# Patient Record
Sex: MALE | Race: WHITE | Employment: OTHER | URBAN - METROPOLITAN AREA
[De-identification: names, ages, dates, MRNs, and addresses within clinical notes are randomized per-mention and may not be internally consistent; named-entity substitution may affect disease eponyms.]

---

## 2017-01-23 ENCOUNTER — APPOINTMENT (OUTPATIENT)
Dept: ULTRASOUND IMAGING | Age: 67
End: 2017-01-23
Attending: EMERGENCY MEDICINE
Payer: COMMERCIAL

## 2017-01-23 ENCOUNTER — HOSPITAL ENCOUNTER (EMERGENCY)
Age: 67
Discharge: HOME | End: 2017-01-24
Attending: EMERGENCY MEDICINE
Payer: COMMERCIAL

## 2017-01-23 DIAGNOSIS — L03.116 CELLULITIS OF LEFT LOWER EXTREMITY: ICD-10-CM

## 2017-01-23 DIAGNOSIS — M10.9 ACUTE GOUT OF LEFT ANKLE, UNSPECIFIED CAUSE: Primary | ICD-10-CM

## 2017-01-23 LAB
ALBUMIN SERPL BCP-MCNC: 3.8 G/DL (ref 3.2–4.6)
ALBUMIN/GLOB SERPL: 1 {RATIO} (ref 1.2–3.5)
ALP SERPL-CCNC: 69 U/L (ref 50–136)
ALT SERPL-CCNC: 24 U/L (ref 12–65)
ANION GAP BLD CALC-SCNC: 6 MMOL/L (ref 7–16)
AST SERPL W P-5'-P-CCNC: 32 U/L (ref 15–37)
BASOPHILS # BLD AUTO: 0 K/UL (ref 0–0.2)
BASOPHILS # BLD: 0 % (ref 0–2)
BILIRUB SERPL-MCNC: 0.5 MG/DL (ref 0.2–1.1)
BUN SERPL-MCNC: 11 MG/DL (ref 8–23)
CALCIUM SERPL-MCNC: 8.7 MG/DL (ref 8.3–10.4)
CHLORIDE SERPL-SCNC: 103 MMOL/L (ref 98–107)
CO2 SERPL-SCNC: 30 MMOL/L (ref 21–32)
CREAT SERPL-MCNC: 1.05 MG/DL (ref 0.8–1.5)
DIFFERENTIAL METHOD BLD: ABNORMAL
EOSINOPHIL # BLD: 0.3 K/UL (ref 0–0.8)
EOSINOPHIL NFR BLD: 4 % (ref 0.5–7.8)
ERYTHROCYTE [DISTWIDTH] IN BLOOD BY AUTOMATED COUNT: 13.2 % (ref 11.9–14.6)
GLOBULIN SER CALC-MCNC: 3.7 G/DL (ref 2.3–3.5)
GLUCOSE SERPL-MCNC: 116 MG/DL (ref 65–100)
HCT VFR BLD AUTO: 48.3 % (ref 41.1–50.3)
HGB BLD-MCNC: 16.8 G/DL (ref 13.6–17.2)
IMM GRANULOCYTES # BLD: 0 K/UL (ref 0–0.5)
IMM GRANULOCYTES NFR BLD AUTO: 0.1 % (ref 0–5)
LYMPHOCYTES # BLD AUTO: 31 % (ref 13–44)
LYMPHOCYTES # BLD: 2.3 K/UL (ref 0.5–4.6)
MCH RBC QN AUTO: 30.3 PG (ref 26.1–32.9)
MCHC RBC AUTO-ENTMCNC: 34.8 G/DL (ref 31.4–35)
MCV RBC AUTO: 87.2 FL (ref 79.6–97.8)
MONOCYTES # BLD: 0.3 K/UL (ref 0.1–1.3)
MONOCYTES NFR BLD AUTO: 5 % (ref 4–12)
NEUTS SEG # BLD: 4.3 K/UL (ref 1.7–8.2)
NEUTS SEG NFR BLD AUTO: 60 % (ref 43–78)
PLATELET # BLD AUTO: 235 K/UL (ref 150–450)
PMV BLD AUTO: 10.1 FL (ref 10.8–14.1)
POTASSIUM SERPL-SCNC: 3.9 MMOL/L (ref 3.5–5.1)
PROT SERPL-MCNC: 7.5 G/DL (ref 6.3–8.2)
RBC # BLD AUTO: 5.54 M/UL (ref 4.23–5.67)
SODIUM SERPL-SCNC: 139 MMOL/L (ref 136–145)
TROPONIN I BLD-MCNC: 0 NG/ML (ref 0–0.08)
WBC # BLD AUTO: 7.2 K/UL (ref 4.3–11.1)

## 2017-01-23 PROCEDURE — 93971 EXTREMITY STUDY: CPT

## 2017-01-23 PROCEDURE — 85025 COMPLETE CBC W/AUTO DIFF WBC: CPT | Performed by: EMERGENCY MEDICINE

## 2017-01-23 PROCEDURE — 99283 EMERGENCY DEPT VISIT LOW MDM: CPT | Performed by: EMERGENCY MEDICINE

## 2017-01-23 PROCEDURE — 80053 COMPREHEN METABOLIC PANEL: CPT | Performed by: EMERGENCY MEDICINE

## 2017-01-23 PROCEDURE — 84484 ASSAY OF TROPONIN QUANT: CPT

## 2017-01-23 RX ORDER — SULFAMETHOXAZOLE AND TRIMETHOPRIM 800; 160 MG/1; MG/1
1 TABLET ORAL 2 TIMES DAILY
Qty: 20 TAB | Refills: 0 | Status: SHIPPED | OUTPATIENT
Start: 2017-01-23 | End: 2017-02-02

## 2017-01-24 VITALS
HEART RATE: 68 BPM | TEMPERATURE: 98.7 F | RESPIRATION RATE: 18 BRPM | SYSTOLIC BLOOD PRESSURE: 164 MMHG | HEIGHT: 70 IN | OXYGEN SATURATION: 95 % | BODY MASS INDEX: 34.93 KG/M2 | DIASTOLIC BLOOD PRESSURE: 85 MMHG | WEIGHT: 244 LBS

## 2017-01-24 NOTE — ED TRIAGE NOTES
Patient to ed with c/o left ankle/leg swelling--reports hx of gout--patient reports he has been taking his colchicine and indocine x1 week without relief--patient has been on keflex r/t skin surgery recently--patient with redness to left ankle and calf

## 2017-01-24 NOTE — ED NOTES
Pt discharged, instructions and rx given to pt. Pt v\u to instructions. Pt ambulatory on discharge in no acute distress.

## 2017-01-24 NOTE — ED PROVIDER NOTES
HPI Comments: Patient just finishing a round of Keflex  Subsequent to a surgical excision of some skin cancer to the left side of his neck. States his gout flares in his left ankle several days ago when he started taking some of his indomethacin as well as colchicine with moderate improvement in the pain today, but noted increased swelling to left lower extremity today. Concerned he may have blood clot. Patient is a 74 y.o. male presenting with lower extremity edema. The history is provided by the patient and the spouse. Leg Swelling    This is a new problem. The current episode started 12 to 24 hours ago. The problem occurs constantly. The problem has not changed since onset. The pain is present in the left ankle. The quality of the pain is described as aching. The pain is at a severity of 4/10. Associated symptoms include stiffness. Pertinent negatives include no numbness, full range of motion, no tingling, no itching, no back pain and no neck pain. The symptoms are aggravated by palpation and standing. He has tried rest for the symptoms. The treatment provided mild relief. There has been no history of extremity trauma. Family history is significant for gout.         Past Medical History:   Diagnosis Date    Anterior myocardial infarction [facility]) 10/2003    Arrhythmia      paroxysmal a fib    Arteriosclerotic coronary artery disease 11/2005    Arthritis      fingers    Cancer [facility])      prostate    Coronary atherosclerosis of native coronary vessel 10/2001    Family history of premature CAD      Father hx cabg/CHF  Brother CABG x 2    Gout      right toe but no problem now    Hypercholesterolemia     Hypertension        Past Surgical History:   Procedure Laterality Date    Hx colonoscopy      Pr cardiac surg procedure unlist       stent    Pr cardiac surg procedure unlist  March 9,2016     quidrupal bypass     Hx coronary artery bypass graft  3/9/2016     x4 - Dr. Emilia Jacques Hx orthopaedic 2 back surg    Hx prostatectomy      Hx heent       tonsillectomy    Colonoscopy N/A 12/21/2016     COLONOSCOPY  BMI 34 performed by Jayshree Lima MD at [facility] ENDOSCOPY         Family History:   Problem Relation Age of Onset    Heart Disease Father     Diabetes Brother     Heart Disease Brother     Cancer Sister     Colon Cancer Neg Hx        Social History     Social History    Marital status:      Spouse name: N/A    Number of children: N/A    Years of education: N/A     Occupational History    Not on file. Social History Main Topics    Smoking status: Never Smoker    Smokeless tobacco: Never Used    Alcohol use Yes      Comment: beer socially-seldom    Drug use: No    Sexual activity: Not on file     Other Topics Concern    Not on file     Social History Narrative         ALLERGIES: Latex; Codeine; and Tape [adhesive]    Review of Systems   Constitutional: Negative for chills and fever. Musculoskeletal: Positive for arthralgias and stiffness. Negative for back pain and neck pain. Skin: Positive for color change. Negative for itching, rash and wound. Neurological: Negative for tingling and numbness. All other systems reviewed and are negative. Vitals:    01/23/17 2156   BP: 168/86   Pulse: 70   Resp: 18   Temp: 99.1 °F (37.3 °C)   SpO2: 94%   Weight: 110.7 kg (244 lb)   Height: 5' 10\" (1.778 m)            Physical Exam   Constitutional: He is oriented to person, place, and time. He appears well-developed and well-nourished. No distress. HENT:   Head: Normocephalic and atraumatic. Right Ear: Tympanic membrane and external ear normal.   Left Ear: Tympanic membrane and external ear normal.   Mouth/Throat: Oropharynx is clear and moist.   Eyes: Conjunctivae and EOM are normal. Pupils are equal, round, and reactive to light. Neck: Normal range of motion. Neck supple. No tracheal deviation present.    Cardiovascular: Normal rate, regular rhythm, normal heart sounds and intact distal pulses. Exam reveals no gallop and no friction rub. No murmur heard. Pulmonary/Chest: Effort normal and breath sounds normal. No respiratory distress. He has no wheezes. Abdominal: Soft. Bowel sounds are normal. He exhibits no distension and no mass. There is no hepatosplenomegaly. There is no tenderness. There is no rebound and no guarding. Musculoskeletal: Normal range of motion. He exhibits edema (2+ left lower extremity with mild erythema and warmth). Lymphadenopathy:     He has no cervical adenopathy. Neurological: He is alert and oriented to person, place, and time. He displays normal reflexes. No cranial nerve deficit. Skin: Skin is warm and dry. No rash noted. He is not diaphoretic. No erythema. Psychiatric: He has a normal mood and affect. Nursing note and vitals reviewed. MDM  Number of Diagnoses or Management Options  Acute gout of left ankle, unspecified cause: new and requires workup  Cellulitis of left lower extremity: new and requires workup     Amount and/or Complexity of Data Reviewed  Clinical lab tests: ordered and reviewed  Tests in the radiology section of CPT®: ordered and reviewed  Obtain history from someone other than the patient: yes  Review and summarize past medical records: yes    Risk of Complications, Morbidity, and/or Mortality  Presenting problems: high  Diagnostic procedures: moderate  Management options: high    Patient Progress  Patient progress: stable    ED Course       Procedures    The patient was observed in the ED. Although I suspect the erythema warmth and swelling are all due to his gout,  I will start him on Bactrim versus possible secondary cellulitis.     Results Reviewed:      Recent Results (from the past 24 hour(s))   CBC WITH AUTOMATED DIFF    Collection Time: 01/23/17  8:15 PM   Result Value Ref Range    WBC 7.2 4.3 - 11.1 K/uL    RBC 5.54 4.23 - 5.67 M/uL    HGB 16.8 13.6 - 17.2 g/dL    HCT 48.3 41.1 - 50.3 %    MCV 87.2 79.6 - 97.8 FL    MCH 30.3 26.1 - 32.9 PG    MCHC 34.8 31.4 - 35.0 g/dL    RDW 13.2 11.9 - 14.6 %    PLATELET 006 417 - 495 K/uL    MPV 10.1 (L) 10.8 - 14.1 FL    DF AUTOMATED      NEUTROPHILS 60 43 - 78 %    LYMPHOCYTES 31 13 - 44 %    MONOCYTES 5 4.0 - 12.0 %    EOSINOPHILS 4 0.5 - 7.8 %    BASOPHILS 0 0.0 - 2.0 %    IMMATURE GRANULOCYTES 0.1 0.0 - 5.0 %    ABS. NEUTROPHILS 4.3 1.7 - 8.2 K/UL    ABS. LYMPHOCYTES 2.3 0.5 - 4.6 K/UL    ABS. MONOCYTES 0.3 0.1 - 1.3 K/UL    ABS. EOSINOPHILS 0.3 0.0 - 0.8 K/UL    ABS. BASOPHILS 0.0 0.0 - 0.2 K/UL    ABS. IMM. GRANS. 0.0 0.0 - 0.5 K/UL   METABOLIC PANEL, COMPREHENSIVE    Collection Time: 01/23/17  8:15 PM   Result Value Ref Range    Sodium 139 136 - 145 mmol/L    Potassium 3.9 3.5 - 5.1 mmol/L    Chloride 103 98 - 107 mmol/L    CO2 30 21 - 32 mmol/L    Anion gap 6 (L) 7 - 16 mmol/L    Glucose 116 (H) 65 - 100 mg/dL    BUN 11 8 - 23 MG/DL    Creatinine 1.05 0.8 - 1.5 MG/DL    GFR est AA >60 >60 ml/min/1.73m2    GFR est non-AA >60 >60 ml/min/1.73m2    Calcium 8.7 8.3 - 10.4 MG/DL    Bilirubin, total 0.5 0.2 - 1.1 MG/DL    ALT 24 12 - 65 U/L    AST 32 15 - 37 U/L    Alk. phosphatase 69 50 - 136 U/L    Protein, total 7.5 6.3 - 8.2 g/dL    Albumin 3.8 3.2 - 4.6 g/dL    Globulin 3.7 (H) 2.3 - 3.5 g/dL    A-G Ratio 1.0 (L) 1.2 - 3.5     POC TROPONIN-I    Collection Time: 01/23/17 10:13 PM   Result Value Ref Range    Troponin-I (POC) 0 0.0 - 0.08 ng/ml     DUPLEX LOWER EXT VENOUS LEFT   Final Result   IMPRESSION: Negative for sonographic evidence of deep venous thrombosis left   lower extremity. I discussed the results of all labs, procedures, radiographs, and treatments with the patient and available family. Treatment plan is agreed upon and the patient is ready for discharge. All voiced understanding of the discharge plan and medication instructions or changes as appropriate. Questions about treatment in the ED were answered.   All were encouraged to return should symptoms worsen or new problems develop.

## 2017-02-27 ENCOUNTER — HOSPITAL ENCOUNTER (EMERGENCY)
Age: 67
Discharge: HOME | End: 2017-02-27
Attending: EMERGENCY MEDICINE
Payer: COMMERCIAL

## 2017-02-27 ENCOUNTER — APPOINTMENT (OUTPATIENT)
Dept: GENERAL RADIOLOGY | Age: 67
End: 2017-02-27
Payer: COMMERCIAL

## 2017-02-27 VITALS
BODY MASS INDEX: 35.79 KG/M2 | OXYGEN SATURATION: 95 % | SYSTOLIC BLOOD PRESSURE: 129 MMHG | HEIGHT: 70 IN | DIASTOLIC BLOOD PRESSURE: 70 MMHG | WEIGHT: 250 LBS | HEART RATE: 68 BPM | RESPIRATION RATE: 18 BRPM | TEMPERATURE: 98.9 F

## 2017-02-27 DIAGNOSIS — M54.50 ACUTE RIGHT-SIDED LOW BACK PAIN WITHOUT SCIATICA: Primary | ICD-10-CM

## 2017-02-27 PROCEDURE — 96372 THER/PROPH/DIAG INJ SC/IM: CPT | Performed by: PHYSICIAN ASSISTANT

## 2017-02-27 PROCEDURE — 74011250636 HC RX REV CODE- 250/636

## 2017-02-27 PROCEDURE — 99284 EMERGENCY DEPT VISIT MOD MDM: CPT | Performed by: PHYSICIAN ASSISTANT

## 2017-02-27 PROCEDURE — 72100 X-RAY EXAM L-S SPINE 2/3 VWS: CPT

## 2017-02-27 PROCEDURE — 81003 URINALYSIS AUTO W/O SCOPE: CPT | Performed by: PHYSICIAN ASSISTANT

## 2017-02-27 PROCEDURE — 74011250636 HC RX REV CODE- 250/636: Performed by: PHYSICIAN ASSISTANT

## 2017-02-27 RX ORDER — METHOCARBAMOL 750 MG/1
750 TABLET, FILM COATED ORAL 4 TIMES DAILY
Qty: 40 TAB | Refills: 0 | Status: SHIPPED | OUTPATIENT
Start: 2017-02-27 | End: 2017-03-09

## 2017-02-27 RX ORDER — DIAZEPAM 10 MG/2ML
5 INJECTION INTRAMUSCULAR
Status: COMPLETED | OUTPATIENT
Start: 2017-02-27 | End: 2017-02-27

## 2017-02-27 RX ORDER — TRAMADOL HYDROCHLORIDE 50 MG/1
50 TABLET ORAL
Qty: 20 TAB | Refills: 0 | Status: SHIPPED | OUTPATIENT
Start: 2017-02-27 | End: 2018-03-28

## 2017-02-27 RX ADMIN — DIAZEPAM 5 MG: 5 INJECTION, SOLUTION INTRAMUSCULAR; INTRAVENOUS at 15:01

## 2017-02-27 NOTE — DISCHARGE INSTRUCTIONS
Back Pain: Care Instructions  Your Care Instructions    Back pain has many possible causes. It is often related to problems with muscles and ligaments of the back. It may also be related to problems with the nerves, discs, or bones of the back. Moving, lifting, standing, sitting, or sleeping in an awkward way can strain the back. Sometimes you don't notice the injury until later. Arthritis is another common cause of back pain. Although it may hurt a lot, back pain usually improves on its own within several weeks. Most people recover in 12 weeks or less. Using good home treatment and being careful not to stress your back can help you feel better sooner. Follow-up care is a key part of your treatment and safety. Be sure to make and go to all appointments, and call your doctor if you are having problems. Its also a good idea to know your test results and keep a list of the medicines you take. How can you care for yourself at home? · Sit or lie in positions that are most comfortable and reduce your pain. Try one of these positions when you lie down:  ¨ Lie on your back with your knees bent and supported by large pillows. ¨ Lie on the floor with your legs on the seat of a sofa or chair. Jenna Renetta on your side with your knees and hips bent and a pillow between your legs. ¨ Lie on your stomach if it does not make pain worse. · Do not sit up in bed, and avoid soft couches and twisted positions. Bed rest can help relieve pain at first, but it delays healing. Avoid bed rest after the first day of back pain. · Change positions every 30 minutes. If you must sit for long periods of time, take breaks from sitting. Get up and walk around, or lie in a comfortable position. · Try using a heating pad on a low or medium setting for 15 to 20 minutes every 2 or 3 hours. Try a warm shower in place of one session with the heating pad. · You can also try an ice pack for 10 to 15 minutes every 2 to 3 hours.  Put a thin cloth between the ice pack and your skin. · Take pain medicines exactly as directed. ¨ If the doctor gave you a prescription medicine for pain, take it as prescribed. ¨ If you are not taking a prescription pain medicine, ask your doctor if you can take an over-the-counter medicine. · Take short walks several times a day. You can start with 5 to 10 minutes, 3 or 4 times a day, and work up to longer walks. Walk on level surfaces and avoid hills and stairs until your back is better. · Return to work and other activities as soon as you can. Continued rest without activity is usually not good for your back. · To prevent future back pain, do exercises to stretch and strengthen your back and stomach. Learn how to use good posture, safe lifting techniques, and proper body mechanics. When should you call for help? Call your doctor now or seek immediate medical care if:  · You have new or worsening numbness in your legs. · You have new or worsening weakness in your legs. (This could make it hard to stand up.)  · You lose control of your bladder or bowels. Watch closely for changes in your health, and be sure to contact your doctor if:  · Your pain gets worse. · You are not getting better after 2 weeks. Where can you learn more? Go to http://prasad-kelly.info/. Enter S971 in the search box to learn more about \"Back Pain: Care Instructions. \"  Current as of: May 23, 2016  Content Version: 11.1  © 0930-5603 [email or web address]. Care instructions adapted under license by [employer] (which disclaims liability or warranty for this information). If you have questions about a medical condition or this instruction, always ask your healthcare professional. [address] any warranty or liability for your use of this information. Back Stretches: Exercises  Your Care Instructions  Here are some examples of exercises for stretching your back.  Start each exercise slowly. Ease off the exercise if you start to have pain. Your doctor or physical therapist will tell you when you can start these exercises and which ones will work best for you. How to do the exercises  Overhead stretch    1. Stand comfortably with your feet shoulder-width apart. 2. Looking straight ahead, raise both arms over your head and reach toward the ceiling. Do not allow your head to tilt back. 3. Hold for 15 to 30 seconds, then lower your arms to your sides. 4. Repeat 2 to 4 times. Side stretch    1. Stand comfortably with your feet shoulder-width apart. 2. Raise one arm over your head, and then lean to the other side. 3. Slide your hand down your leg as you let the weight of your arm gently stretch your side muscles. Hold for 15 to 30 seconds. 4. Repeat 2 to 4 times on each side. Press-up    1. Lie on your stomach, supporting your body with your forearms. 2. Press your elbows down into the floor to raise your upper back. As you do this, relax your stomach muscles and allow your back to arch without using your back muscles. As your press up, do not let your hips or pelvis come off the floor. 3. Hold for 15 to 30 seconds, then relax. 4. Repeat 2 to 4 times. Relax and rest    1. Lie on your back with a rolled towel under your neck and a pillow under your knees. Extend your arms comfortably to your sides. 2. Relax and breathe normally. 3. Remain in this position for about 10 minutes. 4. If you can, do this 2 or 3 times each day. Follow-up care is a key part of your treatment and safety. Be sure to make and go to all appointments, and call your doctor if you are having problems. It's also a good idea to know your test results and keep a list of the medicines you take. Where can you learn more? Go to http://prasad-kelly.info/. Enter E197 in the search box to learn more about \"Back Stretches: Exercises. \"  Current as of: May 23, 2016  Content Version: 11.1  © 8932-1859 [employer]. Care instructions adapted under license by [employer] (which disclaims liability or warranty for this information). If you have questions about a medical condition or this instruction, always ask your healthcare professional. [address] any warranty or liability for your use of this information.

## 2017-02-27 NOTE — ED TRIAGE NOTES
Reports lower back pain. States has had back surgery before. Patient states has been going on a while but worsened on Friday.

## 2017-02-27 NOTE — ED PROVIDER NOTES
Patient is a 74 y.o. male presenting with back pain. The history is provided by the patient. Back Pain    This is a new problem. The current episode started more than 2 days ago. The problem has not changed since onset. The problem occurs constantly. Patient reports not work related injury. The pain is associated with no known injury. The pain is present in the lower back. The quality of the pain is described as aching. The pain does not radiate. The pain is at a severity of 10/10. The pain is moderate. The symptoms are aggravated by certain positions. The pain is the same all the time. Pertinent negatives include no leg pain, no paresthesias, no paresis, no tingling and no weakness. He has tried NSAIDs and bed rest for the symptoms. The treatment provided no relief. Risk factors include a sedentary lifestyle. The patient's surgical history includes laminectomy.        Past Medical History:   Diagnosis Date    Anterior myocardial infarction [facility]) 10/2003    Arrhythmia     paroxysmal a fib    Arteriosclerotic coronary artery disease 11/2005    Arthritis     fingers    Cancer [facility])     prostate    Coronary atherosclerosis of native coronary vessel 10/2001    Family history of premature CAD     Father hx cabg/CHF  Brother CABG x 2    Gout     right toe but no problem now    Hypercholesterolemia     Hypertension        Past Surgical History:   Procedure Laterality Date    CARDIAC SURG PROCEDURE UNLIST      stent    CARDIAC SURG PROCEDURE UNLIST  March 9,2016    quidrupal bypass     COLONOSCOPY N/A 12/21/2016    COLONOSCOPY  BMI 34 performed by Raj Albright MD at [facility] ENDOSCOPY    HX COLONOSCOPY      HX CORONARY ARTERY BYPASS GRAFT  3/9/2016    x4 - Dr. Chantal Wheeler    HX HEENT      tonsillectomy    HX ORTHOPAEDIC      2 back surg    HX PROSTATECTOMY           Family History:   Problem Relation Age of Onset    Heart Disease Father     Diabetes Brother     Heart Disease Brother     Cancer Sister    Nolia Darling Colon Cancer Neg Hx        Social History     Social History    Marital status:      Spouse name: N/A    Number of children: N/A    Years of education: N/A     Occupational History    Not on file. Social History Main Topics    Smoking status: Never Smoker    Smokeless tobacco: Never Used    Alcohol use Yes      Comment: beer socially-seldom    Drug use: No    Sexual activity: Not on file     Other Topics Concern    Not on file     Social History Narrative         ALLERGIES: Latex; Codeine; and Tape [adhesive]    Review of Systems   Musculoskeletal: Positive for back pain. Neurological: Negative for tingling, weakness and paresthesias. All other systems reviewed and are negative. Vitals:    02/27/17 1356   BP: 156/79   Pulse: 77   Resp: 16   Temp: 98.2 °F (36.8 °C)   SpO2: 95%   Weight: 113.4 kg (250 lb)   Height: 5' 10\" (1.778 m)            Physical Exam   Constitutional: He is oriented to person, place, and time. He appears well-developed and well-nourished. No distress. HENT:   Head: Normocephalic and atraumatic. Eyes: Conjunctivae and EOM are normal. Pupils are equal, round, and reactive to light. Neck: Normal range of motion. Neck supple. Cardiovascular: Normal rate and regular rhythm. Pulmonary/Chest: Effort normal and breath sounds normal. No respiratory distress. He has no wheezes. Abdominal: Soft. Bowel sounds are normal.   Musculoskeletal: He exhibits tenderness. He exhibits no edema. Back:    Rt lower back pain to palpation no bony pain, limited motion due to pain, no skin changes, no pain into buttocks or legs, no numbness or tingling into legs, no bowel or bladder symptoms    Neurological: He is alert and oriented to person, place, and time. Skin: Skin is warm. Nursing note and vitals reviewed.        MDM  Number of Diagnoses or Management Options  Diagnosis management comments: Urine dip -, l spine x rays with djd, pt given 5 mg valium im, starting to feel some better, will give rx for ultram and robaxin, stressed moist heat and motion, see pmd for recheck        Amount and/or Complexity of Data Reviewed  Clinical lab tests: ordered and reviewed  Tests in the radiology section of CPT®: ordered and reviewed  Review and summarize past medical records: yes    Risk of Complications, Morbidity, and/or Mortality  Presenting problems: moderate  Diagnostic procedures: low  Management options: low    Patient Progress  Patient progress: improved    ED Course       Procedures

## 2017-03-14 PROBLEM — I10 HYPERTENSION: Status: ACTIVE | Noted: 2017-03-14

## 2017-07-11 ENCOUNTER — HOSPITAL ENCOUNTER (OUTPATIENT)
Dept: GENERAL RADIOLOGY | Age: 67
Discharge: HOME | End: 2017-07-11
Payer: COMMERCIAL

## 2017-07-11 DIAGNOSIS — R05.8 RECURRENT COUGH: ICD-10-CM

## 2017-07-11 PROCEDURE — 71020 XR CHEST PA LAT: CPT

## 2018-09-11 ENCOUNTER — HOSPITAL ENCOUNTER (OUTPATIENT)
Dept: LAB | Age: 68
Discharge: HOME | End: 2018-09-11
Payer: COMMERCIAL

## 2018-09-11 DIAGNOSIS — E78.5 DYSLIPIDEMIA: Chronic | ICD-10-CM

## 2018-09-11 LAB
ALBUMIN SERPL-MCNC: 3.7 G/DL (ref 3.2–4.6)
ALBUMIN/GLOB SERPL: 1 {RATIO} (ref 1.2–3.5)
ALP SERPL-CCNC: 57 U/L (ref 50–136)
ALT SERPL-CCNC: 26 U/L (ref 12–65)
AST SERPL-CCNC: 25 U/L (ref 15–37)
BILIRUB DIRECT SERPL-MCNC: <0.1 MG/DL
BILIRUB SERPL-MCNC: 0.6 MG/DL (ref 0.2–1.1)
CHOLEST SERPL-MCNC: 113 MG/DL
GLOBULIN SER CALC-MCNC: 3.7 G/DL (ref 2.3–3.5)
HDLC SERPL-MCNC: 36 MG/DL (ref 40–60)
HDLC SERPL: 3.1 {RATIO}
LDLC SERPL CALC-MCNC: 47 MG/DL
LIPID PROFILE,FLP: ABNORMAL
PROT SERPL-MCNC: 7.4 G/DL (ref 6.3–8.2)
TRIGL SERPL-MCNC: 150 MG/DL (ref 35–150)
VLDLC SERPL CALC-MCNC: 30 MG/DL (ref 6–23)

## 2018-09-11 PROCEDURE — 36415 COLL VENOUS BLD VENIPUNCTURE: CPT

## 2018-09-11 PROCEDURE — 80076 HEPATIC FUNCTION PANEL: CPT

## 2018-09-11 PROCEDURE — 80061 LIPID PANEL: CPT

## 2019-04-01 PROBLEM — E66.01 SEVERE OBESITY (HCC): Status: ACTIVE | Noted: 2019-04-01

## 2019-08-21 ENCOUNTER — HOSPITAL ENCOUNTER (OUTPATIENT)
Dept: LAB | Age: 69
Discharge: HOME | End: 2019-08-21
Payer: COMMERCIAL

## 2019-08-21 DIAGNOSIS — E78.5 DYSLIPIDEMIA: Chronic | ICD-10-CM

## 2019-08-21 LAB
ALBUMIN SERPL-MCNC: 3.7 G/DL (ref 3.2–4.6)
ALBUMIN/GLOB SERPL: 1 {RATIO} (ref 1.2–3.5)
ALP SERPL-CCNC: 59 U/L (ref 50–136)
ALT SERPL-CCNC: 26 U/L (ref 12–65)
AST SERPL-CCNC: 21 U/L (ref 15–37)
BILIRUB DIRECT SERPL-MCNC: 0.1 MG/DL
BILIRUB SERPL-MCNC: 0.8 MG/DL (ref 0.2–1.1)
CHOLEST SERPL-MCNC: 118 MG/DL
GLOBULIN SER CALC-MCNC: 3.6 G/DL (ref 2.3–3.5)
HDLC SERPL-MCNC: 40 MG/DL (ref 40–60)
HDLC SERPL: 3 {RATIO}
LDLC SERPL CALC-MCNC: 54.2 MG/DL
LIPID PROFILE,FLP: ABNORMAL
PROT SERPL-MCNC: 7.3 G/DL (ref 6.3–8.2)
TRIGL SERPL-MCNC: 119 MG/DL (ref 35–150)
VLDLC SERPL CALC-MCNC: 23.8 MG/DL (ref 6–23)

## 2019-08-21 PROCEDURE — 80061 LIPID PANEL: CPT

## 2019-08-21 PROCEDURE — 80076 HEPATIC FUNCTION PANEL: CPT

## 2019-08-21 PROCEDURE — 36415 COLL VENOUS BLD VENIPUNCTURE: CPT

## 2020-08-24 ENCOUNTER — HOSPITAL ENCOUNTER (OUTPATIENT)
Dept: LAB | Age: 70
Discharge: HOME | End: 2020-08-24
Payer: COMMERCIAL

## 2020-08-24 DIAGNOSIS — I25.700 CORONARY ARTERY DISEASE INVOLVING CORONARY BYPASS GRAFT OF NATIVE HEART WITH UNSTABLE ANGINA PECTORIS (HCC): ICD-10-CM

## 2020-08-24 DIAGNOSIS — E78.5 DYSLIPIDEMIA: ICD-10-CM

## 2020-08-24 LAB
CHOLEST SERPL-MCNC: 157 MG/DL
HDLC SERPL-MCNC: 38 MG/DL (ref 40–60)
HDLC SERPL: 4.1 {RATIO}
LDLC SERPL CALC-MCNC: 87.8 MG/DL
LIPID PROFILE,FLP: ABNORMAL
TRIGL SERPL-MCNC: 156 MG/DL (ref 35–150)
VLDLC SERPL CALC-MCNC: 31.2 MG/DL (ref 6–23)

## 2020-08-24 PROCEDURE — 80061 LIPID PANEL: CPT

## 2020-08-24 PROCEDURE — 36415 COLL VENOUS BLD VENIPUNCTURE: CPT

## 2021-04-02 ENCOUNTER — HOSPITAL ENCOUNTER (OUTPATIENT)
Dept: LAB | Age: 71
Discharge: HOME | End: 2021-04-02
Payer: COMMERCIAL

## 2021-04-02 DIAGNOSIS — E78.5 DYSLIPIDEMIA: Chronic | ICD-10-CM

## 2021-04-02 LAB
ALBUMIN SERPL-MCNC: 3.7 G/DL (ref 3.2–4.6)
ALBUMIN/GLOB SERPL: 1 {RATIO} (ref 1.2–3.5)
ALP SERPL-CCNC: 70 U/L (ref 50–136)
ALT SERPL-CCNC: 24 U/L (ref 12–65)
AST SERPL-CCNC: 22 U/L (ref 15–37)
BILIRUB DIRECT SERPL-MCNC: 0.2 MG/DL
BILIRUB SERPL-MCNC: 0.9 MG/DL (ref 0.2–1.1)
CHOLEST SERPL-MCNC: 96 MG/DL
GLOBULIN SER CALC-MCNC: 3.8 G/DL (ref 2.3–3.5)
HDLC SERPL-MCNC: 39 MG/DL (ref 40–60)
HDLC SERPL: 2.5 {RATIO}
LDLC SERPL CALC-MCNC: 33.4 MG/DL
LIPID PROFILE,FLP: ABNORMAL
PROT SERPL-MCNC: 7.5 G/DL (ref 6.3–8.2)
TRIGL SERPL-MCNC: 118 MG/DL (ref 35–150)
VLDLC SERPL CALC-MCNC: 23.6 MG/DL (ref 6–23)

## 2021-04-02 PROCEDURE — 80076 HEPATIC FUNCTION PANEL: CPT

## 2021-04-02 PROCEDURE — 80061 LIPID PANEL: CPT

## 2021-04-02 PROCEDURE — 36415 COLL VENOUS BLD VENIPUNCTURE: CPT

## 2021-10-25 ENCOUNTER — HOSPITAL ENCOUNTER (OUTPATIENT)
Dept: LAB | Age: 71
Discharge: HOME | End: 2021-10-25

## 2021-10-25 PROCEDURE — 88305 TISSUE EXAM BY PATHOLOGIST: CPT

## 2021-12-04 ENCOUNTER — HOSPITAL ENCOUNTER (INPATIENT)
Age: 71
LOS: 13 days | Discharge: HOME HEALTH | DRG: 871 | End: 2021-12-17
Attending: EMERGENCY MEDICINE | Admitting: FAMILY MEDICINE
Payer: COMMERCIAL

## 2021-12-04 ENCOUNTER — APPOINTMENT (OUTPATIENT)
Dept: GENERAL RADIOLOGY | Age: 71
DRG: 871 | End: 2021-12-04
Attending: EMERGENCY MEDICINE
Payer: COMMERCIAL

## 2021-12-04 DIAGNOSIS — J12.82 PNEUMONIA DUE TO COVID-19 VIRUS: Primary | ICD-10-CM

## 2021-12-04 DIAGNOSIS — U07.1 PNEUMONIA DUE TO COVID-19 VIRUS: Primary | ICD-10-CM

## 2021-12-04 DIAGNOSIS — I48.91 ATRIAL FIBRILLATION WITH RVR (HCC): ICD-10-CM

## 2021-12-04 DIAGNOSIS — I25.10 CORONARY ARTERY DISEASE INVOLVING NATIVE CORONARY ARTERY OF NATIVE HEART WITHOUT ANGINA PECTORIS: ICD-10-CM

## 2021-12-04 DIAGNOSIS — I48.91 ATRIAL FIBRILLATION WITH RAPID VENTRICULAR RESPONSE (HCC): ICD-10-CM

## 2021-12-04 DIAGNOSIS — J96.01 ACUTE HYPOXEMIC RESPIRATORY FAILURE (HCC): ICD-10-CM

## 2021-12-04 DIAGNOSIS — I10 PRIMARY HYPERTENSION: ICD-10-CM

## 2021-12-04 DIAGNOSIS — R09.02 HYPOXIA: ICD-10-CM

## 2021-12-04 LAB
ALBUMIN SERPL-MCNC: 3.3 G/DL (ref 3.2–4.6)
ALBUMIN/GLOB SERPL: 0.7 {RATIO} (ref 1.2–3.5)
ALP SERPL-CCNC: 60 U/L (ref 50–136)
ALT SERPL-CCNC: 34 U/L (ref 12–65)
ANION GAP SERPL CALC-SCNC: 11 MMOL/L (ref 7–16)
AST SERPL-CCNC: 44 U/L (ref 15–37)
ATRIAL RATE: 178 BPM
BASOPHILS # BLD: 0 K/UL (ref 0–0.2)
BASOPHILS NFR BLD: 0 % (ref 0–2)
BILIRUB SERPL-MCNC: 1.7 MG/DL (ref 0.2–1.1)
BUN SERPL-MCNC: 19 MG/DL (ref 8–23)
CALCIUM SERPL-MCNC: 9 MG/DL (ref 8.3–10.4)
CALCULATED R AXIS, ECG10: 112 DEGREES
CALCULATED T AXIS, ECG11: 41 DEGREES
CHLORIDE SERPL-SCNC: 88 MMOL/L (ref 98–107)
CO2 SERPL-SCNC: 27 MMOL/L (ref 21–32)
CREAT SERPL-MCNC: 1.19 MG/DL (ref 0.8–1.5)
CRP SERPL-MCNC: 16.5 MG/DL (ref 0–0.9)
CRP SERPL-MCNC: 17.7 MG/DL (ref 0–0.9)
D DIMER PPP FEU-MCNC: 0.47 UG/ML(FEU)
DIAGNOSIS, 93000: NORMAL
DIFFERENTIAL METHOD BLD: ABNORMAL
EOSINOPHIL # BLD: 0.1 K/UL (ref 0–0.8)
EOSINOPHIL NFR BLD: 1 % (ref 0.5–7.8)
ERYTHROCYTE [DISTWIDTH] IN BLOOD BY AUTOMATED COUNT: 12.8 % (ref 11.9–14.6)
FERRITIN SERPL-MCNC: 410 NG/ML (ref 8–388)
GLOBULIN SER CALC-MCNC: 4.8 G/DL (ref 2.3–3.5)
GLUCOSE SERPL-MCNC: 133 MG/DL (ref 65–100)
HCT VFR BLD AUTO: 48.6 % (ref 41.1–50.3)
HGB BLD-MCNC: 16.9 G/DL (ref 13.6–17.2)
IMM GRANULOCYTES # BLD AUTO: 0.1 K/UL (ref 0–0.5)
IMM GRANULOCYTES NFR BLD AUTO: 1 % (ref 0–5)
LACTATE SERPL-SCNC: 1.5 MMOL/L (ref 0.4–2)
LYMPHOCYTES # BLD: 1 K/UL (ref 0.5–4.6)
LYMPHOCYTES NFR BLD: 8 % (ref 13–44)
MAGNESIUM SERPL-MCNC: 1.8 MG/DL (ref 1.8–2.4)
MCH RBC QN AUTO: 29.7 PG (ref 26.1–32.9)
MCHC RBC AUTO-ENTMCNC: 34.8 G/DL (ref 31.4–35)
MCV RBC AUTO: 85.4 FL (ref 79.6–97.8)
MONOCYTES # BLD: 1 K/UL (ref 0.1–1.3)
MONOCYTES NFR BLD: 8 % (ref 4–12)
NEUTS SEG # BLD: 10.3 K/UL (ref 1.7–8.2)
NEUTS SEG NFR BLD: 83 % (ref 43–78)
NRBC # BLD: 0 K/UL (ref 0–0.2)
PLATELET # BLD AUTO: 272 K/UL (ref 150–450)
PMV BLD AUTO: 9.9 FL (ref 9.4–12.3)
POTASSIUM SERPL-SCNC: 3.9 MMOL/L (ref 3.5–5.1)
PROCALCITONIN SERPL-MCNC: 0.29 NG/ML (ref 0–0.49)
PROCALCITONIN SERPL-MCNC: 0.31 NG/ML (ref 0–0.49)
PROT SERPL-MCNC: 8.1 G/DL (ref 6.3–8.2)
Q-T INTERVAL, ECG07: 286 MS
QRS DURATION, ECG06: 78 MS
QTC CALCULATION (BEZET), ECG08: 449 MS
RBC # BLD AUTO: 5.69 M/UL (ref 4.23–5.6)
SODIUM SERPL-SCNC: 126 MMOL/L (ref 136–145)
VENTRICULAR RATE, ECG03: 148 BPM
WBC # BLD AUTO: 12.4 K/UL (ref 4.3–11.1)

## 2021-12-04 PROCEDURE — 74011250637 HC RX REV CODE- 250/637: Performed by: INTERNAL MEDICINE

## 2021-12-04 PROCEDURE — 82728 ASSAY OF FERRITIN: CPT

## 2021-12-04 PROCEDURE — 74011000250 HC RX REV CODE- 250: Performed by: EMERGENCY MEDICINE

## 2021-12-04 PROCEDURE — 71045 X-RAY EXAM CHEST 1 VIEW: CPT

## 2021-12-04 PROCEDURE — 94760 N-INVAS EAR/PLS OXIMETRY 1: CPT

## 2021-12-04 PROCEDURE — 99223 1ST HOSP IP/OBS HIGH 75: CPT | Performed by: INTERNAL MEDICINE

## 2021-12-04 PROCEDURE — 74011250637 HC RX REV CODE- 250/637: Performed by: EMERGENCY MEDICINE

## 2021-12-04 PROCEDURE — 74011000250 HC RX REV CODE- 250: Performed by: FAMILY MEDICINE

## 2021-12-04 PROCEDURE — 74011250636 HC RX REV CODE- 250/636: Performed by: FAMILY MEDICINE

## 2021-12-04 PROCEDURE — 93005 ELECTROCARDIOGRAM TRACING: CPT | Performed by: EMERGENCY MEDICINE

## 2021-12-04 PROCEDURE — 65660000000 HC RM CCU STEPDOWN

## 2021-12-04 PROCEDURE — 83735 ASSAY OF MAGNESIUM: CPT

## 2021-12-04 PROCEDURE — 99285 EMERGENCY DEPT VISIT HI MDM: CPT

## 2021-12-04 PROCEDURE — 74011250636 HC RX REV CODE- 250/636: Performed by: EMERGENCY MEDICINE

## 2021-12-04 PROCEDURE — 83605 ASSAY OF LACTIC ACID: CPT

## 2021-12-04 PROCEDURE — 87040 BLOOD CULTURE FOR BACTERIA: CPT

## 2021-12-04 PROCEDURE — 80053 COMPREHEN METABOLIC PANEL: CPT

## 2021-12-04 PROCEDURE — 85379 FIBRIN DEGRADATION QUANT: CPT

## 2021-12-04 PROCEDURE — 86140 C-REACTIVE PROTEIN: CPT

## 2021-12-04 PROCEDURE — 77010033678 HC OXYGEN DAILY

## 2021-12-04 PROCEDURE — 96375 TX/PRO/DX INJ NEW DRUG ADDON: CPT

## 2021-12-04 PROCEDURE — 84145 PROCALCITONIN (PCT): CPT

## 2021-12-04 PROCEDURE — 94762 N-INVAS EAR/PLS OXIMTRY CONT: CPT

## 2021-12-04 PROCEDURE — 85025 COMPLETE CBC W/AUTO DIFF WBC: CPT

## 2021-12-04 PROCEDURE — XW0DXM6 INTRODUCTION OF BARICITINIB INTO MOUTH AND PHARYNX, EXTERNAL APPROACH, NEW TECHNOLOGY GROUP 6: ICD-10-PCS | Performed by: FAMILY MEDICINE

## 2021-12-04 PROCEDURE — 96374 THER/PROPH/DIAG INJ IV PUSH: CPT

## 2021-12-04 PROCEDURE — 74011250637 HC RX REV CODE- 250/637: Performed by: FAMILY MEDICINE

## 2021-12-04 PROCEDURE — 36415 COLL VENOUS BLD VENIPUNCTURE: CPT

## 2021-12-04 RX ORDER — ACETAMINOPHEN 650 MG/1
650 SUPPOSITORY RECTAL
Status: DISCONTINUED | OUTPATIENT
Start: 2021-12-04 | End: 2021-12-17 | Stop reason: HOSPADM

## 2021-12-04 RX ORDER — ONDANSETRON 2 MG/ML
4 INJECTION INTRAMUSCULAR; INTRAVENOUS
Status: DISCONTINUED | OUTPATIENT
Start: 2021-12-04 | End: 2021-12-17 | Stop reason: HOSPADM

## 2021-12-04 RX ORDER — ACETAMINOPHEN 500 MG
1000 TABLET ORAL
Status: COMPLETED | OUTPATIENT
Start: 2021-12-04 | End: 2021-12-04

## 2021-12-04 RX ORDER — GUAIFENESIN/DEXTROMETHORPHAN 100-10MG/5
5 SYRUP ORAL
Status: DISCONTINUED | OUTPATIENT
Start: 2021-12-04 | End: 2021-12-17 | Stop reason: HOSPADM

## 2021-12-04 RX ORDER — DEXAMETHASONE SODIUM PHOSPHATE 100 MG/10ML
6 INJECTION INTRAMUSCULAR; INTRAVENOUS EVERY 24 HOURS
Status: DISCONTINUED | OUTPATIENT
Start: 2021-12-05 | End: 2021-12-06

## 2021-12-04 RX ORDER — SODIUM CHLORIDE, SODIUM LACTATE, POTASSIUM CHLORIDE, CALCIUM CHLORIDE 600; 310; 30; 20 MG/100ML; MG/100ML; MG/100ML; MG/100ML
75 INJECTION, SOLUTION INTRAVENOUS CONTINUOUS
Status: DISCONTINUED | OUTPATIENT
Start: 2021-12-04 | End: 2021-12-06

## 2021-12-04 RX ORDER — ASPIRIN 81 MG/1
81 TABLET ORAL DAILY
Status: DISCONTINUED | OUTPATIENT
Start: 2021-12-05 | End: 2021-12-17 | Stop reason: HOSPADM

## 2021-12-04 RX ORDER — ENOXAPARIN SODIUM 100 MG/ML
30 INJECTION SUBCUTANEOUS EVERY 12 HOURS
Status: DISCONTINUED | OUTPATIENT
Start: 2021-12-04 | End: 2021-12-04

## 2021-12-04 RX ORDER — ACETAMINOPHEN 325 MG/1
650 TABLET ORAL
Status: DISCONTINUED | OUTPATIENT
Start: 2021-12-04 | End: 2021-12-17 | Stop reason: HOSPADM

## 2021-12-04 RX ORDER — SODIUM CHLORIDE 0.9 % (FLUSH) 0.9 %
5-10 SYRINGE (ML) INJECTION EVERY 8 HOURS
Status: DISCONTINUED | OUTPATIENT
Start: 2021-12-04 | End: 2021-12-09

## 2021-12-04 RX ORDER — DILTIAZEM HYDROCHLORIDE 5 MG/ML
10 INJECTION INTRAVENOUS ONCE
Status: COMPLETED | OUTPATIENT
Start: 2021-12-04 | End: 2021-12-04

## 2021-12-04 RX ORDER — HYDROCHLOROTHIAZIDE 25 MG/1
12.5 TABLET ORAL DAILY
Status: DISCONTINUED | OUTPATIENT
Start: 2021-12-05 | End: 2021-12-17 | Stop reason: HOSPADM

## 2021-12-04 RX ORDER — SODIUM CHLORIDE 0.9 % (FLUSH) 0.9 %
5-10 SYRINGE (ML) INJECTION AS NEEDED
Status: DISCONTINUED | OUTPATIENT
Start: 2021-12-04 | End: 2021-12-17 | Stop reason: HOSPADM

## 2021-12-04 RX ORDER — FLUTICASONE PROPIONATE 50 MCG
2 SPRAY, SUSPENSION (ML) NASAL DAILY
Status: DISCONTINUED | OUTPATIENT
Start: 2021-12-05 | End: 2021-12-17 | Stop reason: HOSPADM

## 2021-12-04 RX ORDER — SODIUM CHLORIDE 0.9 % (FLUSH) 0.9 %
5-40 SYRINGE (ML) INJECTION EVERY 8 HOURS
Status: DISCONTINUED | OUTPATIENT
Start: 2021-12-04 | End: 2021-12-17 | Stop reason: HOSPADM

## 2021-12-04 RX ORDER — METOPROLOL SUCCINATE 100 MG/1
100 TABLET, EXTENDED RELEASE ORAL DAILY
Status: DISCONTINUED | OUTPATIENT
Start: 2021-12-05 | End: 2021-12-04

## 2021-12-04 RX ORDER — ONDANSETRON 4 MG/1
4 TABLET, ORALLY DISINTEGRATING ORAL
Status: DISCONTINUED | OUTPATIENT
Start: 2021-12-04 | End: 2021-12-17 | Stop reason: HOSPADM

## 2021-12-04 RX ORDER — GUAIFENESIN 600 MG/1
600 TABLET, EXTENDED RELEASE ORAL EVERY 12 HOURS
Status: DISCONTINUED | OUTPATIENT
Start: 2021-12-04 | End: 2021-12-17 | Stop reason: HOSPADM

## 2021-12-04 RX ORDER — POLYETHYLENE GLYCOL 3350 17 G/17G
17 POWDER, FOR SOLUTION ORAL DAILY PRN
Status: DISCONTINUED | OUTPATIENT
Start: 2021-12-04 | End: 2021-12-17 | Stop reason: HOSPADM

## 2021-12-04 RX ORDER — DILTIAZEM HYDROCHLORIDE 30 MG/1
30 TABLET, FILM COATED ORAL
Status: DISCONTINUED | OUTPATIENT
Start: 2021-12-04 | End: 2021-12-04

## 2021-12-04 RX ORDER — ENOXAPARIN SODIUM 100 MG/ML
1 INJECTION SUBCUTANEOUS EVERY 12 HOURS
Status: DISCONTINUED | OUTPATIENT
Start: 2021-12-05 | End: 2021-12-08

## 2021-12-04 RX ORDER — METOPROLOL TARTRATE 5 MG/5ML
5 INJECTION INTRAVENOUS ONCE
Status: COMPLETED | OUTPATIENT
Start: 2021-12-04 | End: 2021-12-04

## 2021-12-04 RX ORDER — SODIUM CHLORIDE, SODIUM LACTATE, POTASSIUM CHLORIDE, CALCIUM CHLORIDE 600; 310; 30; 20 MG/100ML; MG/100ML; MG/100ML; MG/100ML
1000 INJECTION, SOLUTION INTRAVENOUS CONTINUOUS
Status: DISCONTINUED | OUTPATIENT
Start: 2021-12-04 | End: 2021-12-04

## 2021-12-04 RX ORDER — DEXAMETHASONE SODIUM PHOSPHATE 100 MG/10ML
6 INJECTION INTRAMUSCULAR; INTRAVENOUS
Status: COMPLETED | OUTPATIENT
Start: 2021-12-04 | End: 2021-12-04

## 2021-12-04 RX ORDER — SODIUM CHLORIDE 0.9 % (FLUSH) 0.9 %
5-40 SYRINGE (ML) INJECTION AS NEEDED
Status: DISCONTINUED | OUTPATIENT
Start: 2021-12-04 | End: 2021-12-17 | Stop reason: HOSPADM

## 2021-12-04 RX ORDER — METOPROLOL SUCCINATE 100 MG/1
100 TABLET, EXTENDED RELEASE ORAL EVERY 12 HOURS
Status: DISCONTINUED | OUTPATIENT
Start: 2021-12-04 | End: 2021-12-17 | Stop reason: HOSPADM

## 2021-12-04 RX ADMIN — ENOXAPARIN SODIUM 30 MG: 100 INJECTION SUBCUTANEOUS at 14:52

## 2021-12-04 RX ADMIN — SODIUM CHLORIDE, SODIUM LACTATE, POTASSIUM CHLORIDE, AND CALCIUM CHLORIDE 75 ML/HR: 600; 310; 30; 20 INJECTION, SOLUTION INTRAVENOUS at 11:32

## 2021-12-04 RX ADMIN — GUAIFENESIN 600 MG: 600 TABLET ORAL at 21:45

## 2021-12-04 RX ADMIN — DILTIAZEM HYDROCHLORIDE 30 MG: 30 TABLET, FILM COATED ORAL at 11:32

## 2021-12-04 RX ADMIN — ACETAMINOPHEN 1000 MG: 500 TABLET, FILM COATED ORAL at 09:01

## 2021-12-04 RX ADMIN — DILTIAZEM HYDROCHLORIDE 10 MG: 5 INJECTION INTRAVENOUS at 10:10

## 2021-12-04 RX ADMIN — Medication 10 ML: at 21:47

## 2021-12-04 RX ADMIN — METOPROLOL SUCCINATE 100 MG: 100 TABLET, EXTENDED RELEASE ORAL at 21:45

## 2021-12-04 RX ADMIN — DILTIAZEM HYDROCHLORIDE 30 MG: 30 TABLET, FILM COATED ORAL at 16:32

## 2021-12-04 RX ADMIN — DEXAMETHASONE SODIUM PHOSPHATE 6 MG: 10 INJECTION INTRAMUSCULAR; INTRAVENOUS at 10:02

## 2021-12-04 RX ADMIN — BARICITINIB 4 MG: 2 TABLET, FILM COATED ORAL at 14:52

## 2021-12-04 RX ADMIN — METOPROLOL TARTRATE 5 MG: 5 INJECTION INTRAVENOUS at 11:33

## 2021-12-04 RX ADMIN — GUAIFENESIN 600 MG: 600 TABLET ORAL at 14:52

## 2021-12-04 RX ADMIN — SODIUM CHLORIDE, SODIUM LACTATE, POTASSIUM CHLORIDE, AND CALCIUM CHLORIDE 1000 ML: 600; 310; 30; 20 INJECTION, SOLUTION INTRAVENOUS at 10:15

## 2021-12-04 RX ADMIN — Medication 10 ML: at 14:52

## 2021-12-04 RX ADMIN — Medication 5 ML: at 11:34

## 2021-12-04 NOTE — PROGRESS NOTES
MSN, CM:  Spoke with patient this afternoon about discharge planning. Patient lives with his wife \"Jesse\" and his sister Ulises Chiang" in own house with 5 steps for entrance. Patient has three children \"Radha, Anselmo Markham, and Enrique" which live locally. Patient is independent with all ADL's and requires no equipment for ambulation. Patient denies any home oxygen, HH or rehab in the past.  Patient confirms PCP is Dr. Harriet Hines and patient drives himself to all appointments. PT consulted for evaluation and recommendations. Case Management will continue to follow for any discharge needs. Care Management Interventions  PCP Verified by CM: Yes (Dr. Harriet Hines )  Mode of Transport at Discharge:  Other (see comment) (family to transport)  Health Maintenance Reviewed: Yes  Physical Therapy Consult: Yes  Support Systems: Spouse/Significant Other, Child(reddy), Other Family Member(s)  Confirm Follow Up Transport: Self  Freedom of Choice List was Provided with Basic Dialogue that Supports the Patient's Individualized Plan of Care/Goals, Treatment Preferences and Shares the Quality Data Associated with the Providers?: Yes  Discharge Location  Discharge Placement: Home

## 2021-12-04 NOTE — ED NOTES
TRANSFER - OUT REPORT:    Verbal report given to Elsa Luna (name) on Maricao Oats  being transferred to 803(unit) for routine progression of care       Report consisted of patients Situation, Background, Assessment and   Recommendations(SBAR). Information from the following report(s) SBAR, ED Summary, [facility] and Recent Results was reviewed with the receiving nurse. Lines:   Peripheral IV 12/04/21 Right Antecubital (Active)        Opportunity for questions and clarification was provided.       Patient transported with:   O2 @ 3 liters  Tech

## 2021-12-04 NOTE — PROGRESS NOTES
Pt admitted from ED he is alert and oriented rr are even dyspnea noted on exertion. O2 in place 3L NC lung sounds are coarse. Pt denies pain. Skin intact no issues noted dual skin assessment done with Ericka Mejias RN. Safety measures in place will continue to monitor.

## 2021-12-04 NOTE — H&P
[facility] Hospitalist Note     Admit Date:  2021  8:45 AM   Name:  Simon Daniels   Age:  73 y.o.  :  1950   MRN:  [id number]   PCP:  Elayne Epps MD  Treatment Team: Attending Provider: Danny Peña MD; Primary Nurse: Raymond Meyer RN    HPI/Subjective:   53-year-old man with past medical history significant for hypertension, CAD, paroxysmal A. fib, prostate cancer presented to ED with 1 week history of cough and congestion, associated with fevers, body aches, nausea and shortness of breath. Also reports decreased oral intake for few days. Denies any underlying lung disease. He was diagnosed with Covid on 12/3 at urgent care. Wife and sister also tested positive for Covid. Patient has not received Covid vaccination. In the ED patient was febrile up to 101.3, and A. fib with RVR, desatting to 88% on room air, requiring up to 3 L nasal cannula. Labs noted for WBC 12.4, hemoglobin 16.9, platelet 386, sodium 126 creatinine 1. 19. Chest x-ray consistent with viral pneumonitis. Hospitalist consulted for admission for further management.     Assessment and Plan:     Sepsis secondary to COVID-19 pneumonia:  Acute hypoxic respiratory failure secondary to COVID-19 pneumonia:  Meeting SIRS criteria on admission with possible source of infection  Desatting to 88% on room air, requiring up to 3 L nasal cannula  Continue supplemental oxygen as needed, wean as tolerated  Check procalcitonin, if elevated will consider to start patient on antibiotic to cover community-acquired pneumonia  Check D-dimer, if elevated will order CTA to rule out PE  Check inflammatory markers  Will order UA and blood culture to r/o any other source of infection  Start patient on Lovenox for DVT/PE prophylaxis  Start patient on Decadron 6 mg IV daily for 10 days  Start patient on baricitinib 4 mg twice daily for 14 days  Mucinex twice daily  Incentive spirometry  Encourage prone positioning    A. fib with RVR:  Likely triggered by acute illness and COVID-19 infection  Patient with history of paroxysmal A. fib, not on any anticoagulation  Heart rate in 140s on admission, has received Cardizem 10 mg IV once in the ED with improvement in heart rate to 120s  Give Lopressor 5 mg IV once  Start patient on Cardizem 30 mg 4 times daily  Resume Toprol-XL home dose  Lovenox for DVT/PE prophylaxis  Echocardiogram  Replete electrolyte as needed to keep potassium > 4 and magnesium >2  Cardiology consult, appreciate recommendation    Hyponatremia:  Likely secondary to dehydration, patient with decreased oral intake  Gentle hydration with NS, need to be careful for fluid overload state due to Covid infection  Monitor BMP    Hypertension/CAD/hyperlipidemia:  Resume home meds metoprolol, hydrochlorothiazide and aspirin  Patient at home on Repatha    History of prostate cancer:  Noted    Discharge planning: Admit to remote telemetry    DVT ppx ordered  Code status:  Full  Estimated LOS:  Greater than 2 midnights  Risk:  high    Hospital Problems as of 12/4/2021 Date Reviewed: 8/27/2021          Codes Class Noted - Resolved POA    Atrial fibrillation with RVR ([facility] ICD-10-CM: I48.91  ICD-9-CM: 427.31  12/4/2021 - Present Unknown        Acute hypoxemic respiratory failure ([facility] ICD-10-CM: J96.01  ICD-9-CM: 518.81  12/4/2021 - Present Unknown        * (Principal) COVID-19 ICD-10-CM: U07.1  ICD-9-CM: 079.89  12/4/2021 - Present Unknown        Hypertension ICD-10-CM: I10  ICD-9-CM: 401.9  3/14/2017 - Present Yes        Paroxysmal atrial fibrillation (HCC) ICD-10-CM: I48.0  ICD-9-CM: 427.31  3/12/2016 - Present Yes        CAD (coronary artery disease) ICD-10-CM: I25.10  ICD-9-CM: 414.00  3/12/2016 - Present Yes        Hypoxia ICD-10-CM: R09.02  ICD-9-CM: 799.02  3/11/2016 - Present Yes        S/P CABG x 4 ICD-10-CM: Z95.1  ICD-9-CM: V45.81  3/9/2016 - Present Yes                10 systems reviewed and negative except as noted in HPI.   Past Medical History:   Diagnosis Date    Anterior myocardial infarction [facility]) 10/2003    Arrhythmia     paroxysmal a fib    Arteriosclerotic coronary artery disease 2005    Arthritis     fingers    Cancer ([address]     prostate    Coronary atherosclerosis of native coronary vessel 10/2001    Family history of premature CAD     Father hx cabg/CHF  Brother CABG x 2    Gout     right toe but no problem now    Hypercholesterolemia     Hypertension       Past Surgical History:   Procedure Laterality Date    COLONOSCOPY N/A 2016    COLONOSCOPY  BMI 34 performed by Ron Billingsley [facility] at [facility] ENDOSCOPY    HX COLONOSCOPY      HX CORONARY ARTERY BYPASS GRAFT  3/9/2016    x4 - Dr. Wills Pair    HX HEENT      tonsillectomy    HX ORTHOPAEDIC      2 back surg    HX PROSTATECTOMY      [place] CARDIAC SURG PROCEDURE UNLIST      stent    [place] CARDIAC SURG PROCEDURE UNLIST      quidrupal bypass       Allergies   Allergen Reactions    Latex Rash    Codeine Nausea and Vomiting    Tape [Adhesive] Rash      Social History     Tobacco Use    Smoking status: Never Smoker    Smokeless tobacco: Never Used   Substance Use Topics    Alcohol use: Yes     Comment: beer socially-seldom      Family History   Problem Relation Age of Onset    Heart Disease Father     Diabetes Brother     Heart Disease Brother     Cancer Sister     Colon Cancer Neg Hx       Family history reviewed and noncontributory. Immunization History   Administered Date(s) Administered    TB Skin Test (PPD) Intradermal 2016    Tdap 10/21/2014     PTA Medications:  Prior to Admission Medications   Prescriptions Last Dose Informant Patient Reported? Taking?   aspirin delayed-release 81 mg tablet   Yes No   Sig: Take  by mouth every morning. evolocumab (REPATHA) syringe   No No   Si mL by SubCUTAneous route every fourteen (14) days.    fluticasone (FLONASE) 50 mcg/actuation nasal spray   No No   Sig: One spray in each nostril twice daily hydroCHLOROthiazide (HYDRODIURIL) 25 mg tablet   No No   Si/2 tablet by mouth daily   loratadine (CLARITIN) 10 mg tablet   Yes No   Sig: Take 10 mg by mouth every morning. metoprolol succinate (TOPROL-XL) 100 mg tablet   No No   Sig: Take 1 Tablet by mouth daily. nitroglycerin (NITROSTAT) 0.4 mg SL tablet   No No   Si Tablet by SubLINGual route every five (5) minutes as needed for Chest Pain. Up to 3 doses. trimethoprim-sulfamethoxazole (Bactrim DS) 160-800 mg per tablet   No No   Sig: Take 1 Tablet by mouth two (2) times a day. Facility-Administered Medications: None       Objective:     Patient Vitals for the past 24 hrs:   Temp Pulse Resp BP SpO2   21 1106 98.6 °F (37 °C)       21 1100  (!) 109 21 135/69 95 %   21 1044  (!) 117 23 130/77 94 %   21 1030  (!) 116 25 128/72 93 %   21 1014  (!) 127 24 118/68 94 %   21 0945    (!) 126/99 94 %   21 0944     94 %   21 0937 (!) 101.3 °F (38.5 °C) (!) 143 18 (!) 130/91 90 %   21 0935     (!) 88 %   21 0904     93 %   21 0845 (!) 100.6 °F (38.1 °C) (!) 137 20 (!) 151/76 (!) 88 %     Oxygen Therapy  O2 Sat (%): 95 % (21 1100)  Pulse via Oximetry: 106 beats per minute (21 1100)  O2 Device: Nasal cannula (21 1030)  O2 Flow Rate (L/min): 3 l/min (21 1030)    Estimated body mass index is 33 kg/m² as calculated from the following:    Height as of this encounter: 5' 10\" (1.778 m). Weight as of this encounter: 104.3 kg (230 lb). No intake or output data in the 24 hours ending 21 1121    *Note that automatically entered I/Os may not be accurate; dependent on patient compliance with collection and accurate  by assistants. Physical Exam:  General:    Alert. No acute distress  Eyes:   Normal sclerae. Extraocular movements intact. HENT:  Normocephalic, atraumatic.   Moist mucous membranes  CV:   Tachycardia, irregularly irregular. No m/r/g. Lungs:  Coarse breath sounds  Abdomen: Soft, nontender, nondistended. Extremities: Warm and dry. No cyanosis or edema. Neurologic: CN II-XII grossly intact. Sensation intact. Skin:     No rashes or jaundice. Normal coloration  Psych:  Normal mood and affect. I reviewed the labs, imaging, EKGs, telemetry, and other studies done this admission. Data Reviewed:   Recent Results (from the past 24 hour(s))   CBC WITH AUTOMATED DIFF    Collection Time: 12/04/21  8:50 AM   Result Value Ref Range    WBC 12.4 (H) 4.3 - 11.1 K/uL    RBC 5.69 (H) 4.23 - 5.6 M/uL    HGB 16.9 13.6 - 17.2 g/dL    HCT 48.6 41.1 - 50.3 %    MCV 85.4 79.6 - 97.8 FL    MCH 29.7 26.1 - 32.9 PG    MCHC 34.8 31.4 - 35.0 g/dL    RDW 12.8 11.9 - 14.6 %    PLATELET 978 348 - 547 K/uL    MPV 9.9 9.4 - 12.3 FL    ABSOLUTE NRBC 0.00 0.0 - 0.2 K/uL    DF AUTOMATED      NEUTROPHILS 83 (H) 43 - 78 %    LYMPHOCYTES 8 (L) 13 - 44 %    MONOCYTES 8 4.0 - 12.0 %    EOSINOPHILS 1 0.5 - 7.8 %    BASOPHILS 0 0.0 - 2.0 %    IMMATURE GRANULOCYTES 1 0.0 - 5.0 %    ABS. NEUTROPHILS 10.3 (H) 1.7 - 8.2 K/UL    ABS. LYMPHOCYTES 1.0 0.5 - 4.6 K/UL    ABS. MONOCYTES 1.0 0.1 - 1.3 K/UL    ABS. EOSINOPHILS 0.1 0.0 - 0.8 K/UL    ABS. BASOPHILS 0.0 0.0 - 0.2 K/UL    ABS. IMM. GRANS. 0.1 0.0 - 0.5 K/UL   METABOLIC PANEL, COMPREHENSIVE    Collection Time: 12/04/21  8:50 AM   Result Value Ref Range    Sodium 126 (L) 136 - 145 mmol/L    Potassium 3.9 3.5 - 5.1 mmol/L    Chloride 88 (L) 98 - 107 mmol/L    CO2 27 21 - 32 mmol/L    Anion gap 11 7 - 16 mmol/L    Glucose 133 (H) 65 - 100 mg/dL    BUN 19 8 - 23 MG/DL    Creatinine 1.19 0.8 - 1.5 MG/DL    GFR est AA >60 >60 ml/min/1.73m2    GFR est non-AA >60 >60 ml/min/1.73m2    Calcium 9.0 8.3 - 10.4 MG/DL    Bilirubin, total 1.7 (H) 0.2 - 1.1 MG/DL    ALT (SGPT) 34 12 - 65 U/L    AST (SGOT) 44 (H) 15 - 37 U/L    Alk.  phosphatase 60 50 - 136 U/L    Protein, total 8.1 6.3 - 8.2 g/dL    Albumin 3.3 3.2 - 4.6 g/dL    Globulin 4.8 (H) 2.3 - 3.5 g/dL    A-G Ratio 0.7 (L) 1.2 - 3.5     MAGNESIUM    Collection Time: 12/04/21  8:50 AM   Result Value Ref Range    Magnesium 1.8 1.8 - 2.4 mg/dL   EKG, 12 LEAD, INITIAL    Collection Time: 12/04/21  8:51 AM   Result Value Ref Range    Ventricular Rate 148 BPM    Atrial Rate 178 BPM    QRS Duration 78 ms    Q-T Interval 286 ms    QTC Calculation (Bezet) 449 ms    Calculated R Axis 112 degrees    Calculated T Axis 41 degrees    Diagnosis       Atrial fibrillation with rapid ventricular response  Right axis deviation  Nonspecific ST abnormality  Abnormal ECG  When compared with ECG of 10-MAR-2016 07:43,  Atrial fibrillation has replaced Sinus rhythm  Vent. rate has increased BY  81 BPM  QRS axis Shifted right  ST no longer elevated in Inferior leads  ST now depressed in Anterolateral leads  Confirmed by Keith Barker MD (), MODESTA MCCURDY ([id number]) on 12/4/2021 10:51:18 AM         All Micro Results     Procedure Component Value Units Date/Time    CULTURE, BLOOD [[id number]]     Order Status: Sent Specimen: Blood     CULTURE, BLOOD [[id number]]     Order Status: Sent Specimen: Blood           Current Facility-Administered Medications   Medication Dose Route Frequency    sodium chloride (NS) flush 5-10 mL  5-10 mL IntraVENous Q8H    sodium chloride (NS) flush 5-10 mL  5-10 mL IntraVENous PRN    metoprolol (LOPRESSOR) injection 5 mg  5 mg IntraVENous ONCE    dilTIAZem IR (CARDIZEM) tablet 30 mg  30 mg Oral TIDAC    lactated Ringers infusion  75 mL/hr IntraVENous CONTINUOUS    baricitinib (OLUMIANT) tablet 4 mg  4 mg Oral DAILY     Current Outpatient Medications   Medication Sig    nitroglycerin (NITROSTAT) 0.4 mg SL tablet 1 Tablet by SubLINGual route every five (5) minutes as needed for Chest Pain. Up to 3 doses.  evolocumab (REPATHA) syringe 1 mL by SubCUTAneous route every fourteen (14) days.     hydroCHLOROthiazide (HYDRODIURIL) 25 mg tablet 1/2 tablet by mouth daily    metoprolol succinate (TOPROL-XL) 100 mg tablet Take 1 Tablet by mouth daily.  trimethoprim-sulfamethoxazole (Bactrim DS) 160-800 mg per tablet Take 1 Tablet by mouth two (2) times a day.  loratadine (CLARITIN) 10 mg tablet Take 10 mg by mouth every morning.  fluticasone (FLONASE) 50 mcg/actuation nasal spray One spray in each nostril twice daily    aspirin delayed-release 81 mg tablet Take  by mouth every morning. Other Studies:  No results found for this visit on 12/04/21. XR CHEST PORT    Result Date: 12/4/2021  Chest X-ray INDICATION: Shortness of breath. Viral pneumonitis. A portable AP view of the chest was obtained. FINDINGS: New, subtle interstitial changes lateral right mid lung possibly reflecting viral pneumonitis. Left lung remains clear. No pneumothorax or pleural effusions. The heart size is normal.  Sternotomy changes again noted. Subtle interstitial changes lateral right midlung possibly reflecting viral pneumonitis       [unfilled]       Part of this note was written by using a voice dictation software and the note has been proof read but may still contain some grammatical/other typographical errors.     Signed:  Augustus Mansfield MD

## 2021-12-04 NOTE — PROGRESS NOTES
Reviewed notes for concerns      Per notes:    Yazidi    LIVES IN [facility]    [place] - [place]    MEWS [address]          Will continue to assess how to best serve this family

## 2021-12-04 NOTE — CONSULTS
[facility] Cardiology Consult           Date of  Admission: 12/4/2021  8:45 AM     Admission type:Emergency    Consulting MD: Ace Martino MD    Primary Cardiologist: Dr Megan Delong  Primary Care Physician: Vimal Castillo MD      Miguel Angel Wagoner is a 73 y.o. male admitted for COVID-19 [U07.1]; Acute hypoxemic respiratory failure ([facility] [J96.01]; Atrial fibrillation with RVR ([facility] [I48.91]. Patient complains of creasing shortness of breath cough and weakness. Patient is diagnosed with COVID-19 pneumonia with hypoxic respiratory failure. He is noted to have atrial fibrillation with rapid ventricular response. Patient has since converted back to sinus rhythm. Heart rate is stable. Patient is being treated symptomatically for COVID-19. Patient with history of hypertension dyslipidemia and coronary artery disease with coronary bypass grafting 03/2016 with left internal mammary artery the LAD, saphenous vein graft to the ramus intermedius,/vein graft to the posterior descending, sinus vein graft to the right posterior lateral branch. Patient currently denies any angina. Cardiac risk factors: smoking/ tobacco exposure, family history, dyslipidemia, male gender, hypertension.     Patient Active Problem List   Diagnosis Code    Sleep apnea G47.30    Obesity, unspecified E66.9    Dyslipidemia E78.5    NSTEMI (non-ST elevated myocardial infarction) ([facility] I21.4    S/P CABG x 4 Z95.1    Hypoxia R09.02    Paroxysmal atrial fibrillation (HCC) I48.0    CAD (coronary artery disease) I25.10    Coronary artery disease involving coronary bypass graft of native heart with unstable angina pectoris ([facility] I25.700    Hypertension I10    Severe obesity (HCC) E66.01    Atrial fibrillation with RVR (HCC) I48.91    Acute hypoxemic respiratory failure ([facility] J96.01    COVID-19 U07.1       Past Medical History:   Diagnosis Date    Anterior myocardial infarction ([facility] 10/2003    Arrhythmia     paroxysmal a fib    Arteriosclerotic coronary artery disease 11/2005    Arthritis     fingers    Cancer [facility])     prostate    Coronary atherosclerosis of native coronary vessel 10/2001    Family history of premature CAD     Father hx cabg/CHF  Brother CABG x 2    Gout     right toe but no problem now    Hypercholesterolemia     Hypertension      Allergies   Allergen Reactions    Latex Rash    Codeine Nausea and Vomiting    Tape [Adhesive] Rash      Family History   Problem Relation Age of Onset    Heart Disease Father     Diabetes Brother     Heart Disease Brother     Cancer Sister     Colon Cancer Neg Hx          Review of Symptoms:  Review of Systems   Constitutional: Positive for chills, fever and malaise/fatigue. Eyes: Negative for visual disturbance. Cardiovascular: Positive for palpitations. Negative for chest pain, dyspnea on exertion and syncope. Respiratory: Positive for cough and shortness of breath. Skin: Negative for color change and rash. Musculoskeletal: Negative for joint pain and muscle cramps. Gastrointestinal: Negative for abdominal pain, diarrhea and nausea. Genitourinary: Negative for dysuria. Neurological: Negative for dizziness and headaches. Psychiatric/Behavioral: Negative for depression. Physical Exam  Vitals:    12/04/21 1200 12/04/21 1533 12/04/21 1600 12/04/21 1830   BP: 122/64 135/66     Pulse: 83 76 76 78   Resp: 22 22     Temp: 98.7 °F (37.1 °C) 98.4 °F (36.9 °C)     SpO2: 95% 95%     Weight:       Height:           Physical Exam  Constitutional:       Appearance: He is well-developed. HENT:      Head: Normocephalic and atraumatic. Cardiovascular:      Rate and Rhythm: Normal rate and regular rhythm. Heart sounds: Normal heart sounds. Pulmonary:      Breath sounds: Decreased breath sounds present. No wheezing or rales. Abdominal:      General: Bowel sounds are normal.      Palpations: Abdomen is soft.    Musculoskeletal:         General: Normal range of motion. Skin:     General: Skin is warm and dry. Neurological:      Mental Status: He is alert and oriented to person, place, and time. Cardiographics    Telemetry: normal sinus rhythm  ECG: atrial fibrillation, rate 120  Echocardiogram: Not done. Last echocardiogram 2016 with normal left trigger systolic function no significant valvular heart disease    Labs:   Recent Labs     12/04/21  0850   *   K 3.9   MG 1.8   BUN 19   CREA 1.19   *   WBC 12.4*   HGB 16.9   HCT 48.6           Assessment/Plan:     Assessment:      Principal Problem:    COVID-19 (12/4/2021) -patient with acute COVID-19 pneumonia. He has hypoxic respiratory failure. This is managed per primary team.    Active Problems:    S/P CABG x 4 (3/9/2016) -patient with history of coronary disease and prior cardio bypass grafting. Patient denies angina at this time. Hypoxia (3/11/2016) -secondary to COVID-19 pneumonia. Paroxysmal atrial fibrillation ([facility] (3/12/2016) -patient with transient atrial fibrillation with rapid ventricular response. He is converted back to sinus rhythm. Increase metoprolol succinate to 100 mg twice daily. Discontinue diltiazem. Increase Lovenox to treatment dose 1 mg/kg every 12 hours. The patient is stable demonstrating no evidence of clinical decline can transition patient to oral anticoagulation such as Eliquis 5 mg twice daily prior to discharge. CAD (coronary artery disease) (3/12/2016) -no active angina. Hypertension (3/14/2017) -blood pressure currently well controlled. Increase metoprolol succinate to 100 mg twice daily. Atrial fibrillation with RVR ([address] (12/4/2021) -converted to sinus rhythm. Please see above. Acute hypoxemic respiratory failure ([address] (12/4/2021) -please see above. Patient currently stable with no active cardiovascular issues. We will be on standby please call with questions.             Thank you very much for this referral. We appreciate the opportunity to participate in this patient's care. We will follow along with above stated plan.     MD Indio Hester MD

## 2021-12-04 NOTE — ROUTINE PROCESS
TRANSFER - IN REPORT:    Verbal report received from Lia Cantrell, [facility] (name) on Zana Barthel  being received from ED (unit) for routine progression of care      Report consisted of patients Situation, Background, Assessment and   Recommendations(SBAR). Information from the following report(s) SBAR, Kardex and ED Summary was reviewed with the receiving nurse. Opportunity for questions and clarification was provided. Assessment completed upon patients arrival to unit and care assumed. SBAR received and given to primary receiving RN, April. Patient not on 8th @ transfer-in time.

## 2021-12-04 NOTE — ED TRIAGE NOTES
Patient ambulatory to triage with mask in place. Patient reports he tested positive for COVID. Sx started around Thanksgiving. Pt reports n/v, cough and fever. Pt has not been vaccinated.

## 2021-12-04 NOTE — ED PROVIDER NOTES
42-year-old male with history of paroxysmal atrial fibrillation, coronary artery disease, prostate cancer, gout, hypertension, high cholesterol presents with cough and congestion for the past week. He was diagnosed with Covid yesterday at [facility] urgent care. He reports fevers, body aches, shortness of breath, and vomiting. Wife and sister also tested positive for Covid. Denies history of underlying lung disease.       Positive For Covid-19  Associated symptoms: chills, congestion, cough, myalgias, nausea and vomiting    Associated symptoms: no chest pain, no confusion, no diarrhea, no headaches and no rash         Past Medical History:   Diagnosis Date    Anterior myocardial infarction [facility]) 10/2003    Arrhythmia     paroxysmal a fib    Arteriosclerotic coronary artery disease 11/2005    Arthritis     fingers    Cancer ([facility] [address]     prostate    Coronary atherosclerosis of native coronary vessel 10/2001    Family history of premature CAD     Father hx cabg/CHF  Brother CABG x 2    Gout     right toe but no problem now    Hypercholesterolemia     Hypertension        Past Surgical History:   Procedure Laterality Date    COLONOSCOPY N/A 12/21/2016    COLONOSCOPY  BMI 34 performed by Galdino Alaniz MD at [facility]    HX COLONOSCOPY      HX CORONARY ARTERY BYPASS GRAFT  3/9/2016    x4 - Dr. Edith Olmedo    HX HEENT      tonsillectomy    HX ORTHOPAEDIC      2 back surg    HX PROSTATECTOMY      TX CARDIAC SURG PROCEDURE UNLIST      stent    TX CARDIAC SURG PROCEDURE UNLIST  March 9,2016    quidrupal bypass          Family History:   Problem Relation Age of Onset    Heart Disease Father     Diabetes Brother     Heart Disease Brother     Cancer Sister     Colon Cancer Neg Hx        Social History     Socioeconomic History    Marital status:      Spouse name: Not on file    Number of children: Not on file    Years of education: Not on file    Highest education level: Not on file   Occupational History    Not on file   Tobacco Use    Smoking status: Never Smoker    Smokeless tobacco: Never Used   Substance and Sexual Activity    Alcohol use: Yes     Comment: beer socially-seldom    Drug use: No    Sexual activity: Not on file   Other Topics Concern    Not on file   Social History Narrative    Not on file     Social Determinants of Health     Financial Resource Strain:     Difficulty of Paying Living Expenses: Not on file   Food Insecurity:     Worried About Running Out of Food in the Last Year: Not on file    Emilio of Food in the Last Year: Not on file   Transportation Needs:     Lack of Transportation (Medical): Not on file    Lack of Transportation (Non-Medical): Not on file   Physical Activity:     Days of Exercise per Week: Not on file    Minutes of Exercise per Session: Not on file   Stress:     Feeling of Stress : Not on file   Social Connections:     Frequency of Communication with Friends and Family: Not on file    Frequency of Social Gatherings with Friends and Family: Not on file    Attends Yazdanism Services: Not on file    Active Member of [address] [place] [address] or Organizations: Not on file    Attends Club or Organization Meetings: Not on file    Marital Status: Not on file   Intimate Partner Violence:     Fear of Current or Ex-Partner: Not on file    Emotionally Abused: Not on file    Physically Abused: Not on file    Sexually Abused: Not on file   Housing Stability:     Unable to Pay for Housing in the Last Year: Not on file    Number of [place] in the Last Year: Not on file    Unstable Housing in the Last Year: Not on file         ALLERGIES: Latex, Codeine, and Tape [adhesive]    Review of Systems   Constitutional: Positive for appetite change, chills, fatigue and fever. HENT: Positive for congestion. Negative for hearing loss. Eyes: Negative for visual disturbance. Respiratory: Positive for cough and shortness of breath. Cardiovascular: Negative for chest pain. Gastrointestinal: Positive for nausea and vomiting. Negative for abdominal pain and diarrhea. Musculoskeletal: Positive for arthralgias and myalgias. Skin: Negative for rash. Neurological: Negative for headaches. Psychiatric/Behavioral: Negative for confusion. All other systems reviewed and are negative. Vitals:    12/04/21 0935 12/04/21 0937 12/04/21 0944 12/04/21 0945   BP:  (!) 130/91  (!) 126/99   Pulse:  (!) 143     Resp:  18     Temp:  (!) 101.3 °F (38.5 °C)     SpO2: (!) 88% 90% 94% 94%   Weight:       Height:                Physical Exam  Vitals and nursing note reviewed. Constitutional:       Appearance: Normal appearance. HENT:      Head: Normocephalic and atraumatic. Nose: Nose normal.      Mouth/Throat:      Mouth: Mucous membranes are moist.   Eyes:      Pupils: Pupils are equal, round, and reactive to light. Cardiovascular:      Rate and Rhythm: Tachycardia present. Rhythm irregular. Pulmonary:      Effort: Pulmonary effort is normal.      Breath sounds: No stridor. Abdominal:      General: Abdomen is flat. Musculoskeletal:         General: No deformity. Normal range of motion. Cervical back: Normal range of motion and neck supple. Skin:     General: Skin is warm and dry. Neurological:      General: No focal deficit present. Mental Status: He is alert. Mental status is at baseline. Psychiatric:         Mood and Affect: Mood normal.         Behavior: Behavior normal.          MDM  Number of Diagnoses or Management Options  Atrial fibrillation with rapid ventricular response ([address]  Hypoxia  Pneumonia due to COVID-19 virus  Diagnosis management comments: Parts of this document were created using dragon voice recognition software. The chart has been reviewed but errors may still be present. I wore appropriate PPE throughout this patient's ED visit. Sylvia Aleman MD, 9:59 AM    Will admit for hypoxia. Given decadron. Febrile with afib in RVR.  Will give fluids and cardizem. Amount and/or Complexity of Data Reviewed  Clinical lab tests: reviewed and ordered (Results for orders placed or performed during the hospital encounter of 12/04/21  -CBC WITH AUTOMATED DIFF:        Result                      Value             Ref Range           WBC                         12.4 (H)          4.3 - 11.1 K*       RBC                         5.69 (H)          4.23 - 5.6 M*       HGB                         16.9              13.6 - 17.2 *       HCT                         48.6              41.1 - 50.3 %       MCV                         85.4              79.6 - 97.8 *       MCH                         29.7              26.1 - 32.9 *       MCHC                        34.8              31.4 - 35.0 *       RDW                         12.8              11.9 - 14.6 %       PLATELET                    272               150 - 450 K/*       MPV                         9.9               9.4 - 12.3 FL       ABSOLUTE NRBC               0.00              0.0 - 0.2 K/*       DF                          AUTOMATED                             NEUTROPHILS                 83 (H)            43 - 78 %           LYMPHOCYTES                 8 (L)             13 - 44 %           MONOCYTES                   8                 4.0 - 12.0 %        EOSINOPHILS                 1                 0.5 - 7.8 %         BASOPHILS                   0                 0.0 - 2.0 %         IMMATURE GRANULOCYTES       1                 0.0 - 5.0 %         ABS. NEUTROPHILS            10.3 (H)          1.7 - 8.2 K/*       ABS. LYMPHOCYTES            1.0               0.5 - 4.6 K/*       ABS. MONOCYTES              1.0               0.1 - 1.3 K/*       ABS. EOSINOPHILS            0.1               0.0 - 0.8 K/*       ABS. BASOPHILS              0.0               0.0 - 0.2 K/*       ABS. IMM.  GRANS.            0.1               0.0 - 0.5 K/*  -METABOLIC PANEL, COMPREHENSIVE:        Result                      Value Ref Range           Sodium                      126 (L)           136 - 145 mm*       Potassium                   3.9               3.5 - 5.1 mm*       Chloride                    88 (L)            98 - 107 mmo*       CO2                         27                21 - 32 mmol*       Anion gap                   11                7 - 16 mmol/L       Glucose                     133 (H)           65 - 100 mg/*       BUN                         19                8 - 23 MG/DL        Creatinine                  1.19              0.8 - 1.5 MG*       GFR est AA                  >60               >60 ml/min/1*       GFR est non-AA              >60               >60 ml/min/1*       Calcium                     9.0               8.3 - 10.4 M*       Bilirubin, total            1.7 (H)           0.2 - 1.1 MG*       ALT (SGPT)                  34                12 - 65 U/L         AST (SGOT)                  44 (H)            15 - 37 U/L         Alk.  phosphatase            60                50 - 136 U/L        Protein, total              8.1               6.3 - 8.2 g/*       Albumin                     3.3               3.2 - 4.6 g/*       Globulin                    4.8 (H)           2.3 - 3.5 g/*       A-G Ratio                   0.7 (L)           1.2 - 3.5      -MAGNESIUM:        Result                      Value             Ref Range           Magnesium                   1.8               1.8 - 2.4 mg*  -EKG, 12 LEAD, INITIAL:        Result                      Value             Ref Range           Ventricular Rate            148               BPM                 Atrial Rate                 178               BPM                 QRS Duration                78                ms                  Q-T Interval                286               ms                  QTC Calculation (Bezet)     449               ms                  Calculated R Axis           112               degrees             Calculated T Axis           41 degrees             Diagnosis                                                     Atrial fibrillation with rapid ventricular response   Right axis deviation   Nonspecific ST abnormality   Abnormal ECG   When compared with ECG of 10-MAR-2016 07:43,   Atrial fibrillation has replaced Sinus rhythm   Vent. rate has increased BY  81 BPM   QRS axis Shifted right   ST no longer elevated in Inferior leads   ST now depressed in Anterolateral leads     )  Tests in the radiology section of CPT®: ordered and reviewed (XR CHEST PORT    Result Date: 12/4/2021  Chest X-ray INDICATION: Shortness of breath. Viral pneumonitis. A portable AP view of the chest was obtained. FINDINGS: New, subtle interstitial changes lateral right mid lung possibly reflecting viral pneumonitis. Left lung remains clear. No pneumothorax or pleural effusions. The heart size is normal.  Sternotomy changes again noted.       Subtle interstitial changes lateral right midlung possibly reflecting viral pneumonitis     )  Tests in the medicine section of CPT®: ordered and reviewed           EKG    Date/Time: 12/4/2021 10:00 AM  Performed by: Robert Leo MD  Authorized by: Robert Leo MD     ECG reviewed by ED Physician in the absence of a cardiologist: yes    Interpretation:     Interpretation: abnormal    Rate:     ECG rate:  148    ECG rate assessment: tachycardic    Rhythm:     Rhythm: atrial fibrillation    Ectopy:     Ectopy: none    Conduction:     Conduction: normal    ST segments:     ST segments:  Non-specific  T waves:     T waves: non-specific    Critical Care  Performed by: Robert Leo MD  Authorized by: Robert Leo MD     Critical care provider statement:     Critical care time (minutes):  30    Critical care time was exclusive of:  Separately billable procedures and treating other patients    Critical care was necessary to treat or prevent imminent or life-threatening deterioration of the following conditions: Cardiac failure    Critical care was time spent personally by me on the following activities:  Development of treatment plan with patient or surrogate, evaluation of patient's response to treatment, discussions with consultants, examination of patient, ordering and performing treatments and interventions, ordering and review of laboratory studies, ordering and review of radiographic studies, pulse oximetry, re-evaluation of patient's condition and review of old charts    I assumed direction of critical care for this patient from another provider in my specialty: yes    EKG    Date/Time: 12/4/2021 10:27 AM  Performed by: Arjun Sewell MD  Authorized by: Arjun Sewell MD     ECG reviewed by ED Physician in the absence of a cardiologist: yes    Interpretation:     Interpretation: abnormal    Rate:     ECG rate:  103    ECG rate assessment: tachycardic    Rhythm:     Rhythm: atrial fibrillation    Ectopy:     Ectopy: none    QRS:     QRS axis:  Right  Conduction:     Conduction: normal    ST segments:     ST segments:  Non-specific  T waves:     T waves: normal

## 2021-12-04 NOTE — ACP (ADVANCE CARE PLANNING)
Advance Care Planning     General Advance Care Planning (ACP) Conversation      Date of Conversation: 12/4/2021  Conducted with: Patient with Decision Making Capacity    Healthcare Decision Maker:     Primary Decision Maker: Jesse Cota - Spouse - [phone]    Secondary Decision Maker: Andres Franz - Sister  Click here to complete [address] including selection of the [address] Relationship (ie \"Primary\")      Today we documented Decision Maker(s) consistent with Legal Next of Kin hierarchy.     Content/Action Overview:   Has NO ACP documents/care preferences - requested patient complete ACP documents  Reviewed DNR/DNI and patient elects Full Code (Attempt Resuscitation)  Topics discussed: ventilation preferences and resuscitation preferences       Length of Voluntary ACP Conversation in minutes:  <16 minutes (Non-Billable)    Waynetta Crigler, RN

## 2021-12-04 NOTE — ED NOTES
Report received from [facility]. This RN assumes pt care at this time.

## 2021-12-05 LAB
ALBUMIN SERPL-MCNC: 2.9 G/DL (ref 3.2–4.6)
ALBUMIN/GLOB SERPL: 0.7 {RATIO} (ref 1.2–3.5)
ALP SERPL-CCNC: 53 U/L (ref 50–136)
ALT SERPL-CCNC: 38 U/L (ref 12–65)
ANION GAP SERPL CALC-SCNC: 10 MMOL/L (ref 7–16)
APPEARANCE UR: ABNORMAL
AST SERPL-CCNC: 46 U/L (ref 15–37)
BACTERIA URNS QL MICRO: ABNORMAL /HPF
BASOPHILS # BLD: 0 K/UL (ref 0–0.2)
BASOPHILS NFR BLD: 0 % (ref 0–2)
BILIRUB SERPL-MCNC: 1.3 MG/DL (ref 0.2–1.1)
BILIRUB UR QL: NEGATIVE
BUN SERPL-MCNC: 24 MG/DL (ref 8–23)
CALCIUM SERPL-MCNC: 8.8 MG/DL (ref 8.3–10.4)
CASTS URNS QL MICRO: ABNORMAL /LPF
CHLORIDE SERPL-SCNC: 91 MMOL/L (ref 98–107)
CO2 SERPL-SCNC: 28 MMOL/L (ref 21–32)
COLOR UR: ABNORMAL
CREAT SERPL-MCNC: 1.11 MG/DL (ref 0.8–1.5)
CRP SERPL-MCNC: 15.5 MG/DL (ref 0–0.9)
D DIMER PPP FEU-MCNC: 0.74 UG/ML(FEU)
DIFFERENTIAL METHOD BLD: ABNORMAL
EOSINOPHIL # BLD: 0 K/UL (ref 0–0.8)
EOSINOPHIL NFR BLD: 0 % (ref 0.5–7.8)
ERYTHROCYTE [DISTWIDTH] IN BLOOD BY AUTOMATED COUNT: 12.8 % (ref 11.9–14.6)
GLOBULIN SER CALC-MCNC: 4.4 G/DL (ref 2.3–3.5)
GLUCOSE SERPL-MCNC: 115 MG/DL (ref 65–100)
GLUCOSE UR STRIP.AUTO-MCNC: NEGATIVE MG/DL
HCT VFR BLD AUTO: 46.5 % (ref 41.1–50.3)
HGB BLD-MCNC: 16.3 G/DL (ref 13.6–17.2)
HGB UR QL STRIP: ABNORMAL
IMM GRANULOCYTES # BLD AUTO: 0.1 K/UL (ref 0–0.5)
IMM GRANULOCYTES NFR BLD AUTO: 1 % (ref 0–5)
INR PPP: 1.4
KETONES UR QL STRIP.AUTO: 15 MG/DL
LEUKOCYTE ESTERASE UR QL STRIP.AUTO: NEGATIVE
LYMPHOCYTES # BLD: 0.7 K/UL (ref 0.5–4.6)
LYMPHOCYTES NFR BLD: 6 % (ref 13–44)
MCH RBC QN AUTO: 29.9 PG (ref 26.1–32.9)
MCHC RBC AUTO-ENTMCNC: 35.1 G/DL (ref 31.4–35)
MCV RBC AUTO: 85.2 FL (ref 79.6–97.8)
MONOCYTES # BLD: 0.8 K/UL (ref 0.1–1.3)
MONOCYTES NFR BLD: 6 % (ref 4–12)
NEUTS SEG # BLD: 11.9 K/UL (ref 1.7–8.2)
NEUTS SEG NFR BLD: 88 % (ref 43–78)
NITRITE UR QL STRIP.AUTO: NEGATIVE
NRBC # BLD: 0 K/UL (ref 0–0.2)
OTHER OBSERVATIONS,UCOM: ABNORMAL
PH UR STRIP: 5.5 [PH] (ref 5–9)
PLATELET # BLD AUTO: 297 K/UL (ref 150–450)
PMV BLD AUTO: 10 FL (ref 9.4–12.3)
POTASSIUM SERPL-SCNC: 3.8 MMOL/L (ref 3.5–5.1)
PROT SERPL-MCNC: 7.3 G/DL (ref 6.3–8.2)
PROT UR STRIP-MCNC: 100 MG/DL
PROTHROMBIN TIME: 17.7 SEC (ref 12.6–14.5)
RBC # BLD AUTO: 5.46 M/UL (ref 4.23–5.6)
RBC #/AREA URNS HPF: ABNORMAL /HPF
SODIUM SERPL-SCNC: 129 MMOL/L (ref 136–145)
SP GR UR REFRACTOMETRY: 1.03 (ref 1–1.02)
UROBILINOGEN UR QL STRIP.AUTO: 1 EU/DL (ref 0.2–1)
WBC # BLD AUTO: 13.6 K/UL (ref 4.3–11.1)
WBC URNS QL MICRO: ABNORMAL /HPF

## 2021-12-05 PROCEDURE — 74011250636 HC RX REV CODE- 250/636: Performed by: INTERNAL MEDICINE

## 2021-12-05 PROCEDURE — 87086 URINE CULTURE/COLONY COUNT: CPT

## 2021-12-05 PROCEDURE — 74011250637 HC RX REV CODE- 250/637: Performed by: FAMILY MEDICINE

## 2021-12-05 PROCEDURE — 51798 US URINE CAPACITY MEASURE: CPT

## 2021-12-05 PROCEDURE — 74011250636 HC RX REV CODE- 250/636: Performed by: FAMILY MEDICINE

## 2021-12-05 PROCEDURE — 85610 PROTHROMBIN TIME: CPT

## 2021-12-05 PROCEDURE — 85379 FIBRIN DEGRADATION QUANT: CPT

## 2021-12-05 PROCEDURE — 74011250637 HC RX REV CODE- 250/637: Performed by: INTERNAL MEDICINE

## 2021-12-05 PROCEDURE — 36415 COLL VENOUS BLD VENIPUNCTURE: CPT

## 2021-12-05 PROCEDURE — 86140 C-REACTIVE PROTEIN: CPT

## 2021-12-05 PROCEDURE — 94760 N-INVAS EAR/PLS OXIMETRY 1: CPT

## 2021-12-05 PROCEDURE — 77010033678 HC OXYGEN DAILY

## 2021-12-05 PROCEDURE — 65660000000 HC RM CCU STEPDOWN

## 2021-12-05 PROCEDURE — BT40ZZZ ULTRASONOGRAPHY OF BLADDER: ICD-10-PCS | Performed by: FAMILY MEDICINE

## 2021-12-05 PROCEDURE — 80053 COMPREHEN METABOLIC PANEL: CPT

## 2021-12-05 PROCEDURE — 74011000258 HC RX REV CODE- 258: Performed by: FAMILY MEDICINE

## 2021-12-05 PROCEDURE — 85025 COMPLETE CBC W/AUTO DIFF WBC: CPT

## 2021-12-05 PROCEDURE — 81001 URINALYSIS AUTO W/SCOPE: CPT

## 2021-12-05 RX ORDER — ALBUTEROL SULFATE 90 UG/1
2 AEROSOL, METERED RESPIRATORY (INHALATION)
Status: DISCONTINUED | OUTPATIENT
Start: 2021-12-05 | End: 2021-12-17 | Stop reason: HOSPADM

## 2021-12-05 RX ORDER — ATROPA BELLADONNA AND OPIUM 16.2; 6 MG/1; MG/1
1 SUPPOSITORY RECTAL ONCE
Status: DISPENSED | OUTPATIENT
Start: 2021-12-05 | End: 2021-12-05

## 2021-12-05 RX ADMIN — METOPROLOL SUCCINATE 100 MG: 100 TABLET, EXTENDED RELEASE ORAL at 08:10

## 2021-12-05 RX ADMIN — METOPROLOL SUCCINATE 100 MG: 100 TABLET, EXTENDED RELEASE ORAL at 21:30

## 2021-12-05 RX ADMIN — GUAIFENESIN 600 MG: 600 TABLET ORAL at 21:30

## 2021-12-05 RX ADMIN — DEXAMETHASONE SODIUM PHOSPHATE 6 MG: 10 INJECTION INTRAMUSCULAR; INTRAVENOUS at 09:11

## 2021-12-05 RX ADMIN — BARICITINIB 4 MG: 2 TABLET, FILM COATED ORAL at 08:10

## 2021-12-05 RX ADMIN — ASPIRIN 81 MG: 81 TABLET ORAL at 08:10

## 2021-12-05 RX ADMIN — GUAIFENESIN 600 MG: 600 TABLET ORAL at 08:10

## 2021-12-05 RX ADMIN — ACETAMINOPHEN 650 MG: 325 TABLET ORAL at 19:49

## 2021-12-05 RX ADMIN — Medication 10 ML: at 05:24

## 2021-12-05 RX ADMIN — ENOXAPARIN SODIUM 100 MG: 100 INJECTION SUBCUTANEOUS at 02:25

## 2021-12-05 RX ADMIN — Medication 10 ML: at 05:23

## 2021-12-05 RX ADMIN — SODIUM CHLORIDE, SODIUM LACTATE, POTASSIUM CHLORIDE, AND CALCIUM CHLORIDE 75 ML/HR: 600; 310; 30; 20 INJECTION, SOLUTION INTRAVENOUS at 04:45

## 2021-12-05 RX ADMIN — Medication 10 ML: at 22:05

## 2021-12-05 RX ADMIN — Medication 10 ML: at 14:00

## 2021-12-05 RX ADMIN — ENOXAPARIN SODIUM 100 MG: 100 INJECTION SUBCUTANEOUS at 16:08

## 2021-12-05 RX ADMIN — CEFTRIAXONE 1 G: 1 INJECTION, POWDER, FOR SOLUTION INTRAMUSCULAR; INTRAVENOUS at 08:10

## 2021-12-05 RX ADMIN — ONDANSETRON 4 MG: 4 TABLET, ORALLY DISINTEGRATING ORAL at 04:44

## 2021-12-05 RX ADMIN — HYDROCHLOROTHIAZIDE 12.5 MG: 25 TABLET ORAL at 08:10

## 2021-12-05 RX ADMIN — FLUTICASONE PROPIONATE 2 SPRAY: 50 SPRAY, METERED NASAL at 08:10

## 2021-12-05 NOTE — PROGRESS NOTES
Patient alert and oriented x 3. No s/s of acute distress at this time. Patient denies any pain at this time. Report receieved from off going nurse. White board updated. Safety measures in place. Will continue to monitor.

## 2021-12-05 NOTE — PROGRESS NOTES
[facility] Hospitalist Note     Admit Date:  2021  8:45 AM   Name:  Ruba Wright   Age:  73 y.o.  :  1950   MRN:  [id number]   PCP:  Sharif Schmitz MD  Treatment Team: Attending Provider: Denver Kim MD; Primary Nurse: Aurelio Mosqueda, ROM; Staff Nurse: Lauryn Field RN    HPI/Subjective:   62-year-old man with past medical history significant for hypertension, CAD, paroxysmal A. fib, prostate cancer admitted with sepsis and acute hypoxic respiratory failure secondary to COVID-19 infection. Also found to have A. fib with RVR. : Patient is seen at the bedside. Reports he is feeling better. Denies chest pain, palpitation, nausea, vomiting. Shortness of breath improving. Currently on 3 L nasal cannula. Reports he is unsteady on his feet. Wife is also admitted with Covid infection on same floor. Assessment and Plan:     Sepsis secondary to COVID-19 pneumonia:  Acute hypoxic respiratory failure secondary to COVID-19 pneumonia:  Meeting SIRS criteria on admission with possible source of infection  Desatting to 88% on room air, requiring up to 3 L nasal cannula  Continue supplemental oxygen as needed, wean as tolerated  Procalcitonin negative, hold off on antibiotics  Monitor inflammatory markers  On Lovenox 1 mg/kg x bid  Decadron 6 mg IV daily for 10 days  Baricitinib 4 mg twice daily for 14 days  Mucinex twice daily  Incentive spirometry  Encourage prone positioning  : On 3 L nasal cannula, continue wean as tolerated. Continue Decadron and baricitinib.   Continue supportive care    A. fib with RVR:  Likely triggered by acute illness and COVID-19 infection  Patient with history of paroxysmal A. fib, not on any anticoagulation  Heart rate in 140s on admission, has received Cardizem 10 mg IV once in the ED   Increased metoprolol succinate to 100 mg twice daily   Cardizem discontinued by cardiology  Lovenox to treatment dose, plan to transition to Eliquis on discharge  Replete electrolyte as needed to keep potassium > 4 and magnesium >2  Cardiology consult, appreciate recommendation  12/5: In sinus rhythm. Heart rate controlled. Continue metoprolol succinate and Lovenox. Pending echocardiogram.  Appreciate cardiology input. UTI:  12/5: UA with bacteriuria. Start patient on ceftriaxone for 3 days. Urine culture ordered, follow-up results    Hyponatremia:  Likely secondary to dehydration, patient with decreased oral intake  12/5: Sodium 129, improving. Continue gentle hydration. Need to be careful for fluid overload state due to Covid infection    Hypertension/CAD/hyperlipidemia:  Resume home meds metoprolol, hydrochlorothiazide and aspirin  Patient at home on Repatha    History of prostate cancer:  Noted    Discharge planning: Anticipate discharge in 2 to 3 days, depend upon hospital course. PT/OT    DVT ppx ordered  Code status:  Full  Estimated LOS:  Greater than 2 midnights  Risk:  high    Hospital Problems as of 12/5/2021 Date Reviewed: 8/27/2021          Codes Class Noted - Resolved POA    Atrial fibrillation with RVR ([facility] [address] ICD-10-CM: I48.91  ICD-9-CM: 427.31  12/4/2021 - Present Unknown        Acute hypoxemic respiratory failure (HCC) ICD-10-CM: J96.01  ICD-9-CM: 518.81  12/4/2021 - Present Unknown        * (Principal) COVID-19 ICD-10-CM: U07.1  ICD-9-CM: 079.89  12/4/2021 - Present Unknown        Hypertension ICD-10-CM: I10  ICD-9-CM: 401.9  3/14/2017 - Present Yes        Paroxysmal atrial fibrillation (HCC) ICD-10-CM: I48.0  ICD-9-CM: 427.31  3/12/2016 - Present Yes        CAD (coronary artery disease) ICD-10-CM: I25.10  ICD-9-CM: 414.00  3/12/2016 - Present Yes        Hypoxia ICD-10-CM: R09.02  ICD-9-CM: 799.02  3/11/2016 - Present Yes        S/P CABG x 4 ICD-10-CM: Z95.1  ICD-9-CM: V45.81  3/9/2016 - Present Yes                10 systems reviewed and negative except as noted in HPI.   Past Medical History:   Diagnosis Date    Anterior myocardial infarction [facility] 10/2003    Arrhythmia     paroxysmal a fib    Arteriosclerotic coronary artery disease 2005    Arthritis     fingers    Cancer [facility])     prostate    Coronary atherosclerosis of native coronary vessel 10/2001    Family history of premature CAD     Father hx cabg/CHF  Brother CABG x 2    Gout     right toe but no problem now    Hypercholesterolemia     Hypertension       Past Surgical History:   Procedure Laterality Date    COLONOSCOPY N/A 2016    COLONOSCOPY  BMI 34 performed by Melinda Lerma MD at [facility] ENDOSCOPY    HX COLONOSCOPY      HX CORONARY ARTERY BYPASS GRAFT  3/9/2016    x4 - Dr. Joellen Saini    HX HEENT      tonsillectomy    HX ORTHOPAEDIC      2 back surg    HX PROSTATECTOMY      IA CARDIAC SURG PROCEDURE UNLIST      stent    IA CARDIAC SURG PROCEDURE UNLIST      quidrupal bypass       Allergies   Allergen Reactions    Latex Rash    Codeine Nausea and Vomiting    Tape [Adhesive] Rash      Social History     Tobacco Use    Smoking status: Never Smoker    Smokeless tobacco: Never Used   Substance Use Topics    Alcohol use: Yes     Comment: beer socially-seldom      Family History   Problem Relation Age of Onset    Heart Disease Father     Diabetes Brother     Heart Disease Brother     Cancer Sister     Colon Cancer Neg Hx       Family history reviewed and noncontributory. Immunization History   Administered Date(s) Administered    TB Skin Test (PPD) Intradermal 2016    Tdap 10/21/2014     PTA Medications:  Prior to Admission Medications   Prescriptions Last Dose Informant Patient Reported? Taking?   aspirin delayed-release 81 mg tablet   Yes No   Sig: Take  by mouth every morning. evolocumab (REPATHA) syringe   No No   Si mL by SubCUTAneous route every fourteen (14) days.    fluticasone (FLONASE) 50 mcg/actuation nasal spray   No No   Sig: One spray in each nostril twice daily   hydroCHLOROthiazide (HYDRODIURIL) 25 mg tablet   No No   Si/2 tablet by mouth daily   loratadine (CLARITIN) 10 mg tablet   Yes No   Sig: Take 10 mg by mouth every morning. metoprolol succinate (TOPROL-XL) 100 mg tablet   No No   Sig: Take 1 Tablet by mouth daily. nitroglycerin (NITROSTAT) 0.4 mg SL tablet   No No   Si Tablet by SubLINGual route every five (5) minutes as needed for Chest Pain. Up to 3 doses. trimethoprim-sulfamethoxazole (Bactrim DS) 160-800 mg per tablet   No No   Sig: Take 1 Tablet by mouth two (2) times a day. Facility-Administered Medications: None       Objective:     Patient Vitals for the past 24 hrs:   Temp Pulse Resp BP SpO2   21 0811 99.9 °F (37.7 °C) 91 20 (!) 153/71 91 %   21 0301 100.4 °F (38 °C) 92 20 (!) 154/66 92 %   21 2308 98.2 °F (36.8 °C) 74 19 120/67 93 %   21 1933 98.5 °F (36.9 °C) 75 20 122/67 95 %   21 1830  78      21 1600  76      21 1533 98.4 °F (36.9 °C) 76 22 135/66 95 %   21 1200 98.7 °F (37.1 °C) 83 22 122/64 95 %   21 1132  (!) 111  122/70    21 1122  (!) 116 26 122/70 92 %   21 1115  (!) 104 24 127/61 92 %   21 1106 98.6 °F (37 °C)       21 1100  (!) 109 21 135/69 95 %   21 1044  (!) 117 23 130/77 94 %   21 1030  (!) 116 25 128/72 93 %     Oxygen Therapy  O2 Sat (%): 91 % (21 0811)  Pulse via Oximetry: 113 beats per minute (21 1122)  O2 Device: Nasal cannula (21 035)  O2 Flow Rate (L/min): 3 l/min (21 035)    Estimated body mass index is 33.72 kg/m² as calculated from the following:    Height as of this encounter: 5' 10\" (1.778 m). Weight as of this encounter: 106.6 kg (235 lb 0.2 oz).       Intake/Output Summary (Last 24 hours) at 2021 1016  Last data filed at 2021 0550  Gross per 24 hour   Intake 400 ml   Output 100 ml   Net 300 ml       *Note that automatically entered I/Os may not be accurate; dependent on patient compliance with collection and accurate  by assistants. Physical Exam:  General:    Alert. No acute distress  Eyes:   Normal sclerae. Extraocular movements intact. HENT:  Normocephalic, atraumatic. Moist mucous membranes  CV:   Tachycardia, irregularly irregular. No m/r/g. Lungs:  Coarse breath sounds  Abdomen: Soft, nontender, nondistended. Extremities: Warm and dry. No cyanosis or edema. Neurologic: CN II-XII grossly intact. Sensation intact. Skin:     No rashes or jaundice. Normal coloration  Psych:  Normal mood and affect. I reviewed the labs, imaging, EKGs, telemetry, and other studies done this admission.   Data Reviewed:   Recent Results (from the past 24 hour(s))   CULTURE, BLOOD    Collection Time: 12/04/21  6:56 PM    Specimen: Blood   Result Value Ref Range    Special Requests: LEFT  Antecubital        Culture result: NO GROWTH AFTER 12 HOURS     CULTURE, BLOOD    Collection Time: 12/04/21  6:56 PM    Specimen: Blood   Result Value Ref Range    Special Requests: RIGHT  HAND        Culture result: NO GROWTH AFTER 12 HOURS     LACTIC ACID    Collection Time: 12/04/21  6:56 PM   Result Value Ref Range    Lactic acid 1.5 0.4 - 2.0 MMOL/L   PROCALCITONIN    Collection Time: 12/04/21  6:56 PM   Result Value Ref Range    Procalcitonin 0.31 0.00 - 0.49 ng/mL   C REACTIVE PROTEIN, QT    Collection Time: 12/04/21  6:56 PM   Result Value Ref Range    C-Reactive protein 17.7 (H) 0.0 - 0.9 mg/dL   FERRITIN    Collection Time: 12/04/21  6:56 PM   Result Value Ref Range    Ferritin 410 (H) 8 - 388 NG/ML   D DIMER    Collection Time: 12/04/21  6:56 PM   Result Value Ref Range    D DIMER 0.47 <0.56 ug/ml(FEU)   CBC WITH AUTOMATED DIFF    Collection Time: 12/05/21  4:07 AM   Result Value Ref Range    WBC 13.6 (H) 4.3 - 11.1 K/uL    RBC 5.46 4.23 - 5.6 M/uL    HGB 16.3 13.6 - 17.2 g/dL    HCT 46.5 41.1 - 50.3 %    MCV 85.2 79.6 - 97.8 FL    MCH 29.9 26.1 - 32.9 PG    MCHC 35.1 (H) 31.4 - 35.0 g/dL    RDW 12.8 11.9 - 14.6 % PLATELET 624 020 - 729 K/uL    MPV 10.0 9.4 - 12.3 FL    ABSOLUTE NRBC 0.00 0.0 - 0.2 K/uL    DF AUTOMATED      NEUTROPHILS 88 (H) 43 - 78 %    LYMPHOCYTES 6 (L) 13 - 44 %    MONOCYTES 6 4.0 - 12.0 %    EOSINOPHILS 0 (L) 0.5 - 7.8 %    BASOPHILS 0 0.0 - 2.0 %    IMMATURE GRANULOCYTES 1 0.0 - 5.0 %    ABS. NEUTROPHILS 11.9 (H) 1.7 - 8.2 K/UL    ABS. LYMPHOCYTES 0.7 0.5 - 4.6 K/UL    ABS. MONOCYTES 0.8 0.1 - 1.3 K/UL    ABS. EOSINOPHILS 0.0 0.0 - 0.8 K/UL    ABS. BASOPHILS 0.0 0.0 - 0.2 K/UL    ABS. IMM. GRANS. 0.1 0.0 - 0.5 K/UL   METABOLIC PANEL, COMPREHENSIVE    Collection Time: 12/05/21  4:07 AM   Result Value Ref Range    Sodium 129 (L) 136 - 145 mmol/L    Potassium 3.8 3.5 - 5.1 mmol/L    Chloride 91 (L) 98 - 107 mmol/L    CO2 28 21 - 32 mmol/L    Anion gap 10 7 - 16 mmol/L    Glucose 115 (H) 65 - 100 mg/dL    BUN 24 (H) 8 - 23 MG/DL    Creatinine 1.11 0.8 - 1.5 MG/DL    GFR est AA >60 >60 ml/min/1.73m2    GFR est non-AA >60 >60 ml/min/1.73m2    Calcium 8.8 8.3 - 10.4 MG/DL    Bilirubin, total 1.3 (H) 0.2 - 1.1 MG/DL    ALT (SGPT) 38 12 - 65 U/L    AST (SGOT) 46 (H) 15 - 37 U/L    Alk.  phosphatase 53 50 - 136 U/L    Protein, total 7.3 6.3 - 8.2 g/dL    Albumin 2.9 (L) 3.2 - 4.6 g/dL    Globulin 4.4 (H) 2.3 - 3.5 g/dL    A-G Ratio 0.7 (L) 1.2 - 3.5     PROTHROMBIN TIME + INR    Collection Time: 12/05/21  4:07 AM   Result Value Ref Range    Prothrombin time 17.7 (H) 12.6 - 14.5 sec    INR 1.4     C REACTIVE PROTEIN, QT    Collection Time: 12/05/21  4:07 AM   Result Value Ref Range    C-Reactive protein 15.5 (H) 0.0 - 0.9 mg/dL   D DIMER    Collection Time: 12/05/21  4:07 AM   Result Value Ref Range    D DIMER 0.74 (H) <0.56 ug/ml(FEU)   URINALYSIS W/ RFLX MICROSCOPIC    Collection Time: 12/05/21  5:50 AM   Result Value Ref Range    Color BASHIR      Appearance CLOUDY      Specific gravity 1.026 (H) 1.001 - 1.023      pH (UA) 5.5 5.0 - 9.0      Protein 100 (A) NEG mg/dL    Glucose Negative mg/dL    Ketone 15 (A) NEG mg/dL    Bilirubin Negative NEG      Blood MODERATE (A) NEG      Urobilinogen 1.0 0.2 - 1.0 EU/dL    Nitrites Negative NEG      Leukocyte Esterase Negative NEG      WBC 3-5 0 /hpf    RBC 0-3 0 /hpf    Bacteria 3+ (H) 0 /hpf    Casts 3-5 0 /lpf    Other observations RESULTS VERIFIED MANUALLY         All Micro Results     Procedure Component Value Units Date/Time    CULTURE, BLOOD [[id number]] Collected: 12/04/21 1856    Order Status: Completed Specimen: Blood Updated: 12/05/21 0718     Special Requests: --        LEFT  Antecubital       Culture result: NO GROWTH AFTER 12 HOURS       CULTURE, BLOOD [[id number]] Collected: 12/04/21 1856    Order Status: Completed Specimen: Blood Updated: 12/05/21 0718     Special Requests: --        RIGHT  HAND       Culture result: NO GROWTH AFTER 12 HOURS       CULTURE, URINE [[id number]]     Order Status: Sent Specimen: Urine from Clean catch           Current Facility-Administered Medications   Medication Dose Route Frequency    opium-belladonna (B&O 16-A SUPPRETTE) 16.2-60 mg suppository 1 Suppository  1 Suppository Rectal ONCE    cefTRIAXone (ROCEPHIN) 1 g in 0.9% sodium chloride (MBP/ADV) 50 mL MBP  1 g IntraVENous Q24H    sodium chloride (NS) flush 5-10 mL  5-10 mL IntraVENous Q8H    sodium chloride (NS) flush 5-10 mL  5-10 mL IntraVENous PRN    aspirin delayed-release tablet 81 mg  81 mg Oral DAILY    fluticasone propionate (FLONASE) 50 mcg/actuation nasal spray 2 Spray  2 Spray Both Nostrils DAILY    hydroCHLOROthiazide (HYDRODIURIL) tablet 12.5 mg  12.5 mg Oral DAILY    sodium chloride (NS) flush 5-40 mL  5-40 mL IntraVENous Q8H    sodium chloride (NS) flush 5-40 mL  5-40 mL IntraVENous PRN    acetaminophen (TYLENOL) tablet 650 mg  650 mg Oral Q6H PRN    Or    acetaminophen (TYLENOL) suppository 650 mg  650 mg Rectal Q6H PRN    polyethylene glycol (MIRALAX) packet 17 g  17 g Oral DAILY PRN    ondansetron (ZOFRAN ODT) tablet 4 mg  4 mg Oral Q8H PRN    Or    ondansetron (ZOFRAN) injection 4 mg  4 mg IntraVENous Q6H PRN    guaiFENesin-dextromethorphan (ROBITUSSIN DM) 100-10 mg/5 mL syrup 5 mL  5 mL Oral Q4H PRN    dexamethasone (DECADRON) 10 mg/mL injection 6 mg  6 mg IntraVENous Q24H    lactated Ringers infusion  75 mL/hr IntraVENous CONTINUOUS    baricitinib (OLUMIANT) tablet 4 mg  4 mg Oral DAILY    guaiFENesin ER (MUCINEX) tablet 600 mg  600 mg Oral Q12H    metoprolol succinate (TOPROL-XL) tablet 100 mg  100 mg Oral Q12H    enoxaparin (LOVENOX) injection 100 mg  1 mg/kg SubCUTAneous Q12H       Other Studies:  No results found for this visit on 12/04/21. No results found. [unfilled]       Part of this note was written by using a voice dictation software and the note has been proof read but may still contain some grammatical/other typographical errors.     Signed:  Zo Jimenez MD

## 2021-12-05 NOTE — PROGRESS NOTES
SAROJ collected this AM.     Patient lying in bed, resting. No s/s of acute distress. Safety measures in place. Will continue to monitor and report off to oncoming nurse.      O2 at 3LPM via NC.

## 2021-12-05 NOTE — PROGRESS NOTES
Patient resting in bed, reports he is warming up some. RN adjusted thermostat in room at patient request.    Patient denies need for additional blanket at this time. Patient denies any pain or other needs at this time. Safety measures in place. Will continue to monitor.

## 2021-12-05 NOTE — PROGRESS NOTES
Patient requested workman catheter insertion due to weakness and dizziness when getting OOB. Order obtained. Unable to insert workman catheter. Patient reports history of prostatectomy with scar tissue. Dr Geeta Mansfield Notified.

## 2021-12-06 ENCOUNTER — APPOINTMENT (OUTPATIENT)
Dept: CT IMAGING | Age: 71
DRG: 871 | End: 2021-12-06
Attending: FAMILY MEDICINE
Payer: COMMERCIAL

## 2021-12-06 LAB
ANION GAP SERPL CALC-SCNC: 11 MMOL/L (ref 7–16)
ANION GAP SERPL CALC-SCNC: 9 MMOL/L (ref 7–16)
BASOPHILS # BLD: 0 K/UL (ref 0–0.2)
BASOPHILS # BLD: 0 K/UL (ref 0–0.2)
BASOPHILS NFR BLD: 0 % (ref 0–2)
BASOPHILS NFR BLD: 0 % (ref 0–2)
BUN SERPL-MCNC: 25 MG/DL (ref 8–23)
BUN SERPL-MCNC: 26 MG/DL (ref 8–23)
CALCIUM SERPL-MCNC: 8.2 MG/DL (ref 8.3–10.4)
CALCIUM SERPL-MCNC: 8.5 MG/DL (ref 8.3–10.4)
CHLORIDE SERPL-SCNC: 90 MMOL/L (ref 98–107)
CHLORIDE SERPL-SCNC: 92 MMOL/L (ref 98–107)
CO2 SERPL-SCNC: 27 MMOL/L (ref 21–32)
CO2 SERPL-SCNC: 27 MMOL/L (ref 21–32)
CREAT SERPL-MCNC: 1.02 MG/DL (ref 0.8–1.5)
CREAT SERPL-MCNC: 1.18 MG/DL (ref 0.8–1.5)
CRP SERPL-MCNC: 14.2 MG/DL (ref 0–0.9)
D DIMER PPP FEU-MCNC: 0.81 UG/ML(FEU)
DIFFERENTIAL METHOD BLD: ABNORMAL
DIFFERENTIAL METHOD BLD: ABNORMAL
EOSINOPHIL # BLD: 0 K/UL (ref 0–0.8)
EOSINOPHIL # BLD: 0 K/UL (ref 0–0.8)
EOSINOPHIL NFR BLD: 0 % (ref 0.5–7.8)
EOSINOPHIL NFR BLD: 0 % (ref 0.5–7.8)
ERYTHROCYTE [DISTWIDTH] IN BLOOD BY AUTOMATED COUNT: 12.8 % (ref 11.9–14.6)
ERYTHROCYTE [DISTWIDTH] IN BLOOD BY AUTOMATED COUNT: 13.2 % (ref 11.9–14.6)
GLUCOSE SERPL-MCNC: 105 MG/DL (ref 65–100)
GLUCOSE SERPL-MCNC: 117 MG/DL (ref 65–100)
HCT VFR BLD AUTO: 49.6 % (ref 41.1–50.3)
HCT VFR BLD AUTO: 51.5 % (ref 41.1–50.3)
HGB BLD-MCNC: 16.9 G/DL (ref 13.6–17.2)
HGB BLD-MCNC: 17.5 G/DL (ref 13.6–17.2)
IMM GRANULOCYTES # BLD AUTO: 0 K/UL (ref 0–0.5)
IMM GRANULOCYTES # BLD AUTO: 0.1 K/UL (ref 0–0.5)
IMM GRANULOCYTES NFR BLD AUTO: 1 % (ref 0–5)
IMM GRANULOCYTES NFR BLD AUTO: 1 % (ref 0–5)
LACTATE SERPL-SCNC: 1.6 MMOL/L (ref 0.4–2)
LYMPHOCYTES # BLD: 0.6 K/UL (ref 0.5–4.6)
LYMPHOCYTES # BLD: 1 K/UL (ref 0.5–4.6)
LYMPHOCYTES NFR BLD: 7 % (ref 13–44)
LYMPHOCYTES NFR BLD: 8 % (ref 13–44)
MCH RBC QN AUTO: 28.7 PG (ref 26.1–32.9)
MCH RBC QN AUTO: 28.8 PG (ref 26.1–32.9)
MCHC RBC AUTO-ENTMCNC: 34 G/DL (ref 31.4–35)
MCHC RBC AUTO-ENTMCNC: 34.1 G/DL (ref 31.4–35)
MCV RBC AUTO: 84.5 FL (ref 79.6–97.8)
MCV RBC AUTO: 84.6 FL (ref 79.6–97.8)
MONOCYTES # BLD: 0.2 K/UL (ref 0.1–1.3)
MONOCYTES # BLD: 0.7 K/UL (ref 0.1–1.3)
MONOCYTES NFR BLD: 3 % (ref 4–12)
MONOCYTES NFR BLD: 6 % (ref 4–12)
NEUTS SEG # BLD: 10.2 K/UL (ref 1.7–8.2)
NEUTS SEG # BLD: 7.5 K/UL (ref 1.7–8.2)
NEUTS SEG NFR BLD: 85 % (ref 43–78)
NEUTS SEG NFR BLD: 90 % (ref 43–78)
NRBC # BLD: 0 K/UL (ref 0–0.2)
NRBC # BLD: 0 K/UL (ref 0–0.2)
PLATELET # BLD AUTO: 268 K/UL (ref 150–450)
PLATELET # BLD AUTO: 327 K/UL (ref 150–450)
PMV BLD AUTO: 10.1 FL (ref 9.4–12.3)
PMV BLD AUTO: 9.8 FL (ref 9.4–12.3)
POTASSIUM SERPL-SCNC: 4.1 MMOL/L (ref 3.5–5.1)
POTASSIUM SERPL-SCNC: 4.3 MMOL/L (ref 3.5–5.1)
PROCALCITONIN SERPL-MCNC: 1.62 NG/ML (ref 0–0.49)
RBC # BLD AUTO: 5.87 M/UL (ref 4.23–5.6)
RBC # BLD AUTO: 6.09 M/UL (ref 4.23–5.6)
SODIUM SERPL-SCNC: 126 MMOL/L (ref 136–145)
SODIUM SERPL-SCNC: 130 MMOL/L (ref 138–145)
WBC # BLD AUTO: 12 K/UL (ref 4.3–11.1)
WBC # BLD AUTO: 8.4 K/UL (ref 4.3–11.1)

## 2021-12-06 PROCEDURE — 80048 BASIC METABOLIC PNL TOTAL CA: CPT

## 2021-12-06 PROCEDURE — 71260 CT THORAX DX C+: CPT

## 2021-12-06 PROCEDURE — 74011250637 HC RX REV CODE- 250/637: Performed by: INTERNAL MEDICINE

## 2021-12-06 PROCEDURE — 74011250636 HC RX REV CODE- 250/636: Performed by: FAMILY MEDICINE

## 2021-12-06 PROCEDURE — 74011250637 HC RX REV CODE- 250/637: Performed by: FAMILY MEDICINE

## 2021-12-06 PROCEDURE — 74011250636 HC RX REV CODE- 250/636: Performed by: INTERNAL MEDICINE

## 2021-12-06 PROCEDURE — 74011000636 HC RX REV CODE- 636: Performed by: FAMILY MEDICINE

## 2021-12-06 PROCEDURE — 85025 COMPLETE CBC W/AUTO DIFF WBC: CPT

## 2021-12-06 PROCEDURE — 74011000258 HC RX REV CODE- 258: Performed by: FAMILY MEDICINE

## 2021-12-06 PROCEDURE — 77010033711 HC HIGH FLOW OXYGEN

## 2021-12-06 PROCEDURE — 94640 AIRWAY INHALATION TREATMENT: CPT

## 2021-12-06 PROCEDURE — 65660000000 HC RM CCU STEPDOWN

## 2021-12-06 PROCEDURE — 83605 ASSAY OF LACTIC ACID: CPT

## 2021-12-06 PROCEDURE — 36415 COLL VENOUS BLD VENIPUNCTURE: CPT

## 2021-12-06 PROCEDURE — 85379 FIBRIN DEGRADATION QUANT: CPT

## 2021-12-06 PROCEDURE — 94760 N-INVAS EAR/PLS OXIMETRY 1: CPT

## 2021-12-06 PROCEDURE — 94664 DEMO&/EVAL PT USE INHALER: CPT

## 2021-12-06 PROCEDURE — 86140 C-REACTIVE PROTEIN: CPT

## 2021-12-06 PROCEDURE — 84145 PROCALCITONIN (PCT): CPT

## 2021-12-06 RX ORDER — SODIUM CHLORIDE 9 MG/ML
75 INJECTION, SOLUTION INTRAVENOUS CONTINUOUS
Status: DISCONTINUED | OUTPATIENT
Start: 2021-12-06 | End: 2021-12-08

## 2021-12-06 RX ORDER — SODIUM CHLORIDE 0.9 % (FLUSH) 0.9 %
10 SYRINGE (ML) INJECTION
Status: ACTIVE | OUTPATIENT
Start: 2021-12-06 | End: 2021-12-06

## 2021-12-06 RX ORDER — DEXAMETHASONE 4 MG/1
6 TABLET ORAL DAILY
Status: DISCONTINUED | OUTPATIENT
Start: 2021-12-07 | End: 2021-12-10

## 2021-12-06 RX ADMIN — Medication 10 ML: at 05:08

## 2021-12-06 RX ADMIN — Medication 10 ML: at 14:00

## 2021-12-06 RX ADMIN — Medication 10 ML: at 23:28

## 2021-12-06 RX ADMIN — DEXAMETHASONE SODIUM PHOSPHATE 6 MG: 10 INJECTION INTRAMUSCULAR; INTRAVENOUS at 09:12

## 2021-12-06 RX ADMIN — ASPIRIN 81 MG: 81 TABLET ORAL at 09:11

## 2021-12-06 RX ADMIN — GUAIFENESIN 600 MG: 600 TABLET ORAL at 09:11

## 2021-12-06 RX ADMIN — FLUTICASONE PROPIONATE 2 SPRAY: 50 SPRAY, METERED NASAL at 09:00

## 2021-12-06 RX ADMIN — METOPROLOL SUCCINATE 100 MG: 100 TABLET, EXTENDED RELEASE ORAL at 09:00

## 2021-12-06 RX ADMIN — CEFTRIAXONE 1 G: 1 INJECTION, POWDER, FOR SOLUTION INTRAMUSCULAR; INTRAVENOUS at 07:28

## 2021-12-06 RX ADMIN — ACETAMINOPHEN 650 MG: 325 TABLET ORAL at 07:45

## 2021-12-06 RX ADMIN — ENOXAPARIN SODIUM 100 MG: 100 INJECTION SUBCUTANEOUS at 02:48

## 2021-12-06 RX ADMIN — HYDROCHLOROTHIAZIDE 12.5 MG: 25 TABLET ORAL at 09:11

## 2021-12-06 RX ADMIN — ALBUTEROL SULFATE 2 PUFF: 90 AEROSOL, METERED RESPIRATORY (INHALATION) at 07:54

## 2021-12-06 RX ADMIN — BARICITINIB 4 MG: 2 TABLET, FILM COATED ORAL at 09:11

## 2021-12-06 RX ADMIN — SODIUM CHLORIDE 75 ML/HR: 900 INJECTION, SOLUTION INTRAVENOUS at 16:53

## 2021-12-06 RX ADMIN — METOPROLOL SUCCINATE 100 MG: 100 TABLET, EXTENDED RELEASE ORAL at 21:50

## 2021-12-06 RX ADMIN — ENOXAPARIN SODIUM 100 MG: 100 INJECTION SUBCUTANEOUS at 14:22

## 2021-12-06 RX ADMIN — IOPAMIDOL 100 ML: 755 INJECTION, SOLUTION INTRAVENOUS at 08:45

## 2021-12-06 RX ADMIN — SODIUM CHLORIDE 100 ML: 900 INJECTION, SOLUTION INTRAVENOUS at 08:45

## 2021-12-06 RX ADMIN — GUAIFENESIN 600 MG: 600 TABLET ORAL at 21:50

## 2021-12-06 NOTE — PROGRESS NOTES
[facility] Hospitalist Note     Admit Date:  2021  8:45 AM   Name:  Cristhian Parekh   Age:  73 y.o.  :  1950   MRN:  [id number]   PCP:  Justin Hopper MD  Treatment Team: Attending Provider: Mya Mukherjee MD; Staff Nurse: Marita Tran RN; Utilization Review: Diane Scott RN; Physical Therapist: Sandra Enciso PT; Care Manager: Sari Mcdermott RN    HPI/Subjective:   68-year-old man with past medical history significant for hypertension, CAD, paroxysmal A. fib, prostate cancer admitted with sepsis and acute hypoxic respiratory failure secondary to COVID-19 infection. Also found to have A. fib with RVR. : Patient is seen at the bedside. Complaining of feeling weak. Also having spikes of fever. Endorses sweating. Denies chest pain, nausea, vomiting or abdominal pain. Oxygen requirement increased to 9 to 10 L nasal cannula. Assessment and Plan:     Sepsis secondary to COVID-19 pneumonia:  Acute hypoxic respiratory failure secondary to COVID-19 pneumonia:  Meeting SIRS criteria on admission with possible source of infection  Desatting to 88% on room air, requiring up to 3 L nasal cannula  Continue supplemental oxygen as needed, wean as tolerated  Procalcitonin negative, hold off on antibiotics  Monitor inflammatory markers  On Lovenox 1 mg/kg x bid  Decadron 6 mg IV daily for 10 days  Baricitinib 4 mg twice daily for 14 days  Mucinex twice daily  Incentive spirometry  Encourage prone positioning  : Oxygen requirement has been increased to 9 to 10 L nasal cannula. T-max 101.4. Elevated D-dimer. CTA ordered, reviewed and negative for PE. Continue Decadron and baricitinib.   Continue supportive care    A. fib with RVR:  Likely triggered by acute illness and COVID-19 infection  Patient with history of paroxysmal A. fib, not on any anticoagulation  Heart rate in 140s on admission, has received Cardizem 10 mg IV once in the ED   Increased metoprolol succinate to 100 mg twice daily   Cardizem discontinued by cardiology  Lovenox to treatment dose, plan to transition to Eliquis on discharge  Replete electrolyte as needed to keep potassium > 4 and magnesium >2  Cardiology consult, appreciate recommendation  12/6: Patient again A. fib, heart rate in 130s. Also febrile at that time. Given morning dose of metoprolol with improvement in heart rate. Continue metoprolol succinate and Lovenox. Pending echocardiogram.  Appreciate cardiology input. UTI:  12/6: Continue ceftriaxone for 2/3 days. Urine culture negative till date    Hyponatremia:  Likely secondary to dehydration, patient with decreased oral intake  12/6: Sodium 126, worsening. Hold hydrochlorothiazide. Continue gentle hydration. Need to be careful for fluid overload state due to Covid infection    Hypertension/CAD/hyperlipidemia:  Resume home meds metoprolol, and aspirin  Patient at home on Repatha  12/6: Hydrochlorothiazide held due to hyponatremia    History of prostate cancer:  Noted    Discharge planning: To be determined, high oxygen demand, depend upon hospital course.   PT/OT    DVT ppx ordered  Code status:  Full  Estimated LOS:  Greater than 2 midnights  Risk:  high    Hospital Problems as of 12/6/2021 Date Reviewed: 8/27/2021          Codes Class Noted - Resolved POA    Atrial fibrillation with RVR ([facility] ICD-10-CM: I48.91  ICD-9-CM: 427.31  12/4/2021 - Present Unknown        Acute hypoxemic respiratory failure ([facility] ICD-10-CM: J96.01  ICD-9-CM: 518.81  12/4/2021 - Present Unknown        * (Principal) COVID-19 ICD-10-CM: U07.1  ICD-9-CM: 079.89  12/4/2021 - Present Unknown        Hypertension ICD-10-CM: I10  ICD-9-CM: 401.9  3/14/2017 - Present Yes        Paroxysmal atrial fibrillation (HCC) ICD-10-CM: I48.0  ICD-9-CM: 427.31  3/12/2016 - Present Yes        CAD (coronary artery disease) ICD-10-CM: I25.10  ICD-9-CM: 414.00  3/12/2016 - Present Yes        Hypoxia ICD-10-CM: R09.02  ICD-9-CM: 799.02  3/11/2016 - Present Yes        S/P CABG x 4 ICD-10-CM: Z95.1  ICD-9-CM: V45.81  3/9/2016 - Present Yes                10 systems reviewed and negative except as noted in HPI. Past Medical History:   Diagnosis Date    Anterior myocardial infarction [facility]) 10/2003    Arrhythmia     paroxysmal a fib    Arteriosclerotic coronary artery disease 11/2005    Arthritis     fingers    Cancer ([address]     prostate    Coronary atherosclerosis of native coronary vessel 10/2001    Family history of premature CAD     Father hx cabg/CHF  Brother CABG x 2    Gout     right toe but no problem now    Hypercholesterolemia     Hypertension       Past Surgical History:   Procedure Laterality Date    COLONOSCOPY N/A 12/21/2016    COLONOSCOPY  BMI 34 performed by Ron Billingsley MD at [facility] ENDOSCOPY    HX COLONOSCOPY      HX CORONARY ARTERY BYPASS GRAFT  3/9/2016    x4 - Dr. Elma Isabel    HX HEENT      tonsillectomy    HX ORTHOPAEDIC      2 back surg    HX PROSTATECTOMY      SC CARDIAC SURG PROCEDURE UNLIST      stent    SC CARDIAC SURG PROCEDURE UNLIST  March 9,2016    quidrupal bypass       Allergies   Allergen Reactions    Latex Rash    Codeine Nausea and Vomiting    Tape [Adhesive] Rash      Social History     Tobacco Use    Smoking status: Never Smoker    Smokeless tobacco: Never Used   Substance Use Topics    Alcohol use: Yes     Comment: beer socially-seldom      Family History   Problem Relation Age of Onset    Heart Disease Father     Diabetes Brother     Heart Disease Brother     Cancer Sister     Colon Cancer Neg Hx       Family history reviewed and noncontributory. Immunization History   Administered Date(s) Administered    TB Skin Test (PPD) Intradermal 03/09/2016    Tdap 10/21/2014     PTA Medications:  Prior to Admission Medications   Prescriptions Last Dose Informant Patient Reported? Taking?   aspirin delayed-release 81 mg tablet   Yes No   Sig: Take  by mouth every morning.    evolocumab (REPATHA) syringe No No   Si mL by SubCUTAneous route every fourteen (14) days. fluticasone (FLONASE) 50 mcg/actuation nasal spray   No No   Sig: One spray in each nostril twice daily   hydroCHLOROthiazide (HYDRODIURIL) 25 mg tablet   No No   Si/2 tablet by mouth daily   loratadine (CLARITIN) 10 mg tablet   Yes No   Sig: Take 10 mg by mouth every morning. metoprolol succinate (TOPROL-XL) 100 mg tablet   No No   Sig: Take 1 Tablet by mouth daily. nitroglycerin (NITROSTAT) 0.4 mg SL tablet   No No   Si Tablet by SubLINGual route every five (5) minutes as needed for Chest Pain. Up to 3 doses. trimethoprim-sulfamethoxazole (Bactrim DS) 160-800 mg per tablet   No No   Sig: Take 1 Tablet by mouth two (2) times a day. Facility-Administered Medications: None       Objective:     Patient Vitals for the past 24 hrs:   Temp Pulse Resp BP SpO2   21 1200  90      21 1140 98.5 °F (36.9 °C) (!) 103 17 110/74 95 %   21 1008     (!) 6 %   21 0800  (!) 133      21 0745  (!) 133      21 0721 (!) 101.4 °F (38.6 °C) (!) 116 22 (!) 166/82 93 %   21 0448  (!) 114      21 0423  (!) 114 24  (!) 84 %   21 0420     93 %   21 0419     (!) 85 %   21 0020 99.2 °F (37.3 °C) 92 20 (!) 140/68 92 %   21 1958     91 %   21 195     (!) 88 %   21 193 (!) 100.6 °F (38.1 °C) 89 21 (!) 157/75 90 %   21 1532 98.4 °F (36.9 °C) 85 20 (!) 153/68 96 %     Oxygen Therapy  O2 Sat (%): 95 % (21 1140)  Pulse via Oximetry: 124 beats per minute (21)  O2 Device: Hi flow nasal cannula (21)  Skin Assessment: Clean, dry, & intact (21)  Skin Protection for O2 Device: No (21)  O2 Flow Rate (L/min): 6 l/min (21)    Estimated body mass index is 33.72 kg/m² as calculated from the following:    Height as of this encounter: 5' 10\" (1.778 m).     Weight as of this encounter: 106.6 kg (235 lb 0.2 oz). Intake/Output Summary (Last 24 hours) at 12/6/2021 1351  Last data filed at 12/6/2021 1338  Gross per 24 hour   Intake 118 ml   Output    Net 118 ml       *Note that automatically entered I/Os may not be accurate; dependent on patient compliance with collection and accurate  by assistants. Physical Exam:  General:    Alert. No acute distress  Eyes:   Normal sclerae. Extraocular movements intact. HENT:  Normocephalic, atraumatic. Moist mucous membranes  CV:   Tachycardia, irregularly irregular. No m/r/g. Lungs:  Coarse breath sounds  Abdomen: Soft, nontender, nondistended. Extremities: Warm and dry. No cyanosis or edema. Neurologic: CN II-XII grossly intact. Sensation intact. Skin:     No rashes or jaundice. Normal coloration  Psych:  Normal mood and affect. I reviewed the labs, imaging, EKGs, telemetry, and other studies done this admission. Data Reviewed:   Recent Results (from the past 24 hour(s))   C REACTIVE PROTEIN, QT    Collection Time: 12/06/21  4:28 AM   Result Value Ref Range    C-Reactive protein 14.2 (H) 0.0 - 0.9 mg/dL   D DIMER    Collection Time: 12/06/21  4:28 AM   Result Value Ref Range    D DIMER 0.81 (H) <0.56 ug/ml(FEU)   CBC WITH AUTOMATED DIFF    Collection Time: 12/06/21  4:28 AM   Result Value Ref Range    WBC 12.0 (H) 4.3 - 11.1 K/uL    RBC 5.87 (H) 4.23 - 5.6 M/uL    HGB 16.9 13.6 - 17.2 g/dL    HCT 49.6 41.1 - 50.3 %    MCV 84.5 79.6 - 97.8 FL    MCH 28.8 26.1 - 32.9 PG    MCHC 34.1 31.4 - 35.0 g/dL    RDW 12.8 11.9 - 14.6 %    PLATELET 091 631 - 639 K/uL    MPV 10.1 9.4 - 12.3 FL    ABSOLUTE NRBC 0.00 0.0 - 0.2 K/uL    DF AUTOMATED      NEUTROPHILS 85 (H) 43 - 78 %    LYMPHOCYTES 8 (L) 13 - 44 %    MONOCYTES 6 4.0 - 12.0 %    EOSINOPHILS 0 (L) 0.5 - 7.8 %    BASOPHILS 0 0.0 - 2.0 %    IMMATURE GRANULOCYTES 1 0.0 - 5.0 %    ABS. NEUTROPHILS 10.2 (H) 1.7 - 8.2 K/UL    ABS. LYMPHOCYTES 1.0 0.5 - 4.6 K/UL    ABS.  MONOCYTES 0.7 0.1 - 1.3 K/UL    ABS. EOSINOPHILS 0.0 0.0 - 0.8 K/UL    ABS. BASOPHILS 0.0 0.0 - 0.2 K/UL    ABS. IMM.  GRANS. 0.1 0.0 - 0.5 K/UL   METABOLIC PANEL, BASIC    Collection Time: 12/06/21  4:28 AM   Result Value Ref Range    Sodium 126 (L) 136 - 145 mmol/L    Potassium 4.1 3.5 - 5.1 mmol/L    Chloride 90 (L) 98 - 107 mmol/L    CO2 27 21 - 32 mmol/L    Anion gap 9 7 - 16 mmol/L    Glucose 105 (H) 65 - 100 mg/dL    BUN 26 (H) 8 - 23 MG/DL    Creatinine 1.18 0.8 - 1.5 MG/DL    GFR est AA >60 >60 ml/min/1.73m2    GFR est non-AA >60 >60 ml/min/1.73m2    Calcium 8.5 8.3 - 10.4 MG/DL       All Micro Results     Procedure Component Value Units Date/Time    CULTURE, BLOOD [[id number]] Collected: 12/04/21 1856    Order Status: Completed Specimen: Blood Updated: 12/06/21 0653     Special Requests: --        RIGHT  HAND       Culture result: NO GROWTH 2 DAYS       CULTURE, BLOOD [[id number]] Collected: 12/04/21 1856    Order Status: Completed Specimen: Blood Updated: 12/06/21 0653     Special Requests: --        LEFT  Antecubital       Culture result: NO GROWTH 2 DAYS       CULTURE, URINE [[id number]] Collected: 12/05/21 0549    Order Status: Completed Specimen: Urine from Clean catch Updated: 12/06/21 0651     Special Requests: NO SPECIAL REQUESTS        Culture result: NO GROWTH 1 DAY             Current Facility-Administered Medications   Medication Dose Route Frequency    saline peripheral flush soln 10 mL  10 mL InterCATHeter RAD ONCE    [START ON 12/7/2021] dexAMETHasone (DECADRON) tablet 6 mg  6 mg Oral DAILY    cefTRIAXone (ROCEPHIN) 1 g in 0.9% sodium chloride (MBP/ADV) 50 mL MBP  1 g IntraVENous Q24H    albuterol (PROVENTIL HFA, VENTOLIN HFA, PROAIR HFA) inhaler 2 Puff  2 Puff Inhalation Q4H PRN    sodium chloride (NS) flush 5-10 mL  5-10 mL IntraVENous Q8H    sodium chloride (NS) flush 5-10 mL  5-10 mL IntraVENous PRN    aspirin delayed-release tablet 81 mg  81 mg Oral DAILY    fluticasone propionate (FLONASE) 50 mcg/actuation nasal spray 2 Spray  2 Spray Both Nostrils DAILY    hydroCHLOROthiazide (HYDRODIURIL) tablet 12.5 mg  12.5 mg Oral DAILY    sodium chloride (NS) flush 5-40 mL  5-40 mL IntraVENous Q8H    sodium chloride (NS) flush 5-40 mL  5-40 mL IntraVENous PRN    acetaminophen (TYLENOL) tablet 650 mg  650 mg Oral Q6H PRN    Or    acetaminophen (TYLENOL) suppository 650 mg  650 mg Rectal Q6H PRN    polyethylene glycol (MIRALAX) packet 17 g  17 g Oral DAILY PRN    ondansetron (ZOFRAN ODT) tablet 4 mg  4 mg Oral Q8H PRN    Or    ondansetron (ZOFRAN) injection 4 mg  4 mg IntraVENous Q6H PRN    guaiFENesin-dextromethorphan (ROBITUSSIN DM) 100-10 mg/5 mL syrup 5 mL  5 mL Oral Q4H PRN    lactated Ringers infusion  75 mL/hr IntraVENous CONTINUOUS    baricitinib (OLUMIANT) tablet 4 mg  4 mg Oral DAILY    guaiFENesin ER (MUCINEX) tablet 600 mg  600 mg Oral Q12H    metoprolol succinate (TOPROL-XL) tablet 100 mg  100 mg Oral Q12H    enoxaparin (LOVENOX) injection 100 mg  1 mg/kg SubCUTAneous Q12H       Other Studies:  No results found for this visit on 12/04/21. CT CHEST PULMONARY EMBOLISM    Result Date: 12/6/2021  CT OF THE CHEST WITH INTRAVENOUS CONTRAST. INDICATION: COVID pneumonia, worsening, shortness of breath. . COMPARISON: None. TECHNIQUE:   2.5 mm axial scans from above the aortic arch to the lung bases with 1 cc nonionic intravenous contrast without acute complication. Intravenous contrast was given to evaluate for pulmonary embolism. Radiation dose reduction techniques were used for this study. Our CT scanners use one or more of the following:  Automated exposure control, adjustment of the mA and or kV according to patient size, iterative reconstruction. FINDINGS:  PULMONARY ARTERY OPACIFICATION: Adequate  NO intraluminal filling defects within the pulmonary arterial tree to suggest pulmonary embolism. LUNGS: Multiple peripheral groundglass opacities with more consolidation bases.  AIRWAYS: Trachea and proximal bronchi grossly patent. PLEURA: Small bilateral pleural effusions. LYMPH NODES: Mildly enlarged prevascular paratracheal subcarinal and hilar lymph nodes. HEART: Normal size. CORONARIES: Extensive calcifications. AORTA is normal caliber with a uniform lumen without evidence of dissection. UPPER ABDOMEN: Normal size adrenal glands. SKELETAL/CHEST WALL: Sternal wires. Thoracic DJD. Darlynn Magic Negative for pulmonary embolism. Bilateral pneumonia. [unfilled]       Part of this note was written by using a voice dictation software and the note has been proof read but may still contain some grammatical/other typographical errors.     Signed:  Jonah Matos MD

## 2021-12-06 NOTE — PROGRESS NOTES
Report received from off going nurse. Patient alert and oriented. No s/s of acute distress. Safety measures in place including bed alarm and educating patient to not get OOB without assistance from staff. Patient agreeable. Will continue to monitor.

## 2021-12-06 NOTE — PROGRESS NOTES
MSN, CM:  Patient is currently on 9L NC today. Patient did have a fall yesterday. PT is already ordered for this patient. Case Management will continue to follow.

## 2021-12-06 NOTE — PROGRESS NOTES
Assumed care of pt at 0700. Pt stated he doesn't feel well in general. Pt stated he has generalized pain, weakness, and fatigue. Vitals all WNL. MD notified of concern over pt appearance (sweating profusely) and complaints.  Per MD will continue to monitor

## 2021-12-06 NOTE — PROGRESS NOTES
PT note: PT evaluation received and chart reviewed. Pt not feeling well currently, on 10 L HF, sweating. Will hold evaluation today and attempt tomorrow as time and schedule allow. Thank you.    Michael Redding, PT  12/6/2021

## 2021-12-06 NOTE — PROGRESS NOTES
Patient lying bed. No s/s of acute distress at this time. Tely Monitor on. 9LPM HFNC. Safety measures in place. Will continue to monitor and report off to oncoming nurse.

## 2021-12-06 NOTE — PROGRESS NOTES
Patient desated to 84% after BRP with HR elevated to 131 per monitor room. Dr Faustina Reina notified. Patient increased to 9LPM via High flow nasal cannula by respiratory.

## 2021-12-07 LAB
BACTERIA SPEC CULT: NORMAL
SERVICE CMNT-IMP: NORMAL

## 2021-12-07 PROCEDURE — 74011250637 HC RX REV CODE- 250/637: Performed by: FAMILY MEDICINE

## 2021-12-07 PROCEDURE — 74011000258 HC RX REV CODE- 258: Performed by: FAMILY MEDICINE

## 2021-12-07 PROCEDURE — 74011250637 HC RX REV CODE- 250/637: Performed by: INTERNAL MEDICINE

## 2021-12-07 PROCEDURE — 65660000000 HC RM CCU STEPDOWN

## 2021-12-07 PROCEDURE — 74011250636 HC RX REV CODE- 250/636: Performed by: INTERNAL MEDICINE

## 2021-12-07 PROCEDURE — 97530 THERAPEUTIC ACTIVITIES: CPT

## 2021-12-07 PROCEDURE — 97161 PT EVAL LOW COMPLEX 20 MIN: CPT

## 2021-12-07 PROCEDURE — 74011250636 HC RX REV CODE- 250/636: Performed by: FAMILY MEDICINE

## 2021-12-07 PROCEDURE — 97165 OT EVAL LOW COMPLEX 30 MIN: CPT

## 2021-12-07 PROCEDURE — 97535 SELF CARE MNGMENT TRAINING: CPT

## 2021-12-07 RX ORDER — DOXYCYCLINE 100 MG/1
100 CAPSULE ORAL EVERY 12 HOURS
Status: DISCONTINUED | OUTPATIENT
Start: 2021-12-07 | End: 2021-12-12

## 2021-12-07 RX ADMIN — Medication 10 ML: at 05:26

## 2021-12-07 RX ADMIN — FLUTICASONE PROPIONATE 2 SPRAY: 50 SPRAY, METERED NASAL at 08:21

## 2021-12-07 RX ADMIN — DOXYCYCLINE HYCLATE 100 MG: 100 CAPSULE ORAL at 22:11

## 2021-12-07 RX ADMIN — SODIUM CHLORIDE 75 ML/HR: 900 INJECTION, SOLUTION INTRAVENOUS at 05:13

## 2021-12-07 RX ADMIN — Medication 10 ML: at 14:17

## 2021-12-07 RX ADMIN — BARICITINIB 4 MG: 2 TABLET, FILM COATED ORAL at 08:21

## 2021-12-07 RX ADMIN — CEFTRIAXONE 1 G: 1 INJECTION, POWDER, FOR SOLUTION INTRAMUSCULAR; INTRAVENOUS at 08:21

## 2021-12-07 RX ADMIN — ENOXAPARIN SODIUM 100 MG: 100 INJECTION SUBCUTANEOUS at 14:16

## 2021-12-07 RX ADMIN — SODIUM CHLORIDE 75 ML/HR: 900 INJECTION, SOLUTION INTRAVENOUS at 18:14

## 2021-12-07 RX ADMIN — METOPROLOL SUCCINATE 100 MG: 100 TABLET, EXTENDED RELEASE ORAL at 22:11

## 2021-12-07 RX ADMIN — METOPROLOL SUCCINATE 100 MG: 100 TABLET, EXTENDED RELEASE ORAL at 08:21

## 2021-12-07 RX ADMIN — ENOXAPARIN SODIUM 100 MG: 100 INJECTION SUBCUTANEOUS at 02:53

## 2021-12-07 RX ADMIN — GUAIFENESIN 600 MG: 600 TABLET ORAL at 08:22

## 2021-12-07 RX ADMIN — DOXYCYCLINE HYCLATE 100 MG: 100 CAPSULE ORAL at 08:21

## 2021-12-07 RX ADMIN — Medication 10 ML: at 22:00

## 2021-12-07 RX ADMIN — GUAIFENESIN 600 MG: 600 TABLET ORAL at 22:11

## 2021-12-07 RX ADMIN — ASPIRIN 81 MG: 81 TABLET ORAL at 08:21

## 2021-12-07 RX ADMIN — DEXAMETHASONE 6 MG: 4 TABLET ORAL at 08:21

## 2021-12-07 NOTE — PROGRESS NOTES
ACUTE PHYSICAL THERAPY GOALS:  (Developed with and agreed upon by patient and/or caregiver. )  LTG:  (1.)Mr. Baires Friday will move from supine to sit and sit to supine , scoot up and down and roll side to side in bed with INDEPENDENT within 7 treatment day(s). (2.)Mr. Baires Friday will transfer from bed to chair and chair to bed with INDEPENDENT using the least restrictive device within 7 treatment day(s). (3.)Mr. Baires Friday will ambulate with INDEPENDENT for 250+ feet with the least restrictive device within 7 treatment day(s) while maintaining normal vital signs. (4.)Mr. Baires Friday will tolerate 23+ minutes of therapeutic activity within 7 treatment days for increased activity tolerance and strength.   ________________________________________________________________________________________________       PHYSICAL THERAPY ASSESSMENT: Initial Assessment and AM PT Treatment Day # 1      Patty Pierson is a 73 y.o. male   PRIMARY DIAGNOSIS: COVID-19  COVID-19 [U07.1]  Acute hypoxemic respiratory failure (HCC) [J96.01]  Atrial fibrillation with RVR ([address] [I48.91]       Reason for Referral:    ICD-10: Treatment Diagnosis: Generalized Muscle Weakness (M62.81)  Difficulty in walking, Not elsewhere classified (R26.2)  INPATIENT: Payor: [facility] [employer] / Plan: [place] [facility] [employer] / Product Type: PPO /     ASSESSMENT:     REHAB RECOMMENDATIONS:   Recommendation to date pending progress:  [address] [place] [address]  Equipment:    To Be Determined     PRIOR LEVEL OF FUNCTION:  (Prior to Hospitalization) INITIAL/CURRENT LEVEL OF FUNCTION:  (Most Recently Demonstrated)   Bed Mobility:   Independent  Sit to Stand:   Independent  Transfers:   Independent  Gait/Mobility:   Independent Bed Mobility:   Contact Guard Assistance  Sit to Stand:   Minimal Assistance  Transfers:   Minimal Assistance  Gait/Mobility:   Minimal Assistance     ASSESSMENT:  Mr. Baires Friday presents to hospital with ARF due to COVID 19 virus.  Pt currently on 14L HF, stats 91% at rest. Pt reports feeling a little better today than yesterday. Pt generally weak, frequent rest breaks with minimal activity. Pt with CGA to MIN A for activity. Pt with additional time sitting EOB due to O2 stats 87%, cues for pursed lip breathing activity. Pt performed sit to stand and ambulated with HHA short distance bed to chair, O2 dropped to 84%, recovered quickly once seated with cues to 90%. PT to follow for acute care needs to address general weakness, transfers, ambulation, balance, and overall activity tolerance. Pt may benefit from HHPT at discharge pending progress with activity. SUBJECTIVE:   Mr. Darren Sykes states, \"I am doing a little better today. \"    SOCIAL HISTORY/LIVING ENVIRONMENT: independent at baseline, lives spouse, drives, no falls, RA  Home Environment: Private residence  One/Two Story Residence: One story  Living Alone: No  Support Systems: Spouse/Significant Other  OBJECTIVE:     PAIN: VITAL SIGNS: LINES/DRAINS:   Pre Treatment: Pain Screen  Pain Scale 1: Numeric (0 - 10)  Pain Intensity 1: 0  Post Treatment: 0 Vital Signs  O2 Sat (%): 91 %  O2 Device: Hi flow nasal cannula  O2 Flow Rate (L/min): 14 l/min Continuous Pulse Oximetry, IV and Purewick  O2 Device: Hi flow nasal cannula     GROSS EVALUATION:  B LE Within Functional Limits Abnormal/ Functional Abnormal/ Non-Functional (see comments) Not Tested Comments:   AROM [x] [] [] []    PROM [x] [] [] []    Strength [x] [] [] []    Balance [] [x] [] []    Posture [x] [] [] []    Sensation [x] [] [] []    Coordination [x] [] [] []    Tone [x] [] [] []    Edema [] [x] [] []    Activity Tolerance [] [x] [] []     [] [] [] []      COGNITION/  PERCEPTION: Intact Impaired   (see comments) Comments:   Orientation [x] []    Vision [x] []    Hearing [x] []    Command Following [x] []    Safety Awareness [x] []     [] []      MOBILITY: I Mod I S SBA CGA Min Mod Max Total  NT x2 Comments:   Bed Mobility    Rolling [] [] [] [] [x] [] [] [] [] [] []    Supine to Sit [] [] [] [] [] [x] [] [] [] [] []    Scooting [] [] [] [] [x] [] [] [] [] [] []    Sit to Supine [] [] [] [] [] [] [] [] [] [x] [] chair   Transfers    Sit to Stand [] [] [] [] [] [x] [] [] [] [] []    Bed to Chair [] [] [] [] [] [x] [] [] [] [] []    Stand to Sit [] [] [] [] [] [x] [] [] [] [] []    I=Independent, Mod I=Modified Independent, S=Supervision, SBA=Standby Assistance, CGA=Contact Guard Assistance,   Min=Minimal Assistance, Mod=Moderate Assistance, Max=Maximal Assistance, Total=Total Assistance, NT=Not Tested  GAIT: I Mod I S SBA CGA Min Mod Max Total  NT x2 Comments:   Level of Assistance [] [] [] [] [] [x] [] [] [] [] []    Distance 10    DME HHA    Gait Quality Shuffled, slow    Weightbearing Status N/A     I=Independent, Mod I=Modified Independent, S=Supervision, SBA=Standby Assistance, CGA=Contact Guard Assistance,   Min=Minimal Assistance, Mod=Moderate Assistance, Max=Maximal Assistance, Total=Total Assistance, NT=Not Tested    [facility] Form       How much difficulty does the patient currently have. .. Unable A Lot A Little None   1. Turning over in bed (including adjusting bedclothes, sheets and blankets)? [] 1   [] 2   [x] 3   [] 4   2. Sitting down on and standing up from a chair with arms ( e.g., wheelchair, bedside commode, etc.)   [] 1   [] 2   [x] 3   [] 4   3. Moving from lying on back to sitting on the side of the bed? [] 1   [] 2   [x] 3   [] 4   How much help from another person does the patient currently need. .. Total A Lot A Little None   4. Moving to and from a bed to a chair (including a wheelchair)? [] 1   [] 2   [x] 3   [] 4   5. Need to walk in hospital room? [] 1   [] 2   [x] 3   [] 4   6. Climbing 3-5 steps with a railing? [] 1   [x] 2   [] 3   [] 4   © 2007, Trustees of [facility] [employer], under license to [employer].  All rights reserved     Score:  Initial: 17 Most Recent: X (Date: -- )    Interpretation of Tool:  Represents activities that are increasingly more difficult (i.e. Bed mobility, Transfers, Gait). PLAN:   FREQUENCY/DURATION: PT Plan of Care: 3 times/week for duration of hospital stay or until stated goals are met, whichever comes first.    PROBLEM LIST:   (Skilled intervention is medically necessary to address:)  1. Decreased ADL/Functional Activities  2. Decreased Activity Tolerance  3. Decreased Balance  4. Decreased Coordination  5. Decreased Gait Ability  6. Decreased Strength  7. Decreased Transfer Abilities   INTERVENTIONS PLANNED:   (Benefits and precautions of physical therapy have been discussed with the patient.)  1. Therapeutic Activity  2. Therapeutic Exercise/HEP  3. Neuromuscular Re-education  4. Gait Training  5. Manual Therapy  6. Education     TREATMENT:     EVALUATION: Low Complexity : (Untimed Charge)    TREATMENT:   ($$ Therapeutic Activity: 23-37 mins    )  Therapeutic Activity (23 Minutes): Therapeutic activity included Supine to Sit, Scooting, Transfer Training, Ambulation on level ground, Sitting balance , Standing balance and activity pacing, pursed lip breathing to improve functional Mobility, Strength, Activity tolerance and balance.    Co-Treatment PT/OT necessary due to patient's decreased overall endurance/tolerance levels, as well as need for high level skilled assistance to complete functional transfers/mobility and functional tasks    TREATMENT GRID:  N/A    AFTER TREATMENT POSITION/PRECAUTIONS:  Chair, Needs within reach and RN notified    INTERDISCIPLINARY COLLABORATION:  RN/PCT, PT/PTA and OT/BASHIR    TOTAL TREATMENT DURATION:  PT Patient Time In/Time Out  Time In: 0936  Time Out: 5500 REYNA Aleman PT

## 2021-12-07 NOTE — PROGRESS NOTES
ACUTE OT GOALS:  (Developed with and agreed upon by patient and/or caregiver.)  1) Patient will complete total body bathing and dressing with SET UP and adaptive equipment as needed. 2) Patient will complete toileting with SUPERVISION. 3) Patient will complete functional transfers with SUPERVISION and adaptive equipment as needed. 4) Patient will tolerate at least 25 minutes of OT activity with 1-2 rest breaks while maintaining O2 sats >90%. 5) Patient will verbalize at least 3 energy conservation technique to utilize during ADL/IADL. Timeframe: 7 visits     OCCUPATIONAL THERAPY ASSESSMENT: Initial Assessment and Daily Note OT Treatment Day # 1    Bushra Geiger is a 73 y.o. male   PRIMARY DIAGNOSIS: COVID-19  COVID-19 [U07.1]  Acute hypoxemic respiratory failure (HCC) [J96.01]  Atrial fibrillation with RVR ([facility] [I48.91]       Reason for Referral:    ICD-10: Treatment Diagnosis: Generalized Muscle Weakness (M62.81)  Difficulty in walking, Not elsewhere classified (R26.2)  INPATIENT: Payor: [facility] / Plan: [place] [facility] / Product Type: PPO /   ASSESSMENT:     REHAB RECOMMENDATIONS:   Recommendation to date pending progress:  [address] [place] [address]  Equipment:    To Be Determined     PRIOR LEVEL OF FUNCTION:  (Prior to Hospitalization)  INITIAL/CURRENT LEVEL OF FUNCTION:  (Based on today's evaluation)   Bathing:   Independent  Dressing:   Independent  Feeding/Grooming:   Independent  Toileting:   Independent  Functional Mobility:   Independent Bathing:   Moderate Assistance  Dressing:   Minimal Assistance  Feeding/Grooming:   Set Up  Toileting:   Minimal Assistance  Functional Mobility:   Minimal Assistance     ASSESSMENT:  Mr. Carmenza Salamanca is a 74 y/o male presents with acute respiratory failure due to COVID 19. Today pt presents with decreased activity tolerance and balance impacting ADLs.  Pt is currently on 14L HF NC and sats 93% at rest. Pt completed grooming, LE dressing and functional transfers in prep for ADLs in grid below. Pt min A HHA for functional transfers due to feeling weak and off balance from his baseline. Pt educated on pursed lip breathing and energy conservation techniques in prep for ADLs. Pt O2 sats ranged 84-93% throughout. Pt is currently functioning below baseline and would benefit from skilled OT services to address OT goals and plan of care. SUBJECTIVE:   Mr. Michael Guy states, \"I'm just tired. \"    SOCIAL HISTORY/LIVING ENVIRONMENT: lives with wife and sister, one level home, no DME in the home, drives  Home Environment: Private residence  One/Two Story Residence: One story  Living Alone: No  Support Systems: Spouse/Significant Other    OBJECTIVE:     PAIN: VITAL SIGNS: LINES/DRAINS:   Pre Treatment: Pain Screen  Pain Scale 1: Numeric (0 - 10)  Pain Intensity 1: 0  Post Treatment: 0 Vital Signs  O2 Sat (%): 91 %  O2 Device: Hi flow nasal cannula  O2 Flow Rate (L/min): 14 l/min Continuous Pulse Oximetry, IV and Male External Catheter  O2 Device: Hi flow nasal cannula     GROSS EVALUATION:  BUE Within Functional Limits Abnormal/ Functional Abnormal/ Non-Functional (see comments) Not Tested Comments:   AROM [x] [] [] []    PROM [] [] [] [x]    Strength [] [x] [] []    Balance [] [x] [] [] Sitting: good  Standing: fair +   Posture [] [x] [] []    Sensation [x] [] [] []    Coordination [] [x] [] []    Tone [x] [] [] []    Edema [x] [] [] []    Activity Tolerance [] [x] [] [] 14L HF NC    [] [] [] []      COGNITION/  PERCEPTION: Intact Impaired   (see comments) Comments:   Orientation [x] []    Vision [x] []    Hearing [x] []    Judgment/ Insight [] [x] Decreased insight into deficits   Attention [x] []    Memory [x] []    Command Following [x] []    Emotional Regulation [x] []     [] []      ACTIVITIES OF DAILY LIVING: I Mod I S SBA CGA Min Mod Max Total NT Comments   BASIC ADLs:              Bathing/ Showering [] [] [] [] [] [] [] [] [] [x]    Toileting [] [] [] [] [] [] [] [] [] [x]    Dressing [] [] [] [] [x] [] [] [] [] [] Donning socks EOB   Feeding [] [] [] [] [] [] [] [] [] [x]    Grooming [] [] [x] [] [] [] [] [] [] [] Combing hair and brushing teeth in chair   Personal Device Care [] [] [] [] [] [] [] [] [] [x]    Functional Mobility [] [] [] [] [] [x] [] [] [] [] HHA   I=Independent, Mod I=Modified Independent, S=Supervision, SBA=Standby Assistance, CGA=Contact Guard Assistance,   Min=Minimal Assistance, Mod=Moderate Assistance, Max=Maximal Assistance, Total=Total Assistance, NT=Not Tested    MOBILITY: I Mod I S SBA CGA Min Mod Max Total  NT x2 Comments:   Supine to sit [] [] [] [] [] [x] [] [] [] [] []    Sit to supine [] [] [] [] [] [] [] [] [] [x] []    Sit to stand [] [] [] [] [] [x] [] [] [] [] [] HHA   Bed to chair [] [] [] [] [] [x] [] [] [] [] [] HHA   I=Independent, Mod I=Modified Independent, S=Supervision, SBA=Standby Assistance, CGA=Contact Guard Assistance,   Min=Minimal Assistance, Mod=Moderate Assistance, Max=Maximal Assistance, Total=Total Assistance, NT=Not Tested    [address]\"" [address] [address] AM-[facility]. [address]   Daily Activity Inpatient Short Form        How much help from another person does the patient currently need. .. Total A Lot A Little None   1. Putting on and taking off regular lower body clothing? [] 1   [] 2   [x] 3   [] 4   2. Bathing (including washing, rinsing, drying)? [] 1   [x] 2   [] 3   [] 4   3. Toileting, which includes using toilet, bedpan or urinal?   [] 1   [] 2   [x] 3   [] 4   4. Putting on and taking off regular upper body clothing? [] 1   [] 2   [x] 3   [] 4   5. Taking care of personal grooming such as brushing teeth? [] 1   [] 2   [x] 3   [] 4   6. Eating meals? [] 1   [] 2   [] 3   [x] 4   © 2007, Trustees of [address] [place] [id number], under license to [employer].  All rights reserved     Score:  Initial: 18 Most Recent: X (Date: -- )   Interpretation of Tool:  Represents activities that are increasingly more difficult (i.e. Bed mobility, Transfers, Gait). PLAN:   FREQUENCY/DURATION: OT Plan of Care: 3 times/week for duration of hospital stay or until stated goals are met, whichever comes first.    PROBLEM LIST:   (Skilled intervention is medically necessary to address:)  1. Decreased ADL/Functional Activities  2. Decreased Activity Tolerance  3. Decreased Balance  4. Decreased Coordination  5. Decreased Gait Ability  6. Decreased Strength  7. Decreased Transfer Abilities   INTERVENTIONS PLANNED:   (Benefits and precautions of occupational therapy have been discussed with the patient.)  1. Self Care Training  2. Therapeutic Activity  3. Therapeutic Exercise/HEP  4. Neuromuscular Re-education  5. Education     TREATMENT:     EVALUATION: Low Complexity : (Untimed Charge)    TREATMENT:   ($$ Self Care/Home Management: 8-22 mins    )  Co-Treatment PT/OT necessary due to patient's decreased overall endurance/tolerance levels, as well as need for high level skilled assistance to complete functional transfers/mobility and functional tasks  Self Care (15 Minutes): Self care including Grooming, Energy Conservation Training and functional transfers in prep for ADLs to increase independence and decrease level of assistance required.     TREATMENT GRID:  N/A    AFTER TREATMENT POSITION/PRECAUTIONS:  Chair, Needs within reach and RN notified    INTERDISCIPLINARY COLLABORATION:  RN/PCT, PT/PTA and OT/BASHIR    TOTAL TREATMENT DURATION:  OT Patient Time In/Time Out  Time In: 1559  Time Out: [facility],

## 2021-12-07 NOTE — PROGRESS NOTES
Advance Care Planning   Advance Care Planning Inpatient Note  [address] [facility] Department    Today's Date: 12/7/2021  Unit: SFD 8 INTERMEDIATE CARE UNIT    Received request from Health Care provider. Upon review of chart and communication with care team, patient's decision making abilities are not in question. Patient and Spouse was/were present in the room during visit. Goals of ACP Conversation:  Discuss Advance Care planning documents    Health Care Decision Makers:      Primary Decision Maker: Jesse Cota - Spouse - [phone]    Secondary Decision Maker: Roger Michelle - Sister      Summary:  Completed New Documents    Advance Care Planning Documents (Patient Wishes) on file:  Healthcare Power of /Advance Directive appointment of Health care agent     Assessment:    After receiving consult, reviewed patient's chart. Pt's wife was present, as she has also been hospitalized for COVID 19. She was being discharged home but was visiting with pt prior to leaving the hospital.   Confirmed with RN that pt has decision making capabilities. Dressed in appropriate PPE,  facilitated for the completion of form with assistance of wife.      Interventions:  Provided education on documents for clarity and greater understanding  Assisted in the completion of documents according to patient's wishes at this time    Care Preferences Communicated:  See pt's HCPOA    Outcomes/Plan:  New Advance Directive completed  Returned original document(s) to patient, as well as copies for distribution to appointed agents  Copy of Advance Directive given to staff to scan into medical record    Dylan Wilkins, [address] on 12/7/2021 at 4:25 PM

## 2021-12-07 NOTE — PROGRESS NOTES
SERGEYAR received from [facility]. Pt resting quietly in bed. No signs of distress noted or needs stated. Respirations even and unlabored on 13L HF NC. Will continue to monitor.

## 2021-12-07 NOTE — PROGRESS NOTES
Patient remains in stable condition with respirations even/unlabored. No acute distress noted. No needs noted or voiced at this time. Safety measures in place. Patient on oxygen to HF NC. NRB in reach. Call light remains within reach. SBAR to [place], RN.

## 2021-12-07 NOTE — PROGRESS NOTES
Patient was found trying to get OOB for BRP. Patient unable to stand. Was assisted with urinal use by RN on side of bed. Patient voided approximately 15ml of urine. Patient then returned to bed.

## 2021-12-07 NOTE — PROGRESS NOTES
SYLVIA from [place], [facility]. Patient in stable condition with resps even/unlabored. NAD noted. Patient on 13 L HF NC, oxinet in use. IVF infusing. Telemetry noted. Safety measures noted. Will continue to monitor per policy.

## 2021-12-07 NOTE — PROGRESS NOTES
Patient running Sinus Rhythm on cardiac monitor. Tech states they converted from A Fib about 10 minutes ago. Order for telemetry renewed for another 48 hours.

## 2021-12-07 NOTE — PROGRESS NOTES
Patient alert and oriented but weak. Assisted to bathroom for urination by RN. Patient c/o being cold with tremors at this time. SPO2 monitor switched to forehead, as bilateral fingers cool to touch and with tremors not reading accurately. Temp 97.6 orally. Patient given two extra blankets and covered up. Will continue to monitor. Continuous pulse ox monitoring in place.

## 2021-12-07 NOTE — PROGRESS NOTES
[facility] Hospitalist Note     Admit Date:  2021  8:45 AM   Name:  Zana Barthel   Age:  73 y.o.  :  1950   MRN:  [id number]   PCP:  Nik Saldaña MD  Treatment Team: Attending Provider: Abel Ferraro MD; Utilization Review: Lc Nicole, ROM; Care Manager: Jhoana Dickinson, RN; Physical Therapist: Joaquim Altman, PT; Occupational Therapist: Shailesh Williamson OT; Primary Nurse: Harriet Steven    HPI/Subjective:   80-year-old man with past medical history significant for hypertension, CAD, paroxysmal A. fib, prostate cancer admitted with sepsis and acute hypoxic respiratory failure secondary to COVID-19 infection. Started on baricitinib and Decadron. Also found to have A. fib with RVR. Cardiology consulted and patient started on metoprolol twice daily. Lovenox to treatment dose, plan transition to Eliquis on discharge. : Patient is seen at the bedside. Afebrile for 24 hours. Oxygen demand slowly worsening. Currently on 13 L high flow nasal cannula. Denies chest pain, palpitation, nausea, vomiting or abdominal pain. Reports feeling little better today. Wife is being discharged today who is also admitted with COVID-19 infection. She will meet patient before leaving the hospital.    Assessment and Plan:     Sepsis secondary to COVID-19 pneumonia:  Acute hypoxic respiratory failure secondary to COVID-19 pneumonia:  Meeting SIRS criteria on admission with possible source of infection  Desatting to 88% on room air, requiring up to 3 L nasal cannula  Continue supplemental oxygen as needed, wean as tolerated  CTA negative for PE  Procalcitonin negative, hold off on antibiotics  Monitor inflammatory markers  On Lovenox 1 mg/kg x bid  Decadron 6 mg IV daily for 10 days  Baricitinib 4 mg twice daily for 14 days  Mucinex twice daily  Incentive spirometry  Encourage prone positioning  : Oxygen demand slowly worsening, currently on 13 L high flow nasal cannula.   Continue Decadron and baricitinib. Continue supportive care. Monitor inflammatory markers. Elevated procalcitonin today. I will start patient on doxycycline and continue ceftriaxone for CAP coverage. A. fib with RVR:  Likely triggered by acute illness and COVID-19 infection  Patient with history of paroxysmal A. fib, not on any anticoagulation  Heart rate in 140s on admission, has received Cardizem 10 mg IV once in the ED   Increased metoprolol succinate to 100 mg twice daily   Cardizem discontinued by cardiology  Lovenox to treatment dose, plan to transition to Eliquis on discharge  Replete electrolyte as needed to keep potassium > 4 and magnesium >2  Cardiology consult, appreciate recommendation  12/7: Converted back to sinus rhythm today. Continue metoprolol succinate and Lovenox. Pending echocardiogram.  Appreciate cardiology input. UTI:  12/7: Continue ceftriaxone for 3/3 days. Urine culture negative till date    Hyponatremia:  Likely secondary to dehydration, patient with decreased oral intake  Hydrochlorothiazide held  12/7: Sodium 130 today. Continue gentle hydration. Need to be careful for fluid overload state due to Covid infection    Hypertension/CAD/hyperlipidemia:  Resume home meds metoprolol, and aspirin  Patient at home on Repatha  Hydrochlorothiazide held due to hyponatremia    History of prostate cancer:  Noted    Discharge planning: To be determined, high oxygen demand.   PT/OT    DVT ppx ordered  Code status:  Full  Estimated LOS:  Greater than 2 midnights  Risk:  high    Hospital Problems as of 12/7/2021 Date Reviewed: 8/27/2021          Codes Class Noted - Resolved POA    Atrial fibrillation with RVR ([facility] ICD-10-CM: I48.91  ICD-9-CM: 427.31  12/4/2021 - Present Unknown        Acute hypoxemic respiratory failure ([facility] ICD-10-CM: J96.01  ICD-9-CM: 518.81  12/4/2021 - Present Unknown        * (Principal) COVID-19 ICD-10-CM: U07.1  ICD-9-CM: 079.89  12/4/2021 - Present Unknown Hypertension ICD-10-CM: I10  ICD-9-CM: 401.9  3/14/2017 - Present Yes        Paroxysmal atrial fibrillation (HCC) ICD-10-CM: I48.0  ICD-9-CM: 427.31  3/12/2016 - Present Yes        CAD (coronary artery disease) ICD-10-CM: I25.10  ICD-9-CM: 414.00  3/12/2016 - Present Yes        Hypoxia ICD-10-CM: R09.02  ICD-9-CM: 799.02  3/11/2016 - Present Yes        S/P CABG x 4 ICD-10-CM: Z95.1  ICD-9-CM: V45.81  3/9/2016 - Present Yes                10 systems reviewed and negative except as noted in HPI. Past Medical History:   Diagnosis Date    Anterior myocardial infarction [facility]) 10/2003    Arrhythmia     paroxysmal a fib    Arteriosclerotic coronary artery disease 11/2005    Arthritis     fingers    Cancer ([address]     prostate    Coronary atherosclerosis of native coronary vessel 10/2001    Family history of premature CAD     Father hx cabg/CHF  Brother CABG x 2    Gout     right toe but no problem now    Hypercholesterolemia     Hypertension       Past Surgical History:   Procedure Laterality Date    COLONOSCOPY N/A 12/21/2016    COLONOSCOPY  BMI 34 performed by Remy [facility] at [facility] ENDOSCOPY    HX COLONOSCOPY      HX CORONARY ARTERY BYPASS GRAFT  3/9/2016    x4 - Dr. Marina Points    HX HEENT      tonsillectomy    HX ORTHOPAEDIC      2 back surg    HX PROSTATECTOMY      MA CARDIAC SURG PROCEDURE UNLIST      stent    MA CARDIAC SURG PROCEDURE UNLIST  March 9,2016    quidrupal bypass       Allergies   Allergen Reactions    Latex Rash    Codeine Nausea and Vomiting    Tape [Adhesive] Rash      Social History     Tobacco Use    Smoking status: Never Smoker    Smokeless tobacco: Never Used   Substance Use Topics    Alcohol use: Yes     Comment: beer socially-seldom      Family History   Problem Relation Age of Onset    Heart Disease Father     Diabetes Brother     Heart Disease Brother     Cancer Sister     Colon Cancer Neg Hx       Family history reviewed and noncontributory.   Immunization History   Administered Date(s) Administered    TB Skin Test (PPD) Intradermal 2016    Tdap 10/21/2014     PTA Medications:  Prior to Admission Medications   Prescriptions Last Dose Informant Patient Reported? Taking?   aspirin delayed-release 81 mg tablet   Yes No   Sig: Take  by mouth every morning. evolocumab (REPATHA) syringe   No No   Si mL by SubCUTAneous route every fourteen (14) days. fluticasone (FLONASE) 50 mcg/actuation nasal spray   No No   Sig: One spray in each nostril twice daily   hydroCHLOROthiazide (HYDRODIURIL) 25 mg tablet   No No   Si/2 tablet by mouth daily   loratadine (CLARITIN) 10 mg tablet   Yes No   Sig: Take 10 mg by mouth every morning. metoprolol succinate (TOPROL-XL) 100 mg tablet   No No   Sig: Take 1 Tablet by mouth daily. nitroglycerin (NITROSTAT) 0.4 mg SL tablet   No No   Si Tablet by SubLINGual route every five (5) minutes as needed for Chest Pain. Up to 3 doses. trimethoprim-sulfamethoxazole (Bactrim DS) 160-800 mg per tablet   No No   Sig: Take 1 Tablet by mouth two (2) times a day.       Facility-Administered Medications: None       Objective:     Patient Vitals for the past 24 hrs:   Temp Pulse Resp BP SpO2   21 1200  74      21 1122 97.6 °F (36.4 °C) 83 19 125/73 91 %   21 0939  83      21 0937     91 %   21 0936     91 %   21 0805     94 %   21 0800  (!) 102      21 0757 97.6 °F (36.4 °C) (!) 102 18 108/75 90 %   21 0548     94 %   21 0258 98.3 °F (36.8 °C) (!) 117 21 123/73 93 %   21 0113     91 %   21 2325 98.8 °F (37.1 °C) (!) 120 23 (!) 157/77 91 %   21 2126     (!) 89 %   21 2108 98.4 °F (36.9 °C)       21     93 %   21 1942 97.9 °F (36.6 °C) (!) 102 21 137/81 96 %   21 1600  (!) 104      21 1517 97.5 °F (36.4 °C) 96 19 123/75 96 %     Oxygen Therapy  O2 Sat (%): 91 % (21 1122)  Pulse via Oximetry: 96 beats per minute (12/07/21 0805)  O2 Device: Hi flow nasal cannula (12/07/21 5991)  Skin Assessment: Clean, dry, & intact (12/07/21 0805)  Skin Protection for O2 Device: No (12/06/21 0730)  O2 Flow Rate (L/min): 14 l/min (12/07/21 0937)    Estimated body mass index is 33.72 kg/m² as calculated from the following:    Height as of this encounter: 5' 10\" (1.778 m). Weight as of this encounter: 106.6 kg (235 lb 0.2 oz). Intake/Output Summary (Last 24 hours) at 12/7/2021 1425  Last data filed at 12/7/2021 8844  Gross per 24 hour   Intake 1528 ml   Output 225 ml   Net 1303 ml       *Note that automatically entered I/Os may not be accurate; dependent on patient compliance with collection and accurate  by assistants. Physical Exam:  General:    Alert. Eyes:   Normal sclerae. Extraocular movements intact. HENT:  Normocephalic, atraumatic. Moist mucous membranes  CV:   Tachycardia, irregularly irregular. No m/r/g. Lungs:  Coarse breath sounds  Abdomen: Soft, nontender, nondistended. Extremities: Warm and dry. No cyanosis or edema. Neurologic: CN II-XII grossly intact. Sensation intact. Skin:     No rashes or jaundice. Normal coloration  Psych:  Normal mood and affect. I reviewed the labs, imaging, EKGs, telemetry, and other studies done this admission.   Data Reviewed:   Recent Results (from the past 24 hour(s))   LACTIC ACID    Collection Time: 12/06/21  3:23 PM   Result Value Ref Range    Lactic acid 1.6 0.4 - 2.0 MMOL/L   CBC WITH AUTOMATED DIFF    Collection Time: 12/06/21  3:23 PM   Result Value Ref Range    WBC 8.4 4.3 - 11.1 K/uL    RBC 6.09 (H) 4.23 - 5.6 M/uL    HGB 17.5 (H) 13.6 - 17.2 g/dL    HCT 51.5 (H) 41.1 - 50.3 %    MCV 84.6 79.6 - 97.8 FL    MCH 28.7 26.1 - 32.9 PG    MCHC 34.0 31.4 - 35.0 g/dL    RDW 13.2 11.9 - 14.6 %    PLATELET 586 306 - 267 K/uL    MPV 9.8 9.4 - 12.3 FL    ABSOLUTE NRBC 0.00 0.0 - 0.2 K/uL    DF AUTOMATED NEUTROPHILS 90 (H) 43 - 78 %    LYMPHOCYTES 7 (L) 13 - 44 %    MONOCYTES 3 (L) 4.0 - 12.0 %    EOSINOPHILS 0 (L) 0.5 - 7.8 %    BASOPHILS 0 0.0 - 2.0 %    IMMATURE GRANULOCYTES 1 0.0 - 5.0 %    ABS. NEUTROPHILS 7.5 1.7 - 8.2 K/UL    ABS. LYMPHOCYTES 0.6 0.5 - 4.6 K/UL    ABS. MONOCYTES 0.2 0.1 - 1.3 K/UL    ABS. EOSINOPHILS 0.0 0.0 - 0.8 K/UL    ABS. BASOPHILS 0.0 0.0 - 0.2 K/UL    ABS. IMM.  GRANS. 0.0 0.0 - 0.5 K/UL   METABOLIC PANEL, BASIC    Collection Time: 12/06/21  3:23 PM   Result Value Ref Range    Sodium 130 (L) 138 - 145 mmol/L    Potassium 4.3 3.5 - 5.1 mmol/L    Chloride 92 (L) 98 - 107 mmol/L    CO2 27 21 - 32 mmol/L    Anion gap 11 7 - 16 mmol/L    Glucose 117 (H) 65 - 100 mg/dL    BUN 25 (H) 8 - 23 MG/DL    Creatinine 1.02 0.8 - 1.5 MG/DL    GFR est AA >60 >60 ml/min/1.73m2    GFR est non-AA >60 >60 ml/min/1.73m2    Calcium 8.2 (L) 8.3 - 10.4 MG/DL   PROCALCITONIN    Collection Time: 12/06/21  3:23 PM   Result Value Ref Range    Procalcitonin 1.62 (H) 0.00 - 0.49 ng/mL       All Micro Results     Procedure Component Value Units Date/Time    CULTURE, BLOOD [[id number]] Collected: 12/04/21 1856    Order Status: Completed Specimen: Blood Updated: 12/07/21 0751     Special Requests: --        LEFT  Antecubital       Culture result: NO GROWTH 3 DAYS       CULTURE, BLOOD [[id number]] Collected: 12/04/21 1856    Order Status: Completed Specimen: Blood Updated: 12/07/21 0751     Special Requests: --        RIGHT  HAND       Culture result: NO GROWTH 3 DAYS       CULTURE, URINE [[id number]] Collected: 12/05/21 0549    Order Status: Completed Specimen: Urine from Clean catch Updated: 12/07/21 0736     Special Requests: NO SPECIAL REQUESTS        Culture result: NO GROWTH 2 DAYS       INFLUENZA A & B AG (RAPID TEST) [[id number]]     Order Status: Canceled Specimen: Nasopharyngeal           Current Facility-Administered Medications   Medication Dose Route Frequency    doxycycline (VIBRAMYCIN) capsule 100 mg  100 mg Oral Q12H    dexAMETHasone (DECADRON) tablet 6 mg  6 mg Oral DAILY    0.9% sodium chloride infusion  75 mL/hr IntraVENous CONTINUOUS    cefTRIAXone (ROCEPHIN) 1 g in 0.9% sodium chloride (MBP/ADV) 50 mL MBP  1 g IntraVENous Q24H    albuterol (PROVENTIL HFA, VENTOLIN HFA, PROAIR HFA) inhaler 2 Puff  2 Puff Inhalation Q4H PRN    sodium chloride (NS) flush 5-10 mL  5-10 mL IntraVENous Q8H    sodium chloride (NS) flush 5-10 mL  5-10 mL IntraVENous PRN    aspirin delayed-release tablet 81 mg  81 mg Oral DAILY    fluticasone propionate (FLONASE) 50 mcg/actuation nasal spray 2 Spray  2 Spray Both Nostrils DAILY    [Held by provider] hydroCHLOROthiazide (HYDRODIURIL) tablet 12.5 mg  12.5 mg Oral DAILY    sodium chloride (NS) flush 5-40 mL  5-40 mL IntraVENous Q8H    sodium chloride (NS) flush 5-40 mL  5-40 mL IntraVENous PRN    acetaminophen (TYLENOL) tablet 650 mg  650 mg Oral Q6H PRN    Or    acetaminophen (TYLENOL) suppository 650 mg  650 mg Rectal Q6H PRN    polyethylene glycol (MIRALAX) packet 17 g  17 g Oral DAILY PRN    ondansetron (ZOFRAN ODT) tablet 4 mg  4 mg Oral Q8H PRN    Or    ondansetron (ZOFRAN) injection 4 mg  4 mg IntraVENous Q6H PRN    guaiFENesin-dextromethorphan (ROBITUSSIN DM) 100-10 mg/5 mL syrup 5 mL  5 mL Oral Q4H PRN    baricitinib (OLUMIANT) tablet 4 mg  4 mg Oral DAILY    guaiFENesin ER (MUCINEX) tablet 600 mg  600 mg Oral Q12H    metoprolol succinate (TOPROL-XL) tablet 100 mg  100 mg Oral Q12H    enoxaparin (LOVENOX) injection 100 mg  1 mg/kg SubCUTAneous Q12H       Other Studies:  No results found for this visit on 12/04/21. No results found. [unfilled]       Part of this note was written by using a voice dictation software and the note has been proof read but may still contain some grammatical/other typographical errors.     Signed:  Ya Herzog MD

## 2021-12-07 NOTE — ACP (ADVANCE CARE PLANNING)
Advance Care Planning   Advance Care Planning Inpatient Note  [place] [facility] Care Department    Today's Date: 12/7/2021  Unit: [facility] 8 INTERMEDIATE CARE UNIT    Received request from Health Care provider. Upon review of chart and communication with care team, patient's decision making abilities are not in question. Patient and Spouse was/were present in the room during visit. Goals of ACP Conversation:  Discuss Advance Care planning documents    Health Care Decision Makers:      Primary Decision Maker: Jesse Cota - Spouse - [phone]    Secondary Decision Maker: Claritza Leach - Sister      Summary:  Completed New Documents    Advance Care Planning Documents (Patient Wishes) on file:  Healthcare Power of /Advance Directive appointment of Health care agent     Assessment:    After receiving consult, reviewed patient's chart. Pt's wife was present, as she has also been hospitalized for COVID 19. She was being discharged home but was visiting with pt prior to leaving the hospital.   Confirmed with RN that pt has decision making capabilities. Dressed in appropriate PPE,  facilitated for the completion of form with assistance of wife.      Interventions:  Provided education on documents for clarity and greater understanding  Assisted in the completion of documents according to patient's wishes at this time    Care Preferences Communicated:  See pt's HCPOA    Outcomes/Plan:  New Advance Directive completed  Returned original document(s) to patient, as well as copies for distribution to appointed agents  Copy of Advance Directive given to staff to scan into medical record    [facility] on 12/7/2021 at 4:25 PM

## 2021-12-08 PROBLEM — A41.9 SEPSIS (HCC): Status: ACTIVE | Noted: 2021-12-08

## 2021-12-08 PROBLEM — A41.9 SEPSIS (HCC): Status: RESOLVED | Noted: 2021-12-08 | Resolved: 2021-12-08

## 2021-12-08 LAB
ANION GAP SERPL CALC-SCNC: 9 MMOL/L (ref 7–16)
BASOPHILS # BLD: 0 K/UL (ref 0–0.2)
BASOPHILS NFR BLD: 0 % (ref 0–2)
BUN SERPL-MCNC: 27 MG/DL (ref 8–23)
CALCIUM SERPL-MCNC: 8.2 MG/DL (ref 8.3–10.4)
CHLORIDE SERPL-SCNC: 97 MMOL/L (ref 98–107)
CO2 SERPL-SCNC: 28 MMOL/L (ref 21–32)
CREAT SERPL-MCNC: 0.92 MG/DL (ref 0.8–1.5)
DIFFERENTIAL METHOD BLD: ABNORMAL
EOSINOPHIL # BLD: 0 K/UL (ref 0–0.8)
EOSINOPHIL NFR BLD: 0 % (ref 0.5–7.8)
ERYTHROCYTE [DISTWIDTH] IN BLOOD BY AUTOMATED COUNT: 12.8 % (ref 11.9–14.6)
GLUCOSE SERPL-MCNC: 98 MG/DL (ref 65–100)
HCT VFR BLD AUTO: 48.1 % (ref 41.1–50.3)
HGB BLD-MCNC: 16 G/DL (ref 13.6–17.2)
IMM GRANULOCYTES # BLD AUTO: 0.1 K/UL (ref 0–0.5)
IMM GRANULOCYTES NFR BLD AUTO: 1 % (ref 0–5)
LYMPHOCYTES # BLD: 0.5 K/UL (ref 0.5–4.6)
LYMPHOCYTES NFR BLD: 6 % (ref 13–44)
MCH RBC QN AUTO: 28.4 PG (ref 26.1–32.9)
MCHC RBC AUTO-ENTMCNC: 33.3 G/DL (ref 31.4–35)
MCV RBC AUTO: 85.3 FL (ref 79.6–97.8)
MONOCYTES # BLD: 0.6 K/UL (ref 0.1–1.3)
MONOCYTES NFR BLD: 6 % (ref 4–12)
NEUTS SEG # BLD: 8.2 K/UL (ref 1.7–8.2)
NEUTS SEG NFR BLD: 87 % (ref 43–78)
NRBC # BLD: 0 K/UL (ref 0–0.2)
PLATELET # BLD AUTO: 279 K/UL (ref 150–450)
PMV BLD AUTO: 10.3 FL (ref 9.4–12.3)
POTASSIUM SERPL-SCNC: 3.8 MMOL/L (ref 3.5–5.1)
RBC # BLD AUTO: 5.64 M/UL (ref 4.23–5.6)
SODIUM SERPL-SCNC: 134 MMOL/L (ref 136–145)
WBC # BLD AUTO: 9.4 K/UL (ref 4.3–11.1)

## 2021-12-08 PROCEDURE — 74011250636 HC RX REV CODE- 250/636: Performed by: INTERNAL MEDICINE

## 2021-12-08 PROCEDURE — 74011250636 HC RX REV CODE- 250/636: Performed by: FAMILY MEDICINE

## 2021-12-08 PROCEDURE — 77010033711 HC HIGH FLOW OXYGEN

## 2021-12-08 PROCEDURE — 94760 N-INVAS EAR/PLS OXIMETRY 1: CPT

## 2021-12-08 PROCEDURE — 74011250637 HC RX REV CODE- 250/637: Performed by: FAMILY MEDICINE

## 2021-12-08 PROCEDURE — 36415 COLL VENOUS BLD VENIPUNCTURE: CPT

## 2021-12-08 PROCEDURE — 80048 BASIC METABOLIC PNL TOTAL CA: CPT

## 2021-12-08 PROCEDURE — 74011250637 HC RX REV CODE- 250/637: Performed by: INTERNAL MEDICINE

## 2021-12-08 PROCEDURE — 65660000000 HC RM CCU STEPDOWN

## 2021-12-08 PROCEDURE — 85025 COMPLETE CBC W/AUTO DIFF WBC: CPT

## 2021-12-08 PROCEDURE — 97535 SELF CARE MNGMENT TRAINING: CPT

## 2021-12-08 PROCEDURE — 74011000258 HC RX REV CODE- 258: Performed by: FAMILY MEDICINE

## 2021-12-08 PROCEDURE — 97530 THERAPEUTIC ACTIVITIES: CPT

## 2021-12-08 PROCEDURE — 94660 CPAP INITIATION&MGMT: CPT

## 2021-12-08 RX ADMIN — DEXAMETHASONE 6 MG: 4 TABLET ORAL at 09:56

## 2021-12-08 RX ADMIN — METOPROLOL SUCCINATE 100 MG: 100 TABLET, EXTENDED RELEASE ORAL at 21:33

## 2021-12-08 RX ADMIN — GUAIFENESIN 600 MG: 600 TABLET ORAL at 09:56

## 2021-12-08 RX ADMIN — APIXABAN 5 MG: 5 TABLET, FILM COATED ORAL at 17:36

## 2021-12-08 RX ADMIN — DOXYCYCLINE HYCLATE 100 MG: 100 CAPSULE ORAL at 21:32

## 2021-12-08 RX ADMIN — BARICITINIB 4 MG: 2 TABLET, FILM COATED ORAL at 09:56

## 2021-12-08 RX ADMIN — SODIUM CHLORIDE 75 ML/HR: 900 INJECTION, SOLUTION INTRAVENOUS at 06:04

## 2021-12-08 RX ADMIN — Medication 10 ML: at 21:33

## 2021-12-08 RX ADMIN — DOXYCYCLINE HYCLATE 100 MG: 100 CAPSULE ORAL at 09:56

## 2021-12-08 RX ADMIN — CEFTRIAXONE 1 G: 1 INJECTION, POWDER, FOR SOLUTION INTRAMUSCULAR; INTRAVENOUS at 09:56

## 2021-12-08 RX ADMIN — Medication 10 ML: at 06:00

## 2021-12-08 RX ADMIN — Medication 10 ML: at 13:47

## 2021-12-08 RX ADMIN — METOPROLOL SUCCINATE 100 MG: 100 TABLET, EXTENDED RELEASE ORAL at 09:56

## 2021-12-08 RX ADMIN — GUAIFENESIN 600 MG: 600 TABLET ORAL at 21:33

## 2021-12-08 RX ADMIN — ASPIRIN 81 MG: 81 TABLET ORAL at 09:56

## 2021-12-08 RX ADMIN — FLUTICASONE PROPIONATE 2 SPRAY: 50 SPRAY, METERED NASAL at 09:56

## 2021-12-08 RX ADMIN — ENOXAPARIN SODIUM 100 MG: 100 INJECTION SUBCUTANEOUS at 02:37

## 2021-12-08 NOTE — PROGRESS NOTES
Report received from off going nurse. Patient lying supine in bed, alert and oriented. Requested a blanket for bed. HFNC and NRB in use with SPO2 94% at this time. No s/s of acute distress. Safety measures in place.

## 2021-12-08 NOTE — PROGRESS NOTES
Hospitalist Progress Note   Admit Date:  2021  8:45 AM   Name:  Bushra Geiger   Age:  73 y.o. Sex:  male  :  1950   MRN:  [id number]   Room:  [facility]/    Presenting Complaint: Positive For Covid-19    Reason(s) for Admission: COVID-19 [U07.1]  Acute hypoxemic respiratory failure ([facility] [address] [J96.01]  Atrial fibrillation with RVR [facility] Course & Interval History:   Mr. Carmenza Salamanca is a 72 y/o WM with a h/o HTN, CAD, AFib and prostate cancer who was admitted on  with acute hypoxemic respiratory failure 2/2 COVID-19. Started on dexamethasone, baricitinib and O2. CT chest neg for PE. Subjective (21): Feels a little better than yesterday, not as fatigued. On 15L HF, bedside sat low 90s at rest, he did desat at one point in the 70s but pleth was bad and he quickly returned to low 90s. No chest pain, N/V/D. Appetite is marginal. ROS neg otherwise. Assessment & Plan:   # Acute hypoxemic respiratory failure and sepsis 2/2 COVID-19   - Fever + tachycardia + leukocytosis + COVID pos. Sepsis resolved. - Remains on 13-15L NC O2 high flow. Wean as able. Con't dexamethasone, baricitinib and abx. # AFib RVR   - Resolved. Stop Lovenox and change to Eliquis. Metoprolol. # HypoNa   - Resolved. # HTN/CAD/HLD   - Home meds    # H/o prostate cancer   - Outpt f/u as indicated    Dispo/Discharge Planning: Home when able pending improvement in O2 needs.   Diet:  ADULT DIET Regular; dislikes sweet tea  DVT PPx: Eliquis  Code status: Full Code    Hospital Problems as of 2021 Date Reviewed: 2021          Codes Class Noted - Resolved POA    Atrial fibrillation with RVR (HCC) ICD-10-CM: I48.91  ICD-9-CM: 427.31  2021 - Present Yes        * (Principal) Acute hypoxemic respiratory failure ([facility] [address] ICD-10-CM: J96.01  ICD-9-CM: 518.81  2021 - Present Yes        COVID-19 ICD-10-CM: U07.1  ICD-9-CM: 079.89  2021 - Present Yes        Hypertension ICD-10-CM: I10  ICD-9-CM: 401.9  3/14/2017 - Present Yes        Paroxysmal atrial fibrillation (HCC) ICD-10-CM: I48.0  ICD-9-CM: 427.31  3/12/2016 - Present Yes        CAD (coronary artery disease) ICD-10-CM: I25.10  ICD-9-CM: 414.00  3/12/2016 - Present Yes        Hypoxia ICD-10-CM: R09.02  ICD-9-CM: 799.02  3/11/2016 - Present Yes        S/P CABG x 4 ICD-10-CM: Z95.1  ICD-9-CM: V45.81  3/9/2016 - Present Yes        RESOLVED: Sepsis ([facility] [address] ICD-10-CM: A41.9  ICD-9-CM: 038.9, 995.91  12/8/2021 - 12/8/2021 Yes              Objective:     Patient Vitals for the past 24 hrs:   Temp Pulse Resp BP SpO2   12/08/21 1435  63  134/77 97 %   12/08/21 1105     91 %   12/08/21 1005 97.6 °F (36.4 °C) 71 20 128/70 95 %   12/08/21 0526     96 %   12/08/21 0318 98.5 °F (36.9 °C) 67 22 (!) 148/79 95 %   12/08/21 0023 99 °F (37.2 °C) (!) 59 21 (!) 147/70 95 %   12/07/21 1937 98.9 °F (37.2 °C) 72 21 (!) 144/71 93 %   12/07/21 1600  63      12/07/21 1526 97.4 °F (36.3 °C) 69 20 124/68 94 %     Oxygen Therapy  O2 Sat (%): 97 % (12/08/21 1435)  Pulse via Oximetry: 110 beats per minute (12/08/21 0526)  O2 Device: Hi flow nasal cannula; Non-rebreather mask (12/08/21 1105)  Skin Assessment: Clean, dry, & intact (12/07/21 0805)  Skin Protection for O2 Device: No (12/06/21 0730)  O2 Flow Rate (L/min): 14 l/min (12/07/21 1949)  FIO2 (%): 60 % (12/08/21 0526)    Estimated body mass index is 33.72 kg/m² as calculated from the following:    Height as of this encounter: 5' 10\" (1.778 m). Weight as of this encounter: 106.6 kg (235 lb 0.2 oz). Intake/Output Summary (Last 24 hours) at 12/8/2021 1438  Last data filed at 12/8/2021 0612  Gross per 24 hour   Intake 3463 ml   Output 550 ml   Net 2913 ml         Physical Exam:   Blood pressure 134/77, pulse 63, temperature 97.6 °F (36.4 °C), resp. rate 20, height 5' 10\" (1.778 m), weight 106.6 kg (235 lb 0.2 oz), SpO2 97 %. General:    Well nourished. No overt distress. Obese.  Head:  Normocephalic, atraumatic  Eyes:  Sclerae appear normal.  Pupils equally round. ENT:  Nares appear normal, no drainage. Moist oral mucosa  Neck:  No restricted ROM. Trachea midline   CV:   RRR. No m/r/g. No jugular venous distension. Lungs:   Mild b/l rales, no wheezes or rhonchi. 15L HF, bedside sats low 90s at rest.   Abdomen: Bowel sounds present. Soft, nontender, nondistended. Extremities: No cyanosis or clubbing. No edema  Skin:     No rashes and normal coloration. Warm and dry. Neuro:  CN II-XII grossly intact. Sensation intact. A&Ox3  Psych:  Normal mood and affect. I have reviewed ordered lab tests and independently visualized imaging below:    Recent Labs:  Recent Results (from the past 48 hour(s))   LACTIC ACID    Collection Time: 12/06/21  3:23 PM   Result Value Ref Range    Lactic acid 1.6 0.4 - 2.0 MMOL/L   CBC WITH AUTOMATED DIFF    Collection Time: 12/06/21  3:23 PM   Result Value Ref Range    WBC 8.4 4.3 - 11.1 K/uL    RBC 6.09 (H) 4.23 - 5.6 M/uL    HGB 17.5 (H) 13.6 - 17.2 g/dL    HCT 51.5 (H) 41.1 - 50.3 %    MCV 84.6 79.6 - 97.8 FL    MCH 28.7 26.1 - 32.9 PG    MCHC 34.0 31.4 - 35.0 g/dL    RDW 13.2 11.9 - 14.6 %    PLATELET 706 104 - 170 K/uL    MPV 9.8 9.4 - 12.3 FL    ABSOLUTE NRBC 0.00 0.0 - 0.2 K/uL    DF AUTOMATED      NEUTROPHILS 90 (H) 43 - 78 %    LYMPHOCYTES 7 (L) 13 - 44 %    MONOCYTES 3 (L) 4.0 - 12.0 %    EOSINOPHILS 0 (L) 0.5 - 7.8 %    BASOPHILS 0 0.0 - 2.0 %    IMMATURE GRANULOCYTES 1 0.0 - 5.0 %    ABS. NEUTROPHILS 7.5 1.7 - 8.2 K/UL    ABS. LYMPHOCYTES 0.6 0.5 - 4.6 K/UL    ABS. MONOCYTES 0.2 0.1 - 1.3 K/UL    ABS. EOSINOPHILS 0.0 0.0 - 0.8 K/UL    ABS. BASOPHILS 0.0 0.0 - 0.2 K/UL    ABS. IMM.  GRANS. 0.0 0.0 - 0.5 K/UL   METABOLIC PANEL, BASIC    Collection Time: 12/06/21  3:23 PM   Result Value Ref Range    Sodium 130 (L) 138 - 145 mmol/L    Potassium 4.3 3.5 - 5.1 mmol/L    Chloride 92 (L) 98 - 107 mmol/L    CO2 27 21 - 32 mmol/L Anion gap 11 7 - 16 mmol/L    Glucose 117 (H) 65 - 100 mg/dL    BUN 25 (H) 8 - 23 MG/DL    Creatinine 1.02 0.8 - 1.5 MG/DL    GFR est AA >60 >60 ml/min/1.73m2    GFR est non-AA >60 >60 ml/min/1.73m2    Calcium 8.2 (L) 8.3 - 10.4 MG/DL   PROCALCITONIN    Collection Time: 12/06/21  3:23 PM   Result Value Ref Range    Procalcitonin 1.62 (H) 0.00 - 0.49 ng/mL   CBC WITH AUTOMATED DIFF    Collection Time: 12/08/21  7:27 AM   Result Value Ref Range    WBC 9.4 4.3 - 11.1 K/uL    RBC 5.64 (H) 4.23 - 5.6 M/uL    HGB 16.0 13.6 - 17.2 g/dL    HCT 48.1 41.1 - 50.3 %    MCV 85.3 79.6 - 97.8 FL    MCH 28.4 26.1 - 32.9 PG    MCHC 33.3 31.4 - 35.0 g/dL    RDW 12.8 11.9 - 14.6 %    PLATELET 202 395 - 460 K/uL    MPV 10.3 9.4 - 12.3 FL    ABSOLUTE NRBC 0.00 0.0 - 0.2 K/uL    DF AUTOMATED      NEUTROPHILS 87 (H) 43 - 78 %    LYMPHOCYTES 6 (L) 13 - 44 %    MONOCYTES 6 4.0 - 12.0 %    EOSINOPHILS 0 (L) 0.5 - 7.8 %    BASOPHILS 0 0.0 - 2.0 %    IMMATURE GRANULOCYTES 1 0.0 - 5.0 %    ABS. NEUTROPHILS 8.2 1.7 - 8.2 K/UL    ABS. LYMPHOCYTES 0.5 0.5 - 4.6 K/UL    ABS. MONOCYTES 0.6 0.1 - 1.3 K/UL    ABS. EOSINOPHILS 0.0 0.0 - 0.8 K/UL    ABS. BASOPHILS 0.0 0.0 - 0.2 K/UL    ABS. IMM.  GRANS. 0.1 0.0 - 0.5 K/UL   METABOLIC PANEL, BASIC    Collection Time: 12/08/21  7:27 AM   Result Value Ref Range    Sodium 134 (L) 136 - 145 mmol/L    Potassium 3.8 3.5 - 5.1 mmol/L    Chloride 97 (L) 98 - 107 mmol/L    CO2 28 21 - 32 mmol/L    Anion gap 9 7 - 16 mmol/L    Glucose 98 65 - 100 mg/dL    BUN 27 (H) 8 - 23 MG/DL    Creatinine 0.92 0.8 - 1.5 MG/DL    GFR est AA >60 >60 ml/min/1.73m2    GFR est non-AA >60 >60 ml/min/1.73m2    Calcium 8.2 (L) 8.3 - 10.4 MG/DL       All Micro Results     Procedure Component Value Units Date/Time    CULTURE, BLOOD [[id number]] Collected: 12/04/21 1856    Order Status: Completed Specimen: Blood Updated: 12/08/21 0740     Special Requests: --        RIGHT  HAND       Culture result: NO GROWTH 4 DAYS       CULTURE, BLOOD [[id number]] Collected: 12/04/21 1856    Order Status: Completed Specimen: Blood Updated: 12/08/21 0717     Special Requests: --        LEFT  Antecubital       Culture result: NO GROWTH 4 DAYS       CULTURE, URINE [[id number]] Collected: 12/05/21 0549    Order Status: Completed Specimen: Urine from Clean catch Updated: 12/07/21 0736     Special Requests: NO SPECIAL REQUESTS        Culture result: NO GROWTH 2 DAYS       INFLUENZA A & B AG (RAPID TEST) [[id number]]     Order Status: Canceled Specimen: Nasopharyngeal           Other Studies:  No results found.     Current Meds:  Current Facility-Administered Medications   Medication Dose Route Frequency    doxycycline (VIBRAMYCIN) capsule 100 mg  100 mg Oral Q12H    dexAMETHasone (DECADRON) tablet 6 mg  6 mg Oral DAILY    0.9% sodium chloride infusion  75 mL/hr IntraVENous CONTINUOUS    cefTRIAXone (ROCEPHIN) 1 g in 0.9% sodium chloride (MBP/ADV) 50 mL MBP  1 g IntraVENous Q24H    albuterol (PROVENTIL HFA, VENTOLIN HFA, PROAIR HFA) inhaler 2 Puff  2 Puff Inhalation Q4H PRN    sodium chloride (NS) flush 5-10 mL  5-10 mL IntraVENous Q8H    sodium chloride (NS) flush 5-10 mL  5-10 mL IntraVENous PRN    aspirin delayed-release tablet 81 mg  81 mg Oral DAILY    fluticasone propionate (FLONASE) 50 mcg/actuation nasal spray 2 Spray  2 Spray Both Nostrils DAILY    [Held by provider] hydroCHLOROthiazide (HYDRODIURIL) tablet 12.5 mg  12.5 mg Oral DAILY    sodium chloride (NS) flush 5-40 mL  5-40 mL IntraVENous Q8H    sodium chloride (NS) flush 5-40 mL  5-40 mL IntraVENous PRN    acetaminophen (TYLENOL) tablet 650 mg  650 mg Oral Q6H PRN    Or    acetaminophen (TYLENOL) suppository 650 mg  650 mg Rectal Q6H PRN    polyethylene glycol (MIRALAX) packet 17 g  17 g Oral DAILY PRN    ondansetron (ZOFRAN ODT) tablet 4 mg  4 mg Oral Q8H PRN    Or    ondansetron (ZOFRAN) injection 4 mg  4 mg IntraVENous Q6H PRN    guaiFENesin-dextromethorphan (ROBITUSSIN DM) 100-10 mg/5 mL syrup 5 mL  5 mL Oral Q4H PRN    baricitinib (OLUMIANT) tablet 4 mg  4 mg Oral DAILY    guaiFENesin ER (MUCINEX) tablet 600 mg  600 mg Oral Q12H    metoprolol succinate (TOPROL-XL) tablet 100 mg  100 mg Oral Q12H    enoxaparin (LOVENOX) injection 100 mg  1 mg/kg SubCUTAneous Q12H       Signed:  Dorie Suarez MD    Part of this note may have been written by using a voice dictation software. The note has been proof read but may still contain some grammatical/other typographical errors.

## 2021-12-08 NOTE — PROGRESS NOTES
Assumed care for patient and talked with patient. Patient in bed resting and needing urinal.  This nurse gave urinal to patient. No pain or distress noted.    Call light in reach

## 2021-12-08 NOTE — PROGRESS NOTES
Patient is mouth breathing and has non-re-breather on with 14 liters 02 via HFNC and 02 sats decreasing without non-re-breather. This nurse notified hospitalist that patient needs C-PAP while sleeping for sleep apnea. CPAP on and patient 02 saturation is 94% at this time and patient resting in bed. Call light in reach.

## 2021-12-08 NOTE — ADVANCED PRACTICE NURSE
Patient resting and no distress noted. Call light in reach and will prepare bedside report for oncoming nurse.

## 2021-12-08 NOTE — PROGRESS NOTES
ACUTE OT GOALS:  (Developed with and agreed upon by patient and/or caregiver.)  1) Patient will complete total body bathing and dressing with SET UP and adaptive equipment as needed. 2) Patient will complete toileting with SUPERVISION. 3) Patient will complete functional transfers with SUPERVISION and adaptive equipment as needed. 4) Patient will tolerate at least 25 minutes of OT activity with 1-2 rest breaks while maintaining O2 sats >90%. 5) Patient will verbalize at least 3 energy conservation technique to utilize during ADL/IADL. Timeframe: 7 visits       OCCUPATIONAL THERAPY: Daily Note and PM OT Treatment Day # 2    Ruba Wright is a 73 y.o. male   PRIMARY DIAGNOSIS: Acute hypoxemic respiratory failure ([facility] [address]  COVID-19 [U07.1]  Acute hypoxemic respiratory failure (HCC) [J96.01]  Atrial fibrillation with RVR ([facility] [address] [I48.91]       Payor: [employer] / Plan[place] / Product Type: PPO /   ASSESSMENT:     REHAB RECOMMENDATIONS: CURRENT LEVEL OF FUNCTION:  (Most Recently Demonstrated)   Recommendation to date pending progress:  [address] [place] [address] Therapy  Equipment:    To Be Determined Bathing:   Not tested  Dressing:   Not tested  Feeding/Grooming:   Standby Assistance with intermittent CGA to brush teeth in standing. Toileting:   Not tested  Functional Mobility:   CGA without use of DME. ASSESSMENT:  Mr. CHAUDHARI [facility] continues to present with deficits in overall strength, balance, and activity tolerance for performance of ADLs and functional mobility, but does present with minimal improvement towards acute care OT goals. Received on 15L O2 with O2 sats in high 90s at rest. Requires Supervision to transfer to EOB. Seated EOB, pt maintains O2 sats > than 90%. Requires CGA to complete sit <> stand and CGA to walk from bed <> sink. Requires SBA with intermittent CGA to brush teeth at sink due to intermittent decreased dynamic standing balance.  Requires CGA to walk from sink <> chair for rest break with O2 sats observed to be 88% post-activity. Following rest break, requires CGA to complete sit <> stand and CGA to walk from chair <> end of bed and return with pt observed to have one small loss of balance. Once again, requires seated rest break. Following rest break, pt requires CGA to complete sit <> stand and CGA to walk from chair <> end of bed and return. Once again, pt with minimal desaturation to 88% and small loss of balance. Pt able to improve O2 sats to 94% with seated rest break and increased time. Pt would benefit from continued skilled OT to increase independence and safety for performance of ADLs and functional mobility. SUBJECTIVE:   Mr. Zana Spann states, \"I was working on my hot jack before this. \"    SOCIAL HISTORY/LIVING ENVIRONMENT:  Lives with wife and sister, one level home, no DME in the home, drives. Home Environment: Private residence  One/Two Story Residence: One story  Living Alone: No  Support Systems: Spouse/Significant Other    OBJECTIVE:     PAIN: VITAL SIGNS: LINES/DRAINS:   Pre Treatment: Pain Screen  Pain Scale 1: Numeric (0 - 10)  Pain Intensity 1: 0  Post Treatment: Unchanged   Continuous Pulse Oximetry and IV  O2 Device: Hi flow nasal cannula     ACTIVITIES OF DAILY LIVING: I Mod I S SBA CGA Min Mod Max Total NT Comments   BASIC ADLs:              Bathing/ Showering [] [] [] [] [] [] [] [] [] [x]    Toileting [] [] [] [] [] [] [] [] [] [x]    Dressing [] [] [] [] [] [] [] [] [] [x]    Feeding [] [] [] [] [] [] [] [] [] [x]    Grooming [] [] [] [x] [x] [] [] [] [] [] Intermittent CGA due to decreased dynamic standing balance. Personal Device Care [] [] [] [] [] [] [] [] [] [x]    Functional Mobility [] [] [] [] [x] [] [] [] [] [] No use of DME.     I=Independent, Mod I=Modified Independent, S=Supervision, SBA=Standby Assistance, CGA=Contact Guard Assistance,   Min=Minimal Assistance, Mod=Moderate Assistance, Max=Maximal Assistance, Total=Total Assistance, NT=Not Tested    MOBILITY: I Mod I S SBA CGA Min Mod Max Total  NT x2 Comments:   Supine to sit [] [] [x] [] [] [] [] [] [] [] []    Sit to supine [] [] [] [] [] [] [] [] [] [x] []    Sit to stand [] [] [] [] [x] [] [] [] [] [] []    Bed to chair [] [] [] [] [x] [] [] [] [] [] [] No use of DME. I=Independent, Mod I=Modified Independent, S=Supervision, SBA=Standby Assistance, CGA=Contact Guard Assistance,   Min=Minimal Assistance, Mod=Moderate Assistance, Max=Maximal Assistance, Total=Total Assistance, NT=Not Tested    BALANCE: Good Fair+ Fair Fair- Poor NT Comments   Sitting Static [x] [] [] [] [] []    Sitting Dynamic [] [x] [] [] [] []              Standing Static [] [] [x] [] [] []    Standing Dynamic [] [] [x] [x] [] [] 2 small losses of balance during session. PLAN:   FREQUENCY/DURATION: OT Plan of Care: 3 times/week for duration of hospital stay or until stated goals are met, whichever comes first.    TREATMENT:   TREATMENT:   ($$ Self Care/Home Management: 8-22 mins$$ Therapeutic Activity: 8-22 mins   )  Therapeutic Activity (22 Minutes): Therapeutic activity included Supine to Sit, Scooting, Transfer Training, Ambulation on level ground, Sitting balance  and Standing balance to improve functional Mobility, Strength and Activity tolerance. Self Care (11 Minutes): Self care including Grooming and Energy Conservation Training to increase independence, decrease level of assistance required and increase activity tolerance. .    TREATMENT GRID:  N/A    AFTER TREATMENT POSITION/PRECAUTIONS:  Chair, Needs within reach, RN notified and table tray anterior to pt.     INTERDISCIPLINARY COLLABORATION:  RN/PCT and OT/BASHIR    TOTAL TREATMENT DURATION:  OT Patient Time In/Time Out  Time In: 1431  Time Out: 1504    DERIC Byrd/RICKIE

## 2021-12-08 NOTE — PROGRESS NOTES
Assumed care of patient at this time and received report from [facility] and will continue to assess patient. Patient 02 on at 14 liters via HFNC with non-re-breather on and RR even/unlabored. Telemetry on and working with NSR at this time. Call light in reach and will continue to assess.

## 2021-12-08 NOTE — PROGRESS NOTES
Patient resting in bed. Alert and oriented. No s/s of acute distress at this time. Safety measures in place. Will continue to monitor and report off to oncoming nurse.

## 2021-12-09 LAB
BACTERIA SPEC CULT: NORMAL
BACTERIA SPEC CULT: NORMAL
SERVICE CMNT-IMP: NORMAL
SERVICE CMNT-IMP: NORMAL

## 2021-12-09 PROCEDURE — 97535 SELF CARE MNGMENT TRAINING: CPT

## 2021-12-09 PROCEDURE — 76937 US GUIDE VASCULAR ACCESS: CPT

## 2021-12-09 PROCEDURE — 74011250637 HC RX REV CODE- 250/637: Performed by: INTERNAL MEDICINE

## 2021-12-09 PROCEDURE — 65660000000 HC RM CCU STEPDOWN

## 2021-12-09 PROCEDURE — 74011250637 HC RX REV CODE- 250/637: Performed by: FAMILY MEDICINE

## 2021-12-09 PROCEDURE — 74011250636 HC RX REV CODE- 250/636: Performed by: FAMILY MEDICINE

## 2021-12-09 PROCEDURE — 94660 CPAP INITIATION&MGMT: CPT

## 2021-12-09 PROCEDURE — 77010033711 HC HIGH FLOW OXYGEN

## 2021-12-09 PROCEDURE — 74011000258 HC RX REV CODE- 258: Performed by: FAMILY MEDICINE

## 2021-12-09 RX ADMIN — Medication 10 ML: at 05:15

## 2021-12-09 RX ADMIN — METOPROLOL SUCCINATE 100 MG: 100 TABLET, EXTENDED RELEASE ORAL at 08:47

## 2021-12-09 RX ADMIN — BARICITINIB 4 MG: 2 TABLET, FILM COATED ORAL at 08:47

## 2021-12-09 RX ADMIN — DEXAMETHASONE 6 MG: 4 TABLET ORAL at 08:46

## 2021-12-09 RX ADMIN — ASPIRIN 81 MG: 81 TABLET ORAL at 08:46

## 2021-12-09 RX ADMIN — DOXYCYCLINE HYCLATE 100 MG: 100 CAPSULE ORAL at 08:46

## 2021-12-09 RX ADMIN — DOXYCYCLINE HYCLATE 100 MG: 100 CAPSULE ORAL at 21:33

## 2021-12-09 RX ADMIN — CEFTRIAXONE 1 G: 1 INJECTION, POWDER, FOR SOLUTION INTRAMUSCULAR; INTRAVENOUS at 14:34

## 2021-12-09 RX ADMIN — METOPROLOL SUCCINATE 100 MG: 100 TABLET, EXTENDED RELEASE ORAL at 21:33

## 2021-12-09 RX ADMIN — Medication 10 ML: at 14:34

## 2021-12-09 RX ADMIN — FLUTICASONE PROPIONATE 2 SPRAY: 50 SPRAY, METERED NASAL at 08:48

## 2021-12-09 RX ADMIN — APIXABAN 5 MG: 5 TABLET, FILM COATED ORAL at 08:47

## 2021-12-09 RX ADMIN — GUAIFENESIN 600 MG: 600 TABLET ORAL at 08:47

## 2021-12-09 RX ADMIN — APIXABAN 5 MG: 5 TABLET, FILM COATED ORAL at 17:27

## 2021-12-09 RX ADMIN — Medication 10 ML: at 21:33

## 2021-12-09 RX ADMIN — GUAIFENESIN 600 MG: 600 TABLET ORAL at 21:33

## 2021-12-09 NOTE — PROGRESS NOTES
Hospitalist Progress Note   Admit Date:  2021  8:45 AM   Name:  Clarence Henry   Age:  73 y.o. Sex:  male  :  1950   MRN:  [id number]   Room:  [facility]/    Presenting Complaint: Positive For Covid-19    Reason(s) for Admission: COVID-19 [U07.1]  Acute hypoxemic respiratory failure ([facility] [J96.01]  Atrial fibrillation with RVR [facility] Course & Interval History:   Mr. Jose Estrada is a 74 y/o WM with a h/o HTN, CAD, AFib and prostate cancer who was admitted on  with acute hypoxemic respiratory failure 2/2 COVID-19. Started on dexamethasone, baricitinib and O2. CT chest neg for PE. Subjective (21): Afebrile overnight. Now has NRB at 15L over reg NC also at [address]. He says he feels about the same, no better no worse. Afebrile overnight. Still some SOB at rest but no chest pain, N/V/D. Spoke to wife over the phone in patient's room and gave her an update. Assessment & Plan:   # Acute hypoxemic respiratory failure and sepsis 2/2 COVID-19              - Fever + tachycardia + leukocytosis + COVID pos. Sepsis resolved. - Now on 15L NRB over NC. Sats ranged high 80s to high 90s, may need to transition to Airvo. Con't abx, dex, baricitinib.     # AFib RVR              - Likely instigated by respiratory issues. Resolved. Eliquis, PO metoprolol.     # HypoNa              - Resolved.     # HTN/CAD/HLD              - Home meds     # H/o prostate cancer              - Outpt f/u as indicated    Dispo/Discharge Planning: Pending, increased O2 needs, high risk of further decompensation. D/w patient and wife today.    Diet:  ADULT DIET Regular; dislikes sweet tea  DVT PPx: Eliquis  Code status: Full Code    Hospital Problems as of 2021 Date Reviewed: 2021          Codes Class Noted - Resolved POA    Atrial fibrillation with RVR ([facility] [address] ICD-10-CM: I48.91  ICD-9-CM: 427.31  2021 - Present Yes        * (Principal) Acute hypoxemic respiratory failure ([facility] [address] ICD-10-CM: J96.01  ICD-9-CM: 518.81  12/4/2021 - Present Yes        COVID-19 ICD-10-CM: U07.1  ICD-9-CM: 079.89  12/4/2021 - Present Yes        Hypertension ICD-10-CM: I10  ICD-9-CM: 401.9  3/14/2017 - Present Yes        Paroxysmal atrial fibrillation (HCC) ICD-10-CM: I48.0  ICD-9-CM: 427.31  3/12/2016 - Present Yes        CAD (coronary artery disease) ICD-10-CM: I25.10  ICD-9-CM: 414.00  3/12/2016 - Present Yes        Hypoxia ICD-10-CM: R09.02  ICD-9-CM: 799.02  3/11/2016 - Present Yes        S/P CABG x 4 ICD-10-CM: Z95.1  ICD-9-CM: V45.81  3/9/2016 - Present Yes        RESOLVED: Sepsis ([address] ICD-10-CM: A41.9  ICD-9-CM: 038.9, 995.91  12/8/2021 - 12/8/2021 Yes              Objective:     Patient Vitals for the past 24 hrs:   Temp Pulse Resp BP SpO2   12/09/21 1124     90 %   12/09/21 1054 97.4 °F (36.3 °C) 64 21 133/64 (!) 89 %   12/09/21 0749     98 %   12/09/21 0744     100 %   12/09/21 0736  63 21 111/60 100 %   12/09/21 0512     93 %   12/09/21 0442     93 %   12/09/21 0436     (!) 85 %   12/09/21 0400  66      12/09/21 0338 97.5 °F (36.4 °C) 60 18 (!) 149/80 90 %   12/09/21 0000  (!) 56      12/08/21 2301 97.6 °F (36.4 °C) 60 19 (!) 145/77 94 %   12/08/21 2013     94 %   12/08/21 2000  64      12/08/21 1956 97.9 °F (36.6 °C) 65 20 130/75 95 %   12/08/21 1708     93 %   12/08/21 1652     91 %   12/08/21 1651     (!) 88 %   12/08/21 1508     92 %   12/08/21 1435 98.9 °F (37.2 °C) 63 20 134/77 97 %     Oxygen Therapy  O2 Sat (%): 90 % (12/09/21 1124)  Pulse via Oximetry: 65 beats per minute (12/09/21 0749)  O2 Device: Hi flow nasal cannula;  Non-rebreather mask (12/09/21 1124)  Skin Assessment: Clean, dry, & intact (12/08/21 1508)  Skin Protection for O2 Device: No (12/06/21 0730)  O2 Flow Rate (L/min): 15 l/min (12/09/21 1124)  FIO2 (%): 100 % (12/09/21 0518)    Estimated body mass index is 32.74 kg/m² as calculated from the following:    Height as of this encounter: 5' 10\" (1.778 m). Weight as of this encounter: 103.5 kg (228 lb 2.8 oz). Intake/Output Summary (Last 24 hours) at 12/9/2021 1155  Last data filed at 12/9/2021 0952  Gross per 24 hour   Intake 400 ml   Output 885 ml   Net -485 ml         Physical Exam:   Blood pressure 133/64, pulse 64, temperature 97.4 °F (36.3 °C), resp. rate 21, height 5' 10\" (1.778 m), weight 103.5 kg (228 lb 2.8 oz), SpO2 90 %. General:    Well nourished. No overt distress. Obese. Head:  Normocephalic, atraumatic. Eyes:  Sclerae appear normal.  Pupils equally round. ENT:  Nares appear normal, no drainage. Moist oral mucosa  Neck:  No restricted ROM. Trachea midline   CV:   RRR. No m/r/g. No jugular venous distension. Lungs:   BB rales, NC at 15L with NRB at 15L, bedside sats ranged high 80s to high 90s at rest in bed. Abdomen: Bowel sounds present. Soft, nontender, nondistended. Extremities: No cyanosis or clubbing. No edema  Skin:     No rashes and normal coloration. Warm and dry. Neuro:  CN II-XII grossly intact. Sensation intact. A&Ox3  Psych:  Normal mood and affect. I have reviewed ordered lab tests and independently visualized imaging below:    Recent Labs:  Recent Results (from the past 48 hour(s))   CBC WITH AUTOMATED DIFF    Collection Time: 12/08/21  7:27 AM   Result Value Ref Range    WBC 9.4 4.3 - 11.1 K/uL    RBC 5.64 (H) 4.23 - 5.6 M/uL    HGB 16.0 13.6 - 17.2 g/dL    HCT 48.1 41.1 - 50.3 %    MCV 85.3 79.6 - 97.8 FL    MCH 28.4 26.1 - 32.9 PG    MCHC 33.3 31.4 - 35.0 g/dL    RDW 12.8 11.9 - 14.6 %    PLATELET 272 270 - 961 K/uL    MPV 10.3 9.4 - 12.3 FL    ABSOLUTE NRBC 0.00 0.0 - 0.2 K/uL    DF AUTOMATED      NEUTROPHILS 87 (H) 43 - 78 %    LYMPHOCYTES 6 (L) 13 - 44 %    MONOCYTES 6 4.0 - 12.0 %    EOSINOPHILS 0 (L) 0.5 - 7.8 %    BASOPHILS 0 0.0 - 2.0 %    IMMATURE GRANULOCYTES 1 0.0 - 5.0 %    ABS. NEUTROPHILS 8.2 1.7 - 8.2 K/UL    ABS. LYMPHOCYTES 0.5 0.5 - 4.6 K/UL    ABS. MONOCYTES 0.6 0.1 - 1.3 K/UL    ABS. EOSINOPHILS 0.0 0.0 - 0.8 K/UL    ABS. BASOPHILS 0.0 0.0 - 0.2 K/UL    ABS. IMM. GRANS. 0.1 0.0 - 0.5 K/UL   METABOLIC PANEL, BASIC    Collection Time: 12/08/21  7:27 AM   Result Value Ref Range    Sodium 134 (L) 136 - 145 mmol/L    Potassium 3.8 3.5 - 5.1 mmol/L    Chloride 97 (L) 98 - 107 mmol/L    CO2 28 21 - 32 mmol/L    Anion gap 9 7 - 16 mmol/L    Glucose 98 65 - 100 mg/dL    BUN 27 (H) 8 - 23 MG/DL    Creatinine 0.92 0.8 - 1.5 MG/DL    GFR est AA >60 >60 ml/min/1.73m2    GFR est non-AA >60 >60 ml/min/1.73m2    Calcium 8.2 (L) 8.3 - 10.4 MG/DL       All Micro Results     Procedure Component Value Units Date/Time    CULTURE, BLOOD [[id number]] Collected: 12/04/21 1856    Order Status: Completed Specimen: Blood Updated: 12/09/21 0720     Special Requests: --        RIGHT  HAND       Culture result: NO GROWTH 5 DAYS       CULTURE, BLOOD [[id number]] Collected: 12/04/21 1856    Order Status: Completed Specimen: Blood Updated: 12/09/21 0720     Special Requests: --        LEFT  Antecubital       Culture result: NO GROWTH 5 DAYS       CULTURE, URINE [[id number]] Collected: 12/05/21 0549    Order Status: Completed Specimen: Urine from Clean catch Updated: 12/07/21 0736     Special Requests: NO SPECIAL REQUESTS        Culture result: NO GROWTH 2 DAYS       INFLUENZA A & B AG (RAPID TEST) [[id number]]     Order Status: Canceled Specimen: Nasopharyngeal           Other Studies:  No results found.     Current Meds:  Current Facility-Administered Medications   Medication Dose Route Frequency    apixaban (ELIQUIS) tablet 5 mg  5 mg Oral BID    doxycycline (VIBRAMYCIN) capsule 100 mg  100 mg Oral Q12H    dexAMETHasone (DECADRON) tablet 6 mg  6 mg Oral DAILY    cefTRIAXone (ROCEPHIN) 1 g in 0.9% sodium chloride (MBP/ADV) 50 mL MBP  1 g IntraVENous Q24H    albuterol (PROVENTIL HFA, VENTOLIN HFA, PROAIR HFA) inhaler 2 Puff  2 Puff Inhalation Q4H PRN    sodium chloride (NS) flush 5-10 mL 5-10 mL IntraVENous Q8H    sodium chloride (NS) flush 5-10 mL  5-10 mL IntraVENous PRN    aspirin delayed-release tablet 81 mg  81 mg Oral DAILY    fluticasone propionate (FLONASE) 50 mcg/actuation nasal spray 2 Spray  2 Spray Both Nostrils DAILY    [Held by provider] hydroCHLOROthiazide (HYDRODIURIL) tablet 12.5 mg  12.5 mg Oral DAILY    sodium chloride (NS) flush 5-40 mL  5-40 mL IntraVENous Q8H    sodium chloride (NS) flush 5-40 mL  5-40 mL IntraVENous PRN    acetaminophen (TYLENOL) tablet 650 mg  650 mg Oral Q6H PRN    Or    acetaminophen (TYLENOL) suppository 650 mg  650 mg Rectal Q6H PRN    polyethylene glycol (MIRALAX) packet 17 g  17 g Oral DAILY PRN    ondansetron (ZOFRAN ODT) tablet 4 mg  4 mg Oral Q8H PRN    Or    ondansetron (ZOFRAN) injection 4 mg  4 mg IntraVENous Q6H PRN    guaiFENesin-dextromethorphan (ROBITUSSIN DM) 100-10 mg/5 mL syrup 5 mL  5 mL Oral Q4H PRN    baricitinib (OLUMIANT) tablet 4 mg  4 mg Oral DAILY    guaiFENesin ER (MUCINEX) tablet 600 mg  600 mg Oral Q12H    metoprolol succinate (TOPROL-XL) tablet 100 mg  100 mg Oral Q12H       Signed:  Gibran Joiner MD    Part of this note may have been written by using a voice dictation software. The note has been proof read but may still contain some grammatical/other typographical errors.

## 2021-12-09 NOTE — PROGRESS NOTES
Bedside report received from night nurse Marycruz. Assessment done as noted  Respiration even and unlabored 20/min; denies pain or nausea at present. Afebrile this morning. Non-productive coughing at interval. Remain on remote tele with Sinus Rhythm confirmed per monitor tech. O2 intact with 15 liters via non rebreather mask. Remains on Droplet plus isolation for positive COVID-19 screening. Ordered PPE in place and in use. Encouraged to call with needs.

## 2021-12-09 NOTE — PROGRESS NOTES
Pt placed on CPAP to help with hypoxia - pt is in no distress at this time      12/09/21 0512   Oxygen Therapy   O2 Sat (%) 93 %   O2 Device CPAP mask   FIO2 (%) 100 %   Respiratory   Respiratory (WDL) X   Respiratory Pattern Tachypneic   Chest/Tracheal Assessment Chest expansion, symmetrical   Breath Sounds Bilateral Coarse   CPAP/BIPAP   CPAP/BIPAP Start/Stop On   Device Mode CPAP   $$ CPAP Daily Yes   Mask Type and Size Full face; Large   Skin Condition intact   PIP Observed 13 cm H20   EPAP (cm H2O) 12 cm H2O   Vt Spont (ml) 425 ml   Ve Observed (l/min) 9.3 l/min   Total RR (Spontaneous) 23 breaths per minute   Leak (Estimated) 63 L/min   Pt's Home Machine No   Biomedical Check Performed Yes   Settings Verified Yes   Alarm Settings   High Pressure 30   Low Pressure 5   Apnea 20   Low Ve 3   High Rate 50   Low Rate 8   Pulmonary Toilet   Pulmonary Toilet H. O.B elevated; Cough and deep breath

## 2021-12-09 NOTE — PROGRESS NOTES
IV Access    Called for assistance with IV access. 20g, 1.75 inch catheter placed with ultrasound guidance x 1 attempt to patient's right forearm. Blood return noted and PIV flushed without difficulty. Pt henrietta well. [address] [facility]  Shante Evans, RN, PCCN, VAT

## 2021-12-09 NOTE — PROGRESS NOTES
PM assessment done. No changes noted. Respiration even and unlabored 20/min at rest. New IV RA #20ga placed per PICC team. ABX resumed. Up in a chair. No s/s of pain noted at present. Encouraged to call with needs.

## 2021-12-09 NOTE — PROGRESS NOTES
CM chart review. Patient currently requiring 15L HFNC. [facility] CM will follow to assist with discharge planning when oxygen needs decrease.

## 2021-12-09 NOTE — PROGRESS NOTES
ACUTE OT GOALS:  (Developed with and agreed upon by patient and/or caregiver.)  1) Patient will complete total body bathing and dressing with SET UP and adaptive equipment as needed. 2) Patient will complete toileting with SUPERVISION. 3) Patient will complete functional transfers with SUPERVISION and adaptive equipment as needed. 4) Patient will tolerate at least 25 minutes of OT activity with 1-2 rest breaks while maintaining O2 sats >90%. 5) Patient will verbalize at least 3 energy conservation technique to utilize during ADL/IADL. Timeframe: 7 visits       OCCUPATIONAL THERAPY: Daily Note and PM OT Treatment Day # 3    Bushra Geiger is a 73 y.o. male   PRIMARY DIAGNOSIS: Acute hypoxemic respiratory failure ([facility] [address]  COVID-19 [U07.1]  Acute hypoxemic respiratory failure (HCC) [J96.01]  [facility] [I48.91]       Payor: [employer] / Plan: [place] / Product Type: PPO /   ASSESSMENT:     REHAB RECOMMENDATIONS: CURRENT LEVEL OF FUNCTION:  (Most Recently Demonstrated)   Recommendation to date pending progress:  [address] [place] [address] Therapy  Equipment:    To Be Determined Bathing:   Minimal Assistance  Dressing:   Minimal Assistance  Feeding/Groomin [address] for monitoring of O2 sats to brush teeth and wash face in seated. Toileting:   Minimal Assistance  Functional Mobility:   CGA without use of DME. ASSESSMENT:  Mr. Carmenza Salamanca continues to present with deficits in overall strength, balance, and activity tolerance for performance of ADLs and functional mobility, but does present with good progress towards acute care OT goals this treatment session. Received on 15L O2 with NRB donned on top and O2 sats in high 90s at rest. Requires Supervision to transfer to EOB. Seated EOB, pt maintains O2 sats in mid 90s. Requires CGA to complete sit <> stand and CGA to transfer from bed <> hard back chair.  Pt O2 sats in high 90s following transfer to chair. Trialed doffing NRB and pt able to maintain O2 sats in high 80s to low 90s with performance of full bathing ADL. RT and CNA notified. Pt requires SBA for monitoring of O2 sats to wash face and brush teeth in seated. Requires SBA for monitoring of O2 sats to complete UB bathing and dressing, as well as bathe BLE in seated. Requires CGA to complete sit <> stand and CGA to initiate pull up management in standing. Requires CGA to complete genital bathing and Min A to complete perineal wiping. Returned to seated and requires Min A to doff pull up remainder of way and begin clean pull up management in seated and CGA to complete in standing. Pt educated on pacing and rest breaks with performance of functional ADLs. Pt with O2 sats 91% on 15L O2 at end of treatment session. Pt would benefit from continued skilled OT to increase independence and safety for performance of ADLs and functional mobility. SUBJECTIVE:   Mr. Cisco Link states, \"You are one of three people who has been kind to me while I've been here. \"    SOCIAL HISTORY/LIVING ENVIRONMENT:  Lives with wife and sister, one level home, no DME in the home, drives. Home Environment: Private residence  One/Two Story Residence: One story  Living Alone: No  Support Systems: Spouse/Significant Other    OBJECTIVE:     PAIN: VITAL SIGNS: LINES/DRAINS:   Pre Treatment: Pain Screen  Pain Scale 1: Numeric (0 - 10)  Pain Intensity 1: 0  Post Treatment: Unchanged Vital Signs  O2 Sat (%): 91 %  O2 Device: Hi flow nasal cannula  O2 Flow Rate (L/min): 15 l/min Continuous Pulse Oximetry  O2 Device: Hi flow nasal cannula     ACTIVITIES OF DAILY LIVING: I Mod I S SBA CGA Min Mod Max Total NT Comments   BASIC ADLs:              Bathing/ Showering [] [] [] [] [] [x] [] [] [] [] Perineal bathing. Toileting [] [] [] [] [] [x] [] [] [] [] Perineal wiping and pull up management. Dressing [] [] [] [] [] [x] [] [] [] [] Min A LB dressing.     Feeding [] [] [] [] [] [] [] [] [] [x]    Grooming [] [] [] [x] [] [] [] [] [] [] For monitoring of O2 sats to brush teeth and wash face in seated. Personal Device Care [] [] [] [] [] [] [] [] [] [x]    Functional Mobility [] [] [] [] [x] [] [] [] [] [] No use of DME. I=Independent, Mod I=Modified Independent, S=Supervision, SBA=Standby Assistance, CGA=Contact Guard Assistance,   Min=Minimal Assistance, Mod=Moderate Assistance, Max=Maximal Assistance, Total=Total Assistance, NT=Not Tested    MOBILITY: I Mod I S SBA CGA Min Mod Max Total  NT x2 Comments:   Supine to sit [] [] [x] [] [] [] [] [] [] [] []    Sit to supine [] [] [] [] [] [] [] [] [] [x] []    Sit to stand [] [] [] [] [x] [] [] [] [] [] []    Bed to chair [] [] [] [] [x] [] [] [] [] [] [] No use of DME. I=Independent, Mod I=Modified Independent, S=Supervision, SBA=Standby Assistance, CGA=Contact Guard Assistance,   Min=Minimal Assistance, Mod=Moderate Assistance, Max=Maximal Assistance, Total=Total Assistance, NT=Not Tested    BALANCE: Good Fair+ Fair Fair- Poor NT Comments   Sitting Static [x] [] [] [] [] []    Sitting Dynamic [] [x] [] [] [] []              Standing Static [] [x] [] [] [] []    Standing Dynamic [] [] [x] [] [] [] CGA      PLAN:   FREQUENCY/DURATION: OT Plan of Care: 3 times/week for duration of hospital stay or until stated goals are met, whichever comes first.    TREATMENT:   TREATMENT:   ($$ Self Care/Home Management: 23-37 mins    )  Self Care (35 Minutes): Self care including Upper Body Bathing, Lower Body Bathing, Toileting, Upper Body Dressing, Lower Body Dressing, Grooming and Energy Conservation Training to increase independence, decrease level of assistance required and increase activity tolerance. .    TREATMENT GRID:  N/A    AFTER TREATMENT POSITION/PRECAUTIONS:  Chair, Needs within reach, RN notified and table tray anterior to pt.     INTERDISCIPLINARY COLLABORATION:  RN/PCT and OT/BASHIR    TOTAL TREATMENT DURATION:  OT Patient Time In/Time Out  Time In: 1415  Time Out: Fara Bae, OTR/L

## 2021-12-10 PROCEDURE — 74011250637 HC RX REV CODE- 250/637: Performed by: INTERNAL MEDICINE

## 2021-12-10 PROCEDURE — 74011250636 HC RX REV CODE- 250/636: Performed by: FAMILY MEDICINE

## 2021-12-10 PROCEDURE — 65660000000 HC RM CCU STEPDOWN

## 2021-12-10 PROCEDURE — 74011250637 HC RX REV CODE- 250/637: Performed by: EMERGENCY MEDICINE

## 2021-12-10 PROCEDURE — 74011250637 HC RX REV CODE- 250/637: Performed by: FAMILY MEDICINE

## 2021-12-10 PROCEDURE — 97110 THERAPEUTIC EXERCISES: CPT

## 2021-12-10 PROCEDURE — 74011000258 HC RX REV CODE- 258: Performed by: FAMILY MEDICINE

## 2021-12-10 PROCEDURE — 74011250636 HC RX REV CODE- 250/636: Performed by: INTERNAL MEDICINE

## 2021-12-10 RX ORDER — DEXAMETHASONE SODIUM PHOSPHATE 100 MG/10ML
10 INJECTION INTRAMUSCULAR; INTRAVENOUS EVERY 12 HOURS
Status: COMPLETED | OUTPATIENT
Start: 2021-12-10 | End: 2021-12-14

## 2021-12-10 RX ORDER — PANTOPRAZOLE SODIUM 40 MG/1
40 TABLET, DELAYED RELEASE ORAL
Status: DISCONTINUED | OUTPATIENT
Start: 2021-12-11 | End: 2021-12-17 | Stop reason: HOSPADM

## 2021-12-10 RX ADMIN — DEXAMETHASONE SODIUM PHOSPHATE 10 MG: 10 INJECTION INTRAMUSCULAR; INTRAVENOUS at 08:55

## 2021-12-10 RX ADMIN — BARICITINIB 4 MG: 2 TABLET, FILM COATED ORAL at 08:40

## 2021-12-10 RX ADMIN — Medication 10 ML: at 06:18

## 2021-12-10 RX ADMIN — METOPROLOL SUCCINATE 100 MG: 100 TABLET, EXTENDED RELEASE ORAL at 21:21

## 2021-12-10 RX ADMIN — FLUTICASONE PROPIONATE 2 SPRAY: 50 SPRAY, METERED NASAL at 09:00

## 2021-12-10 RX ADMIN — Medication 10 ML: at 21:21

## 2021-12-10 RX ADMIN — Medication 10 ML: at 13:48

## 2021-12-10 RX ADMIN — CEFTRIAXONE 1 G: 1 INJECTION, POWDER, FOR SOLUTION INTRAMUSCULAR; INTRAVENOUS at 08:40

## 2021-12-10 RX ADMIN — APIXABAN 5 MG: 5 TABLET, FILM COATED ORAL at 08:40

## 2021-12-10 RX ADMIN — METOPROLOL SUCCINATE 100 MG: 100 TABLET, EXTENDED RELEASE ORAL at 08:40

## 2021-12-10 RX ADMIN — DOXYCYCLINE HYCLATE 100 MG: 100 CAPSULE ORAL at 08:40

## 2021-12-10 RX ADMIN — ASPIRIN 81 MG: 81 TABLET ORAL at 08:40

## 2021-12-10 RX ADMIN — GUAIFENESIN 600 MG: 600 TABLET ORAL at 21:21

## 2021-12-10 RX ADMIN — GUAIFENESIN 600 MG: 600 TABLET ORAL at 08:40

## 2021-12-10 RX ADMIN — SALINE NASAL SPRAY 2 SPRAY: 1.5 SOLUTION NASAL at 21:21

## 2021-12-10 RX ADMIN — DEXAMETHASONE SODIUM PHOSPHATE 10 MG: 10 INJECTION INTRAMUSCULAR; INTRAVENOUS at 21:21

## 2021-12-10 RX ADMIN — DOXYCYCLINE HYCLATE 100 MG: 100 CAPSULE ORAL at 21:21

## 2021-12-10 RX ADMIN — APIXABAN 5 MG: 5 TABLET, FILM COATED ORAL at 17:01

## 2021-12-10 NOTE — PROGRESS NOTES
spo2 86% on 15L HFNC. Pt placed on HHFNC 50L 80% spo2 93%. Pt henrietta well. No distress or cyanosis noted. CPAP at [facility].

## 2021-12-10 NOTE — PROGRESS NOTES
Hospitalist Progress Note   Admit Date:  2021  8:45 AM   Name:  Akua Rico   Age:  73 y.o. Sex:  male  :  1950   MRN:  [id number]   Room:  [facility]/    Presenting Complaint: Positive For Covid-19    Reason(s) for Admission: COVID-19 [U07.1]  Acute hypoxemic respiratory failure ([address] [J96.01]  Atrial fibrillation with RVR [facility]) [facility] D/P S Course & Interval History:   Mr. Alhaji Farrell is a 74 y/o WM with a h/o HTN, CAD, AFib and prostate cancer who was admitted on  with acute hypoxemic respiratory failure 2/2 COVID-19. Started on dexamethasone, baricitinib and O2. CT chest neg for PE. O2 needs increased and started on Airvo , dexamethasone increased on 12/10. Subjective (12/10/21):  Up to chair, feels good. On Airvo 50L/90%, good bedside sats. He is feeling a little discouraged at his prolonged hospitalization, says yesterday he felt like he wanted to give up but knows he needs to keep fighting. Appetite is decent, no chest pain, N/V/D. Assessment & Plan:   # Acute hypoxemic respiratory failure and sepsis 2/2 COVID-19              - Fever + tachycardia + leukocytosis + COVID pos. Sepsis resolved.               - Changed to [address] , increased dexamethasone today. Remains on empiric abx and baricitinib.      # AFib RVR              - Likely instigated by respiratory issues. Resolved. Eliquis, PO metoprolol.     # HypoNa              - Resolved.     # HTN/CAD/HLD              - Home meds     # H/o prostate cancer              - Outpt f/u as indicated    Dispo/Discharge Planning: Prolonged hospitalization due to continued need for high flow oxygen.   Diet:  ADULT DIET Regular; dislikes sweet tea  ADULT ORAL NUTRITION SUPPLEMENT Breakfast, Lunch, Dinner; Standard High Calorie/High Protein  DVT PPx: Eliquis  Code status: Full Code    Hospital Problems as of 12/10/2021 Date Reviewed: 2021          Codes Class Noted - Resolved POA    Atrial fibrillation with RVR ([facility] [address] ICD-10-CM: I48.91  ICD-9-CM: 427.31  12/4/2021 - Present Yes        * (Principal) Acute hypoxemic respiratory failure (HCC) ICD-10-CM: J96.01  ICD-9-CM: 518.81  12/4/2021 - Present Yes        COVID-19 ICD-10-CM: U07.1  ICD-9-CM: 079.89  12/4/2021 - Present Yes        Hypertension ICD-10-CM: I10  ICD-9-CM: 401.9  3/14/2017 - Present Yes        Paroxysmal atrial fibrillation (HCC) ICD-10-CM: I48.0  ICD-9-CM: 427.31  3/12/2016 - Present Yes        CAD (coronary artery disease) ICD-10-CM: I25.10  ICD-9-CM: 414.00  3/12/2016 - Present Yes        Hypoxia ICD-10-CM: R09.02  ICD-9-CM: 799.02  3/11/2016 - Present Yes        S/P CABG x 4 ICD-10-CM: Z95.1  ICD-9-CM: V45.81  3/9/2016 - Present Yes        RESOLVED: Sepsis ([facility] [address] ICD-10-CM: A41.9  ICD-9-CM: 038.9, 995.91  12/8/2021 - 12/8/2021 Yes              Objective:     Patient Vitals for the past 24 hrs:   Temp Pulse Resp BP SpO2   12/10/21 1132 97.7 °F (36.5 °C) 64 18 115/61 95 %   12/10/21 1101     94 %   12/10/21 0937     95 %   12/10/21 0932     96 %   12/10/21 0804 (!) 96.6 °F (35.9 °C) 63 20 (!) 141/69 90 %   12/10/21 0359  (!) 56      12/10/21 0334 97.6 °F (36.4 °C) 60 20 128/62 94 %   12/10/21 0142  (!) 48      12/09/21 2329     93 %   12/09/21 2315 97.4 °F (36.3 °C) 60 19 138/84 90 %   12/09/21 2310  61      12/09/21 2002     96 %   12/09/21 1936     93 %   12/09/21 1924 98 °F (36.7 °C) 68 20 136/73 95 %   12/09/21 1516 97.6 °F (36.4 °C) 65 21 120/67 95 %   12/09/21 1415     91 %     Oxygen Therapy  O2 Sat (%): 95 % (12/10/21 1132)  Pulse via Oximetry: 87 beats per minute (12/10/21 1101)  O2 Device: Heated;  Hi flow nasal cannula (12/10/21 1132)  Skin Assessment: Clean, dry, & intact (12/09/21 4388)  Skin Protection for O2 Device: No (12/06/21 4477)  O2 Flow Rate (L/min): 50 l/min (12/10/21 1132)  O2 Temperature: 87.8 °F (31 °C) (12/10/21 1101)  FIO2 (%): 80 % (12/10/21 1132)    Estimated body mass index is 32.74 kg/m² as calculated from the following:    Height as of this encounter: 5' 10\" (1.778 m). Weight as of this encounter: 103.5 kg (228 lb 2.8 oz). Intake/Output Summary (Last 24 hours) at 12/10/2021 1238  Last data filed at 12/10/2021 1139  Gross per 24 hour   Intake 520 ml   Output 1050 ml   Net -530 ml         Physical Exam:   Blood pressure 115/61, pulse 64, temperature 97.7 °F (36.5 °C), resp. rate 18, height 5' 10\" (1.778 m), weight 103.5 kg (228 lb 2.8 oz), SpO2 95 %. General:    Well nourished. Conversational dyspnea. Head:  Normocephalic, atraumatic. Eyes:  Sclerae appear normal.  Pupils equally round. ENT:  Nares appear normal, no drainage. Moist oral mucosa  Neck:  No restricted ROM. Trachea midline   CV:   RRR. No m/r/g. No jugular venous distension. Lungs:   Bilateral rales, Airvo 50L/90% with bedside sats mid 90s at rest in the chair. Conversational dyspnea. Abdomen: Bowel sounds present. Soft, nontender, nondistended. Extremities: No cyanosis or clubbing. No edema  Skin:     No rashes and normal coloration. Warm and dry. Neuro:  CN II-XII grossly intact. Sensation intact. A&Ox3  Psych:  Normal mood and affect. I have reviewed ordered lab tests and independently visualized imaging below:    Recent Labs:  No results found for this or any previous visit (from the past 48 hour(s)).     All Micro Results     Procedure Component Value Units Date/Time    CULTURE, BLOOD [[id number]] Collected: 12/04/21 1856    Order Status: Completed Specimen: Blood Updated: 12/09/21 0720     Special Requests: --        RIGHT  HAND       Culture result: NO GROWTH 5 DAYS       CULTURE, BLOOD [[id number]] Collected: 12/04/21 1856    Order Status: Completed Specimen: Blood Updated: 12/09/21 0720     Special Requests: --        LEFT  Antecubital       Culture result: NO GROWTH 5 DAYS       CULTURE, URINE [[id number]] Collected: 12/05/21 0549    Order Status: Completed Specimen: Urine from Clean catch Updated: 12/07/21 0736     Special Requests: NO SPECIAL REQUESTS        Culture result: NO GROWTH 2 DAYS       INFLUENZA A & B AG (RAPID TEST) [[id number]]     Order Status: Canceled Specimen: Nasopharyngeal           Other Studies:  No results found. Current Meds:  Current Facility-Administered Medications   Medication Dose Route Frequency    dexamethasone (DECADRON) 10 mg/mL injection 10 mg  10 mg IntraVENous Q12H    apixaban (ELIQUIS) tablet 5 mg  5 mg Oral BID    doxycycline (VIBRAMYCIN) capsule 100 mg  100 mg Oral Q12H    albuterol (PROVENTIL HFA, VENTOLIN HFA, PROAIR HFA) inhaler 2 Puff  2 Puff Inhalation Q4H PRN    sodium chloride (NS) flush 5-10 mL  5-10 mL IntraVENous PRN    aspirin delayed-release tablet 81 mg  81 mg Oral DAILY    fluticasone propionate (FLONASE) 50 mcg/actuation nasal spray 2 Spray  2 Spray Both Nostrils DAILY    [Held by provider] hydroCHLOROthiazide (HYDRODIURIL) tablet 12.5 mg  12.5 mg Oral DAILY    sodium chloride (NS) flush 5-40 mL  5-40 mL IntraVENous Q8H    sodium chloride (NS) flush 5-40 mL  5-40 mL IntraVENous PRN    acetaminophen (TYLENOL) tablet 650 mg  650 mg Oral Q6H PRN    Or    acetaminophen (TYLENOL) suppository 650 mg  650 mg Rectal Q6H PRN    polyethylene glycol (MIRALAX) packet 17 g  17 g Oral DAILY PRN    ondansetron (ZOFRAN ODT) tablet 4 mg  4 mg Oral Q8H PRN    Or    ondansetron (ZOFRAN) injection 4 mg  4 mg IntraVENous Q6H PRN    guaiFENesin-dextromethorphan (ROBITUSSIN DM) 100-10 mg/5 mL syrup 5 mL  5 mL Oral Q4H PRN    baricitinib (OLUMIANT) tablet 4 mg  4 mg Oral DAILY    guaiFENesin ER (MUCINEX) tablet 600 mg  600 mg Oral Q12H    metoprolol succinate (TOPROL-XL) tablet 100 mg  100 mg Oral Q12H       Signed:  Stanley Bolden MD    Part of this note may have been written by using a voice dictation software. The note has been proof read but may still contain some grammatical/other typographical errors.

## 2021-12-10 NOTE — PROGRESS NOTES
ACUTE PHYSICAL THERAPY GOALS:  (Developed with and agreed upon by patient and/or caregiver.)  (1.)Mr. Tabitha Parks will move from supine to sit and sit to supine , scoot up and down and roll side to side in bed with INDEPENDENT within 7 treatment day(s). (2.)Mr. Tabitha Parks will transfer from bed to chair and chair to bed with INDEPENDENT using the least restrictive device within 7 treatment day(s). (3.)Mr. Tabitha Parks will ambulate with INDEPENDENT for 250+ feet with the least restrictive device within 7 treatment day(s) while maintaining normal vital signs. (4.)Mr. Tabitha Parks will tolerate 23+ minutes of therapeutic activity within 7 treatment days for increased activity tolerance and strength. PHYSICAL THERAPY: Daily Note and AM Treatment Day # 2    Moreno Campos is a 73 y.o. male   PRIMARY DIAGNOSIS: Acute hypoxemic respiratory failure ([facility] [address]  COVID-19 [U07.1]  Acute hypoxemic respiratory failure (HCC) [J96.01]  [facility] ([facility] [I48.91]         ASSESSMENT:     REHAB RECOMMENDATIONS: CURRENT LEVEL OF FUNCTION:  (Most Recently Demonstrated)   Recommendation to date pending progress:  [address] [place] [address]  Equipment:    To Be Determined Bed Mobility:   Not tested up in chair  Sit to Stand:   Standby Assistance  Transfers:   Contact Guard Assistance  Gait/Mobility:   Contact Guard Assistance     ASSESSMENT:  Mr. Tabitha Parks presents up in chair, on arivo 50/80%, stats 95% at rest. Pt without complaints at this time. Pt scooted to EOB, good static sitting at edge of chair while brushing hair, PT to assist due to length and tangles. Pt then CGA for transfers and ambulation short distance in room. Pt performed standing there ex holding grab bar for support, at chair for seated rest breaks. Pt tolerated activity well, several seated breaks throughout, O2 stats 88-92% during activity, recovers quickly with rest. Pt overall doing well, limited activity due to airvo and O2 stats.  Pt making slow progress, increased O2 needs at this time. PT to cont to follow for acute care needs. SUBJECTIVE:   Mr. Tabitha Parks states, \"I am feeling a little better today. \"    SOCIAL HISTORY/ LIVING ENVIRONMENT: see eval  Home Environment: Private residence  One/Two Story Residence: One story  Living Alone: No  Support Systems: Spouse/Significant Other  OBJECTIVE:     PAIN: VITAL SIGNS: LINES/DRAINS:   Pre Treatment: Pain Screen  Pain Scale 1: Numeric (0 - 10)  Pain Intensity 1: 0  Post Treatment: 0 Vital Signs  O2 Sat (%): 95 %  O2 Device: Heated;  Hi flow nasal cannula  O2 Flow Rate (L/min): 50 l/min  FIO2 (%): 80 % Continuous Pulse Oximetry and IV  O2 Device: Heated, Hi flow nasal cannula     MOBILITY: I Mod I S SBA CGA Min Mod Max Total  NT x2 Comments:   Bed Mobility    Rolling [] [] [] [] [] [] [] [] [] [x] [] In chair   Supine to Sit [] [] [] [] [] [] [] [] [] [x] []    Scooting [] [] [] [] [] [] [] [] [] [x] []    Sit to Supine [] [] [] [] [] [] [] [] [] [x] []    Transfers    Sit to Stand [] [] [] [x] [x] [] [] [] [] [] []    Bed to Chair [] [] [] [] [] [] [] [] [] [x] []    Stand to Sit [] [] [] [x] [] [] [] [] [] [] []    I=Independent, Mod I=Modified Independent, S=Supervision, SBA=Standby Assistance, CGA=Contact Guard Assistance,   Min=Minimal Assistance, Mod=Moderate Assistance, Max=Maximal Assistance, Total=Total Assistance, NT=Not Tested    BALANCE: Good Fair+ Fair Fair- Poor NT Comments   Sitting Static [x] [] [] [] [] []    Sitting Dynamic [x] [] [] [] [] []              Standing Static [x] [x] [] [] [] []    Standing Dynamic [] [x] [] [] [] []      GAIT: I Mod I S SBA CGA Min Mod Max Total  NT x2 Comments:   Level of Assistance [] [] [] [] [x] [] [] [] [] [] [] Limited by airvo   Distance 3    DME None    Gait Quality slow    Weightbearing  Status N/A     I=Independent, Mod I=Modified Independent, S=Supervision, SBA=Standby Assistance, CGA=Contact Guard Assistance,   Min=Minimal Assistance, Mod=Moderate Assistance, Max=Maximal Assistance, Total=Total Assistance, NT=Not Tested    PLAN:   FREQUENCY/DURATION: PT Plan of Care: 3 times/week for duration of hospital stay or until stated goals are met, whichever comes first.  TREATMENT:     TREATMENT:   ($$ Therapeutic Exercises: 23-37 mins    )  Therapeutic Exercise (30 Minutes): Therapeutic exercises noted below to improve functional activity tolerance, strength and mobility. All exercises 2 sets x 10 reps each, seated breaks at needed, additional time required. Unsteady and holding grab bar for support, CGA.      TREATMENT GRID:   Date:  12/10/21 Date:   Date:     Activity/Exercise Parameters Parameters Parameters   Standing toe raises X 10 B      Standing marching  X 10 B     Standing hip abd/add X 10 B      Standing hip ext X 10 B     Standing squats X 10 (5 then rest break)                     AFTER TREATMENT POSITION/PRECAUTIONS:  Chair, Needs within reach and RN notified    INTERDISCIPLINARY COLLABORATION:  RN/PCT and PT/PTA    TOTAL TREATMENT DURATION:  PT Patient Time In/Time Out  Time In: 0937  Time Out: [address] PT

## 2021-12-10 NOTE — PROGRESS NOTES
Pt in bed resting on Airvo 50/80%, in no apparent distress, call light in reach, SBAR given to Koby le RN

## 2021-12-11 PROCEDURE — 74011250637 HC RX REV CODE- 250/637: Performed by: INTERNAL MEDICINE

## 2021-12-11 PROCEDURE — 77010033711 HC HIGH FLOW OXYGEN

## 2021-12-11 PROCEDURE — 74011250636 HC RX REV CODE- 250/636: Performed by: INTERNAL MEDICINE

## 2021-12-11 PROCEDURE — 65660000000 HC RM CCU STEPDOWN

## 2021-12-11 PROCEDURE — 74011250637 HC RX REV CODE- 250/637: Performed by: FAMILY MEDICINE

## 2021-12-11 PROCEDURE — 94762 N-INVAS EAR/PLS OXIMTRY CONT: CPT

## 2021-12-11 RX ORDER — BENZONATATE 100 MG/1
100 CAPSULE ORAL
Status: DISCONTINUED | OUTPATIENT
Start: 2021-12-11 | End: 2021-12-17 | Stop reason: HOSPADM

## 2021-12-11 RX ADMIN — METOPROLOL SUCCINATE 100 MG: 100 TABLET, EXTENDED RELEASE ORAL at 21:20

## 2021-12-11 RX ADMIN — DOXYCYCLINE HYCLATE 100 MG: 100 CAPSULE ORAL at 21:20

## 2021-12-11 RX ADMIN — BARICITINIB 4 MG: 2 TABLET, FILM COATED ORAL at 08:13

## 2021-12-11 RX ADMIN — ASPIRIN 81 MG: 81 TABLET ORAL at 08:13

## 2021-12-11 RX ADMIN — Medication 10 ML: at 06:27

## 2021-12-11 RX ADMIN — APIXABAN 5 MG: 5 TABLET, FILM COATED ORAL at 08:13

## 2021-12-11 RX ADMIN — FLUTICASONE PROPIONATE 2 SPRAY: 50 SPRAY, METERED NASAL at 08:13

## 2021-12-11 RX ADMIN — GUAIFENESIN 600 MG: 600 TABLET ORAL at 21:20

## 2021-12-11 RX ADMIN — APIXABAN 5 MG: 5 TABLET, FILM COATED ORAL at 17:27

## 2021-12-11 RX ADMIN — DOXYCYCLINE HYCLATE 100 MG: 100 CAPSULE ORAL at 08:13

## 2021-12-11 RX ADMIN — PANTOPRAZOLE SODIUM 40 MG: 40 TABLET, DELAYED RELEASE ORAL at 06:28

## 2021-12-11 RX ADMIN — GUAIFENESIN 600 MG: 600 TABLET ORAL at 08:14

## 2021-12-11 RX ADMIN — DEXAMETHASONE SODIUM PHOSPHATE 10 MG: 10 INJECTION INTRAMUSCULAR; INTRAVENOUS at 08:13

## 2021-12-11 RX ADMIN — DEXAMETHASONE SODIUM PHOSPHATE 10 MG: 10 INJECTION INTRAMUSCULAR; INTRAVENOUS at 21:20

## 2021-12-11 RX ADMIN — Medication 10 ML: at 18:48

## 2021-12-11 RX ADMIN — Medication 10 ML: at 21:20

## 2021-12-11 RX ADMIN — METOPROLOL SUCCINATE 100 MG: 100 TABLET, EXTENDED RELEASE ORAL at 08:13

## 2021-12-11 NOTE — PROGRESS NOTES
Pt in bed resting on Airvo 45L/90%, in no apparent distress, call light in reach, SBAR given to ROM Ward.

## 2021-12-11 NOTE — PROGRESS NOTES
Hospitalist Progress Note   Admit Date:  2021  8:45 AM   Name:  Juwan Tolliver   Age:  73 y.o. Sex:  male  :  1950   MRN:  [id number]   Room:  3/    Presenting Complaint: Positive For Covid-19    Reason(s) for Admission: COVID-19 [U07.1]  Acute hypoxemic respiratory failure ([facility] [address] [J96.01]  Atrial fibrillation with RVR [facility]) [facility] D/P S Course & Interval History:   Mr. Fany Yoder is a 74 y/o WM with a h/o HTN, CAD, AFib and prostate cancer who was admitted on  with acute hypoxemic respiratory failure 2/2 COVID-19. Started on dexamethasone, baricitinib and O2. CT chest neg for PE. O2 needs increased and started on Airvo , dexamethasone increased on 12/10. Subjective (21): On Airvo 45L/90% (50L yesterday). He still appears comfortable, is in reasonably good spirits today. Dry cough which he thinks is due to high flow O2. No chest pain, N/V/D. Assessment & Plan:   # Acute hypoxemic respiratory failure and sepsis 2/2 COVID-19              - Fever + tachycardia + leukocytosis + COVID pos. Sepsis resolved.               - [id number] to Airvo , dexamethasone increased, remains on doxy, will repeat labs with procal tomorrow.      # AFib RVR              - Likely instigated by respiratory issues. Resolved. Eliquis, PO metoprolol.     # HypoNa              - Resolved.     # HTN/CAD/HLD              - Home meds     # H/o prostate cancer              - Outpt f/u as indicated    Dispo/Discharge Planning: Con't current plan of care.    Diet:  ADULT DIET Regular; dislikes sweet tea  ADULT ORAL NUTRITION SUPPLEMENT Breakfast, Lunch, Dinner; Standard High Calorie/High Protein  DVT PPx: Lovenox  Code status: Full Code    Hospital Problems as of 2021 Date Reviewed: 2021          Codes Class Noted - Resolved POA    Atrial fibrillation with RVR ([facility] [address] ICD-10-CM: I48.91  ICD-9-CM: 427.31  2021 - Present Yes        * (Principal) Acute hypoxemic respiratory failure (HCC) ICD-10-CM: J96.01  ICD-9-CM: 518.81  12/4/2021 - Present Yes        COVID-19 ICD-10-CM: U07.1  ICD-9-CM: 079.89  12/4/2021 - Present Yes        Hypertension ICD-10-CM: I10  ICD-9-CM: 401.9  3/14/2017 - Present Yes        Paroxysmal atrial fibrillation (HCC) ICD-10-CM: I48.0  ICD-9-CM: 427.31  3/12/2016 - Present Yes        CAD (coronary artery disease) ICD-10-CM: I25.10  ICD-9-CM: 414.00  3/12/2016 - Present Yes        Hypoxia ICD-10-CM: R09.02  ICD-9-CM: 799.02  3/11/2016 - Present Yes        S/P CABG x 4 ICD-10-CM: Z95.1  ICD-9-CM: V45.81  3/9/2016 - Present Yes        RESOLVED: Sepsis ([address] ICD-10-CM: A41.9  ICD-9-CM: 038.9, 995.91  12/8/2021 - 12/8/2021 Yes              Objective:     Patient Vitals for the past 24 hrs:   Temp Pulse Resp BP SpO2   12/11/21 1113 97.5 °F (36.4 °C) 60 18 117/66 95 %   12/11/21 0802     91 %   12/11/21 0800 97.6 °F (36.4 °C) 62 18 (!) 152/83 93 %   12/11/21 0518  (!) 53      12/11/21 0312 97.6 °F (36.4 °C) 61 20 128/79 93 %   12/10/21 2256 97.7 °F (36.5 °C) (!) 52 20 135/74 92 %   12/10/21 2222  (!) 50      12/10/21 2005 97.8 °F (36.6 °C) 60 20 138/77 96 %   12/10/21 1555 97.6 °F (36.4 °C) 64 20 (!) 144/77 95 %   12/10/21 1132 97.7 °F (36.5 °C) 64 18 115/61 95 %     Oxygen Therapy  O2 Sat (%): 95 % (12/11/21 1113)  Pulse via Oximetry: 60 beats per minute (12/11/21 0802)  O2 Device: Hi flow nasal cannula; Heated (12/11/21 0802)  Skin Assessment: Clean, dry, & intact (12/11/21 0802)  Skin Protection for O2 Device: No (12/06/21 0730)  O2 Flow Rate (L/min): 45 l/min (12/11/21 0802)  O2 Temperature: 87.8 °F (31 °C) (12/10/21 1101)  FIO2 (%): 92 % (12/11/21 0802)    Estimated body mass index is 32.74 kg/m² as calculated from the following:    Height as of this encounter: 5' 10\" (1.778 m). Weight as of this encounter: 103.5 kg (228 lb 2.8 oz).     Intake/Output Summary (Last 24 hours) at 12/11/2021 1118  Last data filed at 12/11/2021 0839  Gross per 24 hour   Intake 720 ml Output 1300 ml   Net -580 ml         Physical Exam:   Blood pressure 117/66, pulse 60, temperature 97.5 °F (36.4 °C), resp. rate 18, height 5' 10\" (1.778 m), weight 103.5 kg (228 lb 2.8 oz), SpO2 95 %. General:    Well nourished. No overt distress. Obese. Head:  Normocephalic, atraumatic  Eyes:  Sclerae appear normal.  Pupils equally round. ENT:  Nares appear normal, no drainage. Moist oral mucosa  Neck:  No restricted ROM. Trachea midline   CV:   RRR. No m/r/g. No jugular venous distension. Lungs:   Bilateral rales, Airvo 45L/90% with bedside sats mid 90s at rest in bed, mild conversational dyspnea but overall appears fairly comfortable. Abdomen: Bowel sounds present. Soft, nontender, nondistended. Extremities: No cyanosis or clubbing. No edema. Skin:     No rashes and normal coloration. Warm and dry. Neuro:  CN II-XII grossly intact. Sensation intact. A&Ox3  Psych:  Normal mood and affect. I have reviewed ordered lab tests and independently visualized imaging below:    Recent Labs:  No results found for this or any previous visit (from the past 48 hour(s)).     All Micro Results     Procedure Component Value Units Date/Time    CULTURE, BLOOD [[id number]] Collected: 12/04/21 1856    Order Status: Completed Specimen: Blood Updated: 12/09/21 0720     Special Requests: --        RIGHT  HAND       Culture result: NO GROWTH 5 DAYS       CULTURE, BLOOD [[id number]] Collected: 12/04/21 1856    Order Status: Completed Specimen: Blood Updated: 12/09/21 0720     Special Requests: --        LEFT  Antecubital       Culture result: NO GROWTH 5 DAYS       CULTURE, URINE [[id number]] Collected: 12/05/21 0549    Order Status: Completed Specimen: Urine from Clean catch Updated: 12/07/21 0736     Special Requests: NO SPECIAL REQUESTS        Culture result: NO GROWTH 2 DAYS       INFLUENZA A & B AG (RAPID TEST) [[id number]]     Order Status: Canceled Specimen: Nasopharyngeal           Other Studies:  No results found. Current Meds:  Current Facility-Administered Medications   Medication Dose Route Frequency    dexamethasone (DECADRON) 10 mg/mL injection 10 mg  10 mg IntraVENous Q12H    pantoprazole (PROTONIX) tablet 40 mg  40 mg Oral ACB    sodium chloride (OCEAN) 0.65 % nasal squeeze bottle 2 Spray  2 Spray Both Nostrils Q2H PRN    apixaban (ELIQUIS) tablet 5 mg  5 mg Oral BID    doxycycline (VIBRAMYCIN) capsule 100 mg  100 mg Oral Q12H    albuterol (PROVENTIL HFA, VENTOLIN HFA, PROAIR HFA) inhaler 2 Puff  2 Puff Inhalation Q4H PRN    sodium chloride (NS) flush 5-10 mL  5-10 mL IntraVENous PRN    aspirin delayed-release tablet 81 mg  81 mg Oral DAILY    fluticasone propionate (FLONASE) 50 mcg/actuation nasal spray 2 Spray  2 Spray Both Nostrils DAILY    [Held by provider] hydroCHLOROthiazide (HYDRODIURIL) tablet 12.5 mg  12.5 mg Oral DAILY    sodium chloride (NS) flush 5-40 mL  5-40 mL IntraVENous Q8H    sodium chloride (NS) flush 5-40 mL  5-40 mL IntraVENous PRN    acetaminophen (TYLENOL) tablet 650 mg  650 mg Oral Q6H PRN    Or    acetaminophen (TYLENOL) suppository 650 mg  650 mg Rectal Q6H PRN    polyethylene glycol (MIRALAX) packet 17 g  17 g Oral DAILY PRN    ondansetron (ZOFRAN ODT) tablet 4 mg  4 mg Oral Q8H PRN    Or    ondansetron (ZOFRAN) injection 4 mg  4 mg IntraVENous Q6H PRN    guaiFENesin-dextromethorphan (ROBITUSSIN DM) 100-10 mg/5 mL syrup 5 mL  5 mL Oral Q4H PRN    baricitinib (OLUMIANT) tablet 4 mg  4 mg Oral DAILY    guaiFENesin ER (MUCINEX) tablet 600 mg  600 mg Oral Q12H    metoprolol succinate (TOPROL-XL) tablet 100 mg  100 mg Oral Q12H       Signed:  Jolanta Mcneil MD    Part of this note may have been written by using a voice dictation software. The note has been proof read but may still contain some grammatical/other typographical errors.

## 2021-12-11 NOTE — PROGRESS NOTES
Bedside report received from night nurse Peggy Felder. Assessment done as noted  Respiration even and unlabored 20/min at rest; denies pain or nausea at present. Afebrile this morning. Reports non-productive coughing at interval. Remain on remote tele with sinus rhythm confirmed per monitor tech. O2 intact with  Airvo 45/90% with O2 sats 90-91% at rest confirmed via continuous O2 monitor. D-sats easily into low 80s with exertion. ICU rover made aware to assist with closer observation for urgent transfer if pt's condition worsens. Remains on Droplet plus isolation for positive COVID-19 screening. Ordered PPE in place and in use. Encouraged to call with needs.

## 2021-12-12 ENCOUNTER — APPOINTMENT (OUTPATIENT)
Dept: GENERAL RADIOLOGY | Age: 71
DRG: 871 | End: 2021-12-12
Attending: INTERNAL MEDICINE
Payer: COMMERCIAL

## 2021-12-12 LAB
ANION GAP SERPL CALC-SCNC: 6 MMOL/L (ref 7–16)
BASOPHILS # BLD: 0 K/UL (ref 0–0.2)
BASOPHILS NFR BLD: 0 % (ref 0–2)
BUN SERPL-MCNC: 26 MG/DL (ref 8–23)
CALCIUM SERPL-MCNC: 8.6 MG/DL (ref 8.3–10.4)
CHLORIDE SERPL-SCNC: 105 MMOL/L (ref 98–107)
CO2 SERPL-SCNC: 28 MMOL/L (ref 21–32)
CREAT SERPL-MCNC: 0.97 MG/DL (ref 0.8–1.5)
CRP SERPL-MCNC: 1.1 MG/DL (ref 0–0.9)
DIFFERENTIAL METHOD BLD: ABNORMAL
EOSINOPHIL # BLD: 0 K/UL (ref 0–0.8)
EOSINOPHIL NFR BLD: 0 % (ref 0.5–7.8)
ERYTHROCYTE [DISTWIDTH] IN BLOOD BY AUTOMATED COUNT: 13 % (ref 11.9–14.6)
GLUCOSE SERPL-MCNC: 99 MG/DL (ref 65–100)
HCT VFR BLD AUTO: 47.8 % (ref 41.1–50.3)
HGB BLD-MCNC: 15.8 G/DL (ref 13.6–17.2)
IMM GRANULOCYTES # BLD AUTO: 0.2 K/UL (ref 0–0.5)
IMM GRANULOCYTES NFR BLD AUTO: 2 % (ref 0–5)
LYMPHOCYTES # BLD: 0.8 K/UL (ref 0.5–4.6)
LYMPHOCYTES NFR BLD: 6 % (ref 13–44)
MCH RBC QN AUTO: 29.6 PG (ref 26.1–32.9)
MCHC RBC AUTO-ENTMCNC: 33.1 G/DL (ref 31.4–35)
MCV RBC AUTO: 89.5 FL (ref 79.6–97.8)
MONOCYTES # BLD: 0.6 K/UL (ref 0.1–1.3)
MONOCYTES NFR BLD: 4 % (ref 4–12)
NEUTS SEG # BLD: 12.2 K/UL (ref 1.7–8.2)
NEUTS SEG NFR BLD: 88 % (ref 43–78)
NRBC # BLD: 0 K/UL (ref 0–0.2)
PLATELET # BLD AUTO: 467 K/UL (ref 150–450)
PMV BLD AUTO: 9.7 FL (ref 9.4–12.3)
POTASSIUM SERPL-SCNC: 5.2 MMOL/L (ref 3.5–5.1)
PROCALCITONIN SERPL-MCNC: <0.05 NG/ML (ref 0–0.49)
RBC # BLD AUTO: 5.34 M/UL (ref 4.23–5.6)
SODIUM SERPL-SCNC: 139 MMOL/L (ref 136–145)
WBC # BLD AUTO: 13.8 K/UL (ref 4.3–11.1)

## 2021-12-12 PROCEDURE — 74011250636 HC RX REV CODE- 250/636: Performed by: INTERNAL MEDICINE

## 2021-12-12 PROCEDURE — 86140 C-REACTIVE PROTEIN: CPT

## 2021-12-12 PROCEDURE — 74011250637 HC RX REV CODE- 250/637: Performed by: INTERNAL MEDICINE

## 2021-12-12 PROCEDURE — 85025 COMPLETE CBC W/AUTO DIFF WBC: CPT

## 2021-12-12 PROCEDURE — 84145 PROCALCITONIN (PCT): CPT

## 2021-12-12 PROCEDURE — 80048 BASIC METABOLIC PNL TOTAL CA: CPT

## 2021-12-12 PROCEDURE — 74011250637 HC RX REV CODE- 250/637: Performed by: FAMILY MEDICINE

## 2021-12-12 PROCEDURE — 36415 COLL VENOUS BLD VENIPUNCTURE: CPT

## 2021-12-12 PROCEDURE — 94762 N-INVAS EAR/PLS OXIMTRY CONT: CPT

## 2021-12-12 PROCEDURE — 77010033711 HC HIGH FLOW OXYGEN

## 2021-12-12 PROCEDURE — 71045 X-RAY EXAM CHEST 1 VIEW: CPT

## 2021-12-12 PROCEDURE — 65660000000 HC RM CCU STEPDOWN

## 2021-12-12 RX ADMIN — APIXABAN 5 MG: 5 TABLET, FILM COATED ORAL at 09:14

## 2021-12-12 RX ADMIN — BARICITINIB 4 MG: 2 TABLET, FILM COATED ORAL at 09:14

## 2021-12-12 RX ADMIN — APIXABAN 5 MG: 5 TABLET, FILM COATED ORAL at 16:55

## 2021-12-12 RX ADMIN — FLUTICASONE PROPIONATE 2 SPRAY: 50 SPRAY, METERED NASAL at 09:27

## 2021-12-12 RX ADMIN — Medication 10 ML: at 06:33

## 2021-12-12 RX ADMIN — PANTOPRAZOLE SODIUM 40 MG: 40 TABLET, DELAYED RELEASE ORAL at 06:33

## 2021-12-12 RX ADMIN — Medication 10 ML: at 16:56

## 2021-12-12 RX ADMIN — DEXAMETHASONE SODIUM PHOSPHATE 10 MG: 10 INJECTION INTRAMUSCULAR; INTRAVENOUS at 20:58

## 2021-12-12 RX ADMIN — ASPIRIN 81 MG: 81 TABLET ORAL at 09:13

## 2021-12-12 RX ADMIN — GUAIFENESIN 600 MG: 600 TABLET ORAL at 20:58

## 2021-12-12 RX ADMIN — GUAIFENESIN 600 MG: 600 TABLET ORAL at 09:13

## 2021-12-12 RX ADMIN — DEXAMETHASONE SODIUM PHOSPHATE 10 MG: 10 INJECTION INTRAMUSCULAR; INTRAVENOUS at 09:27

## 2021-12-12 RX ADMIN — Medication 10 ML: at 20:58

## 2021-12-12 RX ADMIN — DOXYCYCLINE HYCLATE 100 MG: 100 CAPSULE ORAL at 09:13

## 2021-12-12 NOTE — PROGRESS NOTES
Hospitalist Progress Note   Admit Date:  2021  8:45 AM   Name:  Lucas Robertson   Age:  73 y.o. Sex:  male  :  1950   MRN:  [id number]   Room:  3/    Presenting Complaint: Positive For Covid-19    Reason(s) for Admission: COVID-19 [U07.1]  Acute hypoxemic respiratory failure ([facility] [address] [J96.01]  Atrial fibrillation with RVR [facility]) [facility] Course & Interval History:   Mr. Mariama Marshall is a 72 y/o WM with a h/o HTN, CAD, AFib and prostate cancer who was admitted on  with acute hypoxemic respiratory failure 2/2 COVID-19. Started on dexamethasone, baricitinib and O2. CT chest neg for PE. O2 needs increased and started on Airvo , dexamethasone increased on 12/10. Subjective (21):  Slept good last night, appetite is good, no chest pain, N/V/D. He continues to feel pretty good overall, does have mild conversational dyspnea still. Assessment & Plan:   # Acute hypoxemic respiratory failure and sepsis 2/2 COVID-19              - Fever + tachycardia + leukocytosis + COVID pos. Sepsis resolved.               - IKLGILE to Airvo  and dexamethasone was increased. Madelin Hernandez currently at 45L/90%, will try and wean FiO2 today and see how he tolerates. Repeat procalcitonin is negative, has already completed a course of Rocephin and now 5d doxycycline which I think is sufficient.      # Sinus bradycardia   - Hold metoprolol today, HR 50s. # AFib RVR              - Likely instigated by respiratory issues. Resolved. Eliquis. BB held as above.     # HypoNa              - Resolved.     # HTN/CAD/HLD              - Home meds     # H/o prostate cancer              - Outpt f/u as indicated    Dispo/Discharge Planning: Home when able. Attempted to call wife this morning, no answer, did not leave VM.   Diet:  ADULT DIET Regular; dislikes sweet tea  ADULT ORAL NUTRITION SUPPLEMENT Breakfast, Lunch, Dinner; Standard High Calorie/High Protein  DVT PPx: Lovenox  Code status: Full Code    [facility] Problems as of 12/12/2021 Date Reviewed: 8/27/2021          Codes Class Noted - Resolved POA    Atrial fibrillation with RVR (HCC) ICD-10-CM: I48.91  ICD-9-CM: 427.31  12/4/2021 - Present Yes        * (Principal) Acute hypoxemic respiratory failure (HCC) ICD-10-CM: J96.01  ICD-9-CM: 518.81  12/4/2021 - Present Yes        COVID-19 ICD-10-CM: U07.1  ICD-9-CM: 079.89  12/4/2021 - Present Yes        Hypertension ICD-10-CM: I10  ICD-9-CM: 401.9  3/14/2017 - Present Yes        Paroxysmal atrial fibrillation (HCC) ICD-10-CM: I48.0  ICD-9-CM: 427.31  3/12/2016 - Present Yes        CAD (coronary artery disease) ICD-10-CM: I25.10  ICD-9-CM: 414.00  3/12/2016 - Present Yes        Hypoxia ICD-10-CM: R09.02  ICD-9-CM: 799.02  3/11/2016 - Present Yes        S/P CABG x 4 ICD-10-CM: Z95.1  ICD-9-CM: V45.81  3/9/2016 - Present Yes        RESOLVED: Sepsis ([facility] [address] ICD-10-CM: A41.9  ICD-9-CM: 038.9, 995.91  12/8/2021 - 12/8/2021 Yes              Objective:     Patient Vitals for the past 24 hrs:   Temp Pulse Resp BP SpO2   12/12/21 0811     95 %   12/12/21 0810 97.7 °F (36.5 °C) (!) 57 18 118/60 94 %   12/12/21 0411 97.5 °F (36.4 °C) (!) 52 20 109/63 99 %   12/11/21 2340 97.4 °F (36.3 °C) (!) 57 19 119/69 91 %   12/11/21 2120  (!) 55  133/70    12/11/21 1944 97.5 °F (36.4 °C) (!) 55 19 (!) 115/53 96 %   12/11/21 1554 98.1 °F (36.7 °C) (!) 55 20 120/70 92 %   12/11/21 1113 97.5 °F (36.4 °C) 60 18 117/66 95 %     Oxygen Therapy  O2 Sat (%): 95 % (12/12/21 0811)  Pulse via Oximetry: 60 beats per minute (12/11/21 0802)  O2 Device: Heated; Hi flow nasal cannula (12/12/21 2207)  Skin Assessment: Clean, dry, & intact (12/11/21 0802)  Skin Protection for O2 Device: No (12/06/21 0730)  O2 Flow Rate (L/min): 45 l/min (12/12/21 0811)  O2 Temperature: 87.8 °F (31 °C) (12/10/21 1101)  FIO2 (%): 80 % (12/12/21 0811)    Estimated body mass index is 32.74 kg/m² as calculated from the following:    Height as of this encounter: 5' 10\" (1.778 m). Weight as of this encounter: 103.5 kg (228 lb 2.8 oz). Intake/Output Summary (Last 24 hours) at 12/12/2021 0917  Last data filed at 12/12/2021 0857  Gross per 24 hour   Intake 720 ml   Output 500 ml   Net 220 ml         Physical Exam:   Blood pressure 118/60, pulse (!) 57, temperature 97.7 °F (36.5 °C), resp. rate 18, height 5' 10\" (1.778 m), weight 103.5 kg (228 lb 2.8 oz), SpO2 95 %. General:    Well nourished. No overt distress. Obese. Head:  Normocephalic, atraumatic  Eyes:  Sclerae appear normal.  Pupils equally round. ENT:  Nares appear normal, no drainage. Moist oral mucosa  Neck:  No restricted ROM. Trachea midline   CV:   Bradycardia. No m/r/g. No jugular venous distension. Lungs:   Bibasilar rales, Airvo 45L/90% with bedside sats mid to high 90s at rest in bed. Conversational dyspnea persists, but overall appears comfortable. Abdomen: Bowel sounds present. Soft, nontender, nondistended. Extremities: No cyanosis or clubbing. No edema  Skin:     No rashes and normal coloration. Warm and dry. Neuro:  CN II-XII grossly intact. Sensation intact. A&Ox3  Psych:  Normal mood and affect. I have reviewed ordered lab tests and independently visualized imaging below:    Recent Labs:  Recent Results (from the past 48 hour(s))   CBC WITH AUTOMATED DIFF    Collection Time: 12/12/21  7:37 AM   Result Value Ref Range    WBC 13.8 (H) 4.3 - 11.1 K/uL    RBC 5.34 4.23 - 5.6 M/uL    HGB 15.8 13.6 - 17.2 g/dL    HCT 47.8 41.1 - 50.3 %    MCV 89.5 79.6 - 97.8 FL    MCH 29.6 26.1 - 32.9 PG    MCHC 33.1 31.4 - 35.0 g/dL    RDW 13.0 11.9 - 14.6 %    PLATELET 299 (H) 068 - 450 K/uL    MPV 9.7 9.4 - 12.3 FL    ABSOLUTE NRBC 0.00 0.0 - 0.2 K/uL    DF AUTOMATED      NEUTROPHILS 88 (H) 43 - 78 %    LYMPHOCYTES 6 (L) 13 - 44 %    MONOCYTES 4 4.0 - 12.0 %    EOSINOPHILS 0 (L) 0.5 - 7.8 %    BASOPHILS 0 0.0 - 2.0 %    IMMATURE GRANULOCYTES 2 0.0 - 5.0 %    ABS. NEUTROPHILS 12.2 (H) 1.7 - 8.2 K/UL    ABS. LYMPHOCYTES 0.8 0.5 - 4.6 K/UL    ABS. MONOCYTES 0.6 0.1 - 1.3 K/UL    ABS. EOSINOPHILS 0.0 0.0 - 0.8 K/UL    ABS. BASOPHILS 0.0 0.0 - 0.2 K/UL    ABS. IMM. GRANS. 0.2 0.0 - 0.5 K/UL   METABOLIC PANEL, BASIC    Collection Time: 12/12/21  7:37 AM   Result Value Ref Range    Sodium 139 136 - 145 mmol/L    Potassium 5.2 (H) 3.5 - 5.1 mmol/L    Chloride 105 98 - 107 mmol/L    CO2 28 21 - 32 mmol/L    Anion gap 6 (L) 7 - 16 mmol/L    Glucose 99 65 - 100 mg/dL    BUN 26 (H) 8 - 23 MG/DL    Creatinine 0.97 0.8 - 1.5 MG/DL    GFR est AA >60 >60 ml/min/1.73m2    GFR est non-AA >60 >60 ml/min/1.73m2    Calcium 8.6 8.3 - 10.4 MG/DL   PROCALCITONIN    Collection Time: 12/12/21  7:37 AM   Result Value Ref Range    Procalcitonin <0.05 0.00 - 0.49 ng/mL   C REACTIVE PROTEIN, QT    Collection Time: 12/12/21  7:37 AM   Result Value Ref Range    C-Reactive protein 1.1 (H) 0.0 - 0.9 mg/dL       All Micro Results     Procedure Component Value Units Date/Time    CULTURE, BLOOD [[id number]] Collected: 12/04/21 1856    Order Status: Completed Specimen: Blood Updated: 12/09/21 0720     Special Requests: --        RIGHT  HAND       Culture result: NO GROWTH 5 DAYS       CULTURE, BLOOD [[id number]] Collected: 12/04/21 1856    Order Status: Completed Specimen: Blood Updated: 12/09/21 0720     Special Requests: --        LEFT  Antecubital       Culture result: NO GROWTH 5 DAYS       CULTURE, URINE [[id number]] Collected: 12/05/21 0549    Order Status: Completed Specimen: Urine from Clean catch Updated: 12/07/21 0736     Special Requests: NO SPECIAL REQUESTS        Culture result: NO GROWTH 2 DAYS       INFLUENZA A & B AG (RAPID TEST) [[id number]]     Order Status: Canceled Specimen: Nasopharyngeal           Other Studies:  XR CHEST SNGL V    Result Date: 12/12/2021  EXAM: XR CHEST SNGL V HISTORY: COVID, resp failure, follow up exam.  TECHNIQUE: Frontal chest. COMPARISON: 12/4/2021 FINDINGS: There is a mildly shallow inspiration.  The cardiac silhouette, mediastinum, and pulmonary vasculature are within normal limits. There is no consolidation, pleural effusion, or pneumothorax. Interval development of mild bibasilar infiltrates. No significant osseous abnormalities are observed. No other significant interval changes. Findings as above.         Current Meds:  Current Facility-Administered Medications   Medication Dose Route Frequency    benzonatate (TESSALON) capsule 100 mg  100 mg Oral TID PRN    dexamethasone (DECADRON) 10 mg/mL injection 10 mg  10 mg IntraVENous Q12H    pantoprazole (PROTONIX) tablet 40 mg  40 mg Oral ACB    sodium chloride (OCEAN) 0.65 % nasal squeeze bottle 2 Spray  2 Spray Both Nostrils Q2H PRN    apixaban (ELIQUIS) tablet 5 mg  5 mg Oral BID    doxycycline (VIBRAMYCIN) capsule 100 mg  100 mg Oral Q12H    albuterol (PROVENTIL HFA, VENTOLIN HFA, PROAIR HFA) inhaler 2 Puff  2 Puff Inhalation Q4H PRN    sodium chloride (NS) flush 5-10 mL  5-10 mL IntraVENous PRN    aspirin delayed-release tablet 81 mg  81 mg Oral DAILY    fluticasone propionate (FLONASE) 50 mcg/actuation nasal spray 2 Spray  2 Spray Both Nostrils DAILY    [Held by provider] hydroCHLOROthiazide (HYDRODIURIL) tablet 12.5 mg  12.5 mg Oral DAILY    sodium chloride (NS) flush 5-40 mL  5-40 mL IntraVENous Q8H    sodium chloride (NS) flush 5-40 mL  5-40 mL IntraVENous PRN    acetaminophen (TYLENOL) tablet 650 mg  650 mg Oral Q6H PRN    Or    acetaminophen (TYLENOL) suppository 650 mg  650 mg Rectal Q6H PRN    polyethylene glycol (MIRALAX) packet 17 g  17 g Oral DAILY PRN    ondansetron (ZOFRAN ODT) tablet 4 mg  4 mg Oral Q8H PRN    Or    ondansetron (ZOFRAN) injection 4 mg  4 mg IntraVENous Q6H PRN    guaiFENesin-dextromethorphan (ROBITUSSIN DM) 100-10 mg/5 mL syrup 5 mL  5 mL Oral Q4H PRN    baricitinib (OLUMIANT) tablet 4 mg  4 mg Oral DAILY    guaiFENesin ER (MUCINEX) tablet 600 mg  600 mg Oral Q12H    [Held by provider] metoprolol succinate (TOPROL-XL) tablet 100 mg  100 mg Oral Q12H       Signed:  Gibran Joiner MD    Part of this note may have been written by using a voice dictation software. The note has been proof read but may still contain some grammatical/other typographical errors.

## 2021-12-12 NOTE — PROGRESS NOTES
Bedside report received from night nurse Dyana. Assessment done as noted  Respiration even and unlabored 20/min; denies pain or nausea at present. Afebrile this morning. Reports non-productive coughing at interval. O2 intact via AIRVO at 45L/90% with sats 96% at rest confirmed via continuous O2 monitor. Remains on Droplet plus isolation for positive COVID-19 screening. Ordered PPE in place and in use. Encouraged to call with needs.

## 2021-12-12 NOTE — PROGRESS NOTES
Patient received from day shift breathing with ease on 02 via Airvo at 45L 90%  no acute distress noted. Iv canula intact no redness or swelling noted at site. Patient denies any pain or n/v. Bed locked on lowest position and call bell within reach.

## 2021-12-13 PROCEDURE — 74011250636 HC RX REV CODE- 250/636: Performed by: INTERNAL MEDICINE

## 2021-12-13 PROCEDURE — 77010033711 HC HIGH FLOW OXYGEN

## 2021-12-13 PROCEDURE — 97110 THERAPEUTIC EXERCISES: CPT

## 2021-12-13 PROCEDURE — 74011250637 HC RX REV CODE- 250/637: Performed by: FAMILY MEDICINE

## 2021-12-13 PROCEDURE — 94762 N-INVAS EAR/PLS OXIMTRY CONT: CPT

## 2021-12-13 PROCEDURE — 74011250637 HC RX REV CODE- 250/637: Performed by: INTERNAL MEDICINE

## 2021-12-13 PROCEDURE — 97530 THERAPEUTIC ACTIVITIES: CPT

## 2021-12-13 PROCEDURE — 65660000000 HC RM CCU STEPDOWN

## 2021-12-13 RX ADMIN — Medication 10 ML: at 21:25

## 2021-12-13 RX ADMIN — GUAIFENESIN 600 MG: 600 TABLET ORAL at 08:51

## 2021-12-13 RX ADMIN — APIXABAN 5 MG: 5 TABLET, FILM COATED ORAL at 08:51

## 2021-12-13 RX ADMIN — Medication 10 ML: at 06:01

## 2021-12-13 RX ADMIN — DEXAMETHASONE SODIUM PHOSPHATE 10 MG: 10 INJECTION INTRAMUSCULAR; INTRAVENOUS at 21:25

## 2021-12-13 RX ADMIN — Medication 10 ML: at 13:39

## 2021-12-13 RX ADMIN — ASPIRIN 81 MG: 81 TABLET ORAL at 08:51

## 2021-12-13 RX ADMIN — DEXAMETHASONE SODIUM PHOSPHATE 10 MG: 10 INJECTION INTRAMUSCULAR; INTRAVENOUS at 08:51

## 2021-12-13 RX ADMIN — FLUTICASONE PROPIONATE 2 SPRAY: 50 SPRAY, METERED NASAL at 09:00

## 2021-12-13 RX ADMIN — PANTOPRAZOLE SODIUM 40 MG: 40 TABLET, DELAYED RELEASE ORAL at 06:01

## 2021-12-13 RX ADMIN — APIXABAN 5 MG: 5 TABLET, FILM COATED ORAL at 16:36

## 2021-12-13 RX ADMIN — BARICITINIB 4 MG: 2 TABLET, FILM COATED ORAL at 08:51

## 2021-12-13 RX ADMIN — GUAIFENESIN 600 MG: 600 TABLET ORAL at 21:25

## 2021-12-13 NOTE — PROGRESS NOTES
Patient is alert and oriented X4. Pt on airvo 45L/50%. Continuous Pulse in place . Denies pain. Bed is low, locked and call light within reach.

## 2021-12-13 NOTE — PROGRESS NOTES
ACUTE OT GOALS:  (Developed with and agreed upon by patient and/or caregiver.)  1) Patient will complete total body bathing and dressing with SET UP and adaptive equipment as needed. 2) Patient will complete toileting with SUPERVISION. 3) Patient will complete functional transfers with SUPERVISION and adaptive equipment as needed. 4) Patient will tolerate at least 25 minutes of OT activity with 1-2 rest breaks while maintaining O2 sats >90%. 5) Patient will verbalize at least 3 energy conservation technique to utilize during ADL/IADL. Timeframe: 7 visits       OCCUPATIONAL THERAPY: Daily Note and PM OT Treatment Day # 4    Patty Pierson is a 73 y.o. male   PRIMARY DIAGNOSIS: Acute hypoxemic respiratory failure (HCC)  COVID-19 [U07.1]  Acute hypoxemic respiratory failure (HCC) [J96.01]  Atrial fibrillation with RVR ([address] [I48.91]       Payor: [employer] / Plan: [place] / Product Type: PPO /   ASSESSMENT:     REHAB RECOMMENDATIONS: CURRENT LEVEL OF FUNCTION:  (Most Recently Demonstrated)   Recommendation to date pending progress:  [address] [place] [address] Therapy  Equipment:    To Be Determined Bathing:   Not tested  Dressing:   Not tested  Feeding/Grooming:   Not tested   Toileting:   Not tested  Functional Mobility:   SBA with intermittent CGA     ASSESSMENT:  Mr. Baires Friday continues to present with deficits in overall strength, balance, and activity tolerance for performance of ADLs and functional mobility, but does present with good progress towards acute care OT goals this treatment session. Received on Airvo 45L FiO2 40% with O2 sats in mid 90s at rest and 92-93% with performance of functional activity. Requires Supervision to complete supine <> sit and SBA to complete sit <> stand transfers. Upon standing, pt with slumped posture; cued to hold head upright to improve standing posture and balance.  Requires SBA to initiate side steps from left to right side of bed 4 times with one episode of CGA due to slight loss of balance. Requires seated rest break due to increased fatigue. Requires SBA to complete sit <> stand and SBA to complete additional side steps from left to right side of bed 4 times with no loss of balance on this attempt. Requires SBA to transfer from bed <> chair with O2 sats 92% post-activity. Pt would benefit from continued skilled OT to increase independence and safety for performance of ADLs and functional mobility. SUBJECTIVE:   Mr. Alhaji Farrell states, \"How long will it take? \" In reference to OT treatment session as pt reports increased fatigue from prior PT treatment session. SOCIAL HISTORY/LIVING ENVIRONMENT:  Lives with wife and sister, one level home, no DME in the home, drives. Home Environment: Private residence  One/Two Story Residence: One story  Living Alone: No  Support Systems: Spouse/Significant Other    OBJECTIVE:     PAIN: VITAL SIGNS: LINES/DRAINS:   Pre Treatment: Pain Screen  Pain Scale 1: Numeric (0 - 10)  Pain Intensity 1: 0  Post Treatment: Unchanged Vital Signs  O2 Sat (%): 93 %  O2 Device: Heated; Hi flow nasal cannula  O2 Flow Rate (L/min): 45 l/min  FIO2 (%): 40 % Continuous Pulse Oximetry  O2 Device: Heated, Hi flow nasal cannula     ACTIVITIES OF DAILY LIVING: I Mod I S SBA CGA Min Mod Max Total NT Comments   BASIC ADLs:              Bathing/ Showering [] [] [] [] [] [] [] [] [] [x]    Toileting [] [] [] [] [] [] [] [] [] [x]    Dressing [] [] [] [] [] [] [] [] [] [x]     Feeding [] [] [] [] [] [] [] [] [] [x]    Grooming [] [] [] [] [] [] [] [] [] [x]    Personal Device Care [] [] [] [] [] [] [] [] [] [x]    Functional Mobility [] [] [] [x] [x] [] [] [] [] [] No use of DME; requires CGA one time.     I=Independent, Mod I=Modified Independent, S=Supervision, SBA=Standby Assistance, CGA=Contact Guard Assistance,   Min=Minimal Assistance, Mod=Moderate Assistance, Max=Maximal Assistance, Total=Total Assistance, NT=Not Tested    MOBILITY: I Mod I S SBA CGA Min Mod Max Total  NT x2 Comments:   Supine to sit [] [] [x] [] [] [] [] [] [] [] []    Sit to supine [] [] [] [] [] [] [] [] [] [x] []    Sit to stand [] [] [] [x] [] [] [] [] [] [] []    Bed to chair [] [] [] [x] [] [] [] [] [] [] [] No use of DME; requires CGA one time when completing side steps EOB. I=Independent, Mod I=Modified Independent, S=Supervision, SBA=Standby Assistance, CGA=Contact Guard Assistance,   Min=Minimal Assistance, Mod=Moderate Assistance, Max=Maximal Assistance, Total=Total Assistance, NT=Not Tested    BALANCE: Good Fair+ Fair Fair- Poor NT Comments   Sitting Static [x] [] [] [] [] []    Sitting Dynamic [] [x] [] [] [] []              Standing Static [] [x] [] [] [] []    Standing Dynamic [] [x] [x] [] [] [] SBA majority of session; CGA for one small loss of balance. PLAN:   FREQUENCY/DURATION: OT Plan of Care: 3 times/week for duration of hospital stay or until stated goals are met, whichever comes first.    TREATMENT:   TREATMENT:   ( $$ Therapeutic Activity: 8-22 mins   )  Therapeutic Activity (13 Minutes): Therapeutic activity included Supine to Sit, Scooting, Transfer Training, Ambulation on level ground, Sitting balance  and Standing balance to improve functional Mobility, Strength and Activity tolerance. TREATMENT GRID:  N/A    AFTER TREATMENT POSITION/PRECAUTIONS:  Chair, Needs within reach, RN notified and table tray anterior to pt.     INTERDISCIPLINARY COLLABORATION:  RN/PCT, PT/PTA and OT/BASHIR    TOTAL TREATMENT DURATION:  OT Patient Time In/Time Out  Time In: 1507  Time Out: 1520    DERIC Byrd/RICKIE

## 2021-12-13 NOTE — PROGRESS NOTES
Pt resting in bed. Pt on airvo at 45 L 42% with sat 95% at this time. No distress noted at this time. Call light in reach, will monitor.

## 2021-12-13 NOTE — PROGRESS NOTES
Problem: Falls - Risk of  Goal: *Absence of Falls  Description: Document Lizbet Balbuena Fall Risk and appropriate interventions in the flowsheet. Outcome: Progressing Towards Goal  Note: Fall Risk Interventions:  Mobility Interventions: Patient to call before getting OOB    Mentation Interventions: Bed/chair exit alarm    Medication Interventions: Evaluate medications/consider consulting pharmacy    Elimination Interventions: Call light in reach    History of Falls Interventions:  Investigate reason for fall         Problem: Patient Education: Go to Patient Education Activity  Goal: Patient/Family Education  Outcome: Progressing Towards Goal

## 2021-12-13 NOTE — PROGRESS NOTES
Hospitalist Progress Note   Admit Date:  2021  8:45 AM   Name:  Clarence Henry   Age:  73 y.o. Sex:  male  :  1950   MRN:  [id number]   Room:  3/    Presenting Complaint: Positive For Covid-19    Reason(s) for Admission: COVID-19 [U07.1]  Acute hypoxemic respiratory failure ([facility] [address] [J96.01]  Atrial fibrillation with RVR [facility]) [facility] Course & Interval History:   Mr. Jose Estrada is a 74 y/o WM with a h/o HTN, CAD, AFib and prostate cancer who was admitted on  with acute hypoxemic respiratory failure 2/2 COVID-19. Started on dexamethasone, baricitinib and O2. CT chest neg for PE. O2 needs increased and started on Airvo , dexamethasone increased on 12/10. Airvo settings down to 45L/50% on . Subjective (21):  Up to chair, feels better. Has been ambulating a little in the room with therapy and feels pretty good. Airvo is at 45L/50% (90% FiO2 yesterday). No chest pain or SOB. In good spirits today. Assessment & Plan:   # Acute hypoxemic respiratory failure and sepsis 2/2 COVID-19              - Fever + tachycardia + leukocytosis + COVID pos. Sepsis resolved.               - FUNDUSS to Airvo  and dexamethasone was increased. Repeat procalcitonin is negative, completed a course of Rocephin/doxy. His Airvo settings are 45L/50%, down from 90% FiO2 yesterday. Will wean further today. Not unreasonable to think he may be able to wean off Airvo within the next 24 hours. Will update his wife today.      # Sinus bradycardia              - Remains in the 50s, asymptomatic, keep metoprolol on hold for now.     # AFib RVR              - Likely instigated by respiratory issues. Resolved. Eliquis. BB held as above.     # HypoNa              - Resolved.     # HTN/CAD/HLD              - Home meds     # H/o prostate cancer              - Outpt f/u as indicated    Dispo/Discharge Planning: Home when able, likely will need O2 at discharge.  Spoke to wife this afternoon and updated her. Diet:  ADULT DIET Regular; dislikes sweet tea  ADULT ORAL NUTRITION SUPPLEMENT Breakfast, Lunch, Dinner; Standard High Calorie/High Protein  DVT PPx: Lovenox  Code status: Full Code    Hospital Problems as of 12/13/2021 Date Reviewed: 8/27/2021          Codes Class Noted - Resolved POA    Atrial fibrillation with RVR (HCC) ICD-10-CM: I48.91  ICD-9-CM: 427.31  12/4/2021 - Present Yes        * (Principal) Acute hypoxemic respiratory failure (HCC) ICD-10-CM: J96.01  ICD-9-CM: 518.81  12/4/2021 - Present Yes        COVID-19 ICD-10-CM: U07.1  ICD-9-CM: 079.89  12/4/2021 - Present Yes        Hypertension ICD-10-CM: I10  ICD-9-CM: 401.9  3/14/2017 - Present Yes        Paroxysmal atrial fibrillation (HCC) ICD-10-CM: I48.0  ICD-9-CM: 427.31  3/12/2016 - Present Yes        CAD (coronary artery disease) ICD-10-CM: I25.10  ICD-9-CM: 414.00  3/12/2016 - Present Yes        Hypoxia ICD-10-CM: R09.02  ICD-9-CM: 799.02  3/11/2016 - Present Yes        S/P CABG x 4 ICD-10-CM: Z95.1  ICD-9-CM: V45.81  3/9/2016 - Present Yes        RESOLVED: Sepsis ([facility] [address] ICD-10-CM: A41.9  ICD-9-CM: 038.9, 995.91  12/8/2021 - 12/8/2021 Yes              Objective:     Patient Vitals for the past 24 hrs:   Temp Pulse Resp BP SpO2   12/13/21 0755 97.7 °F (36.5 °C) (!) 52 19 127/66 93 %   12/13/21 0419 97.6 °F (36.4 °C) (!) 58 20 120/71 94 %   12/13/21 0010 97.5 °F (36.4 °C) (!) 54 20 126/73 94 %   12/12/21 1936 97.3 °F (36.3 °C) 64 20 110/63 95 %   12/12/21 1546 98 °F (36.7 °C) (!) 58 20 120/65 95 %   12/12/21 1333     94 %     Oxygen Therapy  O2 Sat (%): 93 % (12/13/21 0755)  Pulse via Oximetry: 56 beats per minute (12/12/21 1333)  O2 Device: Heated;  Hi flow nasal cannula (12/13/21 3235)  Skin Assessment: Clean, dry, & intact (12/11/21 0802)  Skin Protection for O2 Device: No (12/06/21 0730)  O2 Flow Rate (L/min): 45 l/min (12/13/21 0814)  O2 Temperature: 93.2 °F (34 °C) (12/12/21 1333)  FIO2 (%): 50 % (12/13/21 0814)    Estimated body mass index is 32.74 kg/m² as calculated from the following:    Height as of this encounter: 5' 10\" (1.778 m). Weight as of this encounter: 103.5 kg (228 lb 2.8 oz). Intake/Output Summary (Last 24 hours) at 12/13/2021 1137  Last data filed at 12/13/2021 1136  Gross per 24 hour   Intake 598 ml   Output 1300 ml   Net -702 ml         Physical Exam:   Blood pressure 127/66, pulse (!) 52, temperature 97.7 °F (36.5 °C), resp. rate 19, height 5' 10\" (1.778 m), weight 103.5 kg (228 lb 2.8 oz), SpO2 93 %. General:    Well nourished. No overt distress. Obese. Head:  Normocephalic, atraumatic  Eyes:  Sclerae appear normal.  Pupils equally round. ENT:  Nares appear normal, no drainage. Moist oral mucosa  Neck:  No restricted ROM. Trachea midline   CV:   Bradycardia. No m/r/g. No jugular venous distension. Lungs:   BB rales. No wheezes or rhonchi. Appears comfortable. Airvo 45L/50% with beside sats mid to high 90s. Abdomen: Bowel sounds present. Soft, nontender, nondistended. Extremities: No cyanosis or clubbing. No edema  Skin:     No rashes and normal coloration. Warm and dry. Neuro:  CN II-XII grossly intact. Sensation intact. A&Ox3  Psych:  Normal mood and affect.       I have reviewed ordered lab tests and independently visualized imaging below:    Recent Labs:  Recent Results (from the past 48 hour(s))   CBC WITH AUTOMATED DIFF    Collection Time: 12/12/21  7:37 AM   Result Value Ref Range    WBC 13.8 (H) 4.3 - 11.1 K/uL    RBC 5.34 4.23 - 5.6 M/uL    HGB 15.8 13.6 - 17.2 g/dL    HCT 47.8 41.1 - 50.3 %    MCV 89.5 79.6 - 97.8 FL    MCH 29.6 26.1 - 32.9 PG    MCHC 33.1 31.4 - 35.0 g/dL    RDW 13.0 11.9 - 14.6 %    PLATELET 014 (H) 746 - 450 K/uL    MPV 9.7 9.4 - 12.3 FL    ABSOLUTE NRBC 0.00 0.0 - 0.2 K/uL    DF AUTOMATED      NEUTROPHILS 88 (H) 43 - 78 %    LYMPHOCYTES 6 (L) 13 - 44 %    MONOCYTES 4 4.0 - 12.0 %    EOSINOPHILS 0 (L) 0.5 - 7.8 %    BASOPHILS 0 0.0 - 2.0 %    IMMATURE GRANULOCYTES 2 0.0 - 5.0 %    ABS. NEUTROPHILS 12.2 (H) 1.7 - 8.2 K/UL    ABS. LYMPHOCYTES 0.8 0.5 - 4.6 K/UL    ABS. MONOCYTES 0.6 0.1 - 1.3 K/UL    ABS. EOSINOPHILS 0.0 0.0 - 0.8 K/UL    ABS. BASOPHILS 0.0 0.0 - 0.2 K/UL    ABS. IMM. GRANS. 0.2 0.0 - 0.5 K/UL   METABOLIC PANEL, BASIC    Collection Time: 12/12/21  7:37 AM   Result Value Ref Range    Sodium 139 136 - 145 mmol/L    Potassium 5.2 (H) 3.5 - 5.1 mmol/L    Chloride 105 98 - 107 mmol/L    CO2 28 21 - 32 mmol/L    Anion gap 6 (L) 7 - 16 mmol/L    Glucose 99 65 - 100 mg/dL    BUN 26 (H) 8 - 23 MG/DL    Creatinine 0.97 0.8 - 1.5 MG/DL    GFR est AA >60 >60 ml/min/1.73m2    GFR est non-AA >60 >60 ml/min/1.73m2    Calcium 8.6 8.3 - 10.4 MG/DL   PROCALCITONIN    Collection Time: 12/12/21  7:37 AM   Result Value Ref Range    Procalcitonin <0.05 0.00 - 0.49 ng/mL   C REACTIVE PROTEIN, QT    Collection Time: 12/12/21  7:37 AM   Result Value Ref Range    C-Reactive protein 1.1 (H) 0.0 - 0.9 mg/dL       All Micro Results     Procedure Component Value Units Date/Time    CULTURE, BLOOD [[id number]] Collected: 12/04/21 1856    Order Status: Completed Specimen: Blood Updated: 12/09/21 0720     Special Requests: --        RIGHT  HAND       Culture result: NO GROWTH 5 DAYS       CULTURE, BLOOD [[id number]] Collected: 12/04/21 1856    Order Status: Completed Specimen: Blood Updated: 12/09/21 0720     Special Requests: --        LEFT  Antecubital       Culture result: NO GROWTH 5 DAYS       CULTURE, URINE [[id number]] Collected: 12/05/21 0549    Order Status: Completed Specimen: Urine from Clean catch Updated: 12/07/21 0736     Special Requests: NO SPECIAL REQUESTS        Culture result: NO GROWTH 2 DAYS       INFLUENZA A & B AG (RAPID TEST) [[id number]]     Order Status: Canceled Specimen: Nasopharyngeal           Other Studies:  No results found.     Current Meds:  Current Facility-Administered Medications   Medication Dose Route Frequency    benzonatate (TESSALON) capsule 100 mg  100 mg Oral TID PRN    dexamethasone (DECADRON) 10 mg/mL injection 10 mg  10 mg IntraVENous Q12H    pantoprazole (PROTONIX) tablet 40 mg  40 mg Oral ACB    sodium chloride (OCEAN) 0.65 % nasal squeeze bottle 2 Spray  2 Spray Both Nostrils Q2H PRN    apixaban (ELIQUIS) tablet 5 mg  5 mg Oral BID    albuterol (PROVENTIL HFA, VENTOLIN HFA, PROAIR HFA) inhaler 2 Puff  2 Puff Inhalation Q4H PRN    sodium chloride (NS) flush 5-10 mL  5-10 mL IntraVENous PRN    aspirin delayed-release tablet 81 mg  81 mg Oral DAILY    fluticasone propionate (FLONASE) 50 mcg/actuation nasal spray 2 Spray  2 Spray Both Nostrils DAILY    [Held by provider] hydroCHLOROthiazide (HYDRODIURIL) tablet 12.5 mg  12.5 mg Oral DAILY    sodium chloride (NS) flush 5-40 mL  5-40 mL IntraVENous Q8H    sodium chloride (NS) flush 5-40 mL  5-40 mL IntraVENous PRN    acetaminophen (TYLENOL) tablet 650 mg  650 mg Oral Q6H PRN    Or    acetaminophen (TYLENOL) suppository 650 mg  650 mg Rectal Q6H PRN    polyethylene glycol (MIRALAX) packet 17 g  17 g Oral DAILY PRN    ondansetron (ZOFRAN ODT) tablet 4 mg  4 mg Oral Q8H PRN    Or    ondansetron (ZOFRAN) injection 4 mg  4 mg IntraVENous Q6H PRN    guaiFENesin-dextromethorphan (ROBITUSSIN DM) 100-10 mg/5 mL syrup 5 mL  5 mL Oral Q4H PRN    baricitinib (OLUMIANT) tablet 4 mg  4 mg Oral DAILY    guaiFENesin ER (MUCINEX) tablet 600 mg  600 mg Oral Q12H    [Held by provider] metoprolol succinate (TOPROL-XL) tablet 100 mg  100 mg Oral Q12H       Signed:  Maikol Leonard MD    Part of this note may have been written by using a voice dictation software. The note has been proof read but may still contain some grammatical/other typographical errors.

## 2021-12-13 NOTE — PROGRESS NOTES
ACUTE PHYSICAL THERAPY GOALS:  (Developed with and agreed upon by patient and/or caregiver.)  (1.)Mr. Marielle Delgado will move from supine to sit and sit to supine , scoot up and down and roll side to side in bed with INDEPENDENT within 7 treatment day(s). (2.)Mr. Marielle Delgado will transfer from bed to chair and chair to bed with INDEPENDENT using the least restrictive device within 7 treatment day(s). (3.)Mr. Marielle Delgado will ambulate with INDEPENDENT for 250+ feet with the least restrictive device within 7 treatment day(s) while maintaining normal vital signs. (4.)Mr. Marielle Delgado will tolerate 23+ minutes of therapeutic activity within 7 treatment days for increased activity tolerance and strength. PHYSICAL THERAPY: Daily Note and AM Treatment Day # 2    Izabella Childers is a 73 y.o. male   PRIMARY DIAGNOSIS: Acute hypoxemic respiratory failure ([facility] [address]  COVID-19 [U07.1]  Acute hypoxemic respiratory failure (HCC) [J96.01]  Atrial fibrillation with RVR ([facility] [address] [I48.91]         ASSESSMENT:     REHAB RECOMMENDATIONS: CURRENT LEVEL OF FUNCTION:  (Most Recently Demonstrated)   Recommendation to date pending progress:  [address] [place] [address] Therapy  Equipment:    To Be Determined Bed Mobility:   Supervision  Sit to Stand:   Standby Assistance  Transfers:   Contact Guard Assistance  Gait/Mobility:   Contact Guard Assistance     ASSESSMENT:  Mr. Marielle Delgado is a pleasant 73year old male admitted with acute respiratory failure secondary to COVID 19 virus. Patient is seen this PM for PT treatment session. Patient demonstrates improved functional activity tolerance and mobility today tolerating 24 minutes of therapeutic exercise while maintaining SP02 >90%. Patient also reports sitting up in the chair x4 hours this AM. Education on positioning and activity pacing/progression with teach back. Goals remain ongoing and appropriate; continue with plan of care.       SUBJECTIVE:   Mr. Marielle Delgado states, \"I love [place]\"    SOCIAL HISTORY/ LIVING ENVIRONMENT: Independent. Retired.    Home Environment: Private residence  One/Two Story Residence: One story  Living Alone: No  Support Systems: Spouse/Significant Other  OBJECTIVE:     PAIN: VITAL SIGNS: LINES/DRAINS:   Pre Treatment: Pain Screen  Pain Scale 1: Numeric (0 - 10)  Pain Intensity 1: 0  Post Treatment: 0/10   Continuous Pulse Oximetry and IV  O2 Device: Hi flow nasal cannula, Heated     MOBILITY: I Mod I S SBA CGA Min Mod Max Total  NT x2 Comments:   Bed Mobility    Rolling [] [] [] [] [] [] [] [] [] [x] []    Supine to Sit [] [] [x] [] [] [] [] [] [] [] []    Scooting [] [] [x] [] [] [] [] [] [] [] []    Sit to Supine [] [] [x] [] [] [] [] [] [] [] []    Transfers    Sit to Stand [] [] [] [x] [] [] [] [] [] [] []    Bed to Chair [] [] [] [] [x] [] [] [] [] [] []    Stand to Sit [] [] [] [x] [] [] [] [] [] [] []    I=Independent, Mod I=Modified Independent, S=Supervision, SBA=Standby Assistance, CGA=Contact Guard Assistance,   Min=Minimal Assistance, Mod=Moderate Assistance, Max=Maximal Assistance, Total=Total Assistance, NT=Not Tested    BALANCE: Good Fair+ Fair Fair- Poor NT Comments   Sitting Static [x] [] [] [] [] []    Sitting Dynamic [x] [] [] [] [] []              Standing Static [x] [] [] [] [] []    Standing Dynamic [] [x] [] [] [] []      GAIT: I Mod I S SBA CGA Min Mod Max Total  NT x2 Comments:   Level of Assistance [] [] [] [] [] [] [] [] [] [] [] Limited by Elise Hidden N/A    DME N/A    Gait Quality N/A    Weightbearing  Status N/A     I=Independent, Mod I=Modified Independent, S=Supervision, SBA=Standby Assistance, CGA=Contact Guard Assistance,   Min=Minimal Assistance, Mod=Moderate Assistance, Max=Maximal Assistance, Total=Total Assistance, NT=Not Tested    PLAN:   FREQUENCY/DURATION: PT Plan of Care: 3 times/week for duration of hospital stay or until stated goals are met, whichever comes first.  TREATMENT:     TREATMENT:   ($$ Therapeutic Exercises: 23-37 mins )  Therapeutic Exercise (24 Minutes): Therapeutic exercises noted below to improve functional activity tolerance, strength and mobility.     TREATMENT GRID:   Date:  12/10/21 Date:  12/13 Date:     Activity/Exercise Parameters Parameters Parameters   Standing toe raises X 10 B      Standing marching  X 10 B     Standing hip abd/add X 10 B      Standing hip ext X 10 B     Standing squats X 10 (5 then rest break)     Sit to stand -- x10    Seated hip flexion -- 2x20B    LAQ -- x10B    Ankle dorsiflexion/plantarflexion -- x25B    Shoulder flexion -- x12B    Shoulder horizontal ab/adduction -- x12B    UE press/punches -- x15B              AFTER TREATMENT POSITION/PRECAUTIONS:  Bed, Needs within reach and RN notified   RN updated in patient status/progress    INTERDISCIPLINARY COLLABORATION:  RN/PCT and PT/PTA    TOTAL TREATMENT DURATION:  PT Patient Time In/Time Out  Time In: 1341  Time Out: [address], DPT

## 2021-12-13 NOTE — PROGRESS NOTES
Pt sitting up in bed. Pt alert oriented times 3 at this time. Pt on airvo at 45L 50% with sat 93% on continuous sat monitor at this time. Pt denies pain or distress at this time. Pt encouraged to call for assistance If needed call light in reach, will monitor.

## 2021-12-13 NOTE — PROGRESS NOTES
MSN, CM:  Patient currently on 43/47 Airvo this AM.  Patient has received Baricitinib and Decadron during this admission. Patient's discharge plan is home with [facility] but will be reevaluated prior to discharge. Case Management will continue to follow.

## 2021-12-14 PROBLEM — R00.1 SINUS BRADYCARDIA: Status: ACTIVE | Noted: 2021-12-14

## 2021-12-14 PROCEDURE — 77010033711 HC HIGH FLOW OXYGEN

## 2021-12-14 PROCEDURE — 97530 THERAPEUTIC ACTIVITIES: CPT

## 2021-12-14 PROCEDURE — 74011250637 HC RX REV CODE- 250/637: Performed by: FAMILY MEDICINE

## 2021-12-14 PROCEDURE — 74011250637 HC RX REV CODE- 250/637: Performed by: INTERNAL MEDICINE

## 2021-12-14 PROCEDURE — 65660000000 HC RM CCU STEPDOWN

## 2021-12-14 PROCEDURE — 74011250636 HC RX REV CODE- 250/636: Performed by: INTERNAL MEDICINE

## 2021-12-14 PROCEDURE — 94762 N-INVAS EAR/PLS OXIMTRY CONT: CPT

## 2021-12-14 RX ADMIN — APIXABAN 5 MG: 5 TABLET, FILM COATED ORAL at 09:16

## 2021-12-14 RX ADMIN — Medication 10 ML: at 17:48

## 2021-12-14 RX ADMIN — DEXAMETHASONE SODIUM PHOSPHATE 10 MG: 10 INJECTION INTRAMUSCULAR; INTRAVENOUS at 21:15

## 2021-12-14 RX ADMIN — GUAIFENESIN 600 MG: 600 TABLET ORAL at 21:14

## 2021-12-14 RX ADMIN — FLUTICASONE PROPIONATE 2 SPRAY: 50 SPRAY, METERED NASAL at 09:00

## 2021-12-14 RX ADMIN — ASPIRIN 81 MG: 81 TABLET ORAL at 09:16

## 2021-12-14 RX ADMIN — Medication 10 ML: at 22:00

## 2021-12-14 RX ADMIN — GUAIFENESIN 600 MG: 600 TABLET ORAL at 09:17

## 2021-12-14 RX ADMIN — Medication 10 ML: at 06:13

## 2021-12-14 RX ADMIN — DEXAMETHASONE SODIUM PHOSPHATE 10 MG: 10 INJECTION INTRAMUSCULAR; INTRAVENOUS at 09:17

## 2021-12-14 RX ADMIN — PANTOPRAZOLE SODIUM 40 MG: 40 TABLET, DELAYED RELEASE ORAL at 06:14

## 2021-12-14 RX ADMIN — APIXABAN 5 MG: 5 TABLET, FILM COATED ORAL at 17:47

## 2021-12-14 NOTE — PROGRESS NOTES
Patient in bed resting. Respirations even and unlabored. On airvo 45L/42%. All needs addressed. Safety measures in place. Call light within reach. No signs of acute distress at this time. Will continue to monitor. Preparing to give report to oncoming RN.

## 2021-12-14 NOTE — PROGRESS NOTES
Bedside report received from night nurse Sheryle Dus. Assessment done as noted  Respiration even and unlabored 20/min; denies pain or nausea at present. Afebrile this morning. Reports Non-productive coughing at interval. O2 intact with AIRVO 45/42% with O2 sats 93% at rest confirmed via continuous O2 monitor. Remains on Droplet plus isolation for positive COVID-19 screening. Ordered PPE in place and in use. Encouraged to call with needs.

## 2021-12-14 NOTE — PROGRESS NOTES
Report received from [facility]. Patient in bed resting. Respirations even and unlabored. On airvo 45L/42%. All needs addressed. Safety measures in place. Call light in reach. No signs of acute distress at this time. Will continue to monitor.

## 2021-12-14 NOTE — PROGRESS NOTES
ACUTE PHYSICAL THERAPY GOALS:  (Developed with and agreed upon by patient and/or caregiver.)  (1.)Mr. John Marshall will move from supine to sit and sit to supine , scoot up and down and roll side to side in bed with INDEPENDENT within 7 treatment day(s). (2.)Mr. John Marshall will transfer from bed to chair and chair to bed with INDEPENDENT using the least restrictive device within 7 treatment day(s). (3.)Mr. John Marshall will ambulate with INDEPENDENT for 250+ feet with the least restrictive device within 7 treatment day(s) while maintaining normal vital signs. (4.)Mr. John Marshall will tolerate 23+ minutes of therapeutic activity within 7 treatment days for increased activity tolerance and strength. PHYSICAL THERAPY: Daily Note and AM Treatment Day # 3    Hu Fuentes is a 73 y.o. male   PRIMARY DIAGNOSIS: Acute hypoxemic respiratory failure ([facility] [address]  COVID-19 [U07.1]  Acute hypoxemic respiratory failure (HCC) [J96.01]  Atrial fibrillation with RVR ([facility] [address] [I48.91]         ASSESSMENT:     REHAB RECOMMENDATIONS: CURRENT LEVEL OF FUNCTION:  (Most Recently Demonstrated)   Recommendation to date pending progress:  [address] [place] [address]  Equipment:    To Be Determined Bed Mobility:   Not tested  Sit to Stand:  [employer] Assistance  Transfers:   Contact Guard Assistance  Gait/Mobility:   Not tested     ASSESSMENT:  Mr. John Marshall is a pleasant 73year old male admitted with acute respiratory failure secondary to COVID 19 virus. Patient is sitting in the recliner and agreeable to therapy. On Airvo and saturations are good. Patient stood several times form the recliner no instability noted,  Therapeutic exercises for bilateral LE exercises, performed well with as needed rest breaks. . Saturations remain good throughout with the exception of a slight drop to 88-89% but only briefly. Patient demonstrates improved functional activity tolerance and mobility today. Good session and progress demonstrated.  Goals remain ongoing and appropriate; continue with plan of care. Home with HHPT     SUBJECTIVE:   Mr. Sariah Osuna states, \"Hello\"    SOCIAL HISTORY/ LIVING ENVIRONMENT: Independent. Retired.    Home Environment: Private residence  One/Two Story Residence: One story  Living Alone: No  Support Systems: Spouse/Significant Other  OBJECTIVE:     PAIN: VITAL SIGNS: LINES/DRAINS:   Pre Treatment: Pain Screen  Pain Scale 1: Numeric (0 - 10)  Pain Intensity 1: 0  Post Treatment: 0/10   Continuous Pulse Oximetry and IV  O2 Device: Heated, Hi flow nasal cannula     MOBILITY: I Mod I S SBA CGA Min Mod Max Total  NT x2 Comments:   Bed Mobility    Rolling [] [] [] [] [] [] [] [] [] [x] []    Supine to Sit [] [] [] [] [] [] [] [] [] [x] []    Scooting [] [] [] [] [] [] [] [] [] [x] []    Sit to Supine [] [] [] [] [] [] [] [] [] [x] []    Transfers    Sit to Stand [] [] [] [] [x] [] [] [] [] [] []    Bed to Chair [] [] [] [] [] [] [] [] [] [x] []    Stand to Sit [] [] [] [] [x] [] [] [] [] [] []    I=Independent, Mod I=Modified Independent, S=Supervision, SBA=Standby Assistance, CGA=Contact Guard Assistance,   Min=Minimal Assistance, Mod=Moderate Assistance, Max=Maximal Assistance, Total=Total Assistance, NT=Not Tested    BALANCE: Good Fair+ Fair Fair- Poor NT Comments   Sitting Static [x] [] [] [] [] []    Sitting Dynamic [x] [] [] [] [] []              Standing Static [x] [] [] [] [] []    Standing Dynamic [x] [] [] [] [] []      GAIT: I Mod I S SBA CGA Min Mod Max Total  NT x2 Comments:   Level of Assistance [] [] [] [] [] [] [] [] [] [] [] Limited by Genevieve Lockhart N/A    DME N/A    Gait Quality N/A    Weightbearing  Status N/A     I=Independent, Mod I=Modified Independent, S=Supervision, SBA=Standby Assistance, CGA=Contact Guard Assistance,   Min=Minimal Assistance, Mod=Moderate Assistance, Max=Maximal Assistance, Total=Total Assistance, NT=Not Tested    PLAN:   FREQUENCY/DURATION: PT Plan of Care: 3 times/week for duration of hospital stay or until stated goals are met, whichever comes first.  TREATMENT:     TREATMENT:   ($$ Therapeutic Activity: 23-37 mins    )  Therapeutic Activity (24 Minutes): Therapeutic activity included Transfer Training, Sitting balance  and Standing balance to improve functional Mobility, Strength, Activity tolerance and LE exercises.    TREATMENT GRID:   Date:  12/10/21 Date:  12/13 Date  12/14/21   Activity/Exercise Parameters Parameters Parameters   Standing toe raises X 10 B      Standing marching  X 10 B     Standing hip abd/add X 10 B      Standing hip ext X 10 B     Standing squats X 10 (5 then rest break)     Sit to stand -- x10 20   Seated hip flexion -- 2x20B 20   LAQ -- x10B 20   Ankle dorsiflexion/plantarflexion -- x25B 20   Shoulder flexion -- x12B 20   Shoulder horizontal ab/adduction -- x12B    UE press/punches -- x15B    Gluteal sets   20       AFTER TREATMENT POSITION/PRECAUTIONS:  Chair, Needs within reach and RN notified       INTERDISCIPLINARY COLLABORATION:  RN/PCT and PT/PTA    TOTAL TREATMENT DURATION:  PT Patient Time In/Time Out  Time In: 1050  Time Out: [address] PTA

## 2021-12-14 NOTE — PROGRESS NOTES
Resting quietly at present. NAD noted. Remains on droplet plus isolation for positive COVID-19 screening. Ordered PPE in place and in use. Sleeping at interval. Remains on Airvo 45L/40% with O2 93% at rest confirmed via continuous O2 monitor. Reports pt being Lactose intolerant. diet orders modified. Safety maintained through out the shift. To report off to oncoming nurse.

## 2021-12-14 NOTE — PROGRESS NOTES
Comprehensive Nutrition Assessment    Type and Reason for Visit: Initial, RD nutrition re-screen/LOS    Nutrition Recommendations/Plan:   Meals and Snacks:  Continue current diet. Nutrition Supplement Therapy:   Medical food supplement therapy:  Continue Ensure Enlive three times per day (this provides 350 kcal and 20 grams protein per bottle)     Malnutrition Assessment:  Malnutrition Status: At risk for malnutrition (specify) (pt reports poor po intake during admission)    Nutrition Assessment:   Nutrition History: Pt states eating well without unintentional wt loss PTA. No wt loss noted per chart review. Nutrition Background:    Daily Update:  Pt contacted via cell phone due to room phone dysfunction. Pt states he's eating ~50% of meals with his appetite improving. Pt states he also is unable to eat most of the meals due to having an allergy to \"most vegetables\". Pt states he can eat potatoes and some green beans. Pt reports having diarrhea if consuming the wrong vegetable. Provided PDA with pt's cell phone number in order to accommodate food/meal needs. Pt states drinking 50% of 1 Ensure Enlive per day due to lactose intolerance. Informed pt Ensure Enlive is free of lactose and safe to consume. Nutrition Related Findings:   NFPE deferred due to isolation precautions      Current Nutrition Therapies:  ADULT DIET Regular; dislikes sweet tea  ADULT ORAL NUTRITION SUPPLEMENT Breakfast, Lunch, Dinner; Standard High Calorie/High Protein    Current Intake:   Average Meal Intake: 51-75% Average Supplement Intake: 1-25% (per pt report)      Anthropometric Measures:  Height: 5' 10\" (177.8 cm)  Current Body Wt: 105.2 kg (231 lb 14.8 oz) (12/13), Weight source: Bed scale  BMI: 33.3, Obese class 1 (BMI 30.0-34. 9)  Admission Body Weight: 235 lb 0.2 oz (12/5; bed scale)  Ideal Body Weight (lbs) (Calculated): 166 lbs (75 kg), 139.7 %  Usual Body Wt: 104.8 kg (231 lb) (8/27; cardiology office visit), Percent weight change: 0.4          Edema: No data recorded   Estimated Daily Nutrient Needs:  Energy (kcal/day): 2915-6110 (Kcal/kg (25-30), Weight Used: Ideal (75.5kg))  Protein (g/day):  (1.2-1.4gm/kg) Weight Used: (Ideal (75.5kg))  Fluid (ml/day):   (1 ml/kcal)    Nutrition Diagnosis:   · Predicted inadequate energy intake related to  (loss of appetite) as evidenced by intake 51-75% (pt reports barrier to intake)    Nutrition Interventions:   Food and/or Nutrient Delivery: Continue current diet, Continue oral nutrition supplement     Coordination of Nutrition Care: Continue to monitor while inpatient  Plan of Care discussed with IDT Arnold de    Goals: Active Goal: Meet >75% estimated nutrition needs by RD follow up. Nutrition Monitoring and Evaluation:      Food/Nutrient Intake Outcomes: Food and nutrient intake, Supplement intake  Physical Signs/Symptoms Outcomes: Meal time behavior    Discharge Planning:     Too soon to determine    Electronically signed by Nancy Bond MS, RDN, LD 12/14/2021 at 4:47 PM  Contact: [phone]

## 2021-12-14 NOTE — PROGRESS NOTES
Hospitalist Progress Note   Admit Date:  2021  8:45 AM   Name:  Jamar Castaneda   Age:  73 y.o. Sex:  male  :  1950   MRN:  [id number]   Room:  [facility]/    Presenting Complaint: Positive For Covid-19    Reason(s) for Admission: COVID-19 [U07.1]  Acute hypoxemic respiratory failure ([facility] [address] [J96.01]  Atrial fibrillation with RVR [facility]) [facility] Course & Interval History:   71M PMHx HTN, CAD, AFib and prostate cancer who was admitted on  with acute hypoxemic respiratory failure 2/2 COVID-19. Started on dexamethasone, baricitinib and O2. CT chest neg for PE. O2 needs increased and started on Airvo , dexamethasone increased on 12/10. Airvo settings down to 45L/50% on . Subjective (21): Airvo now down to 45/40%. Reports feeling less fatigue. Eating. No new complaints today. Assessment & Plan:   * Acute hypoxemic respiratory failure ([address]  Secondary to Covid. Unvaccinated. Was septic on arrival.  Completed course of doxycycline, ceftriaxone. Most recent pro-Quinn is negative. Required airvo .  -Airvo 40L/40%  -Dexamethasone, baricitinib  -Serial CRP  -Supportive care maintain O2 sat greater than 88  -Symptomatic management-    Sinus bradycardia  -Hold metoprolol    Class 1 obesity due to excess calories with serious comorbidity and body mass index (BMI) of 33.0 to 33.9 in adult  Increased risk of all cause mortality, complicating care  - healthy lifestyle at discharge    Hypertension  -HCTZ, metoprolol held    Paroxysmal atrial fibrillation (HCC)  -Apixaban    Sleep apnea  -CPAP qhs    There is a high probability of acute organ impairment or life-threatening deterioration in the patient's condition from: Acute respiratory failure secondary to Covid  Critical care interventions: Noninvasive positive pressure ventilation  Total critical care time spent: 33 minutes.     Time is indicative of direct patient attendance at bedside and on the patient's floor nearby. Includes time spent at bedside performing history and exam, performing chart review, discussing findings and treatment plan with patient and/or family, discussing patient with consultants and colleagues, ordering and reviewing pertinent laboratory and radiographic evaluations, and discussing patient with nursing staff. Time excludes procedures.       Dispo/Discharge Planning:      Home with home O2 in 2-3d    Diet:  ADULT DIET Regular; dislikes sweet tea  ADULT ORAL NUTRITION SUPPLEMENT Breakfast, Lunch, Dinner; Standard High Calorie/High Protein  DVT PPx: On apixaban  Code status: Full Code    Hospital Problems as of 12/14/2021 Date Reviewed: 8/27/2021          Codes Class Noted - Resolved POA    * (Principal) Acute hypoxemic respiratory failure (HCC) ICD-10-CM: J96.01  ICD-9-CM: 518.81  12/4/2021 - Present Yes        Sinus bradycardia ICD-10-CM: R00.1  ICD-9-CM: 427.89  12/14/2021 - Present No        Class 1 obesity due to excess calories with serious comorbidity and body mass index (BMI) of 33.0 to 33.9 in adult ICD-10-CM: E66.09, Z68.33  ICD-9-CM: 278.00, V85.33  3/11/2016 - Present Yes        Atrial fibrillation with RVR (HCC) ICD-10-CM: I48.91  ICD-9-CM: 427.31  12/4/2021 - Present Yes        COVID-19 ICD-10-CM: U07.1  ICD-9-CM: 079.89  12/4/2021 - Present Yes        Hypertension ICD-10-CM: I10  ICD-9-CM: 401.9  3/14/2017 - Present Yes        Paroxysmal atrial fibrillation ([address] ICD-10-CM: I48.0  ICD-9-CM: 427.31  3/12/2016 - Present Yes        CAD (coronary artery disease) ICD-10-CM: I25.10  ICD-9-CM: 414.00  3/12/2016 - Present Yes        Sleep apnea (Chronic) ICD-10-CM: G47.30  ICD-9-CM: 780.57  3/11/2016 - Present Yes        Hypoxia ICD-10-CM: R09.02  ICD-9-CM: 799.02  3/11/2016 - Present Yes        S/P CABG x 4 ICD-10-CM: Z95.1  ICD-9-CM: V45.81  3/9/2016 - Present Yes        RESOLVED: Sepsis ([facility] [address] ICD-10-CM: A41.9  ICD-9-CM: 038.9, 995.91  12/8/2021 - 12/8/2021 Yes              Objective: Patient Vitals for the past 24 hrs:   Temp Pulse Resp BP SpO2   12/14/21 1156 98.3 °F (36.8 °C) 60 18 (!) 112/59 94 %   12/14/21 0747 97.4 °F (36.3 °C) (!) 52 17 132/77 95 %   12/14/21 0346     91 %   12/14/21 0335 97.6 °F (36.4 °C) 60 18 136/73 97 %   12/13/21 2255 98.1 °F (36.7 °C) (!) 57 16 132/69 95 %   12/13/21 2026     94 %   12/13/21 1924 97.5 °F (36.4 °C) 78 18 121/74 92 %   12/13/21 1722     93 %   12/13/21 1529 97.9 °F (36.6 °C) 64 19 120/65 93 %   12/13/21 1507     93 %     Oxygen Therapy  O2 Sat (%): 94 % (12/14/21 1156)  Pulse via Oximetry: 58 beats per minute (12/14/21 0346)  O2 Device: Heated; Hi flow nasal cannula (12/14/21 0346)  Skin Assessment: Clean, dry, & intact (12/14/21 0346)  Skin Protection for O2 Device: No (12/06/21 0730)  O2 Flow Rate (L/min): 45 l/min (12/14/21 0346)  O2 Temperature: 87.8 °F (31 °C) (12/14/21 0346)  FIO2 (%): 40 % (12/14/21 0346)    Estimated body mass index is 33.28 kg/m² as calculated from the following:    Height as of this encounter: 5' 10\" (1.778 m). Weight as of this encounter: 105.2 kg (231 lb 14.8 oz). Intake/Output Summary (Last 24 hours) at 12/14/2021 1424  Last data filed at 12/14/2021 1018  Gross per 24 hour   Intake 236 ml   Output 1640 ml   Net -1404 ml       Blood pressure (!) 112/59, pulse 60, temperature 98.3 °F (36.8 °C), resp. rate 18, height 5' 10\" (1.778 m), weight 105.2 kg (231 lb 14.8 oz), SpO2 94 %. Physical Exam  Vitals and nursing note reviewed. Constitutional:       General: He is not in acute distress. Appearance: Normal appearance. He is obese. He is ill-appearing. HENT:      Head: Normocephalic. Eyes:      Extraocular Movements: Extraocular movements intact. Cardiovascular:      Rate and Rhythm: Normal rate. Pulses:           Radial pulses are 2+ on the left side. Heart sounds: No murmur heard. Pulmonary:      Effort: Pulmonary effort is normal. No respiratory distress.       Breath sounds: Wheezing present. Musculoskeletal:         General: No deformity. Cervical back: No rigidity. Right lower leg: No edema. Left lower leg: No edema. Skin:     General: Skin is warm and dry. Neurological:      General: No focal deficit present. Mental Status: He is alert and oriented to person, place, and time. Psychiatric:         Mood and Affect: Mood normal.         Behavior: Behavior normal.         I have reviewed ordered lab tests and independently visualized imaging below:    Recent Labs:  No results found for this or any previous visit (from the past 48 hour(s)). All Micro Results     Procedure Component Value Units Date/Time    CULTURE, BLOOD [[id number]] Collected: 12/04/21 1856    Order Status: Completed Specimen: Blood Updated: 12/09/21 0720     Special Requests: --        RIGHT  HAND       Culture result: NO GROWTH 5 DAYS       CULTURE, BLOOD [[id number]] Collected: 12/04/21 1856    Order Status: Completed Specimen: Blood Updated: 12/09/21 0720     Special Requests: --        LEFT  Antecubital       Culture result: NO GROWTH 5 DAYS       CULTURE, URINE [[id number]] Collected: 12/05/21 0549    Order Status: Completed Specimen: Urine from Clean catch Updated: 12/07/21 0736     Special Requests: NO SPECIAL REQUESTS        Culture result: NO GROWTH 2 DAYS       INFLUENZA A & B AG (RAPID TEST) [[id number]]     Order Status: Canceled Specimen: Nasopharyngeal           Other Studies:  No results found.     Current Meds:  Current Facility-Administered Medications   Medication Dose Route Frequency    benzonatate (TESSALON) capsule 100 mg  100 mg Oral TID PRN    dexamethasone (DECADRON) 10 mg/mL injection 10 mg  10 mg IntraVENous Q12H    pantoprazole (PROTONIX) tablet 40 mg  40 mg Oral ACB    sodium chloride (OCEAN) 0.65 % nasal squeeze bottle 2 Spray  2 Spray Both Nostrils Q2H PRN    apixaban (ELIQUIS) tablet 5 mg  5 mg Oral BID    albuterol (PROVENTIL HFA, VENTOLIN HFA, PROAIR HFA) inhaler 2 Puff  2 Puff Inhalation Q4H PRN    sodium chloride (NS) flush 5-10 mL  5-10 mL IntraVENous PRN    aspirin delayed-release tablet 81 mg  81 mg Oral DAILY    fluticasone propionate (FLONASE) 50 mcg/actuation nasal spray 2 Spray  2 Spray Both Nostrils DAILY    [Held by provider] hydroCHLOROthiazide (HYDRODIURIL) tablet 12.5 mg  12.5 mg Oral DAILY    sodium chloride (NS) flush 5-40 mL  5-40 mL IntraVENous Q8H    sodium chloride (NS) flush 5-40 mL  5-40 mL IntraVENous PRN    acetaminophen (TYLENOL) tablet 650 mg  650 mg Oral Q6H PRN    Or    acetaminophen (TYLENOL) suppository 650 mg  650 mg Rectal Q6H PRN    polyethylene glycol (MIRALAX) packet 17 g  17 g Oral DAILY PRN    ondansetron (ZOFRAN ODT) tablet 4 mg  4 mg Oral Q8H PRN    Or    ondansetron (ZOFRAN) injection 4 mg  4 mg IntraVENous Q6H PRN    guaiFENesin-dextromethorphan (ROBITUSSIN DM) 100-10 mg/5 mL syrup 5 mL  5 mL Oral Q4H PRN    baricitinib (OLUMIANT) tablet 4 mg  4 mg Oral DAILY    guaiFENesin ER (MUCINEX) tablet 600 mg  600 mg Oral Q12H    [Held by provider] metoprolol succinate (TOPROL-XL) tablet 100 mg  100 mg Oral Q12H       Signed:  Dima Monterroso MD

## 2021-12-14 NOTE — ASSESSMENT & PLAN NOTE
Secondary to Covid. Unvaccinated. Was septic on arrival.  Completed course of doxycycline, ceftriaxone. Most recent pro-Quinn is negative.   Required airvo 12/9.  -Airvo 40L/40%  -Dexamethasone, baricitinib  -Serial CRP  -Supportive care maintain O2 sat greater than 88  -Symptomatic management    12/16: continue to attempt to wean, tolerate sats at 88

## 2021-12-15 PROBLEM — R79.89 ELEVATED SERUM CREATININE: Status: ACTIVE | Noted: 2021-12-15

## 2021-12-15 LAB
ANION GAP SERPL CALC-SCNC: 5 MMOL/L (ref 7–16)
BUN SERPL-MCNC: 25 MG/DL (ref 8–23)
CALCIUM SERPL-MCNC: 8.7 MG/DL (ref 8.3–10.4)
CHLORIDE SERPL-SCNC: 106 MMOL/L (ref 98–107)
CO2 SERPL-SCNC: 28 MMOL/L (ref 21–32)
CREAT SERPL-MCNC: 1.19 MG/DL (ref 0.8–1.5)
CRP SERPL-MCNC: <0.3 MG/DL (ref 0–0.9)
ERYTHROCYTE [DISTWIDTH] IN BLOOD BY AUTOMATED COUNT: 13 % (ref 11.9–14.6)
GLUCOSE SERPL-MCNC: 86 MG/DL (ref 65–100)
HCT VFR BLD AUTO: 46.8 % (ref 41.1–50.3)
HGB BLD-MCNC: 15.6 G/DL (ref 13.6–17.2)
MAGNESIUM SERPL-MCNC: 2.1 MG/DL (ref 1.8–2.4)
MCH RBC QN AUTO: 29.1 PG (ref 26.1–32.9)
MCHC RBC AUTO-ENTMCNC: 33.3 G/DL (ref 31.4–35)
MCV RBC AUTO: 87.3 FL (ref 79.6–97.8)
NRBC # BLD: 0 K/UL (ref 0–0.2)
PLATELET # BLD AUTO: 508 K/UL (ref 150–450)
PMV BLD AUTO: 9.5 FL (ref 9.4–12.3)
POTASSIUM SERPL-SCNC: 4.6 MMOL/L (ref 3.5–5.1)
RBC # BLD AUTO: 5.36 M/UL (ref 4.23–5.6)
SODIUM SERPL-SCNC: 139 MMOL/L (ref 136–145)
WBC # BLD AUTO: 14.2 K/UL (ref 4.3–11.1)

## 2021-12-15 PROCEDURE — 85027 COMPLETE CBC AUTOMATED: CPT

## 2021-12-15 PROCEDURE — 97530 THERAPEUTIC ACTIVITIES: CPT

## 2021-12-15 PROCEDURE — 94762 N-INVAS EAR/PLS OXIMTRY CONT: CPT

## 2021-12-15 PROCEDURE — 65660000000 HC RM CCU STEPDOWN

## 2021-12-15 PROCEDURE — 74011250637 HC RX REV CODE- 250/637: Performed by: INTERNAL MEDICINE

## 2021-12-15 PROCEDURE — 36415 COLL VENOUS BLD VENIPUNCTURE: CPT

## 2021-12-15 PROCEDURE — 74011250637 HC RX REV CODE- 250/637: Performed by: FAMILY MEDICINE

## 2021-12-15 PROCEDURE — 97110 THERAPEUTIC EXERCISES: CPT

## 2021-12-15 PROCEDURE — 86140 C-REACTIVE PROTEIN: CPT

## 2021-12-15 PROCEDURE — 80048 BASIC METABOLIC PNL TOTAL CA: CPT

## 2021-12-15 PROCEDURE — 74011250636 HC RX REV CODE- 250/636: Performed by: FAMILY MEDICINE

## 2021-12-15 PROCEDURE — 83735 ASSAY OF MAGNESIUM: CPT

## 2021-12-15 PROCEDURE — 77010033711 HC HIGH FLOW OXYGEN

## 2021-12-15 RX ADMIN — SODIUM CHLORIDE, SODIUM LACTATE, POTASSIUM CHLORIDE, AND CALCIUM CHLORIDE 500 ML: 600; 310; 30; 20 INJECTION, SOLUTION INTRAVENOUS at 15:33

## 2021-12-15 RX ADMIN — GUAIFENESIN 600 MG: 600 TABLET ORAL at 21:16

## 2021-12-15 RX ADMIN — ASPIRIN 81 MG: 81 TABLET ORAL at 09:08

## 2021-12-15 RX ADMIN — Medication 10 ML: at 21:16

## 2021-12-15 RX ADMIN — Medication 10 ML: at 15:34

## 2021-12-15 RX ADMIN — GUAIFENESIN 600 MG: 600 TABLET ORAL at 09:08

## 2021-12-15 RX ADMIN — APIXABAN 5 MG: 5 TABLET, FILM COATED ORAL at 17:19

## 2021-12-15 RX ADMIN — APIXABAN 5 MG: 5 TABLET, FILM COATED ORAL at 09:08

## 2021-12-15 RX ADMIN — PANTOPRAZOLE SODIUM 40 MG: 40 TABLET, DELAYED RELEASE ORAL at 06:15

## 2021-12-15 RX ADMIN — BARICITINIB 4 MG: 2 TABLET, FILM COATED ORAL at 09:08

## 2021-12-15 RX ADMIN — Medication 10 ML: at 05:04

## 2021-12-15 RX ADMIN — FLUTICASONE PROPIONATE 2 SPRAY: 50 SPRAY, METERED NASAL at 09:00

## 2021-12-15 NOTE — PROGRESS NOTES
ACUTE OT GOALS:  (Developed with and agreed upon by patient and/or caregiver.)  1) Patient will complete total body bathing and dressing with SET UP and adaptive equipment as needed. 2) Patient will complete toileting with SUPERVISION. 3) Patient will complete functional transfers with SUPERVISION and adaptive equipment as needed. 4) Patient will tolerate at least 25 minutes of OT activity with 1-2 rest breaks while maintaining O2 sats >90%. 5) Patient will verbalize at least 3 energy conservation technique to utilize during ADL/IADL. Timeframe: 7 visits       OCCUPATIONAL THERAPY: Daily Note and PM OT Treatment Day # 5    Simon Daniels is a 73 y.o. male   PRIMARY DIAGNOSIS: Acute hypoxemic respiratory failure ([facility] [address]  COVID-19 [U07.1]  Acute hypoxemic respiratory failure (HCC) [J96.01]  Atrial fibrillation with RVR ([facility] [address] [I48.91]       Payor: [employer] / [place] / Product Type: PPO /   ASSESSMENT:     REHAB RECOMMENDATIONS: CURRENT LEVEL OF FUNCTION:  (Most Recently Demonstrated)   Recommendation to date pending progress:  [address] [place] [address] Therapy  Equipment:    To Be Determined Bathing:   Not tested  Dressing:   Not tested  Feeding/Grooming:   Not tested   Toileting:   Not tested  Functional Mobility:   SBA with intermittent CGA     ASSESSMENT:  Mr. Harriett Tran continues to present with deficits in overall strength, balance, and activity tolerance for performance of ADLs and functional mobility, but does present with good progress towards acute care OT goals this treatment session. Received on Airvo 45L FiO2 40% with O2 sats 90-93% with performance of transfers and standing and seated therapeutic exercises. Requires Supervision to complete supine <> sit and SBA to complete sit <> stand transfers. Requires SBA to complete standing clockwise arm circles x 30 seconds, followed by seated rest break, followed by standing counter clockwise arm circles.  Requires CGA to complete standing marching x 30 seconds x 3 repetitions with seated rest break between each repetition. Requires SBA to complete standing bicep curls with foam soap bottle (to simulate weight) x 30 seconds. Returns to seated for rest break. Pt reporting increased fatigue but agreeable to seated exercises. Requires Supervision for monitoring of O2 sats to complete seated bicep curls with foam soap bottles (to simulate weight) x 30 seconds, rest break, and seated triceps extensions with foam soap bottles (to simulate weight) x 30 seconds. O2 sats improved to 94% following seated rest break. Pt with increased desire to remain seated EOB due to increased fatigue from being seated in recliner chair for multiple hours earlier in day. Pt would benefit from continued skilled OT to increase independence and safety for performance of ADLs and functional mobility. SUBJECTIVE:   Mr. Dania Lovelace states, \"I can do a couple more if they're not too hard. \" In reference to completing additional exercises. SOCIAL HISTORY/LIVING ENVIRONMENT:  Lives with wife and sister, one level home, no DME in the home, drives. Home Environment: Private residence  One/Two Story Residence: One story  Living Alone: No  Support Systems: Spouse/Significant Other    OBJECTIVE:     PAIN: VITAL SIGNS: LINES/DRAINS:   Pre Treatment: Pain Screen  Pain Scale 1: Numeric (0 - 10)  Pain Intensity 1: 0  Post Treatment: Unchanged Vital Signs  O2 Sat (%): 94 %  O2 Device: Heated; Hi flow nasal cannula  O2 Flow Rate (L/min): 45 l/min  FIO2 (%): 40 % Continuous Pulse Oximetry  O2 Device: Heated,Hi flow nasal cannula     ACTIVITIES OF DAILY LIVING: I Mod I S SBA CGA Min Mod Max Total NT Comments   BASIC ADLs:              Bathing/ Showering [] [] [] [] [] [] [] [] [] [x]    Toileting [] [] [] [] [] [] [] [] [] [x]    Dressing [] [] [] [] [] [] [] [] [] [x]     Feeding [] [] [] [] [] [] [] [] [] [x]    Grooming [] [] [] [] [] [] [] [] [] [x]    Personal Device Care [] [] [] [] [] [] [] [] [] [x]    Functional Mobility [] [] [] [x] [x] [] [] [] [] []     I=Independent, Mod I=Modified Independent, S=Supervision, SBA=Standby Assistance, CGA=Contact Guard Assistance,   Min=Minimal Assistance, Mod=Moderate Assistance, Max=Maximal Assistance, Total=Total Assistance, NT=Not Tested    MOBILITY: I Mod I S SBA CGA Min Mod Max Total  NT x2 Comments:   Supine to sit [] [] [x] [] [] [] [] [] [] [] []    Sit to supine [] [] [] [] [] [] [] [] [] [x] []    Sit to stand [] [] [] [x] [] [] [] [] [] [] []    Bed to chair [] [] [] [] [] [] [] [] [] [x] [] CGA to complete standing marching in place. I=Independent, Mod I=Modified Independent, S=Supervision, SBA=Standby Assistance, CGA=Contact Guard Assistance,   Min=Minimal Assistance, Mod=Moderate Assistance, Max=Maximal Assistance, Total=Total Assistance, NT=Not Tested    BALANCE: Good Fair+ Fair Fair- Poor NT Comments   Sitting Static [x] [] [] [] [] []    Sitting Dynamic [x] [] [] [] [] []              Standing Static [] [x] [] [] [] []    Standing Dynamic [] [x] [x] [] [] [] CGA for standing marching. PLAN:   FREQUENCY/DURATION: OT Plan of Care: 3 times/week for duration of hospital stay or until stated goals are met, whichever comes first.    TREATMENT:   TREATMENT:   ( $$ Therapeutic Activity: 8-22 mins  $$ Therapeutic Exercises: 8-22 mins   )  Therapeutic Activity (10 Minutes): Therapeutic activity included Supine to Sit, Scooting, Ambulation on level ground and Standing balance to improve functional Mobility, Strength and Activity tolerance. Therapeutic Exercise (15 Minutes): Therapeutic exercises noted below to improve functional activity tolerance, AROM, strength and mobility.      TREATMENT GRID:   Date:  12/15/2021 Date:   Date:     Activity/Exercise Parameters Parameters Parameters   Standing Clockwise Arm Circles 1 rep x 30 seconds     Standing Counter Clockwise Arm Circles  1 rep x 30 seconds     Standing Marching in Place 3 reps x 30 seconds     Standing Bicep Curls  1 rep x 30 seconds     Seated Bicep Curls  1 rep x 30 seconds     Seated Triceps Extensions  1 rep x 30 seconds             AFTER TREATMENT POSITION/PRECAUTIONS:  Bed, Needs within reach and RN notified    INTERDISCIPLINARY COLLABORATION:  RN/PCT, PT/PTA and OT/BASHIR    TOTAL TREATMENT DURATION:  OT Patient Time In/Time Out  Time In: 1435  Time Out: 1500    Solange Bae OTR/RICKIE

## 2021-12-15 NOTE — PROGRESS NOTES
MSN, CM:  Patient currently on 45/40 Airvo this AM.  PT recommends HH when medically stable for discharge. Case Management will continue to follow.

## 2021-12-15 NOTE — PROGRESS NOTES
Patient in bed resting. Respirations even and unlabored. On Airvo 45L/40%. No needs expressed at this time. Safety measures in place. Call light in reach. No signs of acute distress at this time. Will continue to monitor. Preparing to give report to oncoming RN.

## 2021-12-15 NOTE — PROGRESS NOTES
Report received from [facility]. Patient in bed resting. Respirations even and unlabored. On Airvo 45L/40%. All needs addressed. Safety measures in place. Call light in reach. No signs of acute distress at this time. Will continue to monitor.

## 2021-12-15 NOTE — PROGRESS NOTES
ACUTE PHYSICAL THERAPY GOALS:  (Developed with and agreed upon by patient and/or caregiver.)  (1.)Mr. Baires Friday will move from supine to sit and sit to supine , scoot up and down and roll side to side in bed with INDEPENDENT within 7 treatment day(s). (2.)Mr. Baires Friday will transfer from bed to chair and chair to bed with INDEPENDENT using the least restrictive device within 7 treatment day(s). (3.)Mr. Baires Friday will ambulate with INDEPENDENT for 250+ feet with the least restrictive device within 7 treatment day(s) while maintaining normal vital signs. (4.)Mr. Baires Friday will tolerate 23+ minutes of therapeutic activity within 7 treatment days for increased activity tolerance and strength. PHYSICAL THERAPY: Daily Note and AM Treatment Day # 4    Patty Pierson is a 73 y.o. male   PRIMARY DIAGNOSIS: Acute hypoxemic respiratory failure ([facility] [address]  COVID-19 [U07.1]  Acute hypoxemic respiratory failure (HCC) [J96.01]  Atrial fibrillation with RVR ([facility] [address] [I48.91]         ASSESSMENT:     REHAB RECOMMENDATIONS: CURRENT LEVEL OF FUNCTION:  (Most Recently Demonstrated)   Recommendation to date pending progress:  [address] [place] [address]  Equipment:    To Be Determined Bed Mobility:   Not tested  Sit to Stand:  [employer] Assistance  Transfers:   Contact Guard Assistance  Gait/Mobility:   Not tested     ASSESSMENT:  Mr. Baires Friday is a pleasant 73year old male admitted with acute respiratory failure secondary to COVID 19 virus. Patient is sitting in the recliner and agreeable to therapy. On Airvo and saturations are good. Patient stood several times form the recliner no instability noted,  Therapeutic exercises for bilateral LE exercises, performed well with as needed rest breaks, he was able to increase his reps. Saturations remain good throughout. Patient demonstrates improved functional activity tolerance and mobility today. Good session and progress demonstrated.  Goals remain ongoing and appropriate; continue with plan of care. Home with HHPT. Very pleasant and a shweta to work with. SUBJECTIVE:   Mr. Monster Kowalski states, \"Hello\"    SOCIAL HISTORY/ LIVING ENVIRONMENT: Independent. Retired.    Home Environment: Private residence  One/Two Story Residence: One story  Living Alone: No  Support Systems: Spouse/Significant Other  OBJECTIVE:     PAIN: VITAL SIGNS: LINES/DRAINS:   Pre Treatment: Pain Screen  Pain Scale 1: Numeric (0 - 10)  Pain Intensity 1: 0  Post Treatment: 0/10   Continuous Pulse Oximetry and IV  O2 Device: Heated,Hi flow nasal cannula     MOBILITY: I Mod I S SBA CGA Min Mod Max Total  NT x2 Comments:   Bed Mobility    Rolling [] [] [] [] [] [] [] [] [] [x] []    Supine to Sit [] [] [] [] [] [] [] [] [] [x] []    Scooting [] [] [] [] [] [] [] [] [] [x] []    Sit to Supine [] [] [] [] [] [] [] [] [] [x] []    Transfers    Sit to Stand [] [] [] [] [x] [] [] [] [] [] []    Bed to Chair [] [] [] [] [] [] [] [] [] [x] []    Stand to Sit [] [] [] [] [x] [] [] [] [] [] []    I=Independent, Mod I=Modified Independent, S=Supervision, SBA=Standby Assistance, CGA=Contact Guard Assistance,   Min=Minimal Assistance, Mod=Moderate Assistance, Max=Maximal Assistance, Total=Total Assistance, NT=Not Tested    BALANCE: Good Fair+ Fair Fair- Poor NT Comments   Sitting Static [x] [] [] [] [] []    Sitting Dynamic [x] [] [] [] [] []              Standing Static [x] [] [] [] [] []    Standing Dynamic [x] [] [] [] [] []      GAIT: I Mod I S SBA CGA Min Mod Max Total  NT x2 Comments:   Level of Assistance [] [] [] [] [] [] [] [] [] [] [] Limited by Brando Stoddard N/A    DME N/A    Gait Quality N/A    Weightbearing  Status N/A     I=Independent, Mod I=Modified Independent, S=Supervision, SBA=Standby Assistance, CGA=Contact Guard Assistance,   Min=Minimal Assistance, Mod=Moderate Assistance, Max=Maximal Assistance, Total=Total Assistance, NT=Not Tested    PLAN:   FREQUENCY/DURATION: PT Plan of Care: 3 times/week for duration of hospital stay or until stated goals are met, whichever comes first.  TREATMENT:     TREATMENT:   ($$ Therapeutic Activity: 23-37 mins    )  Therapeutic Activity (26 Minutes): Therapeutic activity included Transfer Training, Sitting balance  and Standing balance to improve functional Mobility, Strength, Activity tolerance and LE exercises.    TREATMENT GRID:   Date:  12/10/21 Date:  12/13 Date  12/14/21   Activity/Exercise Parameters Parameters Parameters   Standing toe raises X 10 B      Standing marching  X 10 B     Standing hip abd/add X 10 B      Standing hip ext X 10 B     Standing squats X 10 (5 then rest break)     Sit to stand -- x10 20   Seated hip flexion -- 2x20B 20   LAQ -- x10B 20   Ankle dorsiflexion/plantarflexion -- x25B 20   Shoulder flexion -- x12B 20   Shoulder horizontal ab/adduction -- x12B    UE press/punches -- x15B    Gluteal sets   20       AFTER TREATMENT POSITION/PRECAUTIONS:  Chair, Needs within reach and RN notified       INTERDISCIPLINARY COLLABORATION:  RN/PCT and PT/PTA    TOTAL TREATMENT DURATION:  PT Patient Time In/Time Out  Time In: 0935  Time Out: Sanjay Pascual PTA

## 2021-12-15 NOTE — PROGRESS NOTES
Hospitalist Progress Note   Admit Date:  2021  8:45 AM   Name:  Emma Govea   Age:  73 y.o. Sex:  male  :  1950   MRN:  [id number]   Room:  [facility]/    Presenting Complaint: Positive For Covid-19    Reason(s) for Admission: COVID-19 [U07.1]  Acute hypoxemic respiratory failure ([address] [J96.01]  Atrial fibrillation with RVR [facility]) [facility] Course & Interval History:   71M PMHx HTN, CAD, AFib and prostate cancer who was admitted on  with acute hypoxemic respiratory failure 2/2 COVID-19. Started on dexamethasone, baricitinib and O2. CT chest neg for PE. O2 needs increased and started on Airvo , dexamethasone increased on 12/10. Airvo settings down to 45L/50% on . Subjective (12/15/21): Airvo reamains 45/40%. Reports feeling less fatigue. Eating more. Getting a little stir crazy. No other new complaints today. Discussed more aggressive weaning with RT. Assessment & Plan:   * Acute hypoxemic respiratory failure ([address]  Secondary to Covid. Unvaccinated. Was septic on arrival.  Completed course of doxycycline, ceftriaxone. Most recent pro-Quinn is negative.   Required airvo .  -Airvo 40L/40%  -Dexamethasone, baricitinib  -Serial CRP  -Supportive care maintain O2 sat greater than 88  -Symptomatic management    12/15: continue to attempt to wean, tolerate sats at 88    Sinus bradycardia  -Hold metoprolol    12/15: remains borderline bradycardic, may need to DC metoprolol    Elevated serum creatinine  12/15: Mild elevation of sCr  -bolus LR    Hypertension  -HCTZ, metoprolol held    12/15: continue to hold with soft pressures    Class 1 obesity due to excess calories with serious comorbidity and body mass index (BMI) of 33.0 to 33.9 in adult  Increased risk of all cause mortality, complicating care  - healthy lifestyle at discharge    Paroxysmal atrial fibrillation (HCC)  -Apixaban    Sleep apnea  -CPAP qhs    There is a high probability of acute organ impairment or life-threatening deterioration in the patient's condition from: Acute respiratory failure secondary to Covid  Critical care interventions: Noninvasive positive pressure ventilation  Total critical care time spent: 35 minutes. Time is indicative of direct patient attendance at bedside and on the patient's floor nearby. Includes time spent at bedside performing history and exam, performing chart review, discussing findings and treatment plan with patient and/or family, discussing patient with consultants and colleagues, ordering and reviewing pertinent laboratory and radiographic evaluations, and discussing patient with nursing staff. Time excludes procedures.       Dispo/Discharge Planning:      Home with home O2 in 2-3d    Diet:  ADULT ORAL NUTRITION SUPPLEMENT Breakfast, Lunch, Dinner; Standard High Calorie/High Protein  ADULT DIET Regular; dislikes sweet tea; lactose intolerance  DVT PPx: On apixaban  Code status: DNR    Hospital Problems as of 12/15/2021 Date Reviewed: 8/27/2021          Codes Class Noted - Resolved POA    * (Principal) Acute hypoxemic respiratory failure (HCC) ICD-10-CM: J96.01  ICD-9-CM: 518.81  12/4/2021 - Present Yes        Sinus bradycardia ICD-10-CM: R00.1  ICD-9-CM: 427.89  12/14/2021 - Present No        Elevated serum creatinine ICD-10-CM: R79.89  ICD-9-CM: 790.99  12/15/2021 - Present No        Hypertension ICD-10-CM: I10  ICD-9-CM: 401.9  3/14/2017 - Present Yes        Class 1 obesity due to excess calories with serious comorbidity and body mass index (BMI) of 33.0 to 33.9 in adult ICD-10-CM: E66.09, Z68.33  ICD-9-CM: 278.00, V85.33  3/11/2016 - Present Yes        Atrial fibrillation with RVR (HCC) ICD-10-CM: I48.91  ICD-9-CM: 427.31  12/4/2021 - Present Yes        COVID-19 ICD-10-CM: U07.1  ICD-9-CM: 079.89  12/4/2021 - Present Yes        Paroxysmal atrial fibrillation (HCC) ICD-10-CM: I48.0  ICD-9-CM: 427.31  3/12/2016 - Present Yes        CAD (coronary artery disease) ICD-10-CM: I25.10  ICD-9-CM: 414.00  3/12/2016 - Present Yes        Sleep apnea (Chronic) ICD-10-CM: G47.30  ICD-9-CM: 780.57  3/11/2016 - Present Yes        Hypoxia ICD-10-CM: R09.02  ICD-9-CM: 799.02  3/11/2016 - Present Yes        S/P CABG x 4 ICD-10-CM: Z95.1  ICD-9-CM: V45.81  3/9/2016 - Present Yes        RESOLVED: Sepsis ([facility] [address] ICD-10-CM: A41.9  ICD-9-CM: 038.9, 995.91  12/8/2021 - 12/8/2021 Yes              Objective:     Patient Vitals for the past 24 hrs:   Temp Pulse Resp BP SpO2   12/15/21 1102 97.8 °F (36.6 °C) (!) 59  137/61    12/15/21 0750 97.8 °F (36.6 °C) (!) 52 18 (!) 130/59 95 %   12/15/21 0335 97.8 °F (36.6 °C) 61 18 139/61 95 %   12/15/21 0028     93 %   12/14/21 2315 97.5 °F (36.4 °C) (!) 56 18 (!) 131/55 94 %   12/14/21 1935 97.6 °F (36.4 °C) 60 20 (!) 135/59 94 %   12/14/21 1812     93 %   12/14/21 1527 98.1 °F (36.7 °C) 74 19 129/68 93 %     Oxygen Therapy  O2 Sat (%): 95 % (12/15/21 0750)  Pulse via Oximetry: 55 beats per minute (12/15/21 0028)  O2 Device: Heated; Hi flow nasal cannula (12/15/21 0311)  Skin Assessment: Clean, dry, & intact (12/14/21 0346)  Skin Protection for O2 Device: No (12/06/21 0730)  O2 Flow Rate (L/min): 45 l/min (12/15/21 0311)  O2 Temperature: 87.8 °F (31 °C) (12/15/21 0028)  FIO2 (%): 40 % (12/15/21 0311)    Estimated body mass index is 33.28 kg/m² as calculated from the following:    Height as of this encounter: 5' 10\" (1.778 m). Weight as of this encounter: 105.2 kg (231 lb 14.8 oz). Intake/Output Summary (Last 24 hours) at 12/15/2021 1256  Last data filed at 12/15/2021 0750  Gross per 24 hour   Intake 476 ml   Output 1000 ml   Net -524 ml       Blood pressure 137/61, pulse (!) 59, temperature 97.8 °F (36.6 °C), resp. rate 18, height 5' 10\" (1.778 m), weight 105.2 kg (231 lb 14.8 oz), SpO2 95 %. Physical Exam  Vitals and nursing note reviewed. Constitutional:       General: He is not in acute distress.      Appearance: Normal appearance. He is obese. He is not ill-appearing. HENT:      Head: Normocephalic. Eyes:      Extraocular Movements: Extraocular movements intact. Cardiovascular:      Rate and Rhythm: Normal rate. Pulses:           Radial pulses are 2+ on the left side. Heart sounds: No murmur heard. Pulmonary:      Effort: Pulmonary effort is normal. No respiratory distress. Breath sounds: Wheezing present. Musculoskeletal:         General: No deformity. Cervical back: No rigidity. Right lower leg: No edema. Left lower leg: No edema. Skin:     General: Skin is warm and dry. Neurological:      General: No focal deficit present. Mental Status: He is alert and oriented to person, place, and time.    Psychiatric:         Mood and Affect: Mood normal.         Behavior: Behavior normal.         I have reviewed ordered lab tests and independently visualized imaging below:    Recent Labs:  Recent Results (from the past 48 hour(s))   CBC W/O DIFF    Collection Time: 12/15/21 10:20 AM   Result Value Ref Range    WBC 14.2 (H) 4.3 - 11.1 K/uL    RBC 5.36 4.23 - 5.6 M/uL    HGB 15.6 13.6 - 17.2 g/dL    HCT 46.8 41.1 - 50.3 %    MCV 87.3 79.6 - 97.8 FL    MCH 29.1 26.1 - 32.9 PG    MCHC 33.3 31.4 - 35.0 g/dL    RDW 13.0 11.9 - 14.6 %    PLATELET 678 (H) 228 - 450 K/uL    MPV 9.5 9.4 - 12.3 FL    ABSOLUTE NRBC 0.00 0.0 - 0.2 K/uL   METABOLIC PANEL, BASIC    Collection Time: 12/15/21 10:20 AM   Result Value Ref Range    Sodium 139 136 - 145 mmol/L    Potassium 4.6 3.5 - 5.1 mmol/L    Chloride 106 98 - 107 mmol/L    CO2 28 21 - 32 mmol/L    Anion gap 5 (L) 7 - 16 mmol/L    Glucose 86 65 - 100 mg/dL    BUN 25 (H) 8 - 23 MG/DL    Creatinine 1.19 0.8 - 1.5 MG/DL    GFR est AA >60 >60 ml/min/1.73m2    GFR est non-AA >60 >60 ml/min/1.73m2    Calcium 8.7 8.3 - 10.4 MG/DL   MAGNESIUM    Collection Time: 12/15/21 10:20 AM   Result Value Ref Range    Magnesium 2.1 1.8 - 2.4 mg/dL   C REACTIVE PROTEIN, QT Collection Time: 12/15/21 10:20 AM   Result Value Ref Range    C-Reactive protein <0.3 0.0 - 0.9 mg/dL       All Micro Results     Procedure Component Value Units Date/Time    CULTURE, BLOOD [[id number]] Collected: 12/04/21 1856    Order Status: Completed Specimen: Blood Updated: 12/09/21 0720     Special Requests: --        RIGHT  HAND       Culture result: NO GROWTH 5 DAYS       CULTURE, BLOOD [[id number]] Collected: 12/04/21 1856    Order Status: Completed Specimen: Blood Updated: 12/09/21 0720     Special Requests: --        LEFT  Antecubital       Culture result: NO GROWTH 5 DAYS       CULTURE, URINE [[id number]] Collected: 12/05/21 0549    Order Status: Completed Specimen: Urine from Clean catch Updated: 12/07/21 0736     Special Requests: NO SPECIAL REQUESTS        Culture result: NO GROWTH 2 DAYS       INFLUENZA A & B AG (RAPID TEST) [[id number]]     Order Status: Canceled Specimen: Nasopharyngeal           Other Studies:  No results found.     Current Meds:  Current Facility-Administered Medications   Medication Dose Route Frequency    lactated ringers bolus infusion 500 mL  500 mL IntraVENous ONCE    benzonatate (TESSALON) capsule 100 mg  100 mg Oral TID PRN    pantoprazole (PROTONIX) tablet 40 mg  40 mg Oral ACB    sodium chloride (OCEAN) 0.65 % nasal squeeze bottle 2 Spray  2 Spray Both Nostrils Q2H PRN    apixaban (ELIQUIS) tablet 5 mg  5 mg Oral BID    albuterol (PROVENTIL HFA, VENTOLIN HFA, PROAIR HFA) inhaler 2 Puff  2 Puff Inhalation Q4H PRN    sodium chloride (NS) flush 5-10 mL  5-10 mL IntraVENous PRN    aspirin delayed-release tablet 81 mg  81 mg Oral DAILY    fluticasone propionate (FLONASE) 50 mcg/actuation nasal spray 2 Spray  2 Spray Both Nostrils DAILY    [Held by provider] hydroCHLOROthiazide (HYDRODIURIL) tablet 12.5 mg  12.5 mg Oral DAILY    sodium chloride (NS) flush 5-40 mL  5-40 mL IntraVENous Q8H    sodium chloride (NS) flush 5-40 mL  5-40 mL IntraVENous PRN    acetaminophen (TYLENOL) tablet 650 mg  650 mg Oral Q6H PRN    Or    acetaminophen (TYLENOL) suppository 650 mg  650 mg Rectal Q6H PRN    polyethylene glycol (MIRALAX) packet 17 g  17 g Oral DAILY PRN    ondansetron (ZOFRAN ODT) tablet 4 mg  4 mg Oral Q8H PRN    Or    ondansetron (ZOFRAN) injection 4 mg  4 mg IntraVENous Q6H PRN    guaiFENesin-dextromethorphan (ROBITUSSIN DM) 100-10 mg/5 mL syrup 5 mL  5 mL Oral Q4H PRN    baricitinib (OLUMIANT) tablet 4 mg  4 mg Oral DAILY    guaiFENesin ER (MUCINEX) tablet 600 mg  600 mg Oral Q12H    [Held by provider] metoprolol succinate (TOPROL-XL) tablet 100 mg  100 mg Oral Q12H       Signed:  Daniel Wood MD

## 2021-12-16 PROCEDURE — 74011250637 HC RX REV CODE- 250/637: Performed by: INTERNAL MEDICINE

## 2021-12-16 PROCEDURE — 74011250637 HC RX REV CODE- 250/637: Performed by: FAMILY MEDICINE

## 2021-12-16 PROCEDURE — 65660000000 HC RM CCU STEPDOWN

## 2021-12-16 RX ADMIN — APIXABAN 5 MG: 5 TABLET, FILM COATED ORAL at 08:34

## 2021-12-16 RX ADMIN — FLUTICASONE PROPIONATE 2 SPRAY: 50 SPRAY, METERED NASAL at 08:34

## 2021-12-16 RX ADMIN — Medication 5 ML: at 14:08

## 2021-12-16 RX ADMIN — APIXABAN 5 MG: 5 TABLET, FILM COATED ORAL at 16:54

## 2021-12-16 RX ADMIN — Medication 10 ML: at 22:00

## 2021-12-16 RX ADMIN — BARICITINIB 4 MG: 2 TABLET, FILM COATED ORAL at 08:34

## 2021-12-16 RX ADMIN — Medication 10 ML: at 05:56

## 2021-12-16 RX ADMIN — PANTOPRAZOLE SODIUM 40 MG: 40 TABLET, DELAYED RELEASE ORAL at 06:06

## 2021-12-16 RX ADMIN — GUAIFENESIN 600 MG: 600 TABLET ORAL at 08:34

## 2021-12-16 RX ADMIN — ASPIRIN 81 MG: 81 TABLET ORAL at 08:34

## 2021-12-16 RX ADMIN — GUAIFENESIN 600 MG: 600 TABLET ORAL at 20:32

## 2021-12-16 NOTE — PROGRESS NOTES
Hospitalist Progress Note   Admit Date:  2021  8:45 AM   Name:  Akua Rico   Age:  73 y.o. Sex:  male  :  1950   MRN:  [id number]   Room:  [facility]/    Presenting Complaint: Positive For Covid-19    Reason(s) for Admission: COVID-19 [U07.1]  Acute hypoxemic respiratory failure ([address] [J96.01]  Atrial fibrillation with RVR [facility]) [facility] Course & Interval History:   71M PMHx HTN, CAD, AFib and prostate cancer who was admitted on  with acute hypoxemic respiratory failure 2/2 COVID-19. Started on dexamethasone, baricitinib and O2. CT chest neg for PE. O2 needs increased and started on Airvo , dexamethasone increased on 12/10. Airvo settings down to 45L/50% on . Subjective (21): Airvo reamains 45/40%. Reports feeling well. Just very dependent on ventilator. Continues to experience dyspnea on exertion. Discussed again aggressive weaning of oxygen with respiratory. Assessment & Plan:   * Acute hypoxemic respiratory failure ([address]  Secondary to Covid. Unvaccinated. Was septic on arrival.  Completed course of doxycycline, ceftriaxone. Most recent pro-Quinn is negative.   Required airvo .  -Airvo 40L/40%  -Dexamethasone, baricitinib  -Serial CRP  -Supportive care maintain O2 sat greater than 88  -Symptomatic management    : continue to attempt to wean, tolerate sats at 88    Sinus bradycardia  -Hold metoprolol    : remains borderline bradycardic, may need to DC metoprolol    Hypertension  -HCTZ, metoprolol held    : continue to hold with soft pressures    Class 1 obesity due to excess calories with serious comorbidity and body mass index (BMI) of 33.0 to 33.9 in adult  Increased risk of all cause mortality, complicating care  - healthy lifestyle at discharge    Paroxysmal atrial fibrillation (HCC)  -Apixaban    Sleep apnea  -CPAP qhs    There is a high probability of acute organ impairment or life-threatening deterioration in the patient's condition from: Acute respiratory failure secondary to Covid  Critical care interventions: Noninvasive positive pressure ventilation  Total critical care time spent: 33 minutes. Time is indicative of direct patient attendance at bedside and on the patient's floor nearby. Includes time spent at bedside performing history and exam, performing chart review, discussing findings and treatment plan with patient and/or family, discussing patient with consultants and colleagues, ordering and reviewing pertinent laboratory and radiographic evaluations, and discussing patient with nursing staff. Time excludes procedures.       Dispo/Discharge Planning:      Home with home O2 in 2-3d    Diet:  ADULT ORAL NUTRITION SUPPLEMENT Breakfast, Lunch, Dinner; Standard High Calorie/High Protein  ADULT DIET Regular; dislikes sweet tea; lactose intolerance  DVT PPx: On apixaban  Code status: DNR    Hospital Problems as of 12/16/2021 Date Reviewed: 8/27/2021          Codes Class Noted - Resolved POA    * (Principal) Acute hypoxemic respiratory failure (HCC) ICD-10-CM: J96.01  ICD-9-CM: 518.81  12/4/2021 - Present Yes        Sinus bradycardia ICD-10-CM: R00.1  ICD-9-CM: 427.89  12/14/2021 - Present No        Elevated serum creatinine ICD-10-CM: R79.89  ICD-9-CM: 790.99  12/15/2021 - Present No        Hypertension ICD-10-CM: I10  ICD-9-CM: 401.9  3/14/2017 - Present Yes        Class 1 obesity due to excess calories with serious comorbidity and body mass index (BMI) of 33.0 to 33.9 in adult ICD-10-CM: E66.09, Z68.33  ICD-9-CM: 278.00, V85.33  3/11/2016 - Present Yes        Atrial fibrillation with RVR (HCC) ICD-10-CM: I48.91  ICD-9-CM: 427.31  12/4/2021 - Present Yes        COVID-19 ICD-10-CM: U07.1  ICD-9-CM: 079.89  12/4/2021 - Present Yes        Paroxysmal atrial fibrillation (HCC) ICD-10-CM: I48.0  ICD-9-CM: 427.31  3/12/2016 - Present Yes        CAD (coronary artery disease) ICD-10-CM: I25.10  ICD-9-CM: 414.00  3/12/2016 - Present Yes        Sleep apnea (Chronic) ICD-10-CM: G47.30  ICD-9-CM: 780.57  3/11/2016 - Present Yes        Hypoxia ICD-10-CM: R09.02  ICD-9-CM: 799.02  3/11/2016 - Present Yes        S/P CABG x 4 ICD-10-CM: Z95.1  ICD-9-CM: V45.81  3/9/2016 - Present Yes        RESOLVED: Sepsis ([address] ICD-10-CM: A41.9  ICD-9-CM: 038.9, 995.91  12/8/2021 - 12/8/2021 Yes              Objective:     Patient Vitals for the past 24 hrs:   Temp Pulse Resp BP SpO2   12/16/21 1300 98.1 °F (36.7 °C) 79 18 (!) 113/59 94 %   12/16/21 0826 97.9 °F (36.6 °C) 63 22 119/63 93 %   12/16/21 0359 97.9 °F (36.6 °C) 61 18 129/70 95 %   12/15/21 2243 98.1 °F (36.7 °C) 72 18 128/63 96 %   12/15/21 2240     96 %   12/15/21 1922 98.2 °F (36.8 °C) 69 18 120/65 94 %     Oxygen Therapy  O2 Sat (%): 94 % (12/16/21 1300)  Pulse via Oximetry: 55 beats per minute (12/15/21 0028)  O2 Device: Heated; Hi flow nasal cannula (12/16/21 1300)  Skin Assessment: Clean, dry, & intact (12/15/21 2240)  Skin Protection for O2 Device: No (12/15/21 2240)  O2 Flow Rate (L/min): 45 l/min (12/16/21 1300)  O2 Temperature: 87.8 °F (31 °C) (12/15/21 0028)  FIO2 (%): 40 % (12/16/21 0252)    Estimated body mass index is 31.66 kg/m² as calculated from the following:    Height as of this encounter: 5' 10\" (1.778 m). Weight as of this encounter: 100.1 kg (220 lb 10.9 oz). Intake/Output Summary (Last 24 hours) at 12/16/2021 1626  Last data filed at 12/16/2021 1300  Gross per 24 hour   Intake 480 ml   Output 1500 ml   Net -1020 ml       Blood pressure (!) 113/59, pulse 79, temperature 98.1 °F (36.7 °C), resp. rate 18, height 5' 10\" (1.778 m), weight 100.1 kg (220 lb 10.9 oz), SpO2 94 %. Physical Exam  Vitals and nursing note reviewed. Constitutional:       General: He is not in acute distress. Appearance: Normal appearance. He is obese. He is not ill-appearing. HENT:      Head: Normocephalic. Eyes:      Extraocular Movements: Extraocular movements intact. Cardiovascular:      Rate and Rhythm: Normal rate. Pulses:           Radial pulses are 2+ on the left side. Pulmonary:      Effort: Pulmonary effort is normal. No respiratory distress. Breath sounds: No wheezing. Musculoskeletal:         General: No deformity. Cervical back: No rigidity. Right lower leg: No edema. Left lower leg: No edema. Skin:     General: Skin is warm and dry. Neurological:      General: No focal deficit present. Mental Status: He is alert and oriented to person, place, and time.    Psychiatric:         Mood and Affect: Mood normal.         Behavior: Behavior normal.         I have reviewed ordered lab tests and independently visualized imaging below:    Recent Labs:  Recent Results (from the past 48 hour(s))   CBC W/O DIFF    Collection Time: 12/15/21 10:20 AM   Result Value Ref Range    WBC 14.2 (H) 4.3 - 11.1 K/uL    RBC 5.36 4.23 - 5.6 M/uL    HGB 15.6 13.6 - 17.2 g/dL    HCT 46.8 41.1 - 50.3 %    MCV 87.3 79.6 - 97.8 FL    MCH 29.1 26.1 - 32.9 PG    MCHC 33.3 31.4 - 35.0 g/dL    RDW 13.0 11.9 - 14.6 %    PLATELET 820 (H) 280 - 450 K/uL    MPV 9.5 9.4 - 12.3 FL    ABSOLUTE NRBC 0.00 0.0 - 0.2 K/uL   METABOLIC PANEL, BASIC    Collection Time: 12/15/21 10:20 AM   Result Value Ref Range    Sodium 139 136 - 145 mmol/L    Potassium 4.6 3.5 - 5.1 mmol/L    Chloride 106 98 - 107 mmol/L    CO2 28 21 - 32 mmol/L    Anion gap 5 (L) 7 - 16 mmol/L    Glucose 86 65 - 100 mg/dL    BUN 25 (H) 8 - 23 MG/DL    Creatinine 1.19 0.8 - 1.5 MG/DL    GFR est AA >60 >60 ml/min/1.73m2    GFR est non-AA >60 >60 ml/min/1.73m2    Calcium 8.7 8.3 - 10.4 MG/DL   MAGNESIUM    Collection Time: 12/15/21 10:20 AM   Result Value Ref Range    Magnesium 2.1 1.8 - 2.4 mg/dL   C REACTIVE PROTEIN, QT    Collection Time: 12/15/21 10:20 AM   Result Value Ref Range    C-Reactive protein <0.3 0.0 - 0.9 mg/dL       All Micro Results     Procedure Component Value Units Date/Time    CULTURE, BLOOD [[id number]] Collected: 12/04/21 1856    Order Status: Completed Specimen: Blood Updated: 12/09/21 0720     Special Requests: --        RIGHT  HAND       Culture result: NO GROWTH 5 DAYS       CULTURE, BLOOD [[id number]] Collected: 12/04/21 1856    Order Status: Completed Specimen: Blood Updated: 12/09/21 0720     Special Requests: --        LEFT  Antecubital       Culture result: NO GROWTH 5 DAYS       CULTURE, URINE [[id number]] Collected: 12/05/21 0549    Order Status: Completed Specimen: Urine from Clean catch Updated: 12/07/21 0736     Special Requests: NO SPECIAL REQUESTS        Culture result: NO GROWTH 2 DAYS       INFLUENZA A & B AG (RAPID TEST) [[id number]]     Order Status: Canceled Specimen: Nasopharyngeal           Other Studies:  No results found.     Current Meds:  Current Facility-Administered Medications   Medication Dose Route Frequency    benzonatate (TESSALON) capsule 100 mg  100 mg Oral TID PRN    pantoprazole (PROTONIX) tablet 40 mg  40 mg Oral ACB    sodium chloride (OCEAN) 0.65 % nasal squeeze bottle 2 Spray  2 Spray Both Nostrils Q2H PRN    apixaban (ELIQUIS) tablet 5 mg  5 mg Oral BID    albuterol (PROVENTIL HFA, VENTOLIN HFA, PROAIR HFA) inhaler 2 Puff  2 Puff Inhalation Q4H PRN    sodium chloride (NS) flush 5-10 mL  5-10 mL IntraVENous PRN    aspirin delayed-release tablet 81 mg  81 mg Oral DAILY    fluticasone propionate (FLONASE) 50 mcg/actuation nasal spray 2 Spray  2 Spray Both Nostrils DAILY    [Held by provider] hydroCHLOROthiazide (HYDRODIURIL) tablet 12.5 mg  12.5 mg Oral DAILY    sodium chloride (NS) flush 5-40 mL  5-40 mL IntraVENous Q8H    sodium chloride (NS) flush 5-40 mL  5-40 mL IntraVENous PRN    acetaminophen (TYLENOL) tablet 650 mg  650 mg Oral Q6H PRN    Or    acetaminophen (TYLENOL) suppository 650 mg  650 mg Rectal Q6H PRN    polyethylene glycol (MIRALAX) packet 17 g  17 g Oral DAILY PRN    ondansetron (ZOFRAN ODT) tablet 4 mg  4 mg Oral Q8H PRN    Or    ondansetron (ZOFRAN) injection 4 mg  4 mg IntraVENous Q6H PRN    guaiFENesin-dextromethorphan (ROBITUSSIN DM) 100-10 mg/5 mL syrup 5 mL  5 mL Oral Q4H PRN    baricitinib (OLUMIANT) tablet 4 mg  4 mg Oral DAILY    guaiFENesin ER (MUCINEX) tablet 600 mg  600 mg Oral Q12H    [Held by provider] metoprolol succinate (TOPROL-XL) tablet 100 mg  100 mg Oral Q12H       Signed:  Prince Chung MD

## 2021-12-16 NOTE — PROGRESS NOTES
Problem: Risk for Spread of Infection  Goal: Prevent transmission of infectious organism to others  Description: Prevent the transmission of infectious organisms to other patients, staff members, and visitors. Outcome: Progressing Towards Goal     Problem: Falls - Risk of  Goal: *Absence of Falls  Description: Document Asha Fischerpat Fall Risk and appropriate interventions in the flowsheet. Outcome: Progressing Towards Goal  Note: Fall Risk Interventions:  Mobility Interventions: Patient to call before getting OOB    Mentation Interventions: Bed/chair exit alarm    Medication Interventions: Teach patient to arise slowly    Elimination Interventions: Call light in reach    History of Falls Interventions:  Investigate reason for fall         Problem: Nutrition Deficits  Goal: Optimize nutrtional status  Outcome: Progressing Towards Goal

## 2021-12-16 NOTE — PROGRESS NOTES
Patient in bed resting. Respirations even and unlabored. On Airvo 45L/42%. No needs expressed at this time. Safety measures in place. Call light in reach. No signs of acute distress. Will continue to monitor. Preparing to give report to oncoming RN.

## 2021-12-16 NOTE — PROGRESS NOTES
Report received from [place], [address] [place] [address]. Patient in bed resting. Respirations even and unlabored. On Airvo 45L/42%. No needs expressed at this time. Safety measures in place. Call light in reach. No signs of acute distress at this time. Will continue to monitor.

## 2021-12-16 NOTE — PROGRESS NOTES
Beside shift report received from [facility]  Patient lying in bed  Respirations even and unlabored on Airvo 45L40%  O2 sat 92% via continuous pulse ox  No signs of distress  No needs expressed at this time  Safety measures in place

## 2021-12-17 ENCOUNTER — HOME HEALTH ADMISSION (OUTPATIENT)
Dept: HOME HEALTH SERVICES | Facility: HOME HEALTH | Age: 71
End: 2021-12-17
Payer: COMMERCIAL

## 2021-12-17 VITALS
DIASTOLIC BLOOD PRESSURE: 65 MMHG | BODY MASS INDEX: 31.64 KG/M2 | TEMPERATURE: 97.4 F | RESPIRATION RATE: 18 BRPM | OXYGEN SATURATION: 95 % | HEART RATE: 75 BPM | SYSTOLIC BLOOD PRESSURE: 133 MMHG | WEIGHT: 221 LBS | HEIGHT: 70 IN

## 2021-12-17 PROBLEM — I10 PRIMARY HYPERTENSION: Chronic | Status: ACTIVE | Noted: 2017-03-14

## 2021-12-17 PROBLEM — I48.91 ATRIAL FIBRILLATION WITH RVR (HCC): Status: RESOLVED | Noted: 2021-12-04 | Resolved: 2021-12-17

## 2021-12-17 PROBLEM — R79.89 ELEVATED SERUM CREATININE: Status: RESOLVED | Noted: 2021-12-15 | Resolved: 2021-12-17

## 2021-12-17 PROBLEM — R00.1 SINUS BRADYCARDIA: Status: RESOLVED | Noted: 2021-12-14 | Resolved: 2021-12-17

## 2021-12-17 PROBLEM — D68.69 SECONDARY HYPERCOAGULABLE STATE (HCC): Chronic | Status: ACTIVE | Noted: 2021-12-17

## 2021-12-17 PROBLEM — J96.01 ACUTE HYPOXEMIC RESPIRATORY FAILURE (HCC): Status: RESOLVED | Noted: 2021-12-04 | Resolved: 2021-12-17

## 2021-12-17 PROCEDURE — 97110 THERAPEUTIC EXERCISES: CPT | Performed by: PHYSICAL THERAPIST

## 2021-12-17 PROCEDURE — 74011250637 HC RX REV CODE- 250/637: Performed by: INTERNAL MEDICINE

## 2021-12-17 PROCEDURE — 74011250637 HC RX REV CODE- 250/637: Performed by: FAMILY MEDICINE

## 2021-12-17 RX ORDER — BENZONATATE 100 MG/1
100 CAPSULE ORAL
Qty: 21 CAPSULE | Refills: 0 | Status: SHIPPED | OUTPATIENT
Start: 2021-12-17 | End: 2021-12-24

## 2021-12-17 RX ADMIN — APIXABAN 5 MG: 5 TABLET, FILM COATED ORAL at 08:55

## 2021-12-17 RX ADMIN — FLUTICASONE PROPIONATE 2 SPRAY: 50 SPRAY, METERED NASAL at 08:56

## 2021-12-17 RX ADMIN — Medication 10 ML: at 05:24

## 2021-12-17 RX ADMIN — BARICITINIB 4 MG: 2 TABLET, FILM COATED ORAL at 08:55

## 2021-12-17 RX ADMIN — PANTOPRAZOLE SODIUM 40 MG: 40 TABLET, DELAYED RELEASE ORAL at 06:11

## 2021-12-17 RX ADMIN — GUAIFENESIN 600 MG: 600 TABLET ORAL at 08:55

## 2021-12-17 RX ADMIN — ASPIRIN 81 MG: 81 TABLET ORAL at 08:55

## 2021-12-17 NOTE — PROGRESS NOTES
Problem: Risk for Spread of Infection  Goal: Prevent transmission of infectious organism to others  Description: Prevent the transmission of infectious organisms to other patients, staff members, and visitors. Outcome: Progressing Towards Goal     Problem: Falls - Risk of  Goal: *Absence of Falls  Description: Document Marcie Dudley Fall Risk and appropriate interventions in the flowsheet.   Outcome: Progressing Towards Goal  Note: Fall Risk Interventions:  Mobility Interventions: Patient to call before getting OOB    Mentation Interventions: Bed/chair exit alarm    Medication Interventions: Teach patient to arise slowly    Elimination Interventions: Call light in reach    History of Falls Interventions: Door open when patient unattended

## 2021-12-17 NOTE — PROGRESS NOTES
Discharge instruction and medication list reviewed and given to patient. Verbalizes understanding. Prescription reviewed and given. Peripheral IV removed with cath tip intact post removal. Exit site pink. Personal belonging with patient (see DC papers). Patient to schedule follow up appointment with PCP due to office being closed. Verbalizes understanding. Aware pt will be using oxygen 3 liters via nasal canula with ambulation and activity. Awaiting Portable O2 tank at bedside. Instructed to call desk when portable O2 tank is delivered and ready for wheelchair. Primary nurse Rosalia yap.

## 2021-12-17 NOTE — DISCHARGE INSTRUCTIONS
Patient Education        [address] After Treatment for COVID-19: Care Instructions  Overview     You are being sent home from the hospital after being treated for COVID-19. Being in the hospital can be hard, especially if you've been in the intensive care unit (ICU). Even though you're going home, you probably don't feel well yet. Healing from COVID-19 takes time. You may feel very tired for weeks or months afterward, especially if you were on a ventilator. It will take time to get back to your old level of activity. Some people may have long-lasting health problems. But most people can look forward to feeling a little better every day. If you were on a ventilator, your throat may be sore and your voice hoarse or raspy for a while. After leaving the hospital, some people have feelings of anxiety and depression. They may have nightmares. Or in their mind they may relive events that happened in the hospital (flashbacks). Reach out to your doctor if you're having trouble with these symptoms. Your doctor will tell you if you need to isolate yourself at home, and when you can end isolation. How can you self-isolate when you have COVID-19? If you have COVID-19, there are things you can do to help avoid spreading the virus to others. · Limit contact with people in your home. If possible, stay in a separate bedroom and use a separate bathroom. · Wear a mask when you are around other people. · If you have to leave home, avoid crowds and try to stay at least 6 feet away from other people. · Avoid contact with pets and other animals. · Cover your mouth and nose with a tissue when you cough or sneeze. Then throw it in the trash right away. · Wash your hands often, especially after you cough or sneeze. Use soap and water, and scrub for at least 20 seconds. If soap and water aren't available, use an alcohol-based hand . · Don't share personal household items.  These include bedding, towels, cups and glasses, and eating utensils. · [address] in the warmest water allowed for the fabric type, and dry it completely. It's okay to wash other people's laundry with yours. · Clean and disinfect your home. Use household  and disinfectant wipes or sprays. Follow-up care is a key part of your treatment and safety. Be sure to make and go to all appointments, and call your doctor if you are having problems. It's also a good idea to know your test results and keep a list of the medicines you take. How can you care for yourself at home? · Get plenty of rest. It can help you feel better. · Be kind to yourself if it's taking longer than you expected to feel better. You've been through a stressful time. · Get up and walk around every hour or two while you're resting. Slowly increase your activity as you start to feel better. · Eat healthy foods. · Drink plenty of fluids. If you have kidney, heart, or liver disease and have to limit fluids, talk with your doctor before you increase the amount of fluids you drink. · If needed, take acetaminophen (Tylenol) or ibuprofen (Advil, Motrin) to reduce a fever. It may also help with muscle aches. Read and follow all instructions on the label. When should you call for help? Call 911 anytime you think you may need emergency care. For example, call if you have life-threatening symptoms, such as:    · You have severe trouble breathing. (You can't talk at all.)     · You have constant chest pain or pressure.     · You are severely dizzy or lightheaded.     · You are confused or can't think clearly.     · You have pale, gray, or blue-colored skin or lips.     · You pass out (lose consciousness) or are very hard to wake up. Call your doctor now or seek immediate medical care if:    · You have moderate trouble breathing. (You can't speak a full sentence.)     · You are coughing up blood (more than about 1 teaspoon).     · You have signs of low blood pressure.  These include feeling lightheaded; being too weak to stand; and having cold, pale, clammy skin. Watch closely for changes in your health, and be sure to contact your doctor if:    · Your symptoms get worse.     · You are not getting better as expected.     · You have new or worse symptoms of anxiety, depression, nightmares, or flashbacks. Call before you go to the doctor's office. Follow their instructions. And wear a mask. Current as of: July 1, 2021               Content Version: 13.0  © 2006-2021 [employer]. Care instructions adapted under license by [employer] (which disclaims liability or warranty for this information). If you have questions about a medical condition or this instruction, always ask your healthcare professional. [address] any warranty or liability for your use of this information.

## 2021-12-17 NOTE — PROGRESS NOTES
Problem: Risk for Spread of Infection  Goal: Prevent transmission of infectious organism to others  Description: Prevent the transmission of infectious organisms to other patients, staff members, and visitors. 12/17/2021 1120 by Antonio Toro  Outcome: Resolved/Met  12/17/2021 0943 by Antonio Toro  Outcome: Progressing Towards Goal     Problem: Patient Education:  Go to Education Activity  Goal: Patient/Family Education  Outcome: Resolved/Met     Problem: Falls - Risk of  Goal: *Absence of Falls  Description: Document Knapp Reason Fall Risk and appropriate interventions in the flowsheet. 12/17/2021 1120 by Antonio Toro  Outcome: Resolved/Met  12/17/2021 0943 by Antonio Toro  Outcome: Progressing Towards Goal  Note: Fall Risk Interventions:  Mobility Interventions: Patient to call before getting OOB    Mentation Interventions: Bed/chair exit alarm    Medication Interventions: Teach patient to arise slowly    Elimination Interventions: Call light in reach    History of Falls Interventions: Door open when patient unattended         Problem: Patient Education: Go to Patient Education Activity  Goal: Patient/Family Education  Outcome: Resolved/Met     Problem: Airway Clearance - Ineffective  Goal: Achieve or maintain patent airway  Outcome: Resolved/Met     Problem: Gas Exchange - Impaired  Goal: Absence of hypoxia  Outcome: Resolved/Met  Goal: Promote optimal lung function  Outcome: Resolved/Met     Problem: Breathing Pattern - Ineffective  Goal: Ability to achieve and maintain a regular respiratory rate  Outcome: Resolved/Met     Problem:  Body Temperature -  Risk of, Imbalanced  Goal: Ability to maintain a body temperature within defined limits  Outcome: Resolved/Met  Goal: Will regain or maintain usual level of consciousness  Outcome: Resolved/Met  Goal: Complications related to the disease process, condition or treatment will be avoided or minimized  Outcome: Resolved/Met     Problem: Isolation Precautions - Risk of Spread of Infection  Goal: Prevent transmission of infectious organism to others  Outcome: Resolved/Met     Problem: Nutrition Deficits  Goal: Optimize nutrtional status  Outcome: Resolved/Met     Problem: Risk for Fluid Volume Deficit  Goal: Maintain normal heart rhythm  Outcome: Resolved/Met  Goal: Maintain absence of muscle cramping  Outcome: Resolved/Met  Goal: Maintain normal serum potassium, sodium, calcium, phosphorus, and pH  Outcome: Resolved/Met     Problem: Loneliness or Risk for Loneliness  Goal: Demonstrate positive use of time alone when socialization is not possible  Outcome: Resolved/Met     Problem: Fatigue  Goal: Verbalize increase energy and improved vitality  Outcome: Resolved/Met     Problem: Patient Education: Go to Patient Education Activity  Goal: Patient/Family Education  Outcome: Resolved/Met     Problem: Pressure Injury - Risk of  Goal: *Prevention of pressure injury  Description: Document Luis Alfredo Scale and appropriate interventions in the flowsheet.   Outcome: Resolved/Met     Problem: Patient Education: Go to Patient Education Activity  Goal: Patient/Family Education  Outcome: Resolved/Met     Problem: Patient Education: Go to Patient Education Activity  Goal: Patient/Family Education  Outcome: Resolved/Met     Problem: Patient Education: Go to Patient Education Activity  Goal: Patient/Family Education  Outcome: Resolved/Met

## 2021-12-17 NOTE — PROGRESS NOTES
Beside shift report received from [facility]  Patient lying in bed  Respirations even and unlabored on 4L NC  O2 sat 93% via continuous pulse ox  No signs of distress  No needs expressed at this time  Safety measures in place

## 2021-12-17 NOTE — PROGRESS NOTES
ACUTE PHYSICAL THERAPY GOALS:  (Developed with and agreed upon by patient and/or caregiver.)  (1.)Mr. Sanjuana Whipple will move from supine to sit and sit to supine , scoot up and down and roll side to side in bed with INDEPENDENT within 7 treatment day(s). (2.)Mr. Sanjuana Whipple will transfer from bed to chair and chair to bed with INDEPENDENT using the least restrictive device within 7 treatment day(s). (3.)Mr. Sanujana Whipple will ambulate with INDEPENDENT for 250+ feet with the least restrictive device within 7 treatment day(s) while maintaining normal vital signs. (4.)Mr. Sanjuana Whipple will tolerate 23+ minutes of therapeutic activity within 7 treatment days for increased activity tolerance and strength. PHYSICAL THERAPY: Daily Note and AM Treatment Day # 5    Leo South is a 73 y.o. male   PRIMARY DIAGNOSIS: Acute hypoxemic respiratory failure ([facility]  COVID-19 [U07.1]  Acute hypoxemic respiratory failure (HCC) [J96.01]  Atrial fibrillation with RVR ([facility] [I48.91]         ASSESSMENT:     REHAB RECOMMENDATIONS: CURRENT LEVEL OF FUNCTION:  (Most Recently Demonstrated)   Recommendation to date pending progress:  [address] [place] [address]  Equipment:    To Be Determined Bed Mobility:   Not tested  Sit to Stand:   Supervision  Transfers:   Supervision  Gait/Mobility:   Supervision     ASSESSMENT:  Mr. Sanjuana Whipple is a pleasant 73year old male admitted with acute respiratory failure secondary to COVID 19 virus. He presents in bedside chair. Upon entering, Pnt is agreeable to PT treatment. he reports no pain at rest. Pnt performed  Sit > stand with supervision using nothing. Gait 2 x 80 ft with supervision, cues for step lenght. Gait is noted to be slow. Stand > sit with supervision, followed by therex in the chair and  positioning for comfort. At end of session pt up in bedside chair with all needs within reach, alarm activated for safety, RN notified.  Overall, great progress today as pnt improved in activity tolerance. Pnt continues to present as functioning below his baseline, with deficits in mobility including transfers, gait, balance, and activity tolerance. Pt will continue to benefit from skilled therapy services to address stated deficits to promote return to highest level of function, independence, and safety. Will continue to follow. SUBJECTIVE:   Mr. 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 Retired.    Home Environment: Private residence  One/Two Story Residence: One story  Living Alone: No  Support Systems: Spouse/Significant Other  OBJECTIVE:     PAIN: VITAL SIGNS: LINES/DRAINS:   Pre Treatment: Pain Screen  Pain Scale 1: Numeric (0 - 10)  Pain Intensity 1: 0  Post Treatment: 0/10   Continuous Pulse Oximetry and IV  O2 Device: Nasal cannula     MOBILITY: I Mod I S SBA CGA Min Mod Max Total  NT x2 Comments:   Bed Mobility    Rolling [] [] [] [] [] [] [] [] [] [x] []    Supine to Sit [] [] [] [] [] [] [] [] [] [x] []    Scooting [] [] [] [] [] [] [] [] [] [x] []    Sit to Supine [] [] [] [] [] [] [] [] [] [x] []    Transfers    Sit to Stand [] [] [x] [] [] [] [] [] [] [] []    Bed to Chair [] [] [] [] [] [] [] [] [] [x] []    Stand to Sit [] [] [x] [] [] [] [] [] [] [] []    I=Independent, Mod I=Modified Independent, S=Supervision, SBA=Standby Assistance, CGA=Contact Guard Assistance,   Min=Minimal Assistance, Mod=Moderate Assistance, Max=Maximal Assistance, Total=Total Assistance, NT=Not Tested    BALANCE: Good Fair+ Fair Fair- Poor NT Comments   Sitting Static [x] [] [] [] [] []    Sitting Dynamic [x] [] [] [] [] []              Standing Static [x] [] [] [] [] []    Standing Dynamic [x] [] [] [] [] []      GAIT: I Mod I S SBA CGA Min Mod Max Total  NT x2 Comments:   Level of Assistance [] [] [x] [] [] [] [] [] [] [] []    Distance 2 x 80    DME N/A    Gait Quality N/A    Weightbearing  Status N/A     I=Independent, Mod I=Modified Independent, S=Supervision, SBA=Standby Assistance, CGA=Contact Guard Assistance,   Min=Minimal Assistance, Mod=Moderate Assistance, Max=Maximal Assistance, Total=Total Assistance, NT=Not Tested    PLAN:   FREQUENCY/DURATION: PT Plan of Care: 3 times/week for duration of hospital stay or until stated goals are met, whichever comes first.  TREATMENT:     TREATMENT:   ($$ Therapeutic Exercises: 23-37 mins    )  Therapeutic Exercise (23 Minutes): Therapeutic exercises noted below to improve functional activity tolerance.     TREATMENT GRID:   Date:  12/10/21 Date:  12/13 Date  12/14/21 Date:  12/17   Activity/Exercise Parameters Parameters Parameters    Standing toe raises X 10 B    3 x 10   Standing marching  X 10 B   3 x 10   Standing hip abd/add X 10 B       Standing hip ext X 10 B      Standing squats X 10 (5 then rest break)      Sit to stand -- x10 20    Seated hip flexion -- 2x20B 20    LAQ -- x10B 20 3 x 10   Ankle dorsiflexion/plantarflexion -- x25B 20    Shoulder flexion -- x12B 20    Shoulder horizontal ab/adduction -- x12B     UE press/punches -- x15B     Gluteal sets   20        AFTER TREATMENT POSITION/PRECAUTIONS:  Chair, Needs within reach and RN notified       INTERDISCIPLINARY COLLABORATION:  RN/PCT and PT/PTA    TOTAL TREATMENT DURATION:  PT Patient Time In/Time Out  Time In: 1052  Time Out: Silinda 19

## 2021-12-17 NOTE — PROGRESS NOTES
Report received from [facility]. Patient resting quietly in bed. Respirations present, even and unlabored on 4L NC. Bed low and locked, safety measures in place. No signs of distress, no needs expressed by the patient. Call light within reach, encouraged patient to call with any needs. Will continue to monitor.

## 2021-12-17 NOTE — ADT AUTH CERT NOTES
Viral Illness, Acute - Care Day 9 (12/12/2021) by Karin Diaz RN       Review Status Review Entered   Completed 12/17/2021 08:46      Criteria Review      Care Day: 9 Care Date: 12/12/2021 Level of Care: Telemetry    Guideline Day 3    Clinical Status    ( ) * Hemodynamic stability    (X) * Afebrile or temperature acceptable for next level of care    ( ) * Tachypnea absent    ( ) * Hypoxemia absent    (X) * Mental status at baseline    ( ) * Renal function at baseline, or stable and acceptable for next level of care    ( ) * Discharge plans and education understood    Activity    (X) * Ambulatory or acceptable for next level of care    Routes    (X) * Oral hydration    (X) * Oral medications or regimen acceptable for next level of care    (X) * Oral diet or acceptable for next level of care    Interventions    ( ) * Isolation not indicated, or is performable at next level of care    (X) Pulse oximetry    Medications    (X) * Antimicrobial medication absent or regimen established for next level of care    (X) Possible DVT prophylaxis    12/17/2021 08:46:50 EST by Karin GARCIA BID 5MG PO X 2    * Milestone   Additional Notes   IM 12/12:     Presenting Complaint: Positive For Covid-19       Reason(s) for Admission: COVID-19 [U07.1]   Acute hypoxemic respiratory failure (HCC) [J96.01]   Atrial fibrillation with RVR [facility]) [I48.91]       Hospital Course & Interval History:   Mr. Carmenza Salamanca is a 72 y/o WM with a h/o HTN, CAD, AFib and prostate cancer who was admitted on 12/4 with acute hypoxemic respiratory failure 2/2 COVID-19. Started on dexamethasone, baricitinib and O2. CT chest neg for PE. O2 needs increased and started on Airvo 12/9, dexamethasone increased on 12/10.       Subjective (12/12/21):   Slept good last night, appetite is good, no chest pain, N/V/D.  He continues to feel pretty good overall, does have mild conversational dyspnea still.       Assessment & Plan:   # Acute hypoxemic respiratory failure and sepsis 2/2 COVID-19               - Fever + tachycardia + leukocytosis + COVID pos. Sepsis resolved.                - QZBTNBB to Airvo 12/9 and dexamethasone was increased. Daphicali Tomasn currently at 45L/90%, will try and wean FiO2 today and see how he tolerates. Repeat procalcitonin is negative, has already completed a course of Rocephin and now 5d doxycycline which I think is sufficient.       # Sinus bradycardia               - Hold metoprolol today, HR 50s.     # AFib RVR               - Likely instigated by respiratory issues. Resolved. Eliquis. BB held as above.       # HypoNa               - Resolved.       # HTN/CAD/HLD               - Home meds       # H/o prostate cancer               - Outpt f/u as indicated       Dispo/Discharge Planning: Home when able. Attempted to call wife this morning, no answer, did not leave VM. Diet:  ADULT DIET Regular; dislikes sweet tea   ADULT ORAL NUTRITION SUPPLEMENT Breakfast, Lunch, Dinner; Standard High Calorie/High Protein   DVT PPx: Lovenox   Code status: Full Code        General:          Well nourished.  No overt distress. Obese. Head:               Normocephalic, atraumatic   Eyes:               Sclerae appear normal.  Pupils equally round. ENT:                Nares appear normal, no drainage.  Moist oral mucosa   Neck:               No restricted ROM.   Trachea midline    CV:                  Bradycardia.  No m/r/g.  No jugular venous distension. Lungs:             Bibasilar rales, Airvo 45L/90% with bedside sats mid to high 90s at rest in bed. Conversational dyspnea persists, but overall appears comfortable. Abdomen:        Bowel sounds present.  Soft, nontender, nondistended.    Extremities:     No cyanosis or clubbing.  No edema   Skin:                No rashes and normal coloration.   Warm and dry.     Neuro:             CN II-XII grossly intact.   Sensation intact.  A&Ox3   Psych:             Normal mood and affect.                        WBC 13.8        K 5.2    CRP 1.1                110/63    97.3F    64HR    20RR    95%45L HIGH FLOW NC                DECADRON Q12H 10MG IV X 2

## 2021-12-17 NOTE — PROGRESS NOTES
MSN, CM:  Patient's wife called primary RN about eliquis and patient's insurance would take a few days to process medication. Primary RN given eliquis 30 day free coupon. Primary RN to call wife to come  coupon.

## 2021-12-17 NOTE — PROGRESS NOTES
No acute events overnight. Patient resting quietly in bed. Respirations present, even and unlabored on 4L NC. Bed low and locked, safety measures in place. No signs of distress, no needs expressed by the patient. Call light within reach, encouraged patient to call with any needs. Preparing to give report to oncoming RN.

## 2021-12-17 NOTE — PROGRESS NOTES
MSN, CM:  Patient to be discharged home today with [facility] [place] services ordered. Patient will also require home oxygen which will be provided by [facility].  Patient agrees with this discharge plan and has met all milestones for this admission. Family to transport patient home. Care Management Interventions  PCP Verified by CM: Yes (Dr. Mack Harada )  Mode of Transport at Discharge:  Other (see comment) (family to transport)  Transition of Care Consult (CM Consult): [address]: Yes  Health Maintenance Reviewed: Yes  Physical Therapy Consult: Yes  Support Systems: Spouse/Significant Other  Confirm Follow Up Transport: Self  The Plan for Transition of Care is Related to the Following Treatment Goals : Home health to regain strength back to baseline to self care  The Patient and/or Patient Representative was Provided with a Choice of Provider and Agrees with the Discharge Plan?: Yes  Name of the Patient Representative Who was Provided with a Choice of Provider and Agrees with the Discharge Plan: Mr. John Marshall (patient)  Freedom of Choice List was Provided with Basic Dialogue that Supports the Patient's Individualized Plan of Care/Goals, Treatment Preferences and Shares the Quality Data Associated with the Providers?: Yes  Discharge Location  Discharge Placement: Home with home health

## 2021-12-17 NOTE — DISCHARGE SUMMARY
Hospitalist Discharge Summary   Admit Date:  2021  8:45 AM   DC Note date: 2021  Name:  Hu Fuentes   Age:  73 y.o.   Sex:  male  :  1950   MRN:  [id number]   Room:  [facility]  PCP:  Madeline Sandoval MD    Presenting Complaint: Positive For Covid-19    Initial Admission Diagnosis: COVID-19 [U07.1]  Acute hypoxemic respiratory failure (HCC) [J96.01]  Atrial fibrillation with RVR ([address] [I48.91]     Problem List for this Hospitalization:  Hospital Problems as of 2021 Date Reviewed: 2021          Codes Class Noted - Resolved POA    * (Principal) RESOLVED: Acute hypoxemic respiratory failure ([address] ICD-10-CM: J96.01  ICD-9-CM: 518.81  2021 - 2021 Yes        RESOLVED: Sinus bradycardia ICD-10-CM: R00.1  ICD-9-CM: 427.89  2021 - 2021 No        Primary hypertension (Chronic) ICD-10-CM: I10  ICD-9-CM: 401.9  3/14/2017 - Present Yes        RESOLVED: Elevated serum creatinine ICD-10-CM: R79.89  ICD-9-CM: 790.99  12/15/2021 - 2021 No        Class 1 obesity due to excess calories with serious comorbidity and body mass index (BMI) of 31.0 to 31.9 in adult ICD-10-CM: E66.09, Z68.31  ICD-9-CM: 278.00, V85.31  3/11/2016 - Present Yes        COVID-19 ICD-10-CM: U07.1  ICD-9-CM: 079.89  2021 - Present Yes        Paroxysmal atrial fibrillation (HCC) ICD-10-CM: I48.0  ICD-9-CM: 427.31  3/12/2016 - Present Yes        CAD (coronary artery disease) ICD-10-CM: I25.10  ICD-9-CM: 414.00  3/12/2016 - Present Yes        Sleep apnea (Chronic) ICD-10-CM: G47.30  ICD-9-CM: 780.57  3/11/2016 - Present Yes        Hypoxia ICD-10-CM: R09.02  ICD-9-CM: 799.02  3/11/2016 - Present Yes        S/P CABG x 4 ICD-10-CM: Z95.1  ICD-9-CM: V45.81  3/9/2016 - Present Yes        RESOLVED: Sepsis ([facility] ICD-10-CM: A41.9  ICD-9-CM: 038.9, 995.91  2021 - 2021 Yes        RESOLVED: Atrial fibrillation with RVR ([facility] ICD-10-CM: I48.91  ICD-9-CM: 427.31  2021 - 2021 Yes            Did Patient have Sepsis (YES OR NO): no    Hospital Course:  71M PMHx HTN, CAD, AFib and prostate cancer who was admitted on 12/4 with acute hypoxemic respiratory failure 2/2 COVID-19. Started on dexamethasone, baricitinib and O2. CT chest neg for PE. O2 needs increased and started on Airvo 12/9, dexamethasone increased on 12/10. Airvo settings down to 45L/50% on 12/13. He was stubbornly resistant to weaning O2 but finally came off of airvo on 12/16. On 12/17 a walk test was performed and he was determined to be safe for discharge on home O2. He should follow up with his primary care provider within 7 days. Primary care provider may consider the following:  -medication changes as noted below  -planned follow up with cardiology  -ongoing need for home O2  -rehabilitation    Disposition: [facility]  Diet: ADULT ORAL NUTRITION SUPPLEMENT Breakfast, Lunch, Dinner; Standard High Calorie/High Protein  ADULT DIET Regular; dislikes sweet tea; lactose intolerance  Code Status: DNR    Follow Up Orders: Follow-up Appointments   Procedures    FOLLOW UP VISIT Appointment in: One Week Follow up with PCP within 7 days. Follow up with PCP within 7 days. Standing Status:   Standing     Number of Occurrences:   1     Order Specific Question:   Appointment in     Answer: One Week    FOLLOW UP VISIT Appointment in: Two Weeks Follow up with Cardiology within 2 weeks. Follow up with Cardiology within 2 weeks. Standing Status:   Standing     Number of Occurrences:   1     Standing Expiration Date:   12/18/2021     Order Specific Question:   Appointment in     Answer: Two Weeks       Follow-up Information     Follow up With Specialties Details Why Contact Remington Williamson, [address]   [address] [place] [address] 56  [phone]              Time spent in patient discharge and coordination 42 minutes. Plan was discussed with patient, nurse, . All questions answered. Patient was stable at time of discharge. Instructions given to call a physician or return if any concerns. Discharge Info:   Current Discharge Medication List      START taking these medications    Details   apixaban (ELIQUIS) 5 mg tablet Take 1 Tablet by mouth two (2) times a day. Indications: treatment to prevent blood clots in chronic atrial fibrillation  Qty: 60 Tablet, Refills: 0  Start date: 12/17/2021      benzonatate (TESSALON) 100 mg capsule Take 1 Capsule by mouth three (3) times daily as needed for Cough for up to 7 days. Qty: 21 Capsule, Refills: 0  Start date: 12/17/2021, End date: 12/24/2021         CONTINUE these medications which have NOT CHANGED    Details   loratadine (CLARITIN) 10 mg tablet Take 10 mg by mouth every morning. fluticasone (FLONASE) 50 mcg/actuation nasal spray One spray in each nostril twice daily  Qty: 1 Bottle, Refills: 5      aspirin delayed-release 81 mg tablet Take  by mouth every morning. nitroglycerin (NITROSTAT) 0.4 mg SL tablet 1 Tablet by SubLINGual route every five (5) minutes as needed for Chest Pain. Up to 3 doses. Qty: 25 Tablet, Refills: 6      evolocumab (REPATHA) syringe 1 mL by SubCUTAneous route every fourteen (14) days. Qty: 2 Syringe, Refills: 11      trimethoprim-sulfamethoxazole (Bactrim DS) 160-800 mg per tablet Take 1 Tablet by mouth two (2) times a day. Qty: 20 Tablet, Refills: 0    Associated Diagnoses: Acute cystitis with hematuria         STOP taking these medications       hydroCHLOROthiazide (HYDRODIURIL) 25 mg tablet Comments:   Reason for Stopping:         metoprolol succinate (TOPROL-XL) 100 mg tablet Comments:   Reason for Stopping:               Procedures done this admission:  * No surgery found *    Consults this admission:  IP CONSULT TO CARDIOLOGY    Echocardiogram/EKG results:  No results found for this or any previous visit.        EKG Results     Procedure 720 Value Units Date/Time    EKG [[id number]] Collected: 12/04/21 4832    Order Status: Completed Updated: 12/04/21 1051     Ventricular Rate 148 BPM      Atrial Rate 178 BPM      QRS Duration 78 ms      Q-T Interval 286 ms      QTC Calculation (Bezet) 449 ms      Calculated R Axis 112 degrees      Calculated T Axis 41 degrees      Diagnosis --     Atrial fibrillation with rapid ventricular response  Right axis deviation  Nonspecific ST abnormality  Abnormal ECG  When compared with ECG of 10-MAR-2016 07:43,  Atrial fibrillation has replaced Sinus rhythm  Vent. rate has increased BY  81 BPM  QRS axis Shifted right  ST no longer elevated in Inferior leads  ST now depressed in Anterolateral leads  Confirmed by Solo Ramos MD (), MODESTA MCCURDY ([id number]) on 12/4/2021 10:51:18 AM      EKG, 12 LEAD, SUBSEQUENT [[id number]]     Order Status: Completed           Diagnostic Imaging/Tests:   XR CHEST PORT    Result Date: 12/4/2021  Chest X-ray INDICATION: Shortness of breath. Viral pneumonitis. A portable AP view of the chest was obtained. FINDINGS: New, subtle interstitial changes lateral right mid lung possibly reflecting viral pneumonitis. Left lung remains clear. No pneumothorax or pleural effusions. The heart size is normal.  Sternotomy changes again noted.       Subtle interstitial changes lateral right midlung possibly reflecting viral pneumonitis       All Micro Results     Procedure Component Value Units Date/Time    CULTURE, BLOOD [[id number]] Collected: 12/04/21 1856    Order Status: Completed Specimen: Blood Updated: 12/09/21 0720     Special Requests: --        RIGHT  HAND       Culture result: NO GROWTH 5 DAYS       CULTURE, BLOOD [[id number]] Collected: 12/04/21 1856    Order Status: Completed Specimen: Blood Updated: 12/09/21 0720     Special Requests: --        LEFT  Antecubital       Culture result: NO GROWTH 5 DAYS       CULTURE, URINE [[id number]] Collected: 12/05/21 0549    Order Status: Completed Specimen: Urine from Clean catch Updated: 12/07/21 0736     Special Requests: NO SPECIAL REQUESTS        Culture result: NO GROWTH 2 DAYS       INFLUENZA A & B AG (RAPID TEST) [[id number]]     Order Status: Canceled Specimen: Nasopharyngeal           Labs: Results:       BMP, Mg, Phos Recent Labs     12/15/21  1020      K 4.6      CO2 28   AGAP 5*   BUN 25*   CREA 1.19   CA 8.7   GLU 86   MG 2.1      CBC Recent Labs     12/15/21  1020   WBC 14.2*   RBC 5.36   HGB 15.6   HCT 46.8   *      LFT No results for input(s): ALT, TBIL, AP, TP, ALB, GLOB, AGRAT in the last 72 hours.     No lab exists for component: SGOT, GPT   Cardiac Testing Lab Results   Component Value Date/Time    Troponin-I, Qt. 0.15 (H) 03/06/2016 04:50 PM    Troponin-I, Qt. 0.26 (H) 03/06/2016 08:33 AM    Troponin-I, Qt. 0.10 (H) 03/05/2016 11:30 PM      Coagulation Tests Lab Results   Component Value Date/Time    Prothrombin time 17.7 (H) 12/05/2021 04:07 AM    Prothrombin time 14.2 (H) 03/09/2016 02:40 PM    Prothrombin time 12.8 (H) 03/08/2016 10:27 AM    INR 1.4 12/05/2021 04:07 AM    INR 1.3 (H) 03/09/2016 02:40 PM    INR 1.2 03/08/2016 10:27 AM    aPTT 27.1 03/09/2016 02:40 PM    aPTT 35.9 (H) 03/08/2016 07:55 PM    aPTT 48.5 (H) 03/08/2016 01:03 PM      A1c Lab Results   Component Value Date/Time    Hemoglobin A1c 5.4 03/08/2016 05:24 AM      Lipid Panel Lab Results   Component Value Date/Time    Cholesterol, total 96 04/02/2021 08:19 AM    HDL Cholesterol 39 (L) 04/02/2021 08:19 AM    LDL, calculated 33.4 04/02/2021 08:19 AM    VLDL, calculated 23.6 (H) 04/02/2021 08:19 AM    Triglyceride 118 04/02/2021 08:19 AM    CHOL/HDL Ratio 2.5 04/02/2021 08:19 AM      Thyroid Panel Lab Results   Component Value Date/Time    TSH 2.920 10/14/2014 08:44 AM    T4, Free 1.24 10/14/2014 08:44 AM        Most Recent UA Lab Results   Component Value Date/Time    Color BASHIR 12/05/2021 05:50 AM    Appearance CLOUDY 12/05/2021 05:50 AM    Specific gravity 1.014 03/08/2016 11:15 AM    pH (UA) 5.5 12/05/2021 05:50 AM    Protein 100 (A) 12/05/2021 05:50 AM    Glucose Negative 12/05/2021 05:50 AM    Ketone 15 (A) 12/05/2021 05:50 AM    Bilirubin Negative 12/05/2021 05:50 AM    Blood MODERATE (A) 12/05/2021 05:50 AM    Urobilinogen 1.0 12/05/2021 05:50 AM    Nitrites Negative 12/05/2021 05:50 AM    Leukocyte Esterase Negative 12/05/2021 05:50 AM    WBC 3-5 12/05/2021 05:50 AM    RBC 0-3 12/05/2021 05:50 AM    Bacteria 3+ (H) 12/05/2021 05:50 AM    Casts 3-5 12/05/2021 05:50 AM    Other observations RESULTS VERIFIED MANUALLY 12/05/2021 05:50 AM          All Labs from Last 24 Hrs:  No results found for this or any previous visit (from the past 24 hour(s)).     Current Med List in Hospital:   Current Facility-Administered Medications   Medication Dose Route Frequency    benzonatate (TESSALON) capsule 100 mg  100 mg Oral TID PRN    pantoprazole (PROTONIX) tablet 40 mg  40 mg Oral ACB    sodium chloride (OCEAN) 0.65 % nasal squeeze bottle 2 Spray  2 Spray Both Nostrils Q2H PRN    apixaban (ELIQUIS) tablet 5 mg  5 mg Oral BID    albuterol (PROVENTIL HFA, VENTOLIN HFA, PROAIR HFA) inhaler 2 Puff  2 Puff Inhalation Q4H PRN    sodium chloride (NS) flush 5-10 mL  5-10 mL IntraVENous PRN    aspirin delayed-release tablet 81 mg  81 mg Oral DAILY    fluticasone propionate (FLONASE) 50 mcg/actuation nasal spray 2 Spray  2 Spray Both Nostrils DAILY    [Held by provider] hydroCHLOROthiazide (HYDRODIURIL) tablet 12.5 mg  12.5 mg Oral DAILY    sodium chloride (NS) flush 5-40 mL  5-40 mL IntraVENous Q8H    sodium chloride (NS) flush 5-40 mL  5-40 mL IntraVENous PRN    acetaminophen (TYLENOL) tablet 650 mg  650 mg Oral Q6H PRN    Or    acetaminophen (TYLENOL) suppository 650 mg  650 mg Rectal Q6H PRN    polyethylene glycol (MIRALAX) packet 17 g  17 g Oral DAILY PRN    ondansetron (ZOFRAN ODT) tablet 4 mg  4 mg Oral Q8H PRN    Or    ondansetron (ZOFRAN) injection 4 mg  4 mg IntraVENous Q6H PRN    guaiFENesin-dextromethorphan (ROBITUSSIN DM) 100-10 mg/5 mL syrup 5 mL  5 mL Oral Q4H PRN    baricitinib (OLUMIANT) tablet 4 mg  4 mg Oral DAILY    guaiFENesin ER (MUCINEX) tablet 600 mg  600 mg Oral Q12H    [Held by provider] metoprolol succinate (TOPROL-XL) tablet 100 mg  100 mg Oral Q12H       Allergies   Allergen Reactions    Latex Rash    Codeine Nausea and Vomiting    Tape [Adhesive] Rash     Immunization History   Administered Date(s) Administered    TB Skin Test (PPD) Intradermal 03/09/2016    Tdap 10/21/2014       Recent Vital Data:  Patient Vitals for the past 24 hrs:   Temp Pulse Resp BP SpO2   12/17/21 0755 97.9 °F (36.6 °C) (!) 58 18 132/80 93 %   12/17/21 0405 98.3 °F (36.8 °C) (!) 50 18 126/68 97 %   12/16/21 2230 97.7 °F (36.5 °C) 65 18 116/60 96 %   12/16/21 1846 99.1 °F (37.3 °C) 66 20 (!) 130/56 95 %   12/16/21 1300 98.1 °F (36.7 °C) 79 18 (!) 113/59 94 %     Oxygen Therapy  O2 Sat (%): 93 % (12/17/21 0755)  Pulse via Oximetry: 55 beats per minute (12/15/21 0028)  O2 Device: Nasal cannula (12/17/21 0408)  Skin Assessment: Clean, dry, & intact (12/15/21 2240)  Skin Protection for O2 Device: No (12/15/21 2240)  O2 Flow Rate (L/min): 4 l/min (12/17/21 0408)  O2 Temperature: 87.8 °F (31 °C) (12/15/21 0028)  FIO2 (%): 40 % (12/16/21 0252)    Estimated body mass index is 31.71 kg/m² as calculated from the following:    Height as of this encounter: 5' 10\" (1.778 m). Weight as of this encounter: 100.2 kg (221 lb). Intake/Output Summary (Last 24 hours) at 12/17/2021 1110  Last data filed at 12/17/2021 0931  Gross per 24 hour   Intake 840 ml   Output 1650 ml   Net -810 ml       Physical Exam  Vitals and nursing note reviewed. Constitutional:       General: He is not in acute distress. Appearance: Normal appearance. He is obese. He is not ill-appearing. HENT:      Head: Normocephalic. Eyes:      Extraocular Movements: Extraocular movements intact. Cardiovascular:      Rate and Rhythm: Normal rate.       Pulses:           Radial pulses are 2+ on the left side. Pulmonary:      Effort: Pulmonary effort is normal. No respiratory distress. Breath sounds: No wheezing. Musculoskeletal:         General: No deformity. Cervical back: No rigidity. Right lower leg: No edema. Left lower leg: No edema. Skin:     General: Skin is warm and dry. Neurological:      General: No focal deficit present. Mental Status: He is alert and oriented to person, place, and time.    Psychiatric:         Mood and Affect: Mood normal.         Behavior: Behavior normal.         Signed:  Julian Thurman MD

## 2021-12-17 NOTE — PROGRESS NOTES
Oxygen Qualifier       Room air: SpO2 with O2 and liter flow   Resting SpO2 90%     Ambulating SpO2  81% 83% on 1L  85% on 2L  90% on 3L         Completed by:     Roro Ybarra, RT

## 2021-12-17 NOTE — PROGRESS NOTES
Problem: Risk for Spread of Infection  Goal: Prevent transmission of infectious organism to others  Description: Prevent the transmission of infectious organisms to other patients, staff members, and visitors. Outcome: Progressing Towards Goal     Problem: Falls - Risk of  Goal: *Absence of Falls  Description: Document Leafy Maravilla Fall Risk and appropriate interventions in the flowsheet. Outcome: Progressing Towards Goal  Note: Fall Risk Interventions:  Mobility Interventions: Patient to call before getting OOB    Mentation Interventions: Bed/chair exit alarm    Medication Interventions: Teach patient to arise slowly    Elimination Interventions: Call light in reach    History of Falls Interventions: Investigate reason for fall         Problem: Airway Clearance - Ineffective  Goal: Achieve or maintain patent airway  Outcome: Progressing Towards Goal     Problem: Gas Exchange - Impaired  Goal: Absence of hypoxia  Outcome: Progressing Towards Goal  Goal: Promote optimal lung function  Outcome: Progressing Towards Goal     Problem: Breathing Pattern - Ineffective  Goal: Ability to achieve and maintain a regular respiratory rate  Outcome: Progressing Towards Goal     Problem: Isolation Precautions - Risk of Spread of Infection  Goal: Prevent transmission of infectious organism to others  Outcome: Progressing Towards Goal     Problem: Loneliness or Risk for Loneliness  Goal: Demonstrate positive use of time alone when socialization is not possible  Outcome: Progressing Towards Goal     Problem: Pressure Injury - Risk of  Goal: *Prevention of pressure injury  Description: Document Luis Alfredo Scale and appropriate interventions in the flowsheet.   Outcome: Progressing Towards Goal  Note: Pressure Injury Interventions:  Sensory Interventions: Assess changes in LOC    Moisture Interventions: Absorbent underpads    Activity Interventions: Increase time out of bed    Mobility Interventions: Pressure redistribution bed/mattress (bed type)    Nutrition Interventions: Document food/fluid/supplement intake    Friction and Shear Interventions: Minimize layers

## 2021-12-18 ENCOUNTER — HOME CARE VISIT (OUTPATIENT)
Dept: SCHEDULING | Facility: HOME HEALTH | Age: 71
End: 2021-12-18
Payer: COMMERCIAL

## 2021-12-18 VITALS
OXYGEN SATURATION: 92 % | RESPIRATION RATE: 16 BRPM | TEMPERATURE: 98 F | DIASTOLIC BLOOD PRESSURE: 62 MMHG | SYSTOLIC BLOOD PRESSURE: 122 MMHG | HEART RATE: 80 BPM

## 2021-12-18 PROCEDURE — 400013 HH SOC

## 2021-12-18 PROCEDURE — 3331090001 HH PPS REVENUE CREDIT

## 2021-12-18 PROCEDURE — G0299 HHS/HOSPICE OF RN EA 15 MIN: HCPCS

## 2021-12-18 PROCEDURE — 3331090002 HH PPS REVENUE DEBIT

## 2021-12-18 PROCEDURE — 400018 HH-NO PAY CLAIM PROCEDURE

## 2021-12-19 PROCEDURE — 3331090002 HH PPS REVENUE DEBIT

## 2021-12-19 PROCEDURE — 3331090001 HH PPS REVENUE CREDIT

## 2021-12-20 ENCOUNTER — HOME CARE VISIT (OUTPATIENT)
Dept: SCHEDULING | Facility: HOME HEALTH | Age: 71
End: 2021-12-20
Payer: COMMERCIAL

## 2021-12-20 VITALS
DIASTOLIC BLOOD PRESSURE: 60 MMHG | OXYGEN SATURATION: 93 % | TEMPERATURE: 99 F | RESPIRATION RATE: 16 BRPM | SYSTOLIC BLOOD PRESSURE: 130 MMHG | HEART RATE: 92 BPM

## 2021-12-20 PROCEDURE — G0151 HHCP-SERV OF PT,EA 15 MIN: HCPCS

## 2021-12-20 PROCEDURE — 3331090002 HH PPS REVENUE DEBIT

## 2021-12-20 PROCEDURE — 3331090001 HH PPS REVENUE CREDIT

## 2021-12-21 ENCOUNTER — HOME CARE VISIT (OUTPATIENT)
Dept: SCHEDULING | Facility: HOME HEALTH | Age: 71
End: 2021-12-21
Payer: COMMERCIAL

## 2021-12-21 ENCOUNTER — HOME CARE VISIT (OUTPATIENT)
Dept: HOME HEALTH SERVICES | Facility: HOME HEALTH | Age: 71
End: 2021-12-21
Payer: COMMERCIAL

## 2021-12-21 VITALS
SYSTOLIC BLOOD PRESSURE: 128 MMHG | RESPIRATION RATE: 22 BRPM | DIASTOLIC BLOOD PRESSURE: 74 MMHG | OXYGEN SATURATION: 92 % | HEART RATE: 92 BPM | TEMPERATURE: 98.1 F

## 2021-12-21 PROCEDURE — 3331090001 HH PPS REVENUE CREDIT

## 2021-12-21 PROCEDURE — 3331090002 HH PPS REVENUE DEBIT

## 2021-12-21 PROCEDURE — G0299 HHS/HOSPICE OF RN EA 15 MIN: HCPCS

## 2021-12-21 NOTE — Clinical Note
Patient's spouse called on call services for HR issues. This RN called patient's spouse and spouse states when patient gets up to use restroom his HR is elevating to 104-106 and oxygen saturation drops to 89% w/ 2 LPM supplemental oxygen via NC. Spouse states patient is not having any dizziness, palpitations, does not feel heart racing, just some anxiety when getting up to use restroom. At rest patient's HR is in high 90's and oxygen satration is 92-93% w/ supplemental oxygen. Patient was taken off Metoprolol & HCTZ per medication note. Spouse states she will call cardiology tomorrow to check on medication and see if Dr Calixto Andrews has any further orders. Instructed spouse to call 911 if patient becomes shob, has any chest pain, or starts to have palpitations where patient feels like his heart is racing. Spouse v/u and has no further questions at this time.

## 2021-12-22 PROCEDURE — 3331090002 HH PPS REVENUE DEBIT

## 2021-12-22 PROCEDURE — 3331090001 HH PPS REVENUE CREDIT

## 2021-12-23 ENCOUNTER — HOME CARE VISIT (OUTPATIENT)
Dept: SCHEDULING | Facility: HOME HEALTH | Age: 71
End: 2021-12-23
Payer: COMMERCIAL

## 2021-12-23 PROCEDURE — 3331090001 HH PPS REVENUE CREDIT

## 2021-12-23 PROCEDURE — 3331090002 HH PPS REVENUE DEBIT

## 2021-12-23 PROCEDURE — G0299 HHS/HOSPICE OF RN EA 15 MIN: HCPCS

## 2021-12-24 VITALS
TEMPERATURE: 98.1 F | DIASTOLIC BLOOD PRESSURE: 78 MMHG | OXYGEN SATURATION: 97 % | HEART RATE: 78 BPM | SYSTOLIC BLOOD PRESSURE: 127 MMHG | RESPIRATION RATE: 17 BRPM

## 2021-12-24 PROCEDURE — 3331090001 HH PPS REVENUE CREDIT

## 2021-12-24 PROCEDURE — 3331090002 HH PPS REVENUE DEBIT

## 2021-12-25 PROCEDURE — 3331090002 HH PPS REVENUE DEBIT

## 2021-12-25 PROCEDURE — 3331090001 HH PPS REVENUE CREDIT

## 2021-12-26 ENCOUNTER — HOSPITAL ENCOUNTER (EMERGENCY)
Age: 71
Discharge: HOME | End: 2021-12-27
Attending: EMERGENCY MEDICINE
Payer: COMMERCIAL

## 2021-12-26 ENCOUNTER — APPOINTMENT (OUTPATIENT)
Dept: CT IMAGING | Age: 71
End: 2021-12-26
Attending: EMERGENCY MEDICINE
Payer: COMMERCIAL

## 2021-12-26 ENCOUNTER — APPOINTMENT (OUTPATIENT)
Dept: GENERAL RADIOLOGY | Age: 71
End: 2021-12-26
Attending: EMERGENCY MEDICINE
Payer: COMMERCIAL

## 2021-12-26 DIAGNOSIS — U07.1 COVID-19 VIRUS INFECTION: Primary | ICD-10-CM

## 2021-12-26 DIAGNOSIS — K83.8 BILIARY SLUDGE: ICD-10-CM

## 2021-12-26 LAB
ALBUMIN SERPL-MCNC: 2.8 G/DL (ref 3.2–4.6)
ALBUMIN/GLOB SERPL: 0.6 {RATIO} (ref 1.2–3.5)
ALP SERPL-CCNC: 92 U/L (ref 50–136)
ALT SERPL-CCNC: 77 U/L (ref 12–65)
ANION GAP SERPL CALC-SCNC: 6 MMOL/L (ref 7–16)
AST SERPL-CCNC: 26 U/L (ref 15–37)
BASOPHILS # BLD: 0 K/UL (ref 0–0.2)
BASOPHILS NFR BLD: 1 % (ref 0–2)
BILIRUB SERPL-MCNC: 0.7 MG/DL (ref 0.2–1.1)
BUN SERPL-MCNC: 9 MG/DL (ref 8–23)
CALCIUM SERPL-MCNC: 8.9 MG/DL (ref 8.3–10.4)
CHLORIDE SERPL-SCNC: 100 MMOL/L (ref 98–107)
CO2 SERPL-SCNC: 33 MMOL/L (ref 21–32)
CREAT SERPL-MCNC: 1.02 MG/DL (ref 0.8–1.5)
DIFFERENTIAL METHOD BLD: NORMAL
EOSINOPHIL # BLD: 0.2 K/UL (ref 0–0.8)
EOSINOPHIL NFR BLD: 3 % (ref 0.5–7.8)
ERYTHROCYTE [DISTWIDTH] IN BLOOD BY AUTOMATED COUNT: 13.6 % (ref 11.9–14.6)
GLOBULIN SER CALC-MCNC: 4.7 G/DL (ref 2.3–3.5)
GLUCOSE SERPL-MCNC: 153 MG/DL (ref 65–100)
HCT VFR BLD AUTO: 44.8 % (ref 41.1–50.3)
HGB BLD-MCNC: 14.4 G/DL (ref 13.6–17.2)
IMM GRANULOCYTES # BLD AUTO: 0 K/UL (ref 0–0.5)
IMM GRANULOCYTES NFR BLD AUTO: 1 % (ref 0–5)
LIPASE SERPL-CCNC: 233 U/L (ref 73–393)
LYMPHOCYTES # BLD: 1.1 K/UL (ref 0.5–4.6)
LYMPHOCYTES NFR BLD: 19 % (ref 13–44)
MAGNESIUM SERPL-MCNC: 2.1 MG/DL (ref 1.8–2.4)
MCH RBC QN AUTO: 28.5 PG (ref 26.1–32.9)
MCHC RBC AUTO-ENTMCNC: 32.1 G/DL (ref 31.4–35)
MCV RBC AUTO: 88.5 FL (ref 79.6–97.8)
MONOCYTES # BLD: 0.5 K/UL (ref 0.1–1.3)
MONOCYTES NFR BLD: 10 % (ref 4–12)
NEUTS SEG # BLD: 3.8 K/UL (ref 1.7–8.2)
NEUTS SEG NFR BLD: 67 % (ref 43–78)
NRBC # BLD: 0 K/UL (ref 0–0.2)
PLATELET # BLD AUTO: 158 K/UL (ref 150–450)
PMV BLD AUTO: 9.8 FL (ref 9.4–12.3)
POTASSIUM SERPL-SCNC: 3.8 MMOL/L (ref 3.5–5.1)
PROT SERPL-MCNC: 7.5 G/DL (ref 6.3–8.2)
RBC # BLD AUTO: 5.06 M/UL (ref 4.23–5.6)
SODIUM SERPL-SCNC: 139 MMOL/L (ref 136–145)
TROPONIN-HIGH SENSITIVITY: 6.3 PG/ML (ref 0–14)
WBC # BLD AUTO: 5.7 K/UL (ref 4.3–11.1)

## 2021-12-26 PROCEDURE — 83690 ASSAY OF LIPASE: CPT

## 2021-12-26 PROCEDURE — 71046 X-RAY EXAM CHEST 2 VIEWS: CPT

## 2021-12-26 PROCEDURE — 84484 ASSAY OF TROPONIN QUANT: CPT

## 2021-12-26 PROCEDURE — 85025 COMPLETE CBC W/AUTO DIFF WBC: CPT

## 2021-12-26 PROCEDURE — 74011000258 HC RX REV CODE- 258: Performed by: EMERGENCY MEDICINE

## 2021-12-26 PROCEDURE — 80053 COMPREHEN METABOLIC PANEL: CPT

## 2021-12-26 PROCEDURE — 3331090001 HH PPS REVENUE CREDIT

## 2021-12-26 PROCEDURE — 74011000636 HC RX REV CODE- 636: Performed by: EMERGENCY MEDICINE

## 2021-12-26 PROCEDURE — 3331090002 HH PPS REVENUE DEBIT

## 2021-12-26 PROCEDURE — 83735 ASSAY OF MAGNESIUM: CPT

## 2021-12-26 PROCEDURE — 93005 ELECTROCARDIOGRAM TRACING: CPT | Performed by: EMERGENCY MEDICINE

## 2021-12-26 PROCEDURE — 99285 EMERGENCY DEPT VISIT HI MDM: CPT

## 2021-12-26 PROCEDURE — 94762 N-INVAS EAR/PLS OXIMTRY CONT: CPT

## 2021-12-26 PROCEDURE — 74177 CT ABD & PELVIS W/CONTRAST: CPT

## 2021-12-26 RX ORDER — SODIUM CHLORIDE 0.9 % (FLUSH) 0.9 %
5-10 SYRINGE (ML) INJECTION AS NEEDED
Status: DISCONTINUED | OUTPATIENT
Start: 2021-12-26 | End: 2021-12-27 | Stop reason: HOSPADM

## 2021-12-26 RX ORDER — SODIUM CHLORIDE 0.9 % (FLUSH) 0.9 %
5-10 SYRINGE (ML) INJECTION EVERY 8 HOURS
Status: DISCONTINUED | OUTPATIENT
Start: 2021-12-26 | End: 2021-12-27 | Stop reason: HOSPADM

## 2021-12-26 RX ORDER — SODIUM CHLORIDE 0.9 % (FLUSH) 0.9 %
10 SYRINGE (ML) INJECTION
Status: COMPLETED | OUTPATIENT
Start: 2021-12-26 | End: 2021-12-26

## 2021-12-26 RX ADMIN — IOPAMIDOL 100 ML: 755 INJECTION, SOLUTION INTRAVENOUS at 23:18

## 2021-12-26 RX ADMIN — Medication 10 ML: at 23:18

## 2021-12-26 RX ADMIN — SODIUM CHLORIDE 100 ML: 900 INJECTION, SOLUTION INTRAVENOUS at 23:17

## 2021-12-27 ENCOUNTER — APPOINTMENT (OUTPATIENT)
Dept: ULTRASOUND IMAGING | Age: 71
End: 2021-12-27
Attending: EMERGENCY MEDICINE
Payer: COMMERCIAL

## 2021-12-27 VITALS
TEMPERATURE: 97.6 F | BODY MASS INDEX: 30.35 KG/M2 | SYSTOLIC BLOOD PRESSURE: 147 MMHG | RESPIRATION RATE: 22 BRPM | OXYGEN SATURATION: 95 % | HEIGHT: 70 IN | HEART RATE: 81 BPM | WEIGHT: 212 LBS | DIASTOLIC BLOOD PRESSURE: 73 MMHG

## 2021-12-27 LAB
ATRIAL RATE: 72 BPM
CALCULATED P AXIS, ECG09: 23 DEGREES
CALCULATED R AXIS, ECG10: 74 DEGREES
CALCULATED T AXIS, ECG11: 61 DEGREES
DIAGNOSIS, 93000: NORMAL
P-R INTERVAL, ECG05: 152 MS
Q-T INTERVAL, ECG07: 380 MS
QRS DURATION, ECG06: 82 MS
QTC CALCULATION (BEZET), ECG08: 416 MS
TROPONIN-HIGH SENSITIVITY: 6.8 PG/ML (ref 0–14)
VENTRICULAR RATE, ECG03: 72 BPM

## 2021-12-27 PROCEDURE — 74011250636 HC RX REV CODE- 250/636: Performed by: EMERGENCY MEDICINE

## 2021-12-27 PROCEDURE — 3331090002 HH PPS REVENUE DEBIT

## 2021-12-27 PROCEDURE — 96374 THER/PROPH/DIAG INJ IV PUSH: CPT

## 2021-12-27 PROCEDURE — 76705 ECHO EXAM OF ABDOMEN: CPT

## 2021-12-27 PROCEDURE — C9113 INJ PANTOPRAZOLE SODIUM, VIA: HCPCS | Performed by: EMERGENCY MEDICINE

## 2021-12-27 PROCEDURE — 74011000250 HC RX REV CODE- 250: Performed by: EMERGENCY MEDICINE

## 2021-12-27 PROCEDURE — 84484 ASSAY OF TROPONIN QUANT: CPT

## 2021-12-27 PROCEDURE — 3331090001 HH PPS REVENUE CREDIT

## 2021-12-27 RX ORDER — HYDROCODONE BITARTRATE AND ACETAMINOPHEN 5; 325 MG/1; MG/1
1 TABLET ORAL
Qty: 9 TABLET | Refills: 0 | Status: SHIPPED | OUTPATIENT
Start: 2021-12-27 | End: 2021-12-30

## 2021-12-27 RX ORDER — PANTOPRAZOLE SODIUM 40 MG/1
40 TABLET, DELAYED RELEASE ORAL DAILY
Qty: 30 TABLET | Refills: 0 | Status: SHIPPED | OUTPATIENT
Start: 2021-12-27 | End: 2022-01-26

## 2021-12-27 RX ORDER — ONDANSETRON 4 MG/1
4 TABLET, ORALLY DISINTEGRATING ORAL
Qty: 10 TABLET | Refills: 0 | Status: SHIPPED | OUTPATIENT
Start: 2021-12-27 | End: 2022-02-20

## 2021-12-27 RX ADMIN — SODIUM CHLORIDE 40 MG: 9 INJECTION INTRAMUSCULAR; INTRAVENOUS; SUBCUTANEOUS at 00:23

## 2021-12-27 NOTE — DISCHARGE INSTRUCTIONS
Follow low-fat diet. Take Zofran, Protonix as directed  Schedule close follow-up with gastroenterology. Continue to closely monitor your oxygen saturation at home. Return if symptoms worsen or progress in any way.

## 2021-12-27 NOTE — ED NOTES
I have reviewed discharge instructions with the patient. The patient verbalized understanding. Patient left ED via Discharge Method: wheelchair to Home with wife. Opportunity for questions and clarification provided. Patient given 3 scripts. To continue your aftercare when you leave the hospital, you may receive an automated call from our care team to check in on how you are doing. This is a free service and part of our promise to provide the best care and service to meet your aftercare needs.  If you have questions, or wish to unsubscribe from this service please call [phone]. Thank you for Choosing our [facility].

## 2021-12-27 NOTE — ED PROVIDER NOTES
82-year-old male with history of CAD, hypertension, hyperlipidemia, recent hospitalization with COVID-19 associated hypoxia and development of atrial fibrillation who was discharged on 12/17 on 2L O2 via NC presents with complaint of epigastric pain with associated nausea, vomiting, diaphoresis that started earlier this evening. Unlike triage documentation patient denies any significant worsening shortness of breath. Denies hemoptysis, chest pain, diarrhea, constipation, dysuria, hematuria, flank pain. States has been compliant with his Eliquis. Reports baseline cough. States that pain occurred after eating dinner at around 6:30 PM.  Initially told triage that pain radiated through to his back. Denies any significant pain at this time. The history is provided by the patient. No  was used. Epigastric Pain   This is a new problem. The current episode started 6 to 12 hours ago. The problem occurs rarely. The problem has been resolved. The pain is located in the epigastric region. The quality of the pain is cramping and sharp. The pain is at a severity of 6/10. The pain is moderate. Associated symptoms include nausea. Pertinent negatives include no fever, no belching, no diarrhea, no flatus, no hematochezia, no melena, no vomiting, no constipation, no dysuria, no headaches, no arthralgias, no myalgias, no chest pain and no back pain. The pain is worsened by eating.         Past Medical History:   Diagnosis Date    Acute hypoxemic respiratory failure ([address] 12/4/2021    Anterior myocardial infarction [facility]) 10/2003    Arrhythmia     paroxysmal a fib    Arteriosclerotic coronary artery disease 11/2005    Arthritis     fingers    Atrial fibrillation with RVR ([address] 12/4/2021    Cancer (HCC)     prostate    Coronary atherosclerosis of native coronary vessel 10/2001    Elevated serum creatinine 12/15/2021    Family history of premature CAD     Father hx cabg/CHF  Brother CABG x 2    Gout     right toe but no problem now    Hypercholesterolemia     Hypertension     Sinus bradycardia 12/14/2021       Past Surgical History:   Procedure Laterality Date    COLONOSCOPY N/A 12/21/2016    COLONOSCOPY  BMI 34 performed by Kuldip Wells MD at [facility] ENDOSCOPY    HX COLONOSCOPY      HX CORONARY ARTERY BYPASS GRAFT  3/9/2016    x4 - Dr. Louisa Whitley HX HEENT      tonsillectomy    HX ORTHOPAEDIC      2 back surg    HX PROSTATECTOMY      SD CARDIAC SURG PROCEDURE UNLIST      stent    SD CARDIAC SURG PROCEDURE UNLIST  March 9,2016    quidrupal bypass          Family History:   Problem Relation Age of Onset    Heart Disease Father     Diabetes Brother     Heart Disease Brother     Cancer Sister     Colon Cancer Neg Hx        Social History     Socioeconomic History    Marital status:      Spouse name: Not on file    Number of children: Not on file    Years of education: Not on file    Highest education level: Not on file   Occupational History    Not on file   Tobacco Use    Smoking status: Never Smoker    Smokeless tobacco: Never Used   Substance and Sexual Activity    Alcohol use: Yes     Comment: beer socially-seldom    Drug use: No    Sexual activity: Not on file   Other Topics Concern    Not on file   Social History Narrative    Not on file     Social Determinants of Health     Financial Resource Strain:     Difficulty of Paying Living Expenses: Not on file   Food Insecurity:     Worried About [address] in the Last Year: Not on file    [address] in the Last Year: Not on file   Transportation Needs:     Lack of Transportation (Medical): Not on file    Lack of Transportation (Non-Medical):  Not on file   Physical Activity:     Days of Exercise per Week: Not on file    Minutes of Exercise per Session: Not on file   Stress:     Feeling of Stress : Not on file   Social Connections:     Frequency of Communication with Friends and Family: Not on file    Frequency of Social Gatherings with Friends and Family: Not on file    Attends Zoroastrianism Services: Not on file    Active Member of Clubs or Organizations: Not on file    Attends Club or Organization Meetings: Not on file    Marital Status: Not on file   Intimate Partner Violence:     Fear of Current or Ex-Partner: Not on file    Emotionally Abused: Not on file    Physically Abused: Not on file    Sexually Abused: Not on file   Housing Stability:     Unable to Pay for Housing in the Last Year: Not on file    Number of [place] in the Last Year: Not on file    Unstable Housing in the Last Year: Not on file         ALLERGIES: Latex, Codeine, and Tape [adhesive]    Review of Systems   Constitutional: Positive for diaphoresis and fatigue. Negative for chills and fever. HENT: Negative for congestion, rhinorrhea and sore throat. Respiratory: Negative for cough and shortness of breath. Cardiovascular: Negative for chest pain, palpitations and leg swelling. Gastrointestinal: Positive for abdominal pain and nausea. Negative for blood in stool, constipation, diarrhea, flatus, hematochezia, melena and vomiting. Genitourinary: Negative for dysuria and flank pain. Musculoskeletal: Negative for arthralgias, back pain and myalgias. Skin: Negative for color change, pallor and rash. Neurological: Negative for dizziness, syncope, weakness, light-headedness and headaches. Hematological: Does not bruise/bleed easily. Psychiatric/Behavioral: Negative for confusion. Vitals:    12/26/21 1937   BP: (!) 158/81   Pulse: 70   Resp: 22   Temp: 97.6 °F (36.4 °C)   SpO2: 99%   Weight: 96.2 kg (212 lb)   Height: 5' 10\" (1.778 m)            Physical Exam  Vitals and nursing note reviewed. Constitutional:       Appearance: Normal appearance. HENT:      Head: Normocephalic.       Nose: Nose normal.      Mouth/Throat:      Mouth: Mucous membranes are moist.   Eyes:      Extraocular Movements: Extraocular movements intact. Conjunctiva/sclera: Conjunctivae normal.      Pupils: Pupils are equal, round, and reactive to light. Cardiovascular:      Rate and Rhythm: Normal rate. Pulses: Normal pulses. Heart sounds: Normal heart sounds. Pulmonary:      Effort: Pulmonary effort is normal.      Breath sounds: Normal breath sounds. Comments: 2L O2 via NC. Abdominal:      General: Bowel sounds are normal.      Palpations: Abdomen is soft. Tenderness: There is no abdominal tenderness. There is no guarding or rebound. Comments: Soft, nontender, nondistended. No rebound or guarding. No peritoneal signs. No pulsatile mass noted. Musculoskeletal:         General: No swelling or tenderness. Normal range of motion. Cervical back: Normal range of motion. No rigidity. Comments: No calf tenderness. No palpable cords. Skin:     Findings: No erythema or rash. Neurological:      General: No focal deficit present. Mental Status: He is alert and oriented to person, place, and time. Cranial Nerves: No cranial nerve deficit. Motor: No weakness. MDM  Number of Diagnoses or Management Options  Biliary sludge: new and requires workup  COVID-19 virus infection: new and requires workup  Diagnosis management comments: Patient with known COVID-19 with recent hospitalization and discharge. Patient on 2 L O2 via nasal cannula at baseline. Patient here tonight with epigastric discomfort. Initial and repeat 2-hour troponin unremarkable. Obtained CT chest abdomen pelvis to rule out PE as well as to rule out other abdominal pathologies. Labs unremarkable. CT with distended gallbladder, but pericholecystic fat stranding suggesting a small gallstone. Right upper quadrant ultrasound ordered. Biliary sludge present. No gallstones noted. No evidence of acute cholecystitis. Will discharge home with Zofran, Protonix, pain control.   Patient instructed to follow-up with GI, PCP.  Patient given return precautions. Amount and/or Complexity of Data Reviewed  Clinical lab tests: ordered and reviewed  Tests in the radiology section of CPT®: ordered and reviewed  Tests in the medicine section of CPT®: ordered and reviewed  Review and summarize past medical records: yes  Independent visualization of images, tracings, or specimens: yes    Risk of Complications, Morbidity, and/or Mortality  Presenting problems: moderate  Diagnostic procedures: moderate  Management options: moderate    Patient Progress  Patient progress: stable    ED Course as of 12/27/21 0251   Sun Dec 26, 2021   4848 CXR FINDINGS:      Stable midline sternotomy wires. Cardiomediastinal silhouette is within normal  limits. There are peripheral and basilar ground glass densities. No pneumothorax  or pleural effusion. Surrounding bones are intact.        IMPRESSION     1. Bilateral groundglass densities, suspicious for atypical pneumonia (including  viral etiology).     2. No substantial change compared to prior exam. [DF]   Mon Dec 27, 2021   0017 CT Chest Abdomen and Pelvis IMPRESSION     CHEST:     1. Negative for pulmonary embolism.     2. Bilateral groundglass densities, most pronounced in the lower lobes,  consistent with atypical pneumonia (including viral etiology). Findings similar  to December 6, 2021 study.        ABDOMEN/PELVIS:     1. Distended gallbladder, pericholecystic fat stranding and suggestion of small  gallstone. Consider follow-up gallbladder ultrasound to better evaluate for  possibility of cholecystitis.     2. Sigmoid/descending colon diverticulosis. Negative for diverticulitis, colitis, appendicitis or bowel obstruction. No hydronephrosis. [DF]   5423 Troponin-High Sensitivity: 6.8 [DF]   0211 RUQ US    FINDINGS:     The liver is normal in echogenicity, contour and size and measures 14.8 cm.     Right kidney is unremarkable without hydronephrosis and measures 11.9 cm.     There is biliary sludge. No gallstone is seen. No gallbladder wall thickening,  pericholecystic fluid or sonographic Eugene's sign.     No intra or extrahepatic biliary ductal dilatation. Common bile duct measures 4  mm.     Pancreas and abdominal aorta are not well seen due to overlying bowel gas.     No evidence of ascites.        IMPRESSION     1. Biliary sludge. No gallstone is seen. No sonographic evidence of acute cholecystitis.     2. Negative for biliary ductal dilatation. [DF]      ED Course User Index  [DF] Spencer Stevens MD       EKG    Date/Time: 12/26/2021 11:13 PM  Performed by: Spencer Stevens MD  Authorized by: Spencer Stevens MD     ECG reviewed by ED Physician in the absence of a cardiologist: yes    Rate:     ECG rate:  72    ECG rate assessment: normal    Rhythm:     Rhythm: sinus rhythm    Ectopy:     Ectopy: none    QRS:     QRS axis:  Normal    QRS intervals:  Normal  Conduction:     Conduction: normal    ST segments:     ST segments:  Normal  T waves:     T waves: normal          Results Include:    Recent Results (from the past 24 hour(s))   EKG, 12 LEAD, INITIAL    Collection Time: 12/26/21  7:40 PM   Result Value Ref Range    Ventricular Rate 72 BPM    Atrial Rate 72 BPM    P-R Interval 152 ms    QRS Duration 82 ms    Q-T Interval 380 ms    QTC Calculation (Bezet) 416 ms    Calculated P Axis 23 degrees    Calculated R Axis 74 degrees    Calculated T Axis 61 degrees    Diagnosis       Sinus rhythm with Premature atrial complexes  Nonspecific ST abnormality  Abnormal ECG  When compared with ECG of 04-DEC-2021 08:51,  Sinus rhythm has replaced Atrial fibrillation  Vent.  rate has decreased BY  76 BPM  QRS axis Shifted left  ST no longer depressed in Lateral leads     TROPONIN-HIGH SENSITIVITY    Collection Time: 12/26/21  7:50 PM   Result Value Ref Range    Troponin-High Sensitivity 6.3 0 - 14 pg/mL   CBC WITH AUTOMATED DIFF    Collection Time: 12/26/21  7:50 PM   Result Value Ref Range    WBC 5.7 4.3 - 11.1 K/uL    RBC 5.06 4.23 - 5.6 M/uL    HGB 14.4 13.6 - 17.2 g/dL    HCT 44.8 41.1 - 50.3 %    MCV 88.5 79.6 - 97.8 FL    MCH 28.5 26.1 - 32.9 PG    MCHC 32.1 31.4 - 35.0 g/dL    RDW 13.6 11.9 - 14.6 %    PLATELET 407 436 - 573 K/uL    MPV 9.8 9.4 - 12.3 FL    ABSOLUTE NRBC 0.00 0.0 - 0.2 K/uL    DF AUTOMATED      NEUTROPHILS 67 43 - 78 %    LYMPHOCYTES 19 13 - 44 %    MONOCYTES 10 4.0 - 12.0 %    EOSINOPHILS 3 0.5 - 7.8 %    BASOPHILS 1 0.0 - 2.0 %    IMMATURE GRANULOCYTES 1 0.0 - 5.0 %    ABS. NEUTROPHILS 3.8 1.7 - 8.2 K/UL    ABS. LYMPHOCYTES 1.1 0.5 - 4.6 K/UL    ABS. MONOCYTES 0.5 0.1 - 1.3 K/UL    ABS. EOSINOPHILS 0.2 0.0 - 0.8 K/UL    ABS. BASOPHILS 0.0 0.0 - 0.2 K/UL    ABS. IMM. GRANS. 0.0 0.0 - 0.5 K/UL   METABOLIC PANEL, COMPREHENSIVE    Collection Time: 12/26/21  7:50 PM   Result Value Ref Range    Sodium 139 136 - 145 mmol/L    Potassium 3.8 3.5 - 5.1 mmol/L    Chloride 100 98 - 107 mmol/L    CO2 33 (H) 21 - 32 mmol/L    Anion gap 6 (L) 7 - 16 mmol/L    Glucose 153 (H) 65 - 100 mg/dL    BUN 9 8 - 23 MG/DL    Creatinine 1.02 0.8 - 1.5 MG/DL    GFR est AA >60 >60 ml/min/1.73m2    GFR est non-AA >60 >60 ml/min/1.73m2    Calcium 8.9 8.3 - 10.4 MG/DL    Bilirubin, total 0.7 0.2 - 1.1 MG/DL    ALT (SGPT) 77 (H) 12 - 65 U/L    AST (SGOT) 26 15 - 37 U/L    Alk.  phosphatase 92 50 - 136 U/L    Protein, total 7.5 6.3 - 8.2 g/dL    Albumin 2.8 (L) 3.2 - 4.6 g/dL    Globulin 4.7 (H) 2.3 - 3.5 g/dL    A-G Ratio 0.6 (L) 1.2 - 3.5     LIPASE    Collection Time: 12/26/21  7:50 PM   Result Value Ref Range    Lipase 233 73 - 393 U/L   MAGNESIUM    Collection Time: 12/26/21  7:50 PM   Result Value Ref Range    Magnesium 2.1 1.8 - 2.4 mg/dL   TROPONIN-HIGH SENSITIVITY    Collection Time: 12/27/21 12:31 AM   Result Value Ref Range    Troponin-High Sensitivity 6.8 0 - 14 pg/mL              Damon Steger Dyanne Habermann., MD; 12/26/2021 @11:01 PM Voice dictation software was used during the making of this note.  This software is not perfect and grammatical and other typographical errors may be present.   This note has not been proofread for errors.  ===================================================================

## 2021-12-27 NOTE — ED TRIAGE NOTES
Arrives with face mask in place. Assisted into triage via wheelchair by spouse. Reports epigastric pain radiating through to back, diaphoresis and n/v. Onset couple hours ago while sitting down. Hospitalized here 12/4 for covid pneumonia, afib, hypoxia. Sent home on 02, arrives on 02.  Denies increased shortness of breath

## 2021-12-28 ENCOUNTER — HOME CARE VISIT (OUTPATIENT)
Dept: SCHEDULING | Facility: HOME HEALTH | Age: 71
End: 2021-12-28
Payer: COMMERCIAL

## 2021-12-28 VITALS
HEART RATE: 78 BPM | TEMPERATURE: 98.3 F | RESPIRATION RATE: 19 BRPM | SYSTOLIC BLOOD PRESSURE: 124 MMHG | OXYGEN SATURATION: 97 % | DIASTOLIC BLOOD PRESSURE: 76 MMHG

## 2021-12-28 PROCEDURE — 3331090002 HH PPS REVENUE DEBIT

## 2021-12-28 PROCEDURE — 3331090001 HH PPS REVENUE CREDIT

## 2021-12-28 PROCEDURE — G0299 HHS/HOSPICE OF RN EA 15 MIN: HCPCS

## 2021-12-29 PROCEDURE — 3331090001 HH PPS REVENUE CREDIT

## 2021-12-29 PROCEDURE — 3331090002 HH PPS REVENUE DEBIT

## 2021-12-30 PROCEDURE — 3331090001 HH PPS REVENUE CREDIT

## 2021-12-30 PROCEDURE — 3331090002 HH PPS REVENUE DEBIT

## 2021-12-31 PROCEDURE — 3331090002 HH PPS REVENUE DEBIT

## 2021-12-31 PROCEDURE — 3331090001 HH PPS REVENUE CREDIT

## 2022-01-01 PROCEDURE — 3331090001 HH PPS REVENUE CREDIT

## 2022-01-01 PROCEDURE — 3331090002 HH PPS REVENUE DEBIT

## 2022-01-02 PROCEDURE — 3331090002 HH PPS REVENUE DEBIT

## 2022-01-02 PROCEDURE — 3331090001 HH PPS REVENUE CREDIT

## 2022-01-03 PROCEDURE — 3331090001 HH PPS REVENUE CREDIT

## 2022-01-03 PROCEDURE — 3331090002 HH PPS REVENUE DEBIT

## 2022-01-04 PROCEDURE — 3331090002 HH PPS REVENUE DEBIT

## 2022-01-04 PROCEDURE — 3331090001 HH PPS REVENUE CREDIT

## 2022-01-05 PROCEDURE — 3331090001 HH PPS REVENUE CREDIT

## 2022-01-05 PROCEDURE — 3331090002 HH PPS REVENUE DEBIT

## 2022-01-06 ENCOUNTER — HOME CARE VISIT (OUTPATIENT)
Dept: SCHEDULING | Facility: HOME HEALTH | Age: 72
End: 2022-01-06
Payer: COMMERCIAL

## 2022-01-06 VITALS
DIASTOLIC BLOOD PRESSURE: 68 MMHG | HEART RATE: 78 BPM | RESPIRATION RATE: 1 BRPM | TEMPERATURE: 98.3 F | SYSTOLIC BLOOD PRESSURE: 124 MMHG | OXYGEN SATURATION: 94 %

## 2022-01-06 PROCEDURE — 3331090002 HH PPS REVENUE DEBIT

## 2022-01-06 PROCEDURE — 3331090001 HH PPS REVENUE CREDIT

## 2022-01-06 PROCEDURE — G0299 HHS/HOSPICE OF RN EA 15 MIN: HCPCS

## 2022-01-07 PROCEDURE — 3331090001 HH PPS REVENUE CREDIT

## 2022-01-07 PROCEDURE — 3331090002 HH PPS REVENUE DEBIT

## 2022-01-08 PROCEDURE — 3331090002 HH PPS REVENUE DEBIT

## 2022-01-08 PROCEDURE — 3331090001 HH PPS REVENUE CREDIT

## 2022-01-09 PROCEDURE — 3331090001 HH PPS REVENUE CREDIT

## 2022-01-09 PROCEDURE — 3331090002 HH PPS REVENUE DEBIT

## 2022-01-10 PROCEDURE — 3331090002 HH PPS REVENUE DEBIT

## 2022-01-10 PROCEDURE — 3331090001 HH PPS REVENUE CREDIT

## 2022-01-11 PROCEDURE — 3331090001 HH PPS REVENUE CREDIT

## 2022-01-11 PROCEDURE — 3331090002 HH PPS REVENUE DEBIT

## 2022-01-12 PROCEDURE — 3331090002 HH PPS REVENUE DEBIT

## 2022-01-12 PROCEDURE — 3331090001 HH PPS REVENUE CREDIT

## 2022-01-13 ENCOUNTER — HOME CARE VISIT (OUTPATIENT)
Dept: SCHEDULING | Facility: HOME HEALTH | Age: 72
End: 2022-01-13
Payer: COMMERCIAL

## 2022-01-13 PROCEDURE — G0299 HHS/HOSPICE OF RN EA 15 MIN: HCPCS

## 2022-01-13 PROCEDURE — 3331090001 HH PPS REVENUE CREDIT

## 2022-01-13 PROCEDURE — 3331090002 HH PPS REVENUE DEBIT

## 2022-01-14 PROCEDURE — 3331090002 HH PPS REVENUE DEBIT

## 2022-01-14 PROCEDURE — 3331090001 HH PPS REVENUE CREDIT

## 2022-01-15 PROCEDURE — 3331090002 HH PPS REVENUE DEBIT

## 2022-01-15 PROCEDURE — 3331090001 HH PPS REVENUE CREDIT

## 2022-01-16 PROCEDURE — 3331090001 HH PPS REVENUE CREDIT

## 2022-01-16 PROCEDURE — 3331090002 HH PPS REVENUE DEBIT

## 2022-01-16 PROCEDURE — 3331090003 HH PPS REVENUE ADJ

## 2022-01-17 VITALS
RESPIRATION RATE: 17 BRPM | DIASTOLIC BLOOD PRESSURE: 82 MMHG | TEMPERATURE: 98 F | OXYGEN SATURATION: 97 % | SYSTOLIC BLOOD PRESSURE: 154 MMHG | HEART RATE: 74 BPM

## 2022-01-17 PROCEDURE — 400018 HH-NO PAY CLAIM PROCEDURE

## 2022-01-17 PROCEDURE — 3331090002 HH PPS REVENUE DEBIT

## 2022-01-17 PROCEDURE — 3331090001 HH PPS REVENUE CREDIT

## 2022-01-18 PROCEDURE — 3331090002 HH PPS REVENUE DEBIT

## 2022-01-18 PROCEDURE — 3331090001 HH PPS REVENUE CREDIT

## 2022-01-19 PROCEDURE — 3331090001 HH PPS REVENUE CREDIT

## 2022-01-19 PROCEDURE — 3331090002 HH PPS REVENUE DEBIT

## 2022-01-20 ENCOUNTER — HOME CARE VISIT (OUTPATIENT)
Dept: SCHEDULING | Facility: HOME HEALTH | Age: 72
End: 2022-01-20
Payer: COMMERCIAL

## 2022-01-20 VITALS
TEMPERATURE: 98.5 F | HEART RATE: 80 BPM | DIASTOLIC BLOOD PRESSURE: 70 MMHG | OXYGEN SATURATION: 95 % | RESPIRATION RATE: 18 BRPM | SYSTOLIC BLOOD PRESSURE: 110 MMHG

## 2022-01-20 PROCEDURE — G0299 HHS/HOSPICE OF RN EA 15 MIN: HCPCS

## 2022-01-20 PROCEDURE — 3331090001 HH PPS REVENUE CREDIT

## 2022-01-20 PROCEDURE — 3331090002 HH PPS REVENUE DEBIT

## 2022-01-20 PROCEDURE — 3331090003 HH PPS REVENUE ADJ

## 2022-01-20 PROCEDURE — 400013 HH SOC

## 2022-01-25 PROBLEM — R06.02 SHORTNESS OF BREATH: Status: ACTIVE | Noted: 2022-01-25

## 2022-01-25 PROBLEM — R06.09 COVID-19 LONG HAULER MANIFESTING CHRONIC DYSPNEA: Status: ACTIVE | Noted: 2022-01-25

## 2022-01-25 PROBLEM — U09.9 COVID-19 LONG HAULER MANIFESTING CHRONIC DYSPNEA: Status: ACTIVE | Noted: 2022-01-25

## 2022-02-20 ENCOUNTER — APPOINTMENT (OUTPATIENT)
Dept: CT IMAGING | Age: 72
DRG: 069 | End: 2022-02-20
Attending: EMERGENCY MEDICINE
Payer: COMMERCIAL

## 2022-02-20 ENCOUNTER — HOSPITAL ENCOUNTER (INPATIENT)
Age: 72
LOS: 1 days | Discharge: HOME | DRG: 069 | End: 2022-02-21
Attending: EMERGENCY MEDICINE | Admitting: INTERNAL MEDICINE
Payer: COMMERCIAL

## 2022-02-20 DIAGNOSIS — G45.9 TIA (TRANSIENT ISCHEMIC ATTACK): Primary | ICD-10-CM

## 2022-02-20 DIAGNOSIS — E66.01 SEVERE OBESITY (HCC): ICD-10-CM

## 2022-02-20 DIAGNOSIS — I25.810 CORONARY ARTERY DISEASE INVOLVING CORONARY BYPASS GRAFT OF NATIVE HEART WITHOUT ANGINA PECTORIS: ICD-10-CM

## 2022-02-20 DIAGNOSIS — E78.5 DYSLIPIDEMIA: Chronic | ICD-10-CM

## 2022-02-20 DIAGNOSIS — I48.0 PAROXYSMAL ATRIAL FIBRILLATION (HCC): ICD-10-CM

## 2022-02-20 DIAGNOSIS — G47.30 SLEEP APNEA, UNSPECIFIED TYPE: Chronic | ICD-10-CM

## 2022-02-20 PROBLEM — R29.810 FACIAL DROOP: Status: ACTIVE | Noted: 2022-02-20

## 2022-02-20 PROBLEM — R47.81 SLURRED SPEECH: Status: ACTIVE | Noted: 2022-02-20

## 2022-02-20 PROBLEM — R53.1 RIGHT SIDED WEAKNESS: Status: ACTIVE | Noted: 2022-02-20

## 2022-02-20 LAB
ANION GAP SERPL CALC-SCNC: 2 MMOL/L (ref 7–16)
ATRIAL RATE: 161 BPM
BASOPHILS # BLD: 0.1 K/UL (ref 0–0.2)
BASOPHILS NFR BLD: 1 % (ref 0–2)
BUN SERPL-MCNC: 10 MG/DL (ref 8–23)
CALCIUM SERPL-MCNC: 8.8 MG/DL (ref 8.3–10.4)
CALCULATED R AXIS, ECG10: 75 DEGREES
CALCULATED T AXIS, ECG11: 75 DEGREES
CHLORIDE SERPL-SCNC: 106 MMOL/L (ref 98–107)
CO2 SERPL-SCNC: 32 MMOL/L (ref 21–32)
CREAT SERPL-MCNC: 0.9 MG/DL (ref 0.8–1.5)
DIAGNOSIS, 93000: NORMAL
DIFFERENTIAL METHOD BLD: NORMAL
EOSINOPHIL # BLD: 0.2 K/UL (ref 0–0.8)
EOSINOPHIL NFR BLD: 3 % (ref 0.5–7.8)
ERYTHROCYTE [DISTWIDTH] IN BLOOD BY AUTOMATED COUNT: 13.5 % (ref 11.9–14.6)
GLUCOSE BLD STRIP.AUTO-MCNC: 86 MG/DL (ref 65–100)
GLUCOSE SERPL-MCNC: 94 MG/DL (ref 65–100)
HCT VFR BLD AUTO: 47.9 % (ref 41.1–50.3)
HGB BLD-MCNC: 15.5 G/DL (ref 13.6–17.2)
IMM GRANULOCYTES # BLD AUTO: 0 K/UL (ref 0–0.5)
IMM GRANULOCYTES NFR BLD AUTO: 0 % (ref 0–5)
INR BLD: 1.3 (ref 0.9–1.2)
LYMPHOCYTES # BLD: 2.2 K/UL (ref 0.5–4.6)
LYMPHOCYTES NFR BLD: 27 % (ref 13–44)
MCH RBC QN AUTO: 28.5 PG (ref 26.1–32.9)
MCHC RBC AUTO-ENTMCNC: 32.4 G/DL (ref 31.4–35)
MCV RBC AUTO: 88.2 FL (ref 79.6–97.8)
MONOCYTES # BLD: 0.7 K/UL (ref 0.1–1.3)
MONOCYTES NFR BLD: 8 % (ref 4–12)
NEUTS SEG # BLD: 4.8 K/UL (ref 1.7–8.2)
NEUTS SEG NFR BLD: 60 % (ref 43–78)
NRBC # BLD: 0 K/UL (ref 0–0.2)
PLATELET # BLD AUTO: 237 K/UL (ref 150–450)
PMV BLD AUTO: 9.6 FL (ref 9.4–12.3)
POTASSIUM SERPL-SCNC: 3.7 MMOL/L (ref 3.5–5.1)
PT BLD: 15.1 SECS (ref 9.6–11.6)
Q-T INTERVAL, ECG07: 401 MS
QRS DURATION, ECG06: 105 MS
QTC CALCULATION (BEZET), ECG08: 417 MS
RBC # BLD AUTO: 5.43 M/UL (ref 4.23–5.6)
SERVICE CMNT-IMP: NORMAL
SODIUM SERPL-SCNC: 140 MMOL/L (ref 138–145)
TROPONIN-HIGH SENSITIVITY: 8.4 PG/ML (ref 0–14)
VENTRICULAR RATE, ECG03: 65 BPM
WBC # BLD AUTO: 7.9 K/UL (ref 4.3–11.1)

## 2022-02-20 PROCEDURE — 74011000250 HC RX REV CODE- 250: Performed by: EMERGENCY MEDICINE

## 2022-02-20 PROCEDURE — 74011000636 HC RX REV CODE- 636: Performed by: EMERGENCY MEDICINE

## 2022-02-20 PROCEDURE — 84484 ASSAY OF TROPONIN QUANT: CPT

## 2022-02-20 PROCEDURE — 70498 CT ANGIOGRAPHY NECK: CPT

## 2022-02-20 PROCEDURE — 70450 CT HEAD/BRAIN W/O DYE: CPT

## 2022-02-20 PROCEDURE — 80048 BASIC METABOLIC PNL TOTAL CA: CPT

## 2022-02-20 PROCEDURE — 74011250637 HC RX REV CODE- 250/637: Performed by: INTERNAL MEDICINE

## 2022-02-20 PROCEDURE — 82962 GLUCOSE BLOOD TEST: CPT

## 2022-02-20 PROCEDURE — 74011250637 HC RX REV CODE- 250/637: Performed by: NURSE PRACTITIONER

## 2022-02-20 PROCEDURE — 74011000250 HC RX REV CODE- 250: Performed by: INTERNAL MEDICINE

## 2022-02-20 PROCEDURE — 93005 ELECTROCARDIOGRAM TRACING: CPT

## 2022-02-20 PROCEDURE — 0042T CT PERF W CBF: CPT

## 2022-02-20 PROCEDURE — 74011250637 HC RX REV CODE- 250/637: Performed by: EMERGENCY MEDICINE

## 2022-02-20 PROCEDURE — 99285 EMERGENCY DEPT VISIT HI MDM: CPT

## 2022-02-20 PROCEDURE — 94762 N-INVAS EAR/PLS OXIMTRY CONT: CPT

## 2022-02-20 PROCEDURE — 4A03X5D MEASUREMENT OF ARTERIAL FLOW, INTRACRANIAL, EXTERNAL APPROACH: ICD-10-PCS | Performed by: RADIOLOGY

## 2022-02-20 PROCEDURE — 65660000000 HC RM CCU STEPDOWN

## 2022-02-20 PROCEDURE — 99223 1ST HOSP IP/OBS HIGH 75: CPT | Performed by: INTERNAL MEDICINE

## 2022-02-20 PROCEDURE — 85025 COMPLETE CBC W/AUTO DIFF WBC: CPT

## 2022-02-20 PROCEDURE — 85610 PROTHROMBIN TIME: CPT

## 2022-02-20 PROCEDURE — 74011000258 HC RX REV CODE- 258: Performed by: EMERGENCY MEDICINE

## 2022-02-20 PROCEDURE — 86580 TB INTRADERMAL TEST: CPT | Performed by: INTERNAL MEDICINE

## 2022-02-20 RX ORDER — METOPROLOL TARTRATE 100 MG/1
TABLET ORAL DAILY
COMMUNITY
End: 2022-02-21

## 2022-02-20 RX ORDER — SODIUM CHLORIDE 0.9 % (FLUSH) 0.9 %
5-40 SYRINGE (ML) INJECTION AS NEEDED
Status: DISCONTINUED | OUTPATIENT
Start: 2022-02-20 | End: 2022-02-21 | Stop reason: HOSPADM

## 2022-02-20 RX ORDER — SODIUM CHLORIDE 0.9 % (FLUSH) 0.9 %
10 SYRINGE (ML) INJECTION
Status: COMPLETED | OUTPATIENT
Start: 2022-02-20 | End: 2022-02-20

## 2022-02-20 RX ORDER — ACETAMINOPHEN 650 MG/1
650 SUPPOSITORY RECTAL
Status: DISCONTINUED | OUTPATIENT
Start: 2022-02-20 | End: 2022-02-21 | Stop reason: HOSPADM

## 2022-02-20 RX ORDER — SODIUM CHLORIDE 0.9 % (FLUSH) 0.9 %
5-10 SYRINGE (ML) INJECTION EVERY 8 HOURS
Status: DISCONTINUED | OUTPATIENT
Start: 2022-02-20 | End: 2022-02-21 | Stop reason: HOSPADM

## 2022-02-20 RX ORDER — FAMOTIDINE 20 MG/1
20 TABLET, FILM COATED ORAL
Status: DISCONTINUED | OUTPATIENT
Start: 2022-02-20 | End: 2022-02-21 | Stop reason: HOSPADM

## 2022-02-20 RX ORDER — METOPROLOL TARTRATE 25 MG/1
25 TABLET, FILM COATED ORAL EVERY 12 HOURS
Status: DISCONTINUED | OUTPATIENT
Start: 2022-02-20 | End: 2022-02-21 | Stop reason: HOSPADM

## 2022-02-20 RX ORDER — LABETALOL HYDROCHLORIDE 5 MG/ML
5 INJECTION, SOLUTION INTRAVENOUS
Status: DISCONTINUED | OUTPATIENT
Start: 2022-02-20 | End: 2022-02-21 | Stop reason: HOSPADM

## 2022-02-20 RX ORDER — ATORVASTATIN CALCIUM 40 MG/1
40 TABLET, FILM COATED ORAL
Status: DISCONTINUED | OUTPATIENT
Start: 2022-02-20 | End: 2022-02-21

## 2022-02-20 RX ORDER — ACETAMINOPHEN 325 MG/1
650 TABLET ORAL
Status: DISCONTINUED | OUTPATIENT
Start: 2022-02-20 | End: 2022-02-21 | Stop reason: HOSPADM

## 2022-02-20 RX ORDER — FACIAL-BODY WIPES
10 EACH TOPICAL DAILY PRN
Status: DISCONTINUED | OUTPATIENT
Start: 2022-02-20 | End: 2022-02-21 | Stop reason: HOSPADM

## 2022-02-20 RX ORDER — GUAIFENESIN 100 MG/5ML
81 LIQUID (ML) ORAL DAILY
Status: DISCONTINUED | OUTPATIENT
Start: 2022-02-21 | End: 2022-02-20

## 2022-02-20 RX ORDER — CLOPIDOGREL BISULFATE 75 MG/1
75 TABLET ORAL DAILY
Status: DISCONTINUED | OUTPATIENT
Start: 2022-02-21 | End: 2022-02-21 | Stop reason: HOSPADM

## 2022-02-20 RX ORDER — ASPIRIN 81 MG/1
81 TABLET ORAL DAILY
Status: DISCONTINUED | OUTPATIENT
Start: 2022-02-21 | End: 2022-02-20

## 2022-02-20 RX ORDER — SODIUM CHLORIDE 0.9 % (FLUSH) 0.9 %
5-40 SYRINGE (ML) INJECTION EVERY 8 HOURS
Status: DISCONTINUED | OUTPATIENT
Start: 2022-02-20 | End: 2022-02-21 | Stop reason: HOSPADM

## 2022-02-20 RX ORDER — CLOPIDOGREL BISULFATE 75 MG/1
300 TABLET ORAL
Status: COMPLETED | OUTPATIENT
Start: 2022-02-20 | End: 2022-02-20

## 2022-02-20 RX ORDER — HEPARIN SODIUM 5000 [USP'U]/ML
5000 INJECTION, SOLUTION INTRAVENOUS; SUBCUTANEOUS EVERY 8 HOURS
Status: DISCONTINUED | OUTPATIENT
Start: 2022-02-20 | End: 2022-02-20

## 2022-02-20 RX ORDER — ATORVASTATIN CALCIUM 80 MG/1
80 TABLET, FILM COATED ORAL
Status: DISCONTINUED | OUTPATIENT
Start: 2022-02-20 | End: 2022-02-21 | Stop reason: HOSPADM

## 2022-02-20 RX ORDER — ONDANSETRON 2 MG/ML
4 INJECTION INTRAMUSCULAR; INTRAVENOUS
Status: DISCONTINUED | OUTPATIENT
Start: 2022-02-20 | End: 2022-02-21 | Stop reason: HOSPADM

## 2022-02-20 RX ORDER — SODIUM CHLORIDE 0.9 % (FLUSH) 0.9 %
5-10 SYRINGE (ML) INJECTION AS NEEDED
Status: DISCONTINUED | OUTPATIENT
Start: 2022-02-20 | End: 2022-02-21 | Stop reason: HOSPADM

## 2022-02-20 RX ADMIN — CLOPIDOGREL BISULFATE 300 MG: 75 TABLET ORAL at 13:00

## 2022-02-20 RX ADMIN — SODIUM CHLORIDE, PRESERVATIVE FREE 10 ML: 5 INJECTION INTRAVENOUS at 22:00

## 2022-02-20 RX ADMIN — Medication 5 UNITS: at 18:11

## 2022-02-20 RX ADMIN — SODIUM CHLORIDE, PRESERVATIVE FREE 10 ML: 5 INJECTION INTRAVENOUS at 18:15

## 2022-02-20 RX ADMIN — IOPAMIDOL 50 ML: 755 INJECTION, SOLUTION INTRAVENOUS at 12:43

## 2022-02-20 RX ADMIN — SODIUM CHLORIDE, PRESERVATIVE FREE 10 ML: 5 INJECTION INTRAVENOUS at 18:14

## 2022-02-20 RX ADMIN — APIXABAN 5 MG: 5 TABLET, FILM COATED ORAL at 20:22

## 2022-02-20 RX ADMIN — METOPROLOL TARTRATE 25 MG: 25 TABLET, FILM COATED ORAL at 20:22

## 2022-02-20 RX ADMIN — ATORVASTATIN CALCIUM 80 MG: 80 TABLET, FILM COATED ORAL at 21:24

## 2022-02-20 RX ADMIN — ATORVASTATIN CALCIUM 40 MG: 40 TABLET, FILM COATED ORAL at 21:25

## 2022-02-20 RX ADMIN — SODIUM CHLORIDE 100 ML: 900 INJECTION, SOLUTION INTRAVENOUS at 12:50

## 2022-02-20 RX ADMIN — Medication 10 ML: at 12:50

## 2022-02-20 NOTE — H&P
Hospitalist History and Physical   Admit Date:  2022 11:52 AM   Name:  Leila Jamison   Age:  81 y.o. Sex:  male  :  1950   MRN:  [id number]   [facility]:  [facility]    Presenting Complaint: Numbness    Reason(s) for Admission: TIA (transient ischemic attack) [G45.9]  Slurred speech [R47.81]  Right sided weakness [R53.1]  Facial droop [R29.810]     History of Present Illness:   Leila Jamison is a 81 y.o. male with medical history of hypertension, CAD, pAFib(no longer on Eliquis), recent Covid infection in 2021 who presented to ED with acute onset of right-sided weakness/numbness, aphasia and facial droop. Symptom onset around 10 AM this morning. NIHSS of 1 on arrival to the ED and most of his symptoms have resolved except for his facial droop. Evaluated by teleneurology. Patient's facial droop has resolved during the evaluation. Not a candidate for TPA. Patient reports that he is no longer on Eliquis as by his cardiologist.  Reports that his A. fib post due to Covid and has been in sinus rhythm. Last dose of Eliquis was about a week ago. Reports normal state of health prior to this morning. Denies any fever, chills, cough, shortness of breath, chest pain, nausea, vomiting. Patient is hemodynamically stable. Laboratory work-up has been unremarkable. CT head without any acute intracranial processes. CT perfusion without any abnormalities. CTA of head without any acute occlusion    Review of Systems:  10 systems reviewed and negative except as noted in HPI. Assessment & Plan:   TIA (right-sided weakness/numbness, facial droop and slurred speech)  NIHSS of 1 on arrival.  Symptoms have resolved. Evaluated by telemetry neurology and not a candidate for TPA.     S/p ASA 325mg and Plavix 300mg in ED  - ASA 81, high intensity statin, plavix 75mg daily  - Neuro check q4h  - Telemetry monitoring  - permissive HTN to 220/120 x 24hrs ( if above goal treat with labetalol 10mg IV or nicardipine gtt)  - MRI Brain, Echo w/ bubble study  - bedside swallow test / SLP   - f/u A1c, TSH, trops  - PT/OT  - DVT PPx  - glucemic control    HTN // HLD // CAD s/p CABG  - on metoprolol (restarted a few days ago per patient)  - on repatha at home    Paroxysmal atrial fibrillation  Was on Eliquis but last dose 1 week ago. Thought to be due to COVID and has resolved per patient after his Cardiology visit  - consult cardiology to determine if he needs to go back on eliquis or loop? Diet: Regular  VTE ppx: heparin sq  Code status: FULL    I have discussed the case with ED Provider. I have reviewed and ordered clinical lab tests and independently visualized images, tracing. I have reviewed outpatient records. I spent 37 minutes of time caring for this patient at bedside or nearby, more than 50 percent of which was spent on coordination of care and/or counseling regarding the disease process, treatment options, and treatment plan.       Hospital Problems as of 2/20/2022 Date Reviewed: 1/25/2022          Codes Class Noted - Resolved POA    * (Principal) TIA (transient ischemic attack) ICD-10-CM: G45.9  ICD-9-CM: 435.9  2/20/2022 - Present Yes        Slurred speech ICD-10-CM: R47.81  ICD-9-CM: 784.59  2/20/2022 - Present Yes        Facial droop ICD-10-CM: R29.810  ICD-9-CM: 781.94  2/20/2022 - Present Yes        Right sided weakness ICD-10-CM: R53.1  ICD-9-CM: 728.87  2/20/2022 - Present Yes        Primary hypertension (Chronic) ICD-10-CM: I10  ICD-9-CM: 401.9  3/14/2017 - Present Yes        Paroxysmal atrial fibrillation (HCC) ICD-10-CM: I48.0  ICD-9-CM: 427.31  3/12/2016 - Present Yes        S/P CABG x 4 ICD-10-CM: Z95.1  ICD-9-CM: V45.81  3/9/2016 - Present Yes        Dyslipidemia (Chronic) ICD-10-CM: E78.5  ICD-9-CM: 272.4  3/6/2016 - Present Yes              Past History:  Past Medical History:   Diagnosis Date    Acute hypoxemic respiratory failure [address] [address] 12/4/2021    Anterior myocardial infarction ([facility] [address] 10/2003  Arrhythmia     paroxysmal a fib    Arteriosclerotic coronary artery disease 11/2005    Arthritis     fingers    Atrial fibrillation with RVR ([address] 12/4/2021    Cancer (HCC)     prostate    Coronary atherosclerosis of native coronary vessel 10/2001    Elevated serum creatinine 12/15/2021    Family history of premature CAD     Father hx cabg/CHF  Brother CABG x 2    Gout     right toe but no problem now    Hypercholesterolemia     Hypertension     Sinus bradycardia 12/14/2021     Past Surgical History:   Procedure Laterality Date    COLONOSCOPY N/A 12/21/2016    COLONOSCOPY  BMI 34 performed [facility] END[facility]COPY    HX COLONOSCOPY      HX CORONARY ARTERY BYPASS GRAFT  3/9/2016    x4 - Dr. Lacey Sanon    HX HEENT      tonsillectomy    HX ORTHOPAEDIC      2 back surg    HX PROSTATECTOMY      [place] CARDIAC SURG PROCEDURE UNLIST      stent    [place] CARDIAC SURG PROCEDURE UNLIST  March 9,2016    quidrupal bypass       Allergies   Allergen Reactions    Latex Rash    Codeine Nausea and Vomiting    Tape [Adhesive] Rash      Social History     Tobacco Use    Smoking status: Never Smoker    Smokeless tobacco: Never Used   Substance Use Topics    Alcohol use: Yes     Comment: beer socially-seldom      Family History   Problem Relation Age of Onset    Heart Disease Father     Diabetes Brother     Heart Disease Brother     Cancer Sister     Colon Cancer Neg Hx       Family history reviewed and negative except as noted above.     Immunization History   Administered Date(s) Administered    TB Skin Test (PPD) Intradermal 03/09/2016    Tdap 10/21/2014     Prior to Admit Medications:  Current Outpatient Medications   Medication Instructions    albuterol (PROVENTIL HFA, VENTOLIN HFA, PROAIR HFA) 90 mcg/actuation inhaler 2 Puffs, Inhalation, EVERY 4 HOURS AS NEEDED    apixaban (ELIQUIS) 5 mg, Oral, 2 TIMES DAILY    aspirin delayed-release 81 mg, Oral, 7AM    evolocumab (REPATHA) 140 mg, SubCUTAneous, EVERY 14 DAYS    fluticasone (FLONASE) 50 mcg/actuation nasal spray One spray in each nostril twice daily    fluticasone propion-salmeteroL (Advair HFA) 230-21 mcg/actuation inhaler 2 Puffs, Inhalation, 2 TIMES DAILY    loratadine (CLARITIN) 10 mg, Oral, 7AM    nitroglycerin (NITROSTAT) 0.4 mg, SubLINGual, EVERY 5 MIN AS NEEDED, Up to 3 doses.  OXYGEN-AIR DELIVERY SYSTEMS 2 L, Inhalation, AS NEEDED, Titrate oxygen to maintain O2 sat above 92% at rest and above 88% with ambulation. Objective:     Patient Vitals for the past 24 hrs:   Temp Pulse Resp BP SpO2   02/20/22 1515 -- 60 22 (!) 155/71 94 %   02/20/22 1500 -- 62 21 (!) 150/72 93 %   02/20/22 1445 -- 61 22 (!) 169/74 94 %   02/20/22 1330 -- 62 23 (!) 170/76 94 %   02/20/22 1300 -- 65 24 (!) 173/86 92 %   02/20/22 1215 -- 64 21 (!) 159/76 91 %   02/20/22 1200 -- 72 22 (!) 179/110 --   02/20/22 1142 98.8 °F (37.1 °C) 70 18 (!) 202/95 95 %     Oxygen Therapy  O2 Sat (%): 94 % (02/20/22 1515)  Pulse via Oximetry: 60 beats per minute (02/20/22 1515)  O2 Device: None (Room air) (02/20/22 1142)    Estimated body mass index is 30.71 kg/m² as calculated from the following:    Height as of this encounter: 5' 10\" (1.778 m). Weight as of this encounter: 97.1 kg (214 lb). No intake or output data in the 24 hours ending 02/20/22 1624      Physical Exam:    Blood pressure (!) 155/71, pulse 60, temperature 98.8 °F (37.1 °C), resp. rate 22, height 5' 10\" (1.778 m), weight 97.1 kg (214 lb), SpO2 94 %. General:    Well nourished. No overt distress  Head:  Normocephalic, atraumatic  Eyes:  Sclerae appear normal.  Pupils equally round. ENT:  Nares appear normal, no drainage. Moist oral mucosa  Neck:  No restricted ROM. Trachea midline   CV:   RRR. No m/r/g. No jugular venous distension. Lungs:   CTAB. No wheezing, rhonchi, or rales. Respirations even, unlabored  Abdomen: Bowel sounds present.   Soft, nontender, nondistended. Extremities: No cyanosis or clubbing. No edema  Skin:     No rashes and normal coloration. Warm and dry. Neuro:  CN II-XII grossly intact. Sensation intact. A&Ox3  Psych:  Normal mood and affect. I have reviewed ordered lab tests and independently visualized imaging below:    Last 24hr Labs:  Recent Results (from the past 24 hour(s))   GLUCOSE, POC    Collection Time: 02/20/22 11:43 AM   Result Value Ref Range    Glucose (POC) 86 65 - 100 mg/dL    Performed by Luci    CBC WITH AUTOMATED DIFF    Collection Time: 02/20/22 11:44 AM   Result Value Ref Range    WBC 7.9 4.3 - 11.1 K/uL    RBC 5.43 4.23 - 5.6 M/uL    HGB 15.5 13.6 - 17.2 g/dL    HCT 47.9 41.1 - 50.3 %    MCV 88.2 79.6 - 97.8 FL    MCH 28.5 26.1 - 32.9 PG    MCHC 32.4 31.4 - 35.0 g/dL    RDW 13.5 11.9 - 14.6 %    PLATELET 681 011 - 145 K/uL    MPV 9.6 9.4 - 12.3 FL    ABSOLUTE NRBC 0.00 0.0 - 0.2 K/uL    DF AUTOMATED      NEUTROPHILS 60 43 - 78 %    LYMPHOCYTES 27 13 - 44 %    MONOCYTES 8 4.0 - 12.0 %    EOSINOPHILS 3 0.5 - 7.8 %    BASOPHILS 1 0.0 - 2.0 %    IMMATURE GRANULOCYTES 0 0.0 - 5.0 %    ABS. NEUTROPHILS 4.8 1.7 - 8.2 K/UL    ABS. LYMPHOCYTES 2.2 0.5 - 4.6 K/UL    ABS. MONOCYTES 0.7 0.1 - 1.3 K/UL    ABS. EOSINOPHILS 0.2 0.0 - 0.8 K/UL    ABS. BASOPHILS 0.1 0.0 - 0.2 K/UL    ABS. IMM.  GRANS. 0.0 0.0 - 0.5 K/UL   METABOLIC PANEL, BASIC    Collection Time: 02/20/22 11:44 AM   Result Value Ref Range    Sodium 140 138 - 145 mmol/L    Potassium 3.7 3.5 - 5.1 mmol/L    Chloride 106 98 - 107 mmol/L    CO2 32 21 - 32 mmol/L    Anion gap 2 (L) 7 - 16 mmol/L    Glucose 94 65 - 100 mg/dL    BUN 10 8 - 23 MG/DL    Creatinine 0.90 0.8 - 1.5 MG/DL    GFR est AA >60 >60 ml/min/1.73m2    GFR est non-AA >60 >60 ml/min/1.73m2    Calcium 8.8 8.3 - 10.4 MG/DL   TROPONIN-HIGH SENSITIVITY    Collection Time: 02/20/22 11:44 AM   Result Value Ref Range    Troponin-High Sensitivity 8.4 0 - 14 pg/mL   POC PT/INR    Collection Time: 02/20/22 11:45 AM   Result Value Ref Range    Prothrombin time (POC) 15.1 (H) 9.6 - 11.6 SECS    INR (POC) 1.3 (H) 0.9 - 1.2     EKG, 12 LEAD, INITIAL    Collection Time: 02/20/22 12:00 PM   Result Value Ref Range    Ventricular Rate 65 BPM    Atrial Rate 161 BPM    QRS Duration 105 ms    Q-T Interval 401 ms    QTC Calculation (Bezet) 417 ms    Calculated R Axis 75 degrees    Calculated T Axis 75 degrees    Diagnosis       Atrial flutter  Low voltage, precordial leads  Anteroseptal infarct, old  Baseline wander in lead(s) V3         All Micro Results     None          Other Studies:  CT CODE NEURO HEAD WO CONTRAST    Result Date: 2/20/2022  CT HEAD WITHOUT CONTRAST, 2/20/2022 History: Difficulty with recollection. Right-sided numbness around 10:15 Comparison: None. Technique:   5 mm axial scans from the skull base to the vertex. All CT scans performed at this facility use one or all of the following: Automated exposure control, adjustment of the mA and/or kVp according to patient's size, iterative reconstruction. Findings:  No evidence of intracranial hemorrhage is seen. No abnormal extra-axial fluid collections are seen. Age-related chronic cortical volume loss is seen. No evidence for acute hydrocephalus is seen. No evidence of midline shift or herniation is seen. No abnormal edema pattern is seen in a vascular distribution to suggest large artery infarction. Only relatively symmetric ill-defined periventricular white matter hypoattenuation is seen in the corona radiata likely representing mild to moderate chronic microangiopathic changes. Evaluation with bone windows shows no acute osseous changes. More chronic appearing inflammatory changes are seen in the maxillary sinuses with evidence of prior sinus surgery. 1.  No acute intracranial process evident by noncontrast CT study of the head. This report was made using voice transcription.  Despite my best efforts to avoid any, transcription errors may persist. If there is any question about the accuracy of the report or need for clarification, then please call [phone], or text me through perfectserv for clarification or correction. Medications Administered     clopidogreL (PLAVIX) tablet 300 mg     Admin Date  02/20/2022 Action  Given Dose  300 mg Route  Oral Administered By  Tara Zamora RN          iopamidoL (ISOVUE-370) 76 % injection 50 mL     Admin Date  02/20/2022 Action  Given Dose  50 mL Route  IntraVENous Administered By  Luann Washington          saline peripheral flush soln 10 mL     Admin Date  02/20/2022 Action  Given Dose  10 mL Route  InterCATHeter Administered By  Luann Washington          sodium chloride 0.9 % bolus infusion 100 mL     Admin Date  02/20/2022 Action  New Bag Dose  100 mL Route  IntraVENous Administered By  Luann Washington                Signed:  Alex Tate MD    Part of this note may have been written by using a voice dictation software. The note has been proof read but may still contain some grammatical/other typographical errors.

## 2022-02-20 NOTE — PROGRESS NOTES
02/20/22 1738   NIH Stroke Scale   Interval Handoff/Transfer   Level of Conciousness (1a) 0   LOC Questions (1b) 0   LOC Commands (1c) 0   Best Gaze (2) 0   Visual (3) 0   Facial Palsy (4) 0   Motor Arm, Left (5a) 0   Motor Arm, Right (5b) 0   Motor Leg, Left (6a) 0   Motor Leg, Right (6b) 0   Limb Ataxia (7) 0   Sensory (8) 0   Best Language (9) 0   Dysarthria (10) 0   Extinction and Inattention (11) 0   Total 0

## 2022-02-20 NOTE — ED TRIAGE NOTES
Patient arrives in wheelchair to triage with mask in place. Reports concern for stroke due to difficulty with recollection. Reports while in [employer] he could not think of his dog's names. Reports right sided numbness around 1015. Dr. Gerda Velasquez to triage.

## 2022-02-20 NOTE — ROUTINE PROCESS
Primary Nurse Azeem Pinto RN and Tavares Asencio RN performed a dual skin assessment on this patient  Skin is warm dry and intact no sign of breakdown  On the sacrum and coccyx and heels. Old scar on chest.   Breakdown.

## 2022-02-20 NOTE — ED PROVIDER NOTES
29-year-old gentleman presents with concerns about right hand weakness, some facial droop, and some problems with language formation. He was with his wife this morning at Sunday school and all of this started at approximately 945 or 10 AM.  He said by arrival here his language symptoms and arm symptoms have mostly resolved although he is still having some facial droop problems. He says he has no history of previous stroke but did have some apparently temporary A. fib during a Covid infection a little over 2 months ago. He does have a history of coronary artery disease and has had previous bypass surgery. He denies any fevers or chills. He said no chest pain, nausea, vomiting, or diarrhea. No other associated symptoms. Elements of this note were created using speech recognition software. As such, errors of speech recognition may be present.            Past Medical History:   Diagnosis Date    Acute hypoxemic respiratory failure ([facility] [address] 12/4/2021    Anterior myocardial infarction [facility]) 10/2003    Arrhythmia     paroxysmal a fib    Arteriosclerotic coronary artery disease 11/2005    Arthritis     fingers    Atrial fibrillation with RVR ([facility] [address] 12/4/2021    Cancer (HCC)     prostate    Coronary atherosclerosis of native coronary vessel 10/2001    Elevated serum creatinine 12/15/2021    Family history of premature CAD     Father hx cabg/CHF  Brother CABG x 2    Gout     right toe but no problem now    Hypercholesterolemia     Hypertension     Sinus bradycardia 12/14/2021       Past Surgical History:   Procedure Laterality Date    COLONOSCOPY N/A 12/21/2016    COLONOSCOPY  BMI 34 performed by Gab[facility]elle [facility]uer MD at [facility] ENDOSCOPY    HX COLONOSCOPY      HX CORONARY ARTERY BYPASS GRAFT  3/9/2016    x4 - Dr. Lacey Sanon    HX HEENT      tonsillectomy    HX ORTHOPAEDIC      2 back surg    HX PROSTATECTOMY      [place] CARDIAC SURG PROCEDURE UNLIST      stent    OR CARDIAC SURG PROCEDURE UNLIST  March 9,2016    quidrupal bypass          Family History:   Problem Relation Age of Onset    Heart Disease Father     Diabetes Brother     Heart Disease Brother     Cancer Sister     Colon Cancer Neg Hx        Social History     Socioeconomic History    Marital status:      Spouse name: Not on file    Number of children: Not on file    Years of education: Not on file    Highest education level: Not on file   Occupational History    Not on file   Tobacco Use    Smoking status: Never Smoker    Smokeless tobacco: Never Used   Substance and Sexual Activity    Alcohol use: Yes     Comment: beer socially-seldom    Drug use: No    Sexual activity: Not on file   Other Topics Concern    Not on file   Social History Narrative    Not on file     Social Determinants of Health     Financial Resource Strain:     Difficulty of Paying Living Expenses: Not on file   Food Insecurity:     Worried About Running Out of Food in the Last Year: Not on file    Emilio of Food in the Last Year: Not on file   Transportation Needs:     Lack of Transportation (Medical): Not on file    Lack of Transportation (Non-Medical):  Not on file   Physical Activity:     Days of Exercise per Week: Not on file    Minutes of Exercise per Session: Not on file   Stress:     Feeling of Stress : Not on file   Social Connections:     Frequency of Communication with Friends and Family: Not on file    Frequency of Social Gatherings with Friends and Family: Not on file    Attends Congregation Services: Not on file    Active Member of Clubs or Organizations: Not on file    Attends Club or Organization Meetings: Not on file    Marital Status: Not on file   Intimate Partner Violence:     Fear of Current or Ex-Partner: Not on file    Emotionally Abused: Not on file    Physically Abused: Not on file    Sexually Abused: Not on file   Housing Stability:     Unable to Pay for Housing in the Last Year: Not on file    Number of [place] in the Last Year: Not on file    Unstable Housing in the Last Year: Not on file         ALLERGIES: Latex, Codeine, and Tape [adhesive]    Review of Systems   Constitutional: Negative for chills, diaphoresis and fever. HENT: Negative for congestion, rhinorrhea and sore throat. Eyes: Negative for redness and visual disturbance. Respiratory: Negative for cough, chest tightness, shortness of breath and wheezing. Cardiovascular: Negative for chest pain and palpitations. Gastrointestinal: Negative for abdominal pain, blood in stool, diarrhea, nausea and vomiting. Endocrine: Negative for polydipsia and polyuria. Genitourinary: Negative for dysuria and hematuria. Musculoskeletal: Negative for arthralgias, myalgias and neck stiffness. Skin: Negative for rash. Allergic/Immunologic: Negative for environmental allergies and food allergies. Neurological: Positive for speech difficulty and weakness. Negative for dizziness and headaches. Hematological: Negative for adenopathy. Does not bruise/bleed easily. Psychiatric/Behavioral: Negative for confusion and sleep disturbance. The patient is not nervous/anxious. Vitals:    02/20/22 1142   BP: (!) 202/95   Pulse: 70   Resp: 18   Temp: 98.8 °F (37.1 °C)   SpO2: 95%   Weight: 97.1 kg (214 lb)   Height: 5' 10\" (1.778 m)            Physical Exam  Vitals and nursing note reviewed. Constitutional:       Appearance: Normal appearance. HENT:      Head: Normocephalic and atraumatic. Mouth/Throat:      Mouth: Mucous membranes are moist.   Eyes:      General:         Right eye: No discharge. Left eye: No discharge. Cardiovascular:      Rate and Rhythm: Normal rate and regular rhythm. Pulmonary:      Effort: Pulmonary effort is normal.      Breath sounds: Normal breath sounds. Musculoskeletal:      Right lower leg: No edema. Left lower leg: No edema. Neurological:      Mental Status: He is alert.       Comments: Patient did have some right-sided facial droop. He had no pronator drift. He had normal strength in arms and legs. His language seemed clear and intact. MDM  Number of Diagnoses or Management Options  Diagnosis management comments: Code stroke was called in triage. CRITICAL CARE Documentation: This patient is critically ill and there is a high probability of of imminent or life threatening deterioration in the patient's condition without immediate management. The nature of the patient's clinical problem is: TIA    I have spent 60 minutes in direct patient care, documentation, review of labs/xrays/old records, discussion with patient, wife, teleneurology, hospitalist.     The time involved in the performance of separately reportable procedures was not counted toward critical care time. Madelin Scott MD; 2/20/2022 @2:34 PM        ED Course as of 02/20/22 1432   Sun Feb 20, 2022   1431 Patient CTA, CT perfusion, irregular CT scan do not show any obvious infarct or bleed. He does not appear to have any large vessel occlusion. I have loaded him with Plavix. He says he did take his aspirin this morning.   I will plan to admit for further evaluation. [AC]      ED Course User Index  [AC] Ruby Darnell MD       Procedures

## 2022-02-21 ENCOUNTER — APPOINTMENT (OUTPATIENT)
Dept: MRI IMAGING | Age: 72
DRG: 069 | End: 2022-02-21
Attending: INTERNAL MEDICINE
Payer: COMMERCIAL

## 2022-02-21 ENCOUNTER — APPOINTMENT (OUTPATIENT)
Dept: NON INVASIVE DIAGNOSTICS | Age: 72
DRG: 069 | End: 2022-02-21
Attending: INTERNAL MEDICINE
Payer: COMMERCIAL

## 2022-02-21 VITALS
OXYGEN SATURATION: 95 % | BODY MASS INDEX: 29.96 KG/M2 | WEIGHT: 214 LBS | HEART RATE: 69 BPM | SYSTOLIC BLOOD PRESSURE: 159 MMHG | DIASTOLIC BLOOD PRESSURE: 71 MMHG | TEMPERATURE: 98.2 F | HEIGHT: 71 IN | RESPIRATION RATE: 18 BRPM

## 2022-02-21 LAB
ANION GAP SERPL CALC-SCNC: 4 MMOL/L (ref 7–16)
BUN SERPL-MCNC: 8 MG/DL (ref 8–23)
CALCIUM SERPL-MCNC: 8.6 MG/DL (ref 8.3–10.4)
CHLORIDE SERPL-SCNC: 105 MMOL/L (ref 98–107)
CHOLEST SERPL-MCNC: 109 MG/DL
CO2 SERPL-SCNC: 30 MMOL/L (ref 21–32)
CREAT SERPL-MCNC: 0.9 MG/DL (ref 0.8–1.5)
ECHO AO ASC DIAM: 4.1 CM
ECHO AO ASCENDING AORTA INDEX: 1.89 CM/M2
ECHO AO ROOT DIAM: 3.9 CM
ECHO AO ROOT INDEX: 1.8 CM/M2
ECHO AV AREA PEAK VELOCITY: 1.9 CM2
ECHO AV AREA VTI: 2 CM2
ECHO AV AREA/BSA PEAK VELOCITY: 0.9 CM2/M2
ECHO AV AREA/BSA VTI: 0.9 CM2/M2
ECHO AV MEAN GRADIENT: 4 MMHG
ECHO AV MEAN VELOCITY: 1 M/S
ECHO AV PEAK GRADIENT: 8 MMHG
ECHO AV PEAK VELOCITY: 1.4 M/S
ECHO AV VELOCITY RATIO: 0.71
ECHO AV VTI: 30 CM
ECHO EST RA PRESSURE: 3 MMHG
ECHO LA AREA 2C: 25 CM2
ECHO LA AREA 4C: 24.2 CM2
ECHO LA DIAMETER INDEX: 1.57 CM/M2
ECHO LA DIAMETER: 3.4 CM
ECHO LA MAJOR AXIS: 5.7 CM
ECHO LA MINOR AXIS: 6 CM
ECHO LA TO AORTIC ROOT RATIO: 0.87
ECHO LA VOL BP: 80 ML (ref 18–58)
ECHO LA VOL/BSA BIPLANE: 37 ML/M2 (ref 16–34)
ECHO LV E' LATERAL VELOCITY: 10 CM/S
ECHO LV E' SEPTAL VELOCITY: 6 CM/S
ECHO LV EDV A2C: 106 ML
ECHO LV EDV A4C: 97 ML
ECHO LV EDV INDEX A4C: 45 ML/M2
ECHO LV EDV NDEX A2C: 49 ML/M2
ECHO LV EJECTION FRACTION A2C: 70 %
ECHO LV EJECTION FRACTION A4C: 68 %
ECHO LV EJECTION FRACTION BIPLANE: 70 % (ref 55–100)
ECHO LV ESV A2C: 32 ML
ECHO LV ESV A4C: 31 ML
ECHO LV ESV INDEX A2C: 15 ML/M2
ECHO LV ESV INDEX A4C: 14 ML/M2
ECHO LV FRACTIONAL SHORTENING: 34 % (ref 28–44)
ECHO LV INTERNAL DIMENSION DIASTOLE INDEX: 2.3 CM/M2
ECHO LV INTERNAL DIMENSION DIASTOLIC: 5 CM (ref 4.2–5.9)
ECHO LV INTERNAL DIMENSION SYSTOLIC INDEX: 1.52 CM/M2
ECHO LV INTERNAL DIMENSION SYSTOLIC: 3.3 CM
ECHO LV IVSD: 1 CM (ref 0.6–1)
ECHO LV MASS 2D: 194.4 G (ref 88–224)
ECHO LV MASS INDEX 2D: 89.6 G/M2 (ref 49–115)
ECHO LV POSTERIOR WALL DIASTOLIC: 1.1 CM (ref 0.6–1)
ECHO LV RELATIVE WALL THICKNESS RATIO: 0.44
ECHO LVOT AREA: 2.8 CM2
ECHO LVOT AV VTI INDEX: 0.69
ECHO LVOT DIAM: 1.9 CM
ECHO LVOT MEAN GRADIENT: 2 MMHG
ECHO LVOT PEAK GRADIENT: 4 MMHG
ECHO LVOT PEAK VELOCITY: 1 M/S
ECHO LVOT STROKE VOLUME INDEX: 27.2 ML/M2
ECHO LVOT SV: 58.9 ML
ECHO LVOT VTI: 20.8 CM
ECHO MV A VELOCITY: 1.08 M/S
ECHO MV E DECELERATION TIME (DT): 276 MS
ECHO MV E VELOCITY: 0.85 M/S
ECHO MV E/A RATIO: 0.79
ECHO MV E/E' LATERAL: 8.5
ECHO MV E/E' RATIO (AVERAGED): 11.33
ECHO MV E/E' SEPTAL: 14.17
ECHO PV ACCELERATION TIME (AT): 100 MS
ECHO RIGHT VENTRICULAR SYSTOLIC PRESSURE (RVSP): 11 MMHG
ECHO RV BASAL DIMENSION: 4.1 CM
ECHO RV INTERNAL DIMENSION: 3.5 CM
ECHO RV LONGITUDINAL DIMENSION: 6 CM
ECHO RV MID DIMENSION: 2.4 CM
ECHO RV TAPSE: 2.1 CM (ref 1.5–2)
ECHO TV REGURGITANT MAX VELOCITY: 1.37 M/S
ECHO TV REGURGITANT PEAK GRADIENT: 8 MMHG
ERYTHROCYTE [DISTWIDTH] IN BLOOD BY AUTOMATED COUNT: 13.6 % (ref 11.9–14.6)
EST. AVERAGE GLUCOSE BLD GHB EST-MCNC: 97 MG/DL
GLUCOSE SERPL-MCNC: 87 MG/DL (ref 65–100)
HBA1C MFR BLD: 5 % (ref 4.2–6.3)
HCT VFR BLD AUTO: 46.2 % (ref 41.1–50.3)
HDLC SERPL-MCNC: 50 MG/DL (ref 40–60)
HDLC SERPL: 2.2 {RATIO}
HGB BLD-MCNC: 15.1 G/DL (ref 13.6–17.2)
LDLC SERPL CALC-MCNC: 43.6 MG/DL
MAGNESIUM SERPL-MCNC: 1.9 MG/DL (ref 1.8–2.4)
MCH RBC QN AUTO: 28.1 PG (ref 26.1–32.9)
MCHC RBC AUTO-ENTMCNC: 32.7 G/DL (ref 31.4–35)
MCV RBC AUTO: 85.9 FL (ref 79.6–97.8)
NRBC # BLD: 0 K/UL (ref 0–0.2)
PLATELET # BLD AUTO: 232 K/UL (ref 150–450)
PMV BLD AUTO: 9.8 FL (ref 9.4–12.3)
POTASSIUM SERPL-SCNC: 3.5 MMOL/L (ref 3.5–5.1)
RBC # BLD AUTO: 5.38 M/UL (ref 4.23–5.6)
SODIUM SERPL-SCNC: 139 MMOL/L (ref 138–145)
TRIGL SERPL-MCNC: 77 MG/DL (ref 35–150)
TSH SERPL DL<=0.005 MIU/L-ACNC: 2.99 UIU/ML (ref 0.36–3.74)
VLDLC SERPL CALC-MCNC: 15.4 MG/DL (ref 6–23)
WBC # BLD AUTO: 7.4 K/UL (ref 4.3–11.1)

## 2022-02-21 PROCEDURE — C8929 TTE W OR WO FOL WCON,DOPPLER: HCPCS

## 2022-02-21 PROCEDURE — 74011250637 HC RX REV CODE- 250/637: Performed by: NURSE PRACTITIONER

## 2022-02-21 PROCEDURE — 83036 HEMOGLOBIN GLYCOSYLATED A1C: CPT

## 2022-02-21 PROCEDURE — 74011250637 HC RX REV CODE- 250/637: Performed by: INTERNAL MEDICINE

## 2022-02-21 PROCEDURE — 92610 EVALUATE SWALLOWING FUNCTION: CPT

## 2022-02-21 PROCEDURE — 36415 COLL VENOUS BLD VENIPUNCTURE: CPT

## 2022-02-21 PROCEDURE — 70551 MRI BRAIN STEM W/O DYE: CPT

## 2022-02-21 PROCEDURE — 74011250636 HC RX REV CODE- 250/636: Performed by: INTERNAL MEDICINE

## 2022-02-21 PROCEDURE — 85027 COMPLETE CBC AUTOMATED: CPT

## 2022-02-21 PROCEDURE — 74011000250 HC RX REV CODE- 250: Performed by: INTERNAL MEDICINE

## 2022-02-21 PROCEDURE — 74011000250 HC RX REV CODE- 250: Performed by: EMERGENCY MEDICINE

## 2022-02-21 PROCEDURE — 83735 ASSAY OF MAGNESIUM: CPT

## 2022-02-21 PROCEDURE — 97165 OT EVAL LOW COMPLEX 30 MIN: CPT

## 2022-02-21 PROCEDURE — 99232 SBSQ HOSP IP/OBS MODERATE 35: CPT | Performed by: INTERNAL MEDICINE

## 2022-02-21 PROCEDURE — 80061 LIPID PANEL: CPT

## 2022-02-21 PROCEDURE — 80048 BASIC METABOLIC PNL TOTAL CA: CPT

## 2022-02-21 PROCEDURE — 84443 ASSAY THYROID STIM HORMONE: CPT

## 2022-02-21 RX ORDER — METOPROLOL TARTRATE 25 MG/1
25 TABLET, FILM COATED ORAL EVERY 12 HOURS
Qty: 60 TABLET | Refills: 0 | Status: SHIPPED | OUTPATIENT
Start: 2022-02-21 | End: 2022-03-16 | Stop reason: SDUPTHER

## 2022-02-21 RX ORDER — ATORVASTATIN CALCIUM 80 MG/1
80 TABLET, FILM COATED ORAL
Qty: 30 TABLET | Refills: 0 | Status: SHIPPED | OUTPATIENT
Start: 2022-02-21 | End: 2022-03-16 | Stop reason: SDUPTHER

## 2022-02-21 RX ORDER — CLOPIDOGREL BISULFATE 75 MG/1
75 TABLET ORAL DAILY
Qty: 30 TABLET | Refills: 0 | Status: SHIPPED | OUTPATIENT
Start: 2022-02-22 | End: 2022-03-16 | Stop reason: SDUPTHER

## 2022-02-21 RX ADMIN — PERFLUTREN 1 ML: 6.52 INJECTION, SUSPENSION INTRAVENOUS at 11:44

## 2022-02-21 RX ADMIN — SODIUM CHLORIDE, PRESERVATIVE FREE 10 ML: 5 INJECTION INTRAVENOUS at 13:11

## 2022-02-21 RX ADMIN — RIVAROXABAN 20 MG: 20 TABLET, FILM COATED ORAL at 17:45

## 2022-02-21 RX ADMIN — METOPROLOL TARTRATE 25 MG: 25 TABLET, FILM COATED ORAL at 11:32

## 2022-02-21 RX ADMIN — CLOPIDOGREL BISULFATE 75 MG: 75 TABLET ORAL at 11:32

## 2022-02-21 RX ADMIN — APIXABAN 5 MG: 5 TABLET, FILM COATED ORAL at 11:32

## 2022-02-21 NOTE — PROGRESS NOTES
02/20/22 1914   NIH Stroke Scale   Interval Handoff/Transfer   Level of Conciousness (1a) 0   LOC Questions (1b) 0   LOC Commands (1c) 0   Best Gaze (2) 0   Visual (3) 0   Facial Palsy (4) 0   Motor Arm, Left (5a) 0   Motor Arm, Right (5b) 0   Motor Leg, Left (6a) 0   Motor Leg, Right (6b) 0   Limb Ataxia (7) 0   Sensory (8) 0   Best Language (9) 0   Dysarthria (10) 0   Extinction and Inattention (11) 0   Total 0

## 2022-02-21 NOTE — PROGRESS NOTES
2/21/2022 3:17 PM    Admit Date: 2/20/2022    Admit Diagnosis: TIA (transient ischemic attack) [G45.9]; Slurred speech [R47.81]; Right sided weakness [R53.1]; Facial droop [R29.810]      Subjective:   No cp or sob      Objective:      Visit Vitals  BP (!) 141/78 (BP 1 Location: Right upper arm, BP Patient Position: Sitting)   Pulse 66   Temp 98.1 °F (36.7 °C)   Resp 20   Ht 5' 11\" (1.803 m)   Wt 214 lb (97.1 kg)   SpO2 95%   BMI 29.85 kg/m²       Physical Exam:  Samantha Kathyy, Well Nourished, No Acute Distress, Alert & Oriented x 3, appropriate mood. Neck- supple, no JVD  CV- regular rate and rhythm no MRG  Lung- clear bilaterally  Abd- soft, nontender, nondistended  Ext- no edema bilaterally. Skin- warm and dry        Data Review:   Recent Labs     02/21/22  0458 02/20/22  1145 02/20/22  1144     --    < >   K 3.5  --    < >   BUN 8  --    < >   CREA 0.90  --    < >   WBC 7.4  --    < >   HGB 15.1  --    < >   HCT 46.2  --    < >     --    < >   INR  --  1.3*  --    HDL 50  --   --     < > = values in this interval not displayed. Assessment/Plan:     Principal Problem:    TIA (transient ischemic attack) (2/20/2022)Stable. Continue current medical therapy.   With hx af will start xarelto- had issues with eliquis in the past- no need for monitor- discussed options and will restart anti-coagulation    Active Problems:    Dyslipidemia (3/6/2016)      S/P CABG x 4 (3/9/2016)      Paroxysmal atrial fibrillation ([address] (3/12/2016)      Primary hypertension (3/14/2017)      Slurred speech (2/20/2022)      Facial droop (2/20/2022)      Right sided weakness (2/20/2022)

## 2022-02-21 NOTE — PROGRESS NOTES
Pt is a 82 yo male admitted due to a TIA. Pt's medical workup is complete and he is medically cleared for dc to home today. Therapy evals complete with no deficits noted and no further therapy indicated. No other dc needs or concerns identified or reported. SW remains available to assist as needed. Care Management Interventions  Mode of Transport at Discharge:  Other (see comment) (spouse)  Transition of Care Consult (CM Consult): Discharge Planning  Discharge Durable Medical Equipment: No  Physical Therapy Consult: Yes  Occupational Therapy Consult: Yes  Speech Therapy Consult: Yes  Support Systems: Spouse/Significant Other  Confirm Follow Up Transport: Family  Discharge Location  Patient Expects to be Discharged to[de-identified] Home

## 2022-02-21 NOTE — ROUTINE PROCESS
HR briefly went to the 40s now back in the 70s. Pt SR, SB since receiving Lopressor 25mg per cardiology who saw pt last night. Pt not in distress. Provider notified.

## 2022-02-21 NOTE — PROGRESS NOTES
Physical Therapy Note:    Physical therapy evaluation orders received and chart reviewed. Attempted to see patient x2 this AM to initiate assessment. Patient with OT and then ECHO. Will follow and re-attempt at a later time/date as schedule permits/patient available.  Thank you,    Rajendra Heads, PT, DPT  2/21/22 9:40AM and 10:35AM

## 2022-02-21 NOTE — DISCHARGE SUMMARY
Hospitalist Discharge Summary   Admit Date:  2022 11:52 AM   DC Note date: 2022  Name:  Chase Valencia   Age:  81 y.o. Sex:  male  :  1950   MRN:  [id number]   Room:    PCP:  Harriett Truong MD    Presenting Complaint: Numbness    Initial Admission Diagnosis: TIA (transient ischemic attack) [G45.9]  Slurred speech [R47.81]  Right sided weakness [R53.1]  Facial droop [R29.810]     Problem List for this Hospitalization:  Hospital Problems as of 2022 Date Reviewed: 2022          Codes Class Noted - Resolved POA    * (Principal) TIA (transient ischemic attack) ICD-10-CM: G45.9  ICD-9-CM: 435.9  2022 - Present Yes        Slurred speech ICD-10-CM: R47.81  ICD-9-CM: 784.59  2022 - Present Yes        Facial droop ICD-10-CM: R29.810  ICD-9-CM: 781.94  2022 - Present Yes        Right sided weakness ICD-10-CM: R53.1  ICD-9-CM: 728.87  2022 - Present Yes        Primary hypertension (Chronic) ICD-10-CM: I10  ICD-9-CM: 401.9  3/14/2017 - Present Yes        Paroxysmal atrial fibrillation ([facility] [address] ICD-10-CM: I48.0  ICD-9-CM: 427.31  3/12/2016 - Present Yes        S/P CABG x 4 ICD-10-CM: Z95.1  ICD-9-CM: V45.81  3/9/2016 - Present Yes        Dyslipidemia (Chronic) ICD-10-CM: E78.5  ICD-9-CM: 272.4  3/6/2016 - Present Yes            Did Patient have Sepsis (YES OR NO): No   Hospital Course:  Chase Valencia is a 81 y.o. male with medical history of hypertension, CAD, pAFib(no longer on Eliquis), recent Covid infection in 2021 who presented to ED with acute onset of right-sided weakness/numbness, aphasia and facial droop. NIHSS of 1 on arrival to the ED and most of his symptoms have resolved except for his facial droop. Evaluated by teleneurology. Patient's facial droop has resolved during the evaluation. Not a candidate for TPA. Eliquis was discontinued by his Cardiologist and he reports that he has been in sinus rhythm.  During admit patient was hemodynamically stable with unremarkable lab work. CT head without any acute intracranial processes. CT perfusion without any abnormalities. CTA of head without any acute occlusion. Cardiology consulted for evaluation of need of [facility]     MRI negative for acute infarct. ECHO with negative bubble study and EF 60-65% and a mildly dilated ascending aortic aneurysm ~4.1. Cardiology did start patient on Xarelto and patient is to continue all medication prescribed at discharged until seen by Cardiology. Patient educated to take medications as prescribed and control BP with optimal 130/80. Patient alert and oriented x 4 and voiced understanding of discharge orders. Denied HA, CP, N/V, neuro deficits. Patient/labs stable and patient discharged to home with orders for Cardiology and PCP f/u. Symptoms likely TIA    Disposition: Home or Self Care  Diet: ADULT DIET Regular  Code Status: Full Code    Follow Up Orders: Follow-up Appointments   Procedures    FOLLOW UP VISIT Appointment in: One Month Cardiology Dr. Hong Sheridan     Cardiology Dr. Hong Sheridan     Standing Status:   Standing     Number of Occurrences:   1     Order Specific Question:   Appointment in     Answer:   One Month    FOLLOW UP VISIT Appointment in: One Week With PCP in 1 week     With PCP in 1 week     Standing Status:   Standing     Number of Occurrences:   1     Standing Expiration Date:   2/22/2022     Order Specific Question:   Appointment in     Answer: One Week       Follow-up Information     Follow up With Specialties Details Why [address] [facility] [address]  [facility] [address]  [phone]            Follow up labs/diagnostics (ultimately defer to outpatient provider):  Cardiology/PCP    Time spent in patient discharge and coordination 38  minutes. Plan was discussed with patient/spouse. All questions answered. Patient was stable at time of discharge. Instructions given to call a physician or return if any concerns.     Discharge Info: Current Discharge Medication List      START taking these medications    Details   atorvastatin (LIPITOR) 80 mg tablet Take 1 Tablet by mouth nightly for 30 days. Qty: 30 Tablet, Refills: 0  Start date: 2/21/2022, End date: 3/23/2022      clopidogreL (PLAVIX) 75 mg tab Take 1 Tablet by mouth daily for 30 days. Qty: 30 Tablet, Refills: 0  Start date: 2/22/2022, End date: 3/24/2022      rivaroxaban (XARELTO) 20 mg tab tablet Take 1 Tablet by mouth daily (with breakfast) for 30 days. Qty: 30 Tablet, Refills: 0  Start date: 2/22/2022, End date: 3/24/2022         CONTINUE these medications which have CHANGED    Details   metoprolol tartrate (LOPRESSOR) 25 mg tablet Take 1 Tablet by mouth every twelve (12) hours for 30 days. Qty: 60 Tablet, Refills: 0  Start date: 2/21/2022, End date: 3/23/2022         CONTINUE these medications which have NOT CHANGED    Details   albuterol (PROVENTIL HFA, VENTOLIN HFA, PROAIR HFA) 90 mcg/actuation inhaler Take 2 Puffs by inhalation every four (4) hours as needed for Wheezing. Qty: 18 g, Refills: 5    Associated Diagnoses: Shortness of breath      fluticasone propion-salmeteroL (Advair HFA) 230-21 mcg/actuation inhaler Take 2 Puffs by inhalation two (2) times a day. Qty: 1 Each, Refills: 5    Associated Diagnoses: Shortness of breath      nitroglycerin (NITROSTAT) 0.4 mg SL tablet 1 Tablet by SubLINGual route every five (5) minutes as needed for Chest Pain. Up to 3 doses. Qty: 25 Tablet, Refills: 6      evolocumab (REPATHA) syringe 1 mL by SubCUTAneous route every fourteen (14) days. Qty: 2 Syringe, Refills: 11      loratadine (CLARITIN) 10 mg tablet Take 10 mg by mouth every morning. fluticasone (FLONASE) 50 mcg/actuation nasal spray One spray in each nostril twice daily  Qty: 1 Bottle, Refills: 5      aspirin delayed-release 81 mg tablet Take 81 mg by mouth every morning.       OXYGEN-AIR DELIVERY SYSTEMS Take 2 L by inhalation as needed for PRN Reason (Other) (Shortness of breath). Titrate oxygen to maintain O2 sat above 92% at rest and above 88% with ambulation. STOP taking these medications       apixaban (ELIQUIS) 5 mg tablet Comments:   Reason for Stopping:               Procedures done this admission:  * No surgery found *    Consults this admission:  IP CONSULT TO CARDIOLOGY    Echocardiogram/EKG results:  Results from Hospital Encounter encounter on 02/20/22    ECHO ADULT COMPLETE    Interpretation Summary    Left Ventricle: Left ventricle size is normal. Normal wall thickness. Normal wall motion. Normal left ventricular systolic function with a visually estimated EF of 60 - 65%.   Left Atrium: Left atrium is mildly dilated.   Interatrial Septum: Agitated saline study was negative with and without provocation.   Aorta: Mildly dilated ascending aorta (~4.1cm).   Technical qualifiers: Echo study was technically difficult.   Contrast used: Definity.        EKG Results     Procedure 720 Value Units Date/Time    Initial ECG [[id number]] Collected: 02/20/22 1200    Order Status: Completed Updated: 02/20/22 2103     Ventricular Rate 65 BPM      Atrial Rate 161 BPM      QRS Duration 105 ms      Q-T Interval 401 ms      QTC Calculation (Bezet) 417 ms      Calculated R Axis 75 degrees      Calculated T Axis 75 degrees      Diagnosis --     Sinus Rhythm with PACs  Low voltage, precordial leads  Anteroseptal infarct, old  Baseline wander in lead(s) V3    Confirmed by Omar Fox MD, Maribel Whiteside ([id number]) on 2/20/2022 9:03:12 PM            Diagnostic Imaging/Tests:   MRI BRAIN WO CONT    Result Date: 2/21/2022  EXAMINATION: BRAIN MRI 2/21/2022 7:17 AM ACCESSION NUMBER: [id number] INDICATION: Cerebrovascular accident right vasogenic numbness and weakness ectasia COMPARISON: Head CT, CTA head and neck, and CT perfusion 2/20/2022 TECHNIQUE: Multiplanar multisequence MRI of the brain without the administration of intravenous contrast. FINDINGS: Midline structures including the corpus callosum, pituitary gland, optic nerves, and cerebellum are well developed. The ventricles are within normal limits for the mild degree of global brain parenchymal volume loss. There is no midline shift. The basilar cisterns are patent. There is no cerebellar tonsillar ectopia or herniation. There are T2 hyperintensities in the periventricular and subcortical white matter, a nonspecific finding which likely represents chronic microangiopathy. Bilateral maxillary sinus and right ethmoid air cell mucosal thickening. Diffusion imaging shows no evidence of acute infarction or other acute abnormality. The expected large intracranial vascular flow voids are preserved. There is no evidence of intracranial blood products. There are no suspicious osseous lesions. 1. No evidence of acute infarction. 2. Mild burden of chronic microangiopathy. CT PERF W CBF    Result Date: 2/21/2022  Exam: CT PERF W CBF on 2/21/2022 8:05 AM Clinical History: The Male patient is 81years old  presenting for CVA with resolved expressive aphasia and right arm weakness. COMPARISON: Head CT 2/20/2022 TECHNIQUE: CT perfusion of the brain was obtained after the administration of intravenous contrast.  Perfusion maps and perfusion analysis output were generated using the VIZ ContaCt perfusion processing software algorithm for LVO detection. Radiation dose reduction techniques were used for this study: All CT scans performed at this facility use one or all of the following: Automated exposure control, adjustment of the mA and/or kVp according to patient's size, iterative reconstruction. Total radiation dose: 1934 mGy-cm FINDINGS: Study is technically adequate. Adequate vascular enhancement is demonstrated.  RAPID Output Values: CBF < 30% volume (best correlation with core infarct volume without overcalls): 0 ml (core infarction volume greater than 50 cc associated with poor outcomes) Tmax > 6 seconds: 0 ml Tmax/CBF Mismatch Volume: 0 ml Tmax/CBF Mismatch Ratio: None Hypoperfusion Intensity Ratio (Tmax > 10 seconds / Tmax > 6 seconds): 0 (values greater than 0.5 associated with poor outcome) Tmax > 10 seconds Volume: 0 ml (volume greater than 100 mL is associated with poor outcome)     1. No evidence of core CT cerebral perfusion defect. Please note that the determination of patient treatment is not based solely upon imaging factors or calculation values. Management of ischemia is at the discretion of the primary physician and is based upon a combination of clinical and imaging data, along other factors. CTA CODE NEURO HEAD AND NECK W CONT    Result Date: 2/20/2022  Title:  CT arteriogram of the neck and head. Indication: Code stroke. Difficulty with recollection--while in charge he could not think of his dogs names. Aphasia, facial droop. Right-sided weakness and numbness. Hypertension, coronary artery disease, atrial fibrillation, recent Covid infection Technique: Axial images of the neck and head were obtained after the uneventful administration of intravenous iodinated contrast media. Contrast was used to best identify the arterial structures. Images were reviewed on a separate, free standing, three-dimensional workstation as per the referring physicians request.  Per [facility] administration: This examination was analyzed by the [address] ai algorithm. All stenosis percentages derived by comparing the narrowest segment with the distal Internal Carotid Artery luminal diameter, as described in the [place] American Symptomatic Carotid Endarterectomy Trial (NASCET) criteria. All CT scans at this facility are performed using dose reduction/dose modulation techniques, as appropriate the performed exam, including the following: Automated Exposure Control;  Adjustment of the mA and/or kV according to patient size (this includes techniques or standardized protocols for targeted exams where dose is matched to indication/reason for exam); and Use of Iterative Reconstruction Technique. Comparison: Head CT same day. Findings: Lungs:  Scattered groundglass densities in both upper lungs. Bones:  Sternal wires. Paranasal sinuses:  RIGHT maxillary sinus mucosal thickening, LEFT maxillary sinus complete opacification with a large central calcification, mucosal thickening in a few right-sided ethmoidal air cells. Brain:  No mass effect. Soft tissues:  Visible but nonenlarged cervical.  Superior sagittal sinus:  Patent. Right transverse sinus:  Probably patent. Left transverse sinus:  Probably patent. Aortic arch:  Patent with some calcified atherosclerotic disease. Right brachiocephalic artery:  Patent with atherosclerotic disease. Right subclavian artery:  Patent. Left subclavian artery:  Patent with atherosclerotic disease. Right common carotid artery:  Patent. Right external carotid artery:  Mildly narrowed. Cervical Right internal carotid artery: Atherosclerotic disease in the carotid bulb results in mild luminal narrowing, patent. Left common carotid artery: Patent. Left external carotid artery:  Stenotic origin. Cervical Left internal carotid artery:  Calcification in the carotid bulb results in 40-50% luminal narrowing, patent. Right vertebral artery:  Small diameter, stenosis at C2, occluded intracranially. Left vertebral artery:  Dominant, tortuous, artifact obscures detail C2, patent. Basilar artery: Tortuous, patent. Intracranial right internal carotid artery:  Calcified atherosclerotic disease, mild narrowing, patent. Right middle cerebral artery:  Patent. Right anterior cerebral artery:  Patent. Anterior communicating artery: Patent. Left anterior cerebral artery:  Patent. Left middle cerebral artery:  Patent. Intracranial left internal carotid artery:  Calcified atherosclerotic disease results in mild luminal narrowing, patent. Right posterior communicating artery: Patent.   Left posterior communicating artery: See below. Right posterior cerebral artery:  Patent. Left posterior cerebral artery:  Fetal origin, patent. No intracranial arterial occlusion. Bilateral maxillary sinusitis, probably chronic. Bilateral pulmonary ground glass densities could represent fluid overload, viral infection, or other infiltrative process. CT CODE NEURO HEAD WO CONTRAST    Result Date: 2/20/2022  CT HEAD WITHOUT CONTRAST, 2/20/2022 History: Difficulty with recollection. Right-sided numbness around 10:15 Comparison: None. Technique:   5 mm axial scans from the skull base to the vertex. All CT scans performed at this facility use one or all of the following: Automated exposure control, adjustment of the mA and/or kVp according to patient's size, iterative reconstruction. Findings:  No evidence of intracranial hemorrhage is seen. No abnormal extra-axial fluid collections are seen. Age-related chronic cortical volume loss is seen. No evidence for acute hydrocephalus is seen. No evidence of midline shift or herniation is seen. No abnormal edema pattern is seen in a vascular distribution to suggest large artery infarction. Only relatively symmetric ill-defined periventricular white matter hypoattenuation is seen in the corona radiata likely representing mild to moderate chronic microangiopathic changes. Evaluation with bone windows shows no acute osseous changes. More chronic appearing inflammatory changes are seen in the maxillary sinuses with evidence of prior sinus surgery. 1.  No acute intracranial process evident by noncontrast CT study of the head. This report was made using voice transcription. Despite my best efforts to avoid any, transcription errors may persist. If there is any question about the accuracy of the report or need for clarification, then please call [phone], or text me through perfectserv for clarification or correction.      ECHO ADULT COMPLETE    Result Date: 2/21/2022    Left Ventricle: Left ventricle size is normal. Normal wall thickness. Normal wall motion. Normal left ventricular systolic function with a visually estimated EF of 60 - 65%.   Left Atrium: Left atrium is mildly dilated.   Interatrial Septum: Agitated saline study was negative with and without provocation.   Aorta: Mildly dilated ascending aorta (~4.1cm).   Technical qualifiers: Echo study was technically difficult.   Contrast used: Definity. All Micro Results     None          Labs: Results:       BMP, Mg, Phos Recent Labs     02/21/22 0458 02/20/22  1144    140   K 3.5 3.7    106   CO2 30 32   AGAP 4* 2*   BUN 8 10   CREA 0.90 0.90   CA 8.6 8.8   GLU 87 94   MG 1.9  --       CBC Recent Labs     02/21/22 0458 02/20/22  1144   WBC 7.4 7.9   RBC 5.38 5.43   HGB 15.1 15.5   HCT 46.2 47.9    237   GRANS  --  60   LYMPH  --  27   EOS  --  3   MONOS  --  8   BASOS  --  1   IG  --  0   ANEU  --  4.8   ABL  --  2.2   KATALINA  --  0.2   ABM  --  0.7   ABB  --  0.1   AIG  --  0.0      LFT No results for input(s): ALT, TBIL, AP, TP, ALB, GLOB, AGRAT in the last 72 hours.     No lab exists for component: SGOT, GPT   Cardiac Testing Lab Results   Component Value Date/Time    Troponin-I, Qt. 0.15 (H) 03/06/2016 04:50 PM    Troponin-I, Qt. 0.26 (H) 03/06/2016 08:33 AM    Troponin-I, Qt. 0.10 (H) 03/05/2016 11:30 PM      Coagulation Tests Lab Results   Component Value Date/Time    Prothrombin time 17.7 (H) 12/05/2021 04:07 AM    Prothrombin time 14.2 (H) 03/09/2016 02:40 PM    Prothrombin time 12.8 (H) 03/08/2016 10:27 AM    INR 1.4 12/05/2021 04:07 AM    INR 1.3 (H) 03/09/2016 02:40 PM    INR 1.2 03/08/2016 10:27 AM    INR (POC) 1.3 (H) 02/20/2022 11:45 AM    aPTT 27.1 03/09/2016 02:40 PM    aPTT 35.9 (H) 03/08/2016 07:55 PM    aPTT 48.5 (H) 03/08/2016 01:03 PM      A1c Lab Results   Component Value Date/Time    Hemoglobin A1c 5.0 02/21/2022 04:59 AM    Hemoglobin A1c 5.3 01/19/2022 08:57 AM    Hemoglobin A1c 5.4 03/08/2016 05:24 AM      Lipid Panel Lab Results   Component Value Date/Time    Cholesterol, total 109 02/21/2022 04:58 AM    HDL Cholesterol 50 02/21/2022 04:58 AM    LDL, calculated 43.6 02/21/2022 04:58 AM    VLDL, calculated 15.4 02/21/2022 04:58 AM    Triglyceride 77 02/21/2022 04:58 AM    CHOL/HDL Ratio 2.2 02/21/2022 04:58 AM      Thyroid Panel Lab Results   Component Value Date/Time    TSH 2.990 02/21/2022 04:58 AM    TSH 2.920 01/19/2022 08:57 AM    T4, Free 1.45 01/19/2022 08:57 AM    T4, Free 1.24 10/14/2014 08:44 AM        Most Recent UA Lab Results   Component Value Date/Time    Color BASHIR 12/05/2021 05:50 AM    Appearance CLOUDY 12/05/2021 05:50 AM    Specific gravity 1.014 03/08/2016 11:15 AM    pH (UA) 5.5 12/05/2021 05:50 AM    Protein 100 (A) 12/05/2021 05:50 AM    Glucose Negative 12/05/2021 05:50 AM    Ketone 15 (A) 12/05/2021 05:50 AM    Bilirubin Negative 12/05/2021 05:50 AM    Blood MODERATE (A) 12/05/2021 05:50 AM    Urobilinogen 1.0 12/05/2021 05:50 AM    Nitrites Negative 12/05/2021 05:50 AM    Leukocyte Esterase Negative 12/05/2021 05:50 AM    WBC 3-5 12/05/2021 05:50 AM    RBC 0-3 12/05/2021 05:50 AM    Bacteria 3+ (H) 12/05/2021 05:50 AM    Casts 3-5 12/05/2021 05:50 AM    Other observations RESULTS VERIFIED MANUALLY 12/05/2021 05:50 AM          All Labs from Last 24 Hrs:  Recent Results (from the past 24 hour(s))   CBC W/O DIFF    Collection Time: 02/21/22  4:58 AM   Result Value Ref Range    WBC 7.4 4.3 - 11.1 K/uL    RBC 5.38 4.23 - 5.6 M/uL    HGB 15.1 13.6 - 17.2 g/dL    HCT 46.2 41.1 - 50.3 %    MCV 85.9 79.6 - 97.8 FL    MCH 28.1 26.1 - 32.9 PG    MCHC 32.7 31.4 - 35.0 g/dL    RDW 13.6 11.9 - 14.6 %    PLATELET 058 103 - 589 K/uL    MPV 9.8 9.4 - 12.3 FL    ABSOLUTE NRBC 0.00 0.0 - 0.2 K/uL   LIPID PANEL    Collection Time: 02/21/22  4:58 AM   Result Value Ref Range    Cholesterol, total 109 <200 MG/DL    Triglyceride 77 35 - 150 MG/DL    HDL Cholesterol 50 40 - 60 MG/DL    LDL, calculated 43.6 <100 MG/DL    VLDL, calculated 15.4 6.0 - 23.0 MG/DL    CHOL/HDL Ratio 2.2     METABOLIC PANEL, BASIC    Collection Time: 02/21/22  4:58 AM   Result Value Ref Range    Sodium 139 138 - 145 mmol/L    Potassium 3.5 3.5 - 5.1 mmol/L    Chloride 105 98 - 107 mmol/L    CO2 30 21 - 32 mmol/L    Anion gap 4 (L) 7 - 16 mmol/L    Glucose 87 65 - 100 mg/dL    BUN 8 8 - 23 MG/DL    Creatinine 0.90 0.8 - 1.5 MG/DL    GFR est AA >60 >60 ml/min/1.73m2    GFR est non-AA >60 >60 ml/min/1.73m2    Calcium 8.6 8.3 - 10.4 MG/DL   TSH 3RD GENERATION    Collection Time: 02/21/22  4:58 AM   Result Value Ref Range    TSH 2.990 0.358 - 3.740 uIU/mL   MAGNESIUM    Collection Time: 02/21/22  4:58 AM   Result Value Ref Range    Magnesium 1.9 1.8 - 2.4 mg/dL   HEMOGLOBIN A1C WITH EAG    Collection Time: 02/21/22  4:59 AM   Result Value Ref Range    Hemoglobin A1c 5.0 4.20 - 6.30 %    Est. average glucose 97 mg/dL   ECHO ADULT COMPLETE    Collection Time: 02/21/22 11:44 AM   Result Value Ref Range    LV EDV A2C 106 mL    LV EDV A4C 97 mL    LV ESV A2C 32 mL    LV ESV A4C 31 mL    IVSd 1.0 0.6 - 1.0 cm    LVIDd 5.0 4.2 - 5.9 cm    LVIDs 3.3 cm    LVOT Diameter 1.9 cm    LVOT Mean Gradient 2 mmHg    LVOT VTI 20.8 cm    LVOT Peak Velocity 1.0 m/s    LVOT Peak Gradient 4 mmHg    LVPWd 1.1 (A) 0.6 - 1.0 cm    LV E' Lateral Velocity 10 cm/s    LV E' Septal Velocity 6 cm/s    LV Ejection Fraction A2C 70 %    LV Ejection Fraction A4C 68 %    EF BP 70 55 - 100 %    LVOT Area 2.8 cm2    LVOT SV 58.9 ml    LA Minor Axis 6.0 cm    LA Major Axis 5.7 cm    LA Area 2C 25.0 cm2    LA Area 4C 24.2 cm2    LA Volume BP 80 (A) 18 - 58 mL    LA Diameter 3.4 cm    AV Mean Velocity 1.0 m/s    AV Mean Gradient 4 mmHg    AV VTI 30.0 cm    AV Peak Velocity 1.4 m/s    AV Peak Gradient 8 mmHg    AV Area by VTI 2.0 cm2    AV Area by Peak Velocity 1.9 cm2    Aortic Root 3.9 cm    Ascending Aorta 4.1 cm    MV E Wave Deceleration Time 276.0 ms    MV A Velocity 1.08 m/s MV E Velocity 0.85 m/s    PV .0 ms    RVIDd 3.5 cm    RV Basal Dimension 4.1 cm    RV Longitudinal Dimension 6.0 cm    RV Mid Dimension 2.4 cm    TAPSE 2.1 (A) 1.5 - 2.0 cm    TR Max Velocity 1.37 m/s    TR Peak Gradient 8 mmHg    Fractional Shortening 2D 34 28 - 44 %    LV ESV Index A4C 14 mL/m2    LV EDV Index A4C 45 mL/m2    LV ESV Index A2C 15 mL/m2    LV EDV Index A2C 49 mL/m2    LVIDd Index 2.30 cm/m2    LVIDs Index 1.52 cm/m2    LV RWT Ratio 0.44     LV Mass 2D 194.4 88 - 224 g    LV Mass 2D Index 89.6 49 - 115 g/m2    MV E/A 0.79     E/E' Ratio (Averaged) 11.33     E/E' Lateral 8.50     E/E' Septal 14.17     LA Volume Index BP 37 (A) 16 - 34 ml/m2    LVOT Stroke Volume Index 27.2 mL/m2    LA Size Index 1.57 cm/m2    LA/AO Root Ratio 0.87     Ao Root Index 1.80 cm/m2    Ascending Aorta Index 1.89 cm/m2    AV Velocity Ratio 0.71     LVOT:AV VTI Index 0.69     SHIRLEY/BSA VTI 0.9 cm2/m2    SHIRLEY/BSA Peak Velocity 0.9 cm2/m2    Est. RA Pressure 3 mmHg    RVSP 11 mmHg       Current Med List in Hospital:   Current Facility-Administered Medications   Medication Dose Route Frequency    [START ON 2/22/2022] rivaroxaban (XARELTO) tablet 20 mg  20 mg Oral DAILY WITH BREAKFAST    sodium chloride (NS) flush 5-10 mL  5-10 mL IntraVENous Q8H    sodium chloride (NS) flush 5-10 mL  5-10 mL IntraVENous PRN    tuberculin injection 5 Units  5 Units IntraDERMal ONCE    sodium chloride (NS) flush 5-40 mL  5-40 mL IntraVENous Q8H    sodium chloride (NS) flush 5-40 mL  5-40 mL IntraVENous PRN    ondansetron (ZOFRAN) injection 4 mg  4 mg IntraVENous Q4H PRN    acetaminophen (TYLENOL) tablet 650 mg  650 mg Oral Q4H PRN    acetaminophen (TYLENOL) suppository 650 mg  650 mg Rectal Q4H PRN    labetaloL (NORMODYNE;TRANDATE) injection 5 mg  5 mg IntraVENous Q10MIN PRN    bisacodyL (DULCOLAX) suppository 10 mg  10 mg Rectal DAILY PRN    famotidine (PEPCID) tablet 20 mg  20 mg Oral BID PRN    clopidogreL (PLAVIX) tablet 75 mg 75 mg Oral DAILY    atorvastatin (LIPITOR) tablet 80 mg  80 mg Oral QHS    metoprolol tartrate (LOPRESSOR) tablet 25 mg  25 mg Oral Q12H       Allergies   Allergen Reactions    Latex Rash    Codeine Nausea and Vomiting    Tape [Adhesive] Rash     Immunization History   Administered Date(s) Administered    TB Skin Test (PPD) Intradermal 03/09/2016, 02/20/2022    Tdap 10/21/2014       Recent Vital Data:  Patient Vitals for the past 24 hrs:   Temp Pulse Resp BP SpO2   02/21/22 1200 98.1 °F (36.7 °C) 66 20 (!) 141/78 95 %   02/21/22 0800 98.5 °F (36.9 °C) 61 20 (!) 140/66 93 %   02/21/22 0400 98 °F (36.7 °C) (!) 58 16 132/69 91 %   02/21/22 0334 98 °F (36.7 °C) 60 16 132/69 91 %   02/21/22 0000 97.8 °F (36.6 °C) 62 20 139/69 91 %   02/20/22 2000 98.9 °F (37.2 °C) 66 20 (!) 156/72 93 %   02/20/22 1742 98.3 °F (36.8 °C) 67 18 (!) 165/70 98 %   02/20/22 1722 -- 70 26 (!) 155/78 97 %     Oxygen Therapy  O2 Sat (%): 95 % (02/21/22 1200)  Pulse via Oximetry: 70 beats per minute (02/20/22 1722)  O2 Device: None (Room air) (02/21/22 0750)    Estimated body mass index is 29.85 kg/m² as calculated from the following:    Height as of this encounter: 5' 11\" (1.803 m). Weight as of this encounter: 97.1 kg (214 lb). No intake or output data in the 24 hours ending 02/21/22 1545      Physical Exam:    General:    Well nourished. No overt distress  Head:  Normocephalic, atraumatic  Eyes:  Sclerae appear normal.  Pupils equally round. HENT:  Nares appear normal, no drainage. Moist mucous membranes  Neck:  No restricted ROM. Trachea midline  CV:   RRR. No m/r/g. No JVD  Lungs:   CTAB. No wheezing, rhonchi, or rales. Even, unlabored  Abdomen:   Soft, nontender, nondistended. Extremities: Warm and dry. No cyanosis or clubbing. No edema. Skin:     No rashes. Normal coloration  Neuro:  CN II-XII grossly intact. No focal deficits  Psych:  Normal mood and affect.       Signed:  Brittani Serrano NP    Part of this note may have been written by using a voice dictation software. The note has been proof read but may still contain some grammatical/other typographical errors.

## 2022-02-21 NOTE — PROGRESS NOTES
ACUTE OT GOALS:  (Developed with and agreed upon by patient and/or caregiver.)  No goals, evaluation and d/c    OCCUPATIONAL THERAPY ASSESSMENT: Initial Assessment and Discharge OT Treatment Day Rafi Lovelace is a 81 y.o. male   PRIMARY DIAGNOSIS: TIA (transient ischemic attack)  TIA (transient ischemic attack) [G45.9]  Slurred speech [R47.81]  Right sided weakness [R53.1]  Facial droop [R29.810]       Reason for Referral:    ICD-10: Treatment Diagnosis: Generalized Muscle Weakness (M62.81)  Difficulty in walking, Not elsewhere classified (R26.2)  Other abnormalities of gait and mobility (R26.89)  [place]PAT[place] / [place] ONLY / Product Type: Medicare /   ASSESSMENT:     REHAB RECOMMENDATIONS:   Recommendation to date pending progress:  Setting:   No further skilled therapy after discharge from hospital  Equipment:    None     PRIOR LEVEL OF FUNCTION:  (Prior to Hospitalization)  INITIAL/CURRENT LEVEL OF FUNCTION:  (Based on today's evaluation)   Bathing:   Independent  Dressing:   Independent  Feeding/Grooming:   Independent  Toileting:   Independent  Functional Mobility:   Independent Bathing:   Independent  Dressing:   Independent  Feeding/Grooming:   Independent  Toileting:   Independent  Functional Mobility:   Independent     ASSESSMENT:  Mr. Stefan Adames is a 82 y/o male presents with TIA with R sided weakness with symptoms resolved. At baseline pt is independent all ADLs/IADLs, drives and does not use DME. Pt has been independent with ADLs since admission and demonstrated independence with functional transfers, mobility of household distances and grooming ADLs today. Pt educated on BE FAST signs/symptoms of stroke--pt and pt wife verbalized understanding. Pt does not have acute OT needs or OT needs at d/c. Will discontinue therapy orders.        SUBJECTIVE:   Mr. Stefan Adames states, \"I feel fine now\"    SOCIAL HISTORY/LIVING ENVIRONMENT: lives with wife, one level, drives, no DME, walk in shower       OBJECTIVE:     PAIN: VITAL SIGNS: LINES/DRAINS:   Pre Treatment: Pain Screen  Pain Scale 1: Numeric (0 - 10)  Pain Intensity 1: 0  Post Treatment: 0   None  O2 Device: None (Room air)     GROSS EVALUATION:  BUE Within Functional Limits Abnormal/ Functional Abnormal/ Non-Functional (see comments) Not Tested Comments:   AROM [x] [] [] []    PROM [] [] [] [x]    Strength [x] [] [] []    Balance [x] [] [] []    Posture [x] [] [] []    Sensation [x] [] [] []    Coordination [x] [] [] []    Tone [x] [] [] []    Edema [x] [] [] []    Activity Tolerance [x] [] [] []     [] [] [] []      COGNITION/  PERCEPTION: Intact Impaired   (see comments) Comments:   Orientation [x] []    Vision [x] []    Hearing [x] []    Judgment/ Insight [x] []    Attention [x] []    Memory [x] []    Command Following [x] []    Emotional Regulation [x] []     [] []      ACTIVITIES OF DAILY LIVING: I Mod I S SBA CGA Min Mod Max Total NT Comments   BASIC ADLs:              Bathing/ Showering [] [] [] [] [] [] [] [] [] [x]    Toileting [] [] [] [] [] [] [] [] [] [x]    Dressing [] [] [] [] [] [] [] [] [] [x]    Feeding [] [] [] [] [] [] [] [] [] [x]    Grooming [x] [] [] [] [] [] [] [] [] [] Brushing teeth standing at sink   Personal Device Care [] [] [] [] [] [] [] [] [] [x]    Functional Mobility [x] [] [] [] [] [] [] [] [] []    I=Independent, Mod I=Modified Independent, S=Supervision, SBA=Standby Assistance, CGA=Contact Guard Assistance,   Min=Minimal Assistance, Mod=Moderate Assistance, Max=Maximal Assistance, Total=Total Assistance, NT=Not Tested    MOBILITY: I Mod I S SBA CGA Min Mod Max Total  NT x2 Comments: pt received sitting in chair   Supine to sit [] [] [] [] [] [] [] [] [] [x] []    Sit to supine [] [] [] [] [] [] [] [] [] [x] []    Sit to stand [x] [] [] [] [] [] [] [] [] [] []    Bed to chair [x] [] [] [] [] [] [] [] [] [] []    I=Independent, Mod I=Modified Independent, S=Supervision, SBA=Standby Assistance, CGA=Contact [employer],   Min=Minimal Assistance, Mod=Moderate Assistance, Max=Maximal Assistance, Total=Total Assistance, NT=Not [facility] [address]   Daily Activity Inpatient Short Form        How much help from another person does the patient currently need. .. Total A Lot A Little None   1. Putting on and taking off regular lower body clothing? [] 1   [] 2   [] 3   [x] 4   2. Bathing (including washing, rinsing, drying)? [] 1   [] 2   [] 3   [x] 4   3. Toileting, which includes using toilet, bedpan or urinal?   [] 1   [] 2   [] 3   [x] 4   4. Putting on and taking off regular upper body clothing? [] 1   [] 2   [] 3   [x] 4   5. Taking care of personal grooming such as brushing teeth? [] 1   [] 2   [] 3   [x] 4   6. Eating meals? [] 1   [] 2   [] 3   [x] 4   © 2007, Trustees of [address] [place] [address], under license to [employer]. All rights reserved     Score:  Initial: 24 Most Recent: X (Date: -- )   Interpretation of Tool:  Represents activities that are increasingly more difficult (i.e. Bed mobility, Transfers, Gait). PLAN:   FREQUENCY/DURATION: OT Plan of Care:  (eval & d/c) for duration of hospital stay or until stated goals are met, whichever comes first.    PROBLEM LIST:   (Skilled intervention is medically necessary to address:)  1. N/A   INTERVENTIONS PLANNED:   (Benefits and precautions of occupational therapy have been discussed with the patient.)  1.  N/A     TREATMENT:     EVALUATION: Low Complexity : (Untimed Charge)    TREATMENT:   (     )  N/A evaluation only    TREATMENT GRID:  N/A    AFTER TREATMENT POSITION/PRECAUTIONS:  Chair, Needs within reach, RN notified and Visitors at bedside    INTERDISCIPLINARY COLLABORATION:  RN/PCT and OT/BASHIR    TOTAL TREATMENT DURATION:  OT Patient Time In/Time Out  Time In: 0940  Time Out: [address] [facility], OT

## 2022-02-21 NOTE — DISCHARGE INSTRUCTIONS

## 2022-02-21 NOTE — PROGRESS NOTES
Problem: Falls - Risk of  Goal: *Absence of Falls  Description: Document Naga Baptiste Fall Risk and appropriate interventions in the flowsheet. Outcome: Progressing Towards Goal  Note: Fall Risk Interventions:                      History of Falls Interventions:  Investigate reason for fall         Problem: Patient Education: Go to Patient Education Activity  Goal: Patient/Family Education  Outcome: Progressing Towards Goal

## 2022-02-21 NOTE — PROGRESS NOTES
Patient discharged to home with all personal belongings and Rx. Discharge planning reviewed with patient and all questions answered. Patient taken to front entrance in a wheelchair with hospital personnel. Vital signs stable and patient appears to be in no distress.

## 2022-02-21 NOTE — PROGRESS NOTES
SPEECH LANGUAGE PATHOLOGY: DYSPHAGIA- Initial Assessment and Discharge    NAME/AGE/GENDER: Naldo Alvarado is a 81 y.o. male  DATE: 2/21/2022  PRIMARY DIAGNOSIS: TIA (transient ischemic attack) [G45.9]  Slurred speech [R47.81]  Right sided weakness [R53.1]  Facial droop [R29.810]      ICD-10: Treatment Diagnosis: R13.12 Dysphagia, Oropharyngeal Phase    RECOMMENDATIONS   DIET:    Regular Consistency   Thin Liquids    MEDICATIONS: With liquid     PRECAUTIONS, MODIFICATIONS, AND STRATEGIES  · none       RECOMMENDATIONS FOR CONTINUED SPEECH THERAPY:   No further speech therapy indicated at this time. ASSESSMENT   Patient presents with oropharyngeal swallow function that is within normal limits. No s/sx of dysphagia identified. Recommend regular diet and thin liquids. EDUCATION: Recommendations discussed with Patient and Family    REHABILITATION POTENTIAL FOR STATED GOALS: none    PLAN    FREQUENCY/DURATION:   No further speech therapy indicated at this time as oropharyngeal swallow function is within normal limits. CONTINUATION OF SKILLED SERVICES/MEDICAL NECESSITY:   No additional speech services warranted. SUBJECTIVE   Upright in chair. Wife present. Patient reports numbness on right, slight facial droop on right, and difficulty getting his words out completely resolved and feels back at baseline.      Oxygen Device: room air  Pain: Pain Scale 1: Numeric (0 - 10)  Pain Intensity 1: 0    History of Present Injury/Illness: Mr. Alfredito Harris  has a past medical history of Acute hypoxemic respiratory failure ([facility] [address] (12/4/2021), Anterior myocardial infarction [facility]) (10/2003), Arrhythmia, Arteriosclerotic coronary artery disease (11/2005), Arthritis, Atrial fibrillation with RVR ([facility] [address] (12/4/2021), Cancer ([facility] [address] Coronary atherosclerosis of native coronary vessel (10/2001), Elevated serum creatinine (12/15/2021), Family history of premature CAD, Gout, Hypercholesterolemia, Hypertension, and Sinus bradycardia (12/14/2021). Anjelica Kinney He also  has a past surgical history that includes hx colonoscopy; pr cardiac surg procedure unlist; pr cardiac surg procedure unlist (March 9,2016); hx coronary artery bypass graft (3/9/2016); hx orthopaedic; hx prostatectomy; hx heent; and colonoscopy (N/A, 12/21/2016). PRECAUTIONS/ALLERGIES: Latex, Codeine, and Tape [adhesive]     Problem List:  (Impairments causing functional limitations):  1. Oropharyngeal dysphagia- No symptoms identified      Previous Dysphagia: NONE REPORTED  Diet Prior to Evaluation: regular/thin    Orientation:  Person  Place  Time  Situation    Cognitive-Linguistic Screening:   Alertness  o Alert   Speech Production:   o Fully intelligible   Expressive Language:  o Fluent speech and Able to effectively communicate wants and needs   o Reports word finding difficulty completed resolved   Receptive Language:  o Answers yes/no questions appropriately and Follows commands   Cognition:   o Denies changes. Good insight into hospitalization/events leading up to it. Able to recall details of conversation with neurologist.   Prior Level of Function: independent. Retired. Recommendations: Given results of screening, patient appears to be functioning at baseline. No acute assessment of speech, language, or cognition warranted. MRI negative for acute CVA. Patient and wife report symptoms have resolved. OBJECTIVE   Oral Motor:   · Labial: No impairment  · Dentition: Intact  · Oral Hygiene: Adequate  · Lingual: No impairment    Dysphagia evaluation:   Patient consumed trials of regular solids and thin liquids. Appropriate oral prep with all textures. Timely swallow initiation, and single swallows upon palpation. Adequate oral clearing. No overt signs or symptoms of airway compromise observed with liquid or solid textures.        Tool Used: Dysphagia Outcome and Severity Scale (SAVANNAH)    Score Comments   Normal Diet  [] 7 With no strategies or extra time needed Functional Swallow  [] 6 May have mild oral or pharyngeal delay   Mild Dysphagia  [] 5 Which may require one diet consistency restricted    Mild-Moderate Dysphagia  [] 4 With 1-2 diet consistencies restricted   Moderate Dysphagia  [] 3 With 2 or more diet consistencies restricted   Moderate-Severe Dysphagia  [] 2 With partial PO strategies (trials with ST only)   Severe Dysphagia  [] 1 With inability to tolerate any PO safely      Score:  Initial: 7 Most Recent: 7 (Date 02/21/22 )   Interpretation of Tool: The Dysphagia Outcome and Severity Scale (SAVANNAH) is a simple, easy-to-use, 7-point scale developed to systematically rate the functional severity of dysphagia based on objective assessment and make recommendations for diet level, independence level, and type of nutrition. Current Medications:   No current facility-administered medications on file prior to encounter. Current Outpatient Medications on File Prior to Encounter   Medication Sig Dispense Refill    metoprolol tartrate (LOPRESSOR) 100 mg IR tablet Take  by mouth daily.  albuterol (PROVENTIL HFA, VENTOLIN HFA, PROAIR HFA) 90 mcg/actuation inhaler Take 2 Puffs by inhalation every four (4) hours as needed for Wheezing. 18 g 5    fluticasone propion-salmeteroL (Advair HFA) 230-21 mcg/actuation inhaler Take 2 Puffs by inhalation two (2) times a day. 1 Each 5    apixaban (ELIQUIS) 5 mg tablet Take 1 Tablet by mouth two (2) times a day. Indications: treatment to prevent blood clots in chronic atrial fibrillation 60 Tablet 0    nitroglycerin (NITROSTAT) 0.4 mg SL tablet 1 Tablet by SubLINGual route every five (5) minutes as needed for Chest Pain. Up to 3 doses. 25 Tablet 6    evolocumab (REPATHA) syringe 1 mL by SubCUTAneous route every fourteen (14) days. 2 Syringe 11    loratadine (CLARITIN) 10 mg tablet Take 10 mg by mouth every morning.       fluticasone (FLONASE) 50 mcg/actuation nasal spray One spray in each nostril twice daily 1 Bottle 5    aspirin delayed-release 81 mg tablet Take 81 mg by mouth every morning.  OXYGEN-AIR DELIVERY SYSTEMS Take 2 L by inhalation as needed for PRN Reason (Other) (Shortness of breath). Titrate oxygen to maintain O2 sat above 92% at rest and above 88% with ambulation.           INTERDISCIPLINARY COLLABORATION: RN    After treatment position/precautions:  · Upright in chair  · Call light within reach  · wife at bedside    Total Treatment Duration:   Time In: 7884  Time Out: [address], [address] CCC-SLP

## 2022-02-21 NOTE — ROUTINE PROCESS
Bedside and Verbal shift change report given to Ashwin Aleman (oncoming nurse) by myself (offgoing nurse). Report included the following information SBAR, Kardex, Intake/Output, MAR and Recent Results.

## 2022-02-21 NOTE — CONSULTS
[address]                 Primary Cardiologist: Dr. Arian Guerrero    Primary Care Physician: Dr. Swetha Gerber    Admitting Physician: Dr. Chico Guerrero    Evaluating Physician: Dr. Melina Rae:     Patient is a 81 y.o.  male with PMHx of CAD (s/p PCI and CABG), HTN, HLD, Prostate CA and PAF who presented to the ED today with s/s CVA. States he was at [employer] around 1000 and noted some numbness in his R arm and leg. Wife told him his mouth was drooping to the R. They went home as he thought maybe this was due to not eating any breakfast. However, he began to have expressive aphasia and difficulty with recall (couldn't remember his dogs names) and so they drove to the ED. In the ED: S/s mostly resolved on arrival. He was felt to be having TIA. CODE S was initiated and he underwent CTA/CT perfusion. He was not found to have LVO. He was loaded with Plavix and admitted by the Hospitalist for further f/u. Of note -- was hypertensive on arrival.     Tele-Neuro consult was completed and felt s/s consistent with TIA. Recommended AFib r/o and [address]. Permissive HTN at present. Pt states that he was hospitalized 12/2021 with COVID. During that admit he was noted to have AFib RVR and was evaluated by our group. He was placed in IV Heparin and converted to PO Eliquis on discharge. He required PAP support and was ultimately discharged on O2. He is still using O2 at HS and intermittently during the day but not everyday. He had f/u with Dr. Arian Guerrero on 1/4/2022 and was doing well. He was recommended to continue the Eliquis x1 month longer and then could stop med which he did. States he is unaware of any other history of PAF. Discussed that OV notes and PMHx both list PAF going back to 2016 -- Pt states that he was never told he had PAF. He denies noted tachy-palpitations, skipped/missed beats or otherwise irregular HR.  States he does snore at night but has never had a formal VELASQUEZ eval.      Past Medical History:   Diagnosis Date    Acute hypoxemic respiratory failure ([facility] [address] 12/4/2021    Anterior myocardial infarction [facility]) 10/2003    Arrhythmia     paroxysmal a fib    Arteriosclerotic coronary artery disease 11/2005    Arthritis     fingers    Atrial fibrillation with RVR ([facility] [address] 12/4/2021    Cancer (HCC)     prostate    Coronary atherosclerosis of native coronary vessel 10/2001    Elevated serum creatinine 12/15/2021    Family history of premature CAD     Father hx cabg/CHF  Brother CABG x 2    Gout     right toe but no problem now    Hypercholesterolemia     Hypertension     Sinus bradycardia 12/14/2021      Past Surgical History:   Procedure Laterality Date    COLONOSCOPY N/A 12/21/2016    COLONOSCOPY  BMI 34 performed by Jed Peralta [facility] at [facility] ENDOSCOPY    HX COLONOSCOPY      HX CORONARY ARTERY BYPASS GRAFT  3/9/2016    x4 - Dr. Brody Cochran    HX HEENT      tonsillectomy    HX ORTHOPAEDIC      2 back surg    HX PROSTATECTOMY      OK CARDIAC SURG PROCEDURE UNLIST      stent    OK CARDIAC SURG PROCEDURE UNLIST  March 9,2016    quidrupal bypass       Allergies   Allergen Reactions    Latex Rash    Codeine Nausea and Vomiting    Tape [Adhesive] Rash     Social History     Tobacco Use    Smoking status: Never Smoker    Smokeless tobacco: Never Used   Substance Use Topics    Alcohol use: Yes     Comment: beer socially-seldom      FH:   Family History   Problem Relation Age of Onset    Heart Disease Father     Diabetes Brother     Heart Disease Brother     Cancer Sister     Colon Cancer Neg Hx         Review of Systems  General: no acute weight change, no new weakness or fatigue, no fever or chills, no diaphoresis  Skin: no rashes, lumps, wounds, sores or other skin changes  HEENT: no headache, dizziness, lightheadedness, vision changes, hearing changes, tinnitus, vertigo, sinus pressure/pain, bleeding gums, sore throat, or hoarseness  Neck: no swollen glands, goiter, pain or stiffness  Respiratory: no cough, no congestion, no sputum, no hemoptysis, no dyspnea, no wheezing  Cardiovascular: + as per HPI, no palpitations, syncope, orthopnea, PND  Gastrointestinal: no increased reflux, no constipation, diarrhea, liver problems, GI bleeding, N/V, no abdominal pain or distension  Urinary: no frequency, urgency, hematuria, burning/pain with urination, flank pain, polyuria, nocturia, or difficulty urinating  Peripheral Vascular: no claudication, leg cramps, prior DVTs, swelling of BLE, color change, or swelling with redness or tenderness  Musculoskeletal: no muscle or joint pain/stiffness, joint swelling, erythema of joints, or back pain  Psychiatric: no increased depression, anxiety or excessive stress  Neurological: no sensory or motor loss, seizures, syncope, tremors, +numbness RUE/RLE, no tingling, no changes in mood, attention, +speech difficulties, no changes in orientation, insight, or judgment. Hematologic: no anemia, no easy bruising or bleeding  Endocrine: no thyroid problems, no heat or cold intolerance, excessive sweating, polyuria, polydipsia, no diabetes. Objective:       Visit Vitals  BP (!) 165/70   Pulse 67   Temp 98.3 °F (36.8 °C)   Resp 18   Ht 5' 11\" (1.803 m)   Wt 97.1 kg (214 lb)   SpO2 98%   BMI 29.85 kg/m²       No intake/output data recorded. No intake/output data recorded.     Physical Exam:  General: well developed, well nourished, NAD, resting comfortably  HEENT: PERRLA, no abnormalities noted, sclera clear, EOMs intact  Neck: supple, no JVD, trachea midline, no carotid bruits  Heart: S1S2 with RRR without murmurs, rubs or gallops  Lungs: clear to auscultation bilaterally, normal effort on RA, no wheezing or rales  Abd: soft, nontender, nondistended, +bowel sounds  Ext: warm, no edema, calves supple/nontender, pulses 2+ bilaterally  Skin: warm and dry, intact to view  Psychiatric: appropriate mood and affect, cooperative  Neurologic: A&O X 3, moves all 4 equally, CNs intact, strength 5/5 bilaterally      ECG: reviewed with Dr. Renae Walker -- felt to be SR    ECHO: ordered and pending    CTA: IMPRESSION  No intracranial arterial occlusion.     Bilateral maxillary sinusitis, probably chronic.     Bilateral pulmonary ground glass densities could represent fluid overload, viral  infection, or other infiltrative process. CT Perfusion: No large artery perfusion defect suggested. Data Review:      Recent Results (from the past 24 hour(s))   GLUCOSE, POC    Collection Time: 02/20/22 11:43 AM   Result Value Ref Range    Glucose (POC) 86 65 - 100 mg/dL    Performed by Luci    CBC WITH AUTOMATED DIFF    Collection Time: 02/20/22 11:44 AM   Result Value Ref Range    WBC 7.9 4.3 - 11.1 K/uL    RBC 5.43 4.23 - 5.6 M/uL    HGB 15.5 13.6 - 17.2 g/dL    HCT 47.9 41.1 - 50.3 %    MCV 88.2 79.6 - 97.8 FL    MCH 28.5 26.1 - 32.9 PG    MCHC 32.4 31.4 - 35.0 g/dL    RDW 13.5 11.9 - 14.6 %    PLATELET 127 543 - 496 K/uL    MPV 9.6 9.4 - 12.3 FL    ABSOLUTE NRBC 0.00 0.0 - 0.2 K/uL    DF AUTOMATED      NEUTROPHILS 60 43 - 78 %    LYMPHOCYTES 27 13 - 44 %    MONOCYTES 8 4.0 - 12.0 %    EOSINOPHILS 3 0.5 - 7.8 %    BASOPHILS 1 0.0 - 2.0 %    IMMATURE GRANULOCYTES 0 0.0 - 5.0 %    ABS. NEUTROPHILS 4.8 1.7 - 8.2 K/UL    ABS. LYMPHOCYTES 2.2 0.5 - 4.6 K/UL    ABS. MONOCYTES 0.7 0.1 - 1.3 K/UL    ABS. EOSINOPHILS 0.2 0.0 - 0.8 K/UL    ABS. BASOPHILS 0.1 0.0 - 0.2 K/UL    ABS. IMM.  GRANS. 0.0 0.0 - 0.5 K/UL   METABOLIC PANEL, BASIC    Collection Time: 02/20/22 11:44 AM   Result Value Ref Range    Sodium 140 138 - 145 mmol/L    Potassium 3.7 3.5 - 5.1 mmol/L    Chloride 106 98 - 107 mmol/L    CO2 32 21 - 32 mmol/L    Anion gap 2 (L) 7 - 16 mmol/L    Glucose 94 65 - 100 mg/dL    BUN 10 8 - 23 MG/DL    Creatinine 0.90 0.8 - 1.5 MG/DL    GFR est AA >60 >60 ml/min/1.73m2    GFR est non-AA >60 >60 ml/min/1.73m2    Calcium 8.8 8.3 - 10.4 MG/DL   TROPONIN-HIGH SENSITIVITY    Collection Time: 02/20/22 11:44 AM   Result Value Ref Range    Troponin-High Sensitivity 8.4 0 - 14 pg/mL   POC PT/INR    Collection Time: 02/20/22 11:45 AM   Result Value Ref Range    Prothrombin time (POC) 15.1 (H) 9.6 - 11.6 SECS    INR (POC) 1.3 (H) 0.9 - 1.2     EKG, 12 LEAD, INITIAL    Collection Time: 02/20/22 12:00 PM   Result Value Ref Range    Ventricular Rate 65 BPM    Atrial Rate 161 BPM    QRS Duration 105 ms    Q-T Interval 401 ms    QTC Calculation (Bezet) 417 ms    Calculated R Axis 75 degrees    Calculated T Axis 75 degrees    Diagnosis       Atrial flutter  Low voltage, precordial leads  Anteroseptal infarct, old  Baseline wander in lead(s) V3           Assessment/Plan:   Principal Problem:    TIA (transient ischemic attack) -- per Hospitalist, s/s have resolved, no LVO    Active Problems:    Dyslipidemia-- cont statin, also on Repatha as OP      CAD S/P CABG x 4 -- 2016      Primary hypertension -- was previously on Toprol 100mg daily, apparently had bradycardia during COVID admit and med was stopped on d/c, BP/HR controlled off med at appt 1/4/22 with recs to restart as recovery progressed, has not resumed yet, currently permissive HTN for now as per Neuro/Hospitalist, will restart metoprolol 25mg q12 and monitor response      Slurred speech -- resolved      Facial droop -- resolved      Right sided weakness -- resolved      Paroxysmal atrial fibrillation -- this dx is noted in chart back to 2016 but no EKGs in system with AFib, per chart review -- Pt had 2 runs of POST-OP AFib during CABG surgery admit in 2016 that was resolved with Amio, PAF was resolved and he was cont on PO Amio at d/c x 1 week, appears AMio was stopped after the 1 week f/u appt but remained listed on his dx list, he states no known dx of AFib and has actually never received any AFib directed treatment since that time, has been on BB (Toprol XL) but this was stopped after COVID admit due to bradycardia, will plan to check ECHO in AM, restart metoprolol 25mg BID and monitor response, keep K>4, Mag >2, check TSH for completeness, will plan to restart Eliquis at this time, CHADsVASC=5, further recommendations or possible need for Loop vs OP monitor as per clinical course, would also recommend OP VELASQUEZ eval as untreated VELASQUEZ is risk factor for AFib -- defer to primary team      BRANDT Prado  2/20/2022  7:23 PM     [facility] CARDIOLOGY     2/20/2022     7:59 PM    I have personally seen and examined Kassandra Lara with  Cornelius Kim NP. I agree and confirm findings in history, physical exam, and assessment/plan as outlined above with following pertinent additions/exceptions:       72-year-old gentleman with known coronary artery disease and previous CABG, hypertension and hyperlipidemia was recently admitted to the hospital with COVID-19 infection and was on oxygen for over a month after he left the hospital.  He had transient atrial fibrillation noted during his last hospitalization but it was thought to be triggered by his significant COVID-19 infection.  -A month later, he was seen in follow-up by his primary cardiologist and he was in sinus rhythm at that point. He previously was on metoprolol succinate 100 mg once daily but had a fair amount of bradycardia while in the hospital with his COVID-19 infection so this was discontinued.  -Heart rate sense of trended upwards but here, his EKG only shows sinus rhythm with no obvious evidence of atrial fibrillation.  -He came in with right-sided weakness but no obvious speech deficits, said it was transient and it has since resolved. CT scan of the head shows no acute process and no evidence of a bleed. On telemetry, only sinus rhythm is noted with some slightly higher pressures but nothing excessive.   Neurology was consulted and have asked us to evaluate for atrial fibrillation.  -On exam, he is in sinus rhythm, moving all 4 extremities, no obvious facial droop, speech is normal, no obvious murmurs rubs or gallops, lungs are clear to auscultation bilaterally. No edema peripheral pulses felt although mildly diminished in both feet. Assessment recommendations:  1. TIA-right-sided weakness resolved  2. Coronary artery disease s/p CABG  3. Paroxysmal atrial fibrillation-noted previously-likely postoperative after CABG looking back through notes from Dr. Nadia Casiano but no documented recurrence until he had his recent COVID-19 infection a month ago. -[id number] score equals 4-indicating need for therapeutic anticoagulation at this point especially after his recent TIA. -We were asked to consider an implantable loop recorder but after having spoken to him, I agree that he would benefit from being started on therapeutic anticoagulation right away with Eliquis 5 mg twice daily and we can monitor him on telemetry here and at the time of discharge, we can set him up with a 30-day MCT if needed although I do suspect he has enough evidence for long-term anticoagulation regardless. We will start him on low-dose metoprolol tartrate 25 mg twice daily-to help maintain sinus rhythm.  -He has not had a recent echocardiogram and we will get this done to evaluate LV function, rule out additional valvular heart disease, evaluate atrial dimensions. 4.  Suspected sleep apnea-we will require a sleep study on an outpatient basis and follow-up with sleep medicine. Mallampati IV airway noted with witnessed apneic spells at home.    -Further plans will be per clinical course. Thank you for allowing us to participate in the care of your patient. If you have any further questions, please do not hesitate to contact us.   Sincerely,    Maria Esther Simmons MD

## 2022-02-21 NOTE — PROGRESS NOTES
[facility] CARDIOLOGY PROGRESS NOTE           2/21/2022 11:23 AM    Admit Date: 2/20/2022      Subjective:   Pt came to the ED with TIA like symptoms that mostly resolved overnight. Code S was called with tele aura recommending AF r/o. Pt has a hx of PAF s/p COVID and continued on Eliquis 1 month and stopped. Pt [id number] score equals 4 at this time. The patient was started on low does 25 mg BID Metoprolol with bradycardia with plans for outpatient monitor along with [address] when safe by neuro. Pt has felt good on current medication regiment at this time with no complaints today. He denies CP, HA, LOC, AMS, weakness, dizziness, N/V, or any other new medical problems. ROS:  Cardiovascular:  As noted above    Objective:      Vitals:    02/21/22 0000 02/21/22 0334 02/21/22 0400 02/21/22 0800   BP: 139/69 132/69 132/69 (!) 140/66   Pulse: 62 60 (!) 58 61   Resp: 20 16 16 20   Temp: 97.8 °F (36.6 °C) 98 °F (36.7 °C) 98 °F (36.7 °C) 98.5 °F (36.9 °C)   SpO2: 91% 91% 91% 93%   Weight:       Height:           Physical Exam:  General-No Acute Distress  Neck- supple, no JVD  CV- regular rate and rhythm no MRG  Lung- clear bilaterally  Abd- soft, nontender, nondistended  Ext- no edema bilaterally. Skin- warm and dry    Data Review:   Recent Labs     02/21/22  0458 02/20/22  1145 02/20/22  1144     --  140   K 3.5  --  3.7   MG 1.9  --   --    BUN 8  --  10   CREA 0.90  --  0.90   GLU 87  --  94   WBC 7.4  --  7.9   HGB 15.1  --  15.5   HCT 46.2  --  47.9     --  237   INR  --  1.3*  --    CHOL 109  --   --    LDLC 43.6  --   --    HDL 50  --   --        Assessment/Plan:     Principal Problem:    TIA (transient ischemic attack) (2/20/2022)    Per Neuro, MRI no evidence of infartction    Active Problems:    Dyslipidemia (3/6/2016)    On lipitor will DC duplicate dose and continue 80 mg daily.       S/P CABG x 4 (3/9/2016)    Continue statin, BB, and is started back on Eliquis already with Plavix as well,  Stop home ASA while on Plavix and Elquis. Paroxysmal atrial fibrillation ([facility] [address] (3/12/2016)    [address] already started with Eliquis BID, planned outpatient monitor, Started Lopressor 25 mg BID (on 100 mg IR BID at home) with acceptable HR at this time. Rates between 70-58. Primary hypertension (3/14/2017)    Currently controlled, BP recommendations per neuro at this time.         Slurred speech (2/20/2022)    Per Neuro      Facial droop (2/20/2022)    Per Neuro      Right sided weakness (2/20/2022)    Per Neuro          Tyrarinda Room, NP  2/21/2022 11:23 AM

## 2022-02-21 NOTE — PROGRESS NOTES
02/21/22 0641   NIH Stroke Scale   Interval Handoff/Transfer   Level of Conciousness (1a) 0   LOC Questions (1b) 0   LOC Commands (1c) 0   Best Gaze (2) 0   Visual (3) 0   Facial Palsy (4) 0   Motor Arm, Left (5a) 0   Motor Arm, Right (5b) 0   Motor Leg, Left (6a) 0   Motor Leg, Right (6b) 0   Limb Ataxia (7) 0   Sensory (8) 0   Best Language (9) 0   Dysarthria (10) 0   Extinction and Inattention (11) 0   Total 0

## 2022-03-18 PROBLEM — R47.81 SLURRED SPEECH: Status: ACTIVE | Noted: 2022-02-20

## 2022-03-18 PROBLEM — D68.69 SECONDARY HYPERCOAGULABLE STATE (HCC): Status: ACTIVE | Noted: 2021-12-17

## 2022-03-18 PROBLEM — G45.9 TIA (TRANSIENT ISCHEMIC ATTACK): Status: ACTIVE | Noted: 2022-02-20

## 2022-03-18 PROBLEM — R06.02 SHORTNESS OF BREATH: Status: ACTIVE | Noted: 2022-01-25

## 2022-03-18 PROBLEM — R53.1 RIGHT SIDED WEAKNESS: Status: ACTIVE | Noted: 2022-02-20

## 2022-03-19 PROBLEM — R29.810 FACIAL DROOP: Status: ACTIVE | Noted: 2022-02-20

## 2022-03-19 PROBLEM — U07.1 COVID-19: Status: ACTIVE | Noted: 2021-12-04

## 2022-03-19 PROBLEM — E66.01 SEVERE OBESITY (HCC): Status: ACTIVE | Noted: 2019-04-01

## 2022-03-19 PROBLEM — U09.9 COVID-19 LONG HAULER MANIFESTING CHRONIC DYSPNEA: Status: ACTIVE | Noted: 2022-01-25

## 2022-03-19 PROBLEM — I10 PRIMARY HYPERTENSION: Status: ACTIVE | Noted: 2017-03-14

## 2022-03-19 PROBLEM — R06.09 COVID-19 LONG HAULER MANIFESTING CHRONIC DYSPNEA: Status: ACTIVE | Noted: 2022-01-25

## 2022-04-06 NOTE — DISCHARGE INSTRUCTIONS
Cellulitis: Care Instructions  Your Care Instructions    Cellulitis is a skin infection. It often occurs after a break in the skin from a scrape, cut, bite, or puncture, or after a rash. The doctor has checked you carefully, but problems can develop later. If you notice any problems or new symptoms, get medical treatment right away. Follow-up care is a key part of your treatment and safety. Be sure to make and go to all appointments, and call your doctor if you are having problems. It's also a good idea to know your test results and keep a list of the medicines you take. How can you care for yourself at home? · Take your antibiotics as directed. Do not stop taking them just because you feel better. You need to take the full course of antibiotics. · Prop up the infected area on pillows to reduce pain and swelling. Try to keep the area above the level of your heart as often as you can. · If your doctor told you how to care for your wound, follow your doctor's instructions. If you did not get instructions, follow this general advice:  ¨ Wash the wound with clean water 2 times a day. Don't use hydrogen peroxide or alcohol, which can slow healing. ¨ You may cover the wound with a thin layer of petroleum jelly, such as Vaseline, and a nonstick bandage. ¨ Apply more petroleum jelly and replace the bandage as needed. · Be safe with medicines. Take pain medicines exactly as directed. ¨ If the doctor gave you a prescription medicine for pain, take it as prescribed. ¨ If you are not taking a prescription pain medicine, ask your doctor if you can take an over-the-counter medicine. To prevent cellulitis in the future  · Try to prevent cuts, scrapes, or other injuries to your skin. Cellulitis most often occurs where there is a break in the skin. · If you get a scrape, cut, mild burn, or bite, wash the wound with clean water as soon as you can to help avoid infection.  Don't use hydrogen peroxide or alcohol, which Pt to ED with c/o high blood pressure. Pt states she was seen in the ED yesterday for the same. Pt states when she woke up this am her b/p was 160/111. Pt denies h/a or any other associated symptoms.    can slow healing. · If you have swelling in your legs (edema), support stockings and good skin care may help prevent leg sores and cellulitis. · Take care of your feet, especially if you have diabetes or other conditions that increase the risk of infection. Wear shoes and socks. Do not go barefoot. If you have athlete's foot or other skin problems on your feet, talk to your doctor about how to treat them. When should you call for help? Call your doctor now or seek immediate medical care if:  · You have signs that your infection is getting worse, such as:  ¨ Increased pain, swelling, warmth, or redness. ¨ Red streaks leading from the area. ¨ Pus draining from the area. ¨ A fever. · You get a rash. Watch closely for changes in your health, and be sure to contact your doctor if:  · You are not getting better after 1 day (24 hours). · You do not get better as expected. Where can you learn more? Go to http://prasad-kelly.info/. Cherrie Trevino in the search box to learn more about \"Cellulitis: Care Instructions. \"  Current as of: February 5, 2016  Content Version: 11.1  © 5697-2835 [employer]. Care instructions adapted under license by [employer] (which disclaims liability or warranty for this information). If you have questions about a medical condition or this instruction, always ask your healthcare professional. [place] [address] any warranty or liability for your use of this information. Gout: Care Instructions  Your Care Instructions  Gout is a form of arthritis caused by a buildup of uric acid crystals in a joint. It causes sudden attacks of pain, swelling, redness, and stiffness, usually in one joint, especially the big toe. Gout usually comes on without a cause. But it can be brought on by drinking alcohol (especially beer) or eating seafood and red meat.  Taking certain medicines, such as diuretics or aspirin, also can bring on an attack of gout. Taking your medicines as prescribed and following up with your doctor regularly can help you avoid gout attacks in the future. Follow-up care is a key part of your treatment and safety. Be sure to make and go to all appointments, and call your doctor if you are having problems. Its also a good idea to know your test results and keep a list of the medicines you take. How can you care for yourself at home? · If the joint is swollen, put ice or a cold pack on the area for 10 to 20 minutes at a time. Put a thin cloth between the ice and your skin. · Prop up the sore limb on a pillow when you ice it or anytime you sit or lie down during the next 3 days. Try to keep it above the level of your heart. This will help reduce swelling. · Rest sore joints. Avoid activities that put weight or strain on the joints for a few days. Take short rest breaks from your regular activities during the day. · Take your medicines exactly as prescribed. Call your doctor if you think you are having a problem with your medicine. · Take pain medicines exactly as directed. ¨ If the doctor gave you a prescription medicine for pain, take it as prescribed. ¨ If you are not taking a prescription pain medicine, ask your doctor if you can take an over-the-counter medicine. · Eat less seafood and red meat. · Check with your doctor before drinking alcohol. · Losing weight, if you are overweight, may help reduce attacks of gout. But do not go on a [employer]. \" Losing a lot of weight in a short amount of time can cause a gout attack. When should you call for help? Call your doctor now or seek immediate medical care if:  · You have a fever. · The joint is so painful you cannot use it. · You have sudden, unexplained swelling, redness, warmth, or severe pain in one or more joints. Watch closely for changes in your health, and be sure to contact your doctor if:  · You have joint pain.   · Your symptoms get worse or are not improving after 2 or 3 days. Where can you learn more? Go to http://prasad-kelly.info/. Enter I387 in the search box to learn more about \"Gout: Care Instructions. \"  Current as of: February 24, 2016  Content Version: 11.1  © 5566-7356 [employer]. Care instructions adapted under license by [employer] (which disclaims liability or warranty for this information). If you have questions about a medical condition or this instruction, always ask your healthcare professional. [address] any warranty or liability for your use of this information.

## 2022-05-29 ENCOUNTER — APPOINTMENT (OUTPATIENT)
Dept: CT IMAGING | Age: 72
End: 2022-05-29
Payer: MEDICARE

## 2022-05-29 ENCOUNTER — APPOINTMENT (OUTPATIENT)
Dept: GENERAL RADIOLOGY | Age: 72
End: 2022-05-29
Payer: MEDICARE

## 2022-05-29 ENCOUNTER — HOSPITAL ENCOUNTER (OUTPATIENT)
Age: 72
Setting detail: OBSERVATION
Discharge: HOME | End: 2022-05-31
Attending: EMERGENCY MEDICINE | Admitting: EMERGENCY MEDICINE
Payer: MEDICARE

## 2022-05-29 DIAGNOSIS — I63.20 ISCHEMIC CEREBRAL VASCULAR ACCIDENT (CVA) DUE TO STENOSIS OF LARGE EXTRACRANIAL ARTERY (HCC): ICD-10-CM

## 2022-05-29 DIAGNOSIS — G45.9 TIA (TRANSIENT ISCHEMIC ATTACK): Primary | ICD-10-CM

## 2022-05-29 PROBLEM — I10 PRIMARY HYPERTENSION: Status: ACTIVE | Noted: 2017-03-14

## 2022-05-29 PROBLEM — D68.69 SECONDARY HYPERCOAGULABLE STATE (HCC): Status: ACTIVE | Noted: 2021-12-17

## 2022-05-29 PROBLEM — U07.1 COVID-19: Status: ACTIVE | Noted: 2021-12-04

## 2022-05-29 LAB
ANION GAP SERPL CALC-SCNC: 3 MMOL/L (ref 7–16)
APPEARANCE UR: CLEAR
BASOPHILS # BLD: 0.1 K/UL (ref 0–0.2)
BASOPHILS NFR BLD: 1 % (ref 0–2)
BILIRUB UR QL: NEGATIVE
BUN SERPL-MCNC: 14 MG/DL (ref 8–23)
CALCIUM SERPL-MCNC: 9.2 MG/DL (ref 8.3–10.4)
CHLORIDE SERPL-SCNC: 104 MMOL/L (ref 98–107)
CO2 SERPL-SCNC: 33 MMOL/L (ref 21–32)
COLOR UR: YELLOW
CREAT SERPL-MCNC: 1 MG/DL (ref 0.8–1.5)
DIFFERENTIAL METHOD BLD: ABNORMAL
EOSINOPHIL # BLD: 0.2 K/UL (ref 0–0.8)
EOSINOPHIL NFR BLD: 3 % (ref 0.5–7.8)
ERYTHROCYTE [DISTWIDTH] IN BLOOD BY AUTOMATED COUNT: 13.7 % (ref 11.9–14.6)
GLUCOSE BLD STRIP.AUTO-MCNC: 152 MG/DL (ref 65–100)
GLUCOSE SERPL-MCNC: 161 MG/DL (ref 65–100)
GLUCOSE UR STRIP.AUTO-MCNC: NEGATIVE MG/DL
HCT VFR BLD AUTO: 48.6 % (ref 41.1–50.3)
HGB BLD-MCNC: 15.8 G/DL (ref 13.6–17.2)
HGB UR QL STRIP: NEGATIVE
IMM GRANULOCYTES # BLD AUTO: 0 K/UL (ref 0–0.5)
IMM GRANULOCYTES NFR BLD AUTO: 0 % (ref 0–5)
INR BLD: 1.3 (ref 0.9–1.2)
KETONES UR QL STRIP.AUTO: NEGATIVE MG/DL
LEUKOCYTE ESTERASE UR QL STRIP.AUTO: NEGATIVE
LYMPHOCYTES # BLD: 1.7 K/UL (ref 0.5–4.6)
LYMPHOCYTES NFR BLD: 21 % (ref 13–44)
MCH RBC QN AUTO: 28.2 PG (ref 26.1–32.9)
MCHC RBC AUTO-ENTMCNC: 32.5 G/DL (ref 31.4–35)
MCV RBC AUTO: 86.6 FL (ref 79.6–97.8)
MONOCYTES # BLD: 0.6 K/UL (ref 0.1–1.3)
MONOCYTES NFR BLD: 8 % (ref 4–12)
NEUTS SEG # BLD: 5.2 K/UL (ref 1.7–8.2)
NEUTS SEG NFR BLD: 67 % (ref 43–78)
NITRITE UR QL STRIP.AUTO: NEGATIVE
NRBC # BLD: 0 K/UL (ref 0–0.2)
PH UR STRIP: 7.5 (ref 5–9)
PLATELET # BLD AUTO: 231 K/UL (ref 150–450)
PMV BLD AUTO: 10.1 FL (ref 9.4–12.3)
POTASSIUM SERPL-SCNC: 3.9 MMOL/L (ref 3.5–5.1)
PROT UR STRIP-MCNC: NEGATIVE MG/DL
PT BLD: 15.7 SECS (ref 9.6–11.6)
RBC # BLD AUTO: 5.61 M/UL (ref 4.23–5.6)
SERVICE CMNT-IMP: ABNORMAL
SODIUM SERPL-SCNC: 140 MMOL/L (ref 138–145)
SP GR UR REFRACTOMETRY: 1.01 (ref 1–1.02)
TROPONIN I SERPL HS-MCNC: 7.9 PG/ML (ref 0–14)
TROPONIN I SERPL HS-MCNC: 9 PG/ML (ref 0–14)
UROBILINOGEN UR QL STRIP.AUTO: 0.2 EU/DL (ref 0.2–1)
WBC # BLD AUTO: 7.8 K/UL (ref 4.3–11.1)

## 2022-05-29 PROCEDURE — 2580000003 HC RX 258: Performed by: EMERGENCY MEDICINE

## 2022-05-29 PROCEDURE — 85610 PROTHROMBIN TIME: CPT

## 2022-05-29 PROCEDURE — 6370000000 HC RX 637 (ALT 250 FOR IP): Performed by: EMERGENCY MEDICINE

## 2022-05-29 PROCEDURE — 85025 COMPLETE CBC W/AUTO DIFF WBC: CPT

## 2022-05-29 PROCEDURE — 1100000000 HC RM PRIVATE

## 2022-05-29 PROCEDURE — 70498 CT ANGIOGRAPHY NECK: CPT

## 2022-05-29 PROCEDURE — G0378 HOSPITAL OBSERVATION PER HR: HCPCS

## 2022-05-29 PROCEDURE — 94762 N-INVAS EAR/PLS OXIMTRY CONT: CPT

## 2022-05-29 PROCEDURE — 0042T CT BRAIN PERFUSION: CPT

## 2022-05-29 PROCEDURE — 81003 URINALYSIS AUTO W/O SCOPE: CPT

## 2022-05-29 PROCEDURE — 6370000000 HC RX 637 (ALT 250 FOR IP): Performed by: INTERNAL MEDICINE

## 2022-05-29 PROCEDURE — 71045 X-RAY EXAM CHEST 1 VIEW: CPT

## 2022-05-29 PROCEDURE — 80048 BASIC METABOLIC PNL TOTAL CA: CPT

## 2022-05-29 PROCEDURE — 6360000004 HC RX CONTRAST MEDICATION: Performed by: EMERGENCY MEDICINE

## 2022-05-29 PROCEDURE — 93005 ELECTROCARDIOGRAM TRACING: CPT | Performed by: EMERGENCY MEDICINE

## 2022-05-29 PROCEDURE — 99285 EMERGENCY DEPT VISIT HI MDM: CPT

## 2022-05-29 PROCEDURE — 84484 ASSAY OF TROPONIN QUANT: CPT

## 2022-05-29 PROCEDURE — 82962 GLUCOSE BLOOD TEST: CPT

## 2022-05-29 PROCEDURE — 36415 COLL VENOUS BLD VENIPUNCTURE: CPT

## 2022-05-29 PROCEDURE — 70450 CT HEAD/BRAIN W/O DYE: CPT

## 2022-05-29 RX ORDER — ALBUTEROL SULFATE 90 UG/1
2 AEROSOL, METERED RESPIRATORY (INHALATION) EVERY 4 HOURS PRN
Status: DISCONTINUED | OUTPATIENT
Start: 2022-05-29 | End: 2022-05-31 | Stop reason: HOSPADM

## 2022-05-29 RX ORDER — SODIUM CHLORIDE 0.9 % (FLUSH) 0.9 %
3 SYRINGE (ML) INJECTION EVERY 8 HOURS
Status: DISCONTINUED | OUTPATIENT
Start: 2022-05-29 | End: 2022-05-31 | Stop reason: HOSPADM

## 2022-05-29 RX ORDER — ATORVASTATIN CALCIUM 80 MG/1
80 TABLET, FILM COATED ORAL NIGHTLY
Status: DISCONTINUED | OUTPATIENT
Start: 2022-05-29 | End: 2022-05-31 | Stop reason: HOSPADM

## 2022-05-29 RX ORDER — ONDANSETRON 2 MG/ML
4 INJECTION INTRAMUSCULAR; INTRAVENOUS EVERY 6 HOURS PRN
Status: DISCONTINUED | OUTPATIENT
Start: 2022-05-29 | End: 2022-05-31 | Stop reason: HOSPADM

## 2022-05-29 RX ORDER — ONDANSETRON 4 MG/1
4 TABLET, ORALLY DISINTEGRATING ORAL EVERY 8 HOURS PRN
Status: DISCONTINUED | OUTPATIENT
Start: 2022-05-29 | End: 2022-05-31 | Stop reason: HOSPADM

## 2022-05-29 RX ORDER — ATORVASTATIN CALCIUM 80 MG/1
80 TABLET, FILM COATED ORAL DAILY
COMMUNITY
End: 2022-09-27 | Stop reason: SINTOL

## 2022-05-29 RX ORDER — BISACODYL 10 MG
10 SUPPOSITORY, RECTAL RECTAL DAILY PRN
Status: DISCONTINUED | OUTPATIENT
Start: 2022-05-29 | End: 2022-05-31 | Stop reason: HOSPADM

## 2022-05-29 RX ORDER — POLYETHYLENE GLYCOL 3350 17 G/17G
17 POWDER, FOR SOLUTION ORAL DAILY PRN
Status: DISCONTINUED | OUTPATIENT
Start: 2022-05-29 | End: 2022-05-31 | Stop reason: HOSPADM

## 2022-05-29 RX ORDER — ASPIRIN 81 MG/1
81 TABLET, CHEWABLE ORAL DAILY
Status: DISCONTINUED | OUTPATIENT
Start: 2022-05-30 | End: 2022-05-31 | Stop reason: HOSPADM

## 2022-05-29 RX ORDER — ACETAMINOPHEN 325 MG/1
650 TABLET ORAL EVERY 4 HOURS PRN
Status: DISCONTINUED | OUTPATIENT
Start: 2022-05-29 | End: 2022-05-31 | Stop reason: HOSPADM

## 2022-05-29 RX ORDER — ASPIRIN 325 MG
325 TABLET ORAL
Status: COMPLETED | OUTPATIENT
Start: 2022-05-29 | End: 2022-05-29

## 2022-05-29 RX ORDER — ASPIRIN 81 MG/1
81 TABLET ORAL DAILY
COMMUNITY

## 2022-05-29 RX ORDER — FLUTICASONE PROPIONATE 50 MCG
2 SPRAY, SUSPENSION (ML) NASAL DAILY
Status: DISCONTINUED | OUTPATIENT
Start: 2022-05-30 | End: 2022-05-31 | Stop reason: HOSPADM

## 2022-05-29 RX ORDER — CLOPIDOGREL BISULFATE 75 MG/1
75 TABLET ORAL DAILY
Status: DISCONTINUED | OUTPATIENT
Start: 2022-05-30 | End: 2022-05-30

## 2022-05-29 RX ORDER — CETIRIZINE HYDROCHLORIDE 10 MG/1
10 TABLET ORAL DAILY
Status: DISCONTINUED | OUTPATIENT
Start: 2022-05-30 | End: 2022-05-31 | Stop reason: HOSPADM

## 2022-05-29 RX ORDER — NICOTINE 21 MG/24HR
1 PATCH, TRANSDERMAL 24 HOURS TRANSDERMAL DAILY PRN
Status: DISCONTINUED | OUTPATIENT
Start: 2022-05-29 | End: 2022-05-31 | Stop reason: HOSPADM

## 2022-05-29 RX ORDER — ACETAMINOPHEN 650 MG/1
650 SUPPOSITORY RECTAL EVERY 4 HOURS PRN
Status: DISCONTINUED | OUTPATIENT
Start: 2022-05-29 | End: 2022-05-31 | Stop reason: HOSPADM

## 2022-05-29 RX ORDER — CLOPIDOGREL BISULFATE 75 MG/1
75 TABLET ORAL DAILY
Status: ON HOLD | COMMUNITY
End: 2022-05-31 | Stop reason: HOSPADM

## 2022-05-29 RX ADMIN — IOPAMIDOL 100 ML: 755 INJECTION, SOLUTION INTRAVENOUS at 16:32

## 2022-05-29 RX ADMIN — ATORVASTATIN CALCIUM 80 MG: 80 TABLET, FILM COATED ORAL at 22:53

## 2022-05-29 RX ADMIN — Medication 3 ML: at 23:56

## 2022-05-29 RX ADMIN — ASPIRIN 325 MG ORAL TABLET 325 MG: 325 PILL ORAL at 18:28

## 2022-05-29 ASSESSMENT — PAIN SCALES - GENERAL
PAINLEVEL_OUTOF10: 0
PAINLEVEL_OUTOF10: 0

## 2022-05-29 NOTE — ED TRIAGE NOTES
Pt arrives ambulatory to triage. Sudden onset of dizziness around 2pm with aphasia and right sided numbness. Still with right arm numbness. Speech is better. Still feels slightly dizzy.  Code s called by RN

## 2022-05-29 NOTE — H&P
Hospitalist History and Physical   Admit Date:  2022  3:01 PM   Name:  Carmon Litten   Age:  81 y.o. Sex:  male  :  1950   MRN:  [id number]   Room:  [place] [address]    Presenting Complaint: Aphasia     Reason(s) for Admission: Ischemic cerebral vascular accident (CVA) due to stenosis of large extracranial artery ([facility] [I63.20]     History of Present Illness:       Carmon Litten is a 81 y.o. male with medical history of CAD s/p CABG, TIA, paroxysmal AFIB (states not taking eliquis or xarelto) , HTN, HLP, BILL not on CPAP, obesity, COVID 19, untreated BILL who is evaluated with acute onset of headache, dizziness, right sided facial / arm numbness and change in speech. Wife present and gives details as well as chart reviewed and spoke with referring Dr. [place]. CODE S called. STAT head CT no acute issues. CTP negative. CTA head and neck shows prior noted occluded distal right vertebral artery. He has been seen by tele neurology. While in the ED he has improved somewhat and still has some headache and facial numbness. He recalls prior event  that was similar. He has intermittent afib and at one point was taking eliquis that was stopped by cardiology. Review of Systems:  10 systems reviewed and negative except as noted in HPI. No chest pain or edema, no dyspnea or cough, no change in bowels or anorexia     He has had some neck pain      Assessment & Plan:     Principal Problem:    Ischemic cerebral vascular accident (CVA) due to stenosis of large extracranial artery ([facility] [address]  Plan:   · Admit to remote tele   · MRI brain  · ECHO  · Neurology and cardiology consults to address medications in light of afib history   · Continued on asa, plavix and lipitor for now   · SLP, PT,OT, PPD         Dyslipidemia  Plan:     S/P CABG x 4  Plan:    Active Problems:    CAD (coronary artery disease)  Plan:   · Continued asa, lipitor, plavix   · He is on outpatient repatha      Paroxysmal atrial fibrillation ([address]  Plan:     Secondary hypercoagulable state ([address]  Plan:   · Not currently on eliquis   · Cardiology consult for re-evaluation   · Currently in NSR- EKG tracing personally reviewed           Sleep apnea  Plan:   · Unable to tolerate CPAP           Class 1 obesity due to excess calories with serious comorbidity and body mass index (BMI) of 31.0 to 31.9 in adult  Plan:   · Advise diet/ exercise change           Primary hypertension  Plan:   · Resume home meds                Dispo/Discharge Planning:     pending to home    Diet: Diet NPO  VTE ppx: SCD  Code status: FULL      Wife Rise [phone]    Past History:  Past Medical History:   Diagnosis Date    Acute hypoxemic respiratory failure ([address] 12/4/2021    Anterior myocardial infarction ([address] 10/2003    Arrhythmia     paroxysmal a fib    Arteriosclerotic coronary artery disease 11/2005    Arthritis     fingers    Atrial fibrillation with RVR ([address] 12/4/2021    Cancer ([address]     prostate    Coronary atherosclerosis of native coronary vessel 10/2001    Elevated serum creatinine 12/15/2021    Family history of premature CAD     Father hx cabg/CHF  Brother CABG x 2    Gout     right toe but no problem now    Hypercholesterolemia     Hypertension     Sinus bradycardia 12/14/2021     Past Surgical History:   Procedure Laterality Date    COLONOSCOPY N/A 12/21/2016    COLONOSCOPY  BMI 34 performed by Tamara Maloney MD at [address] [place] [address] GRAFT  3/9/2016    x4 - Dr. Nichols Pencil      2 back surg    [place] CARDIAC SURG PROCEDURE UNLIST  March 9,2016    quidrupal bypass     ID CARDIAC SURG PROCEDURE UNLIST      stent    PROSTATECTOMY        Allergies   Allergen Reactions    Latex Rash    Codeine Nausea And Vomiting      Social History     Tobacco Use    Smoking status: Never Smoker    Smokeless tobacco: Never Used   Substance Use Topics    Alcohol use: Yes      Family History   Problem Relation Age of Onset    Colon Cancer Neg Hx     Cancer Sister     Heart Disease Brother     Diabetes Brother     Heart Disease Father       Family history reviewed and negative except as noted above. Immunization History   Administered Date(s) Administered    PPD Test 03/09/2016, 02/20/2022    Tdap (Boostrix, Adacel) 10/21/2014     Prior to Admit Medications:  Current Outpatient Medications   Medication Instructions    albuterol sulfate  (90 Base) MCG/ACT inhaler 2 puffs, Inhalation, EVERY 4 HOURS PRN    Evolocumab 140 mg, SubCUTAneous, EVERY 14 DAYS    fluticasone (FLONASE) 50 MCG/ACT nasal spray One spray in each nostril twice daily    fluticasone-salmeterol (ADVAIR HFA) 230-21 MCG/ACT inhaler 2 puffs, Inhalation, 2 TIMES DAILY    loratadine (CLARITIN) 10 mg, Oral    metoprolol tartrate (LOPRESSOR) 50 mg, Oral, 2 TIMES DAILY    nitroGLYCERIN (NITROSTAT) 0.4 mg, SubLINGual         Objective:     Patient Vitals for the past 24 hrs:   Temp Pulse Resp BP SpO2   05/29/22 1717 -- -- -- (!) 170/69 --   05/29/22 1632 -- 76 24 -- 91 %   05/29/22 1617 -- 75 23 (!) 171/62 90 %   05/29/22 1602 -- 77 30 (!) 168/59 92 %   05/29/22 1547 -- 83 29 (!) 176/63 91 %   05/29/22 1532 -- 72 22 (!) 162/58 92 %   05/29/22 1517 -- 75 17 (!) 162/60 95 %   05/29/22 1445 97.8 °F (36.6 °C) 81 16 (!) 191/62 96 %       Oxygen Therapy  SpO2: 91 %  O2 Device: None (Room air)    Estimated body mass index is 31.57 kg/m² as calculated from the following:    Height as of this encounter: 5' 10\" (1.778 m). Weight as of this encounter: 220 lb (99.8 kg). No intake or output data in the 24 hours ending 05/29/22 1859      Physical Exam:    Blood pressure (!) 170/69, pulse 76, temperature 97.8 °F (36.6 °C), temperature source Oral, resp. rate 24, height 5' 10\" (1.778 m), weight 220 lb (99.8 kg), SpO2 91 %. General:    Well nourished.   No overt distress, alert, pleasant, obese  Head:  Normocephalic, atraumatic  Eyes:  Sclerae appear normal.  Pupils equally round. ENT:  Nares appear normal, no drainage. Moist oral mucosa, mallampatti III   Neck:  No restricted ROM. Trachea midline   CV:   RRR. No m/r/g. No jugular venous distension. Lungs:   CTAB. No wheezing, rhonchi, or rales. Respirations even, unlabored  Abdomen: Bowel sounds present. Soft, nontender, nondistended. obese  Extremities: No cyanosis or clubbing. No edema  Skin:     No rashes and normal coloration. Warm and dry. Neuro:  CN II-XII grossly intact. 5/5 extremity strength   Psych:  Normal mood and affect.       I have reviewed ordered lab tests and independently visualized imaging below:    Last 24hr Labs:  Recent Results (from the past 24 hour(s))   POCT Glucose    Collection Time: 05/29/22  2:58 PM   Result Value Ref Range    POC Glucose 152 (H) 65 - 100 mg/dL    Performed by: Julia Santos    CBC with Auto Differential    Collection Time: 05/29/22  3:00 PM   Result Value Ref Range    WBC 7.8 4.3 - 11.1 K/uL    RBC 5.61 (H) 4.23 - 5.6 M/uL    Hemoglobin 15.8 13.6 - 17.2 g/dL    Hematocrit 48.6 41.1 - 50.3 %    MCV 86.6 79.6 - 97.8 FL    MCH 28.2 26.1 - 32.9 PG    MCHC 32.5 31.4 - 35.0 g/dL    RDW 13.7 11.9 - 14.6 %    Platelets 881 598 - 692 K/uL    MPV 10.1 9.4 - 12.3 FL    nRBC 0.00 0.0 - 0.2 K/uL    Differential Type AUTOMATED      Seg Neutrophils 67 43 - 78 %    Lymphocytes 21 13 - 44 %    Monocytes 8 4.0 - 12.0 %    Eosinophils % 3 0.5 - 7.8 %    Basophils 1 0.0 - 2.0 %    Immature Granulocytes 0 0.0 - 5.0 %    Segs Absolute 5.2 1.7 - 8.2 K/UL    Absolute Lymph # 1.7 0.5 - 4.6 K/UL    Absolute Mono # 0.6 0.1 - 1.3 K/UL    Absolute Eos # 0.2 0.0 - 0.8 K/UL    Basophils Absolute 0.1 0.0 - 0.2 K/UL    Absolute Immature Granulocyte 0.0 0.0 - 0.5 K/UL   Basic Metabolic Panel    Collection Time: 05/29/22  3:00 PM   Result Value Ref Range    Sodium 140 138 - 145 mmol/L    Potassium 3.9 3.5 - 5.1 mmol/L Chloride 104 98 - 107 mmol/L    CO2 33 (H) 21 - 32 mmol/L    Anion Gap 3 (L) 7 - 16 mmol/L    Glucose 161 (H) 65 - 100 mg/dL    BUN 14 8 - 23 MG/DL    CREATININE 1.00 0.8 - 1.5 MG/DL    GFR African American >60 >60 ml/min/1.73m2    GFR Non- >60 >60 ml/min/1.73m2    Calcium 9.2 8.3 - 10.4 MG/DL   Troponin    Collection Time: 05/29/22  3:00 PM   Result Value Ref Range    Troponin, High Sensitivity 7.9 0 - 14 pg/mL   POCT INR    Collection Time: 05/29/22  3:00 PM   Result Value Ref Range    POC Protime 15.7 (H) 9.6 - 11.6 SECS    POC INR 1.3 (H) 0.9 - 1.2     EKG 12 Lead    Collection Time: 05/29/22  3:15 PM   Result Value Ref Range    Ventricular Rate 72 BPM    Atrial Rate 72 BPM    P-R Interval 152 ms    QRS Duration 108 ms    Q-T Interval 395 ms    QTc Calculation (Bazett) 433 ms    P Axis 41 degrees    R Axis 95 degrees    T Axis 49 degrees    Diagnosis Sinus rhythm    Urinalysis    Collection Time: 05/29/22  5:23 PM   Result Value Ref Range    Color, UA YELLOW      Appearance CLEAR      Specific Gravity, UA 1.015 1.001 - 1.023      pH, Urine 7.5 5.0 - 9.0      Protein, UA Negative NEG mg/dL    Glucose, UA Negative mg/dL    Ketones, Urine Negative NEG mg/dL    Bilirubin Urine Negative NEG      Blood, Urine Negative NEG      Urobilinogen, Urine 0.2 0.2 - 1.0 EU/dL    Nitrite, Urine Negative NEG      Leukocyte Esterase, Urine Negative NEG         Other Studies:  CTA HEAD NECK W WO CONTRAST    Result Date: 5/29/2022  History: Code stroke. FINDINGS: CT angiography was performed of the neck and head with contrast and three-dimensional CT angiography reconstruction and reformat was performed. NASCET criteria as needed. CT dose reduction was achieved through use of a standardized protocol tailored for this examination and automatic exposure control for dose modulation.  Study analyzed by the [employer] software package per the hospital protocol if presented as such by the facility technologists in the pacs system. However, the results of the analysis are neither reviewed nor provided in this exam interpretation and report. This statement is provided at the request of the hospital for hospital billing purposes only. Status post median sternotomy. Aortic arch is patent. Proximal great vessel segments are patent bilaterally. The left vertebral artery is patent. The right vertebral artery is small at the cervical segment and occludes at the intracranial segment. The basilar artery is patent. The posterior cerebral arteries are patent bilaterally. The anterior cerebral arteries are patent. The middle cerebral arteries are patent. Atherosclerosis at the carotid bulbs bilaterally. However, no hemodynamically significant stenosis. Left maxillary sinus disease. The dural venous sinuses are patent. Occluded distal segment of the right vertebral artery. This was present on the exam from February 20, 2022 as well. CT Head W/O Contrast    Result Date: 5/29/2022  Noncontrast head CT Clinical Indication: Acute code stroke with severe dizziness, aphasia, right upper extremity numbness. Technique: Noncontrast axial images were obtained through the brain. All CT scans at this location are performed using dose modulation techniques as appropriate including the following: Automated exposure control, adjustment of the MA and/or kV according to patient's size, or use of iterative reconstruction technique. Reformatted sagittal, coronal images obtained to further evaluate bones, soft tissues, extra-axial spaces. Comparison: 2/21/2022 MRI and 2/20/2022 CT Findings: There is no acute intracranial hemorrhage, hydrocephalus, intra-axial mass, or mass-effect. Again noted are mild bilateral white matter hypoattenuation changes, nonspecific but most often chronic microangiopathy, similar to prior. The possibility of acute small developing or superimposed infarct cannot BE axilla by CT if clinically suspected.  There is no CT evidence of acute large artery territorial infarction or abnormal extra-axial fluid collection. The mastoid air cells are aerated. Chronic left maxillary sinus disease again evident. Paranasal sinuses are otherwise clear where imaged. No displaced skull fractures are present. 1. No CT evidence of acute intracranial abnormality. 2. Left maxillary sinusitis. XR CHEST PORTABLE    Result Date: 5/29/2022  Chest portable CLINICAL INDICATION: Acute severe dizziness, stroke evaluation, aphasia and right arm numbness COMPARISON: Radiography and CT 12/26/2021 TECHNIQUE: single AP portable view chest at 3:08 PM sitting upright FINDINGS: Lungs are well-inflated with improved aeration since prior. There is no evidence of consolidation, pneumothorax, pleural effusion or pulmonary edema. Sternotomy wires, cardiac surgical changes are again evident. The mediastinal and hilar contours are normal given technique. Unchanged chronic right hemidiaphragm eventration anteriorly. No acute disease. CT BRAIN PERFUSION    Result Date: 5/29/2022  CT Perfusion Imaging INDICATION:  Acute stroke evaluation with dizziness, right upper extremity and lower extremity paresthesias, intermittent numbness today, generalized headache COMPARISON: CT earlier today, perfusion CT 2/20/2022 CT perfusion imaging of the brain was performed after the administration of 100 mL Isovue-370 intravenous contrast.  Perfusion maps and perfusion analysis output were generated using the RAPID perfusion processing software algorithm. Radiation dose reduction techniques were used for this study: All CT scans performed at this facility use one or all of the following: Automated exposure control, adjustment of the mA and/or kVp according to patient's size, iterative reconstruction.  FINDINGS: Small areas of hypoperfusion involving left temporoparietal lobe of unclear significance, possibly artifactual. RAPID Output Values: CBF < 30% volume:  0 ml   (core infarction volume greater than 50 cc associated with poor outcomes) Tmax > 6 seconds: 0 ml Tmax/CBF Mismatch Volume: 0 ml Tmax/CBF Mismatch Ratio: N/A Hypoperfusion Intensity Ratio: 0   (values greater than 0.5 associated with poor outcome) Tmax > 10 seconds Volume: 0 ml   (volume greater than 100 mL is associated with poor outcome)     No CT evidence of core infarct or ischemic penumbra. Please note that the determination of patient treatment is not based solely upon imaging factors or calculation values. Management of ischemia is at the discretion of the primary physician and is based upon a combination of clinical and imaging data, along other factors. Echocardiogram:  No results found for this or any previous visit. Meds previously ordered:  Orders Placed This Encounter   Medications    sodium chloride flush 0.9 % injection 3 mL    iopamidol (ISOVUE-370) 76 % injection 100 mL    aspirin tablet 325 mg         Signed:  Drew Khalil MD    Part of this note may have been written by using a voice dictation software. The note has been proof read but may still contain some grammatical/other typographical errors.

## 2022-05-29 NOTE — ED NOTES
Report has been given to [address] who will assume care at this time.      Adam George, VIRI  05/29/22 9025

## 2022-05-29 NOTE — ED PROVIDER NOTES
[employer] Emergency Department Provider Note                   PCP:                Franchesca Yip MD               Age: 81 y.o. Sex: male     No diagnosis found. DISPOSITION         New Prescriptions    No medications on file       Orders Placed This Encounter   Procedures    XR CHEST PORTABLE    CT Head W/O Contrast    CBC with Auto Differential    Basic Metabolic Panel    Troponin    Urinalysis    Diet NPO    Activate Code Stroke    Neuro checks    Weigh patient    NIH STROKE SCALE ASSESSMENT    Cardiac Monitor - ED Only    Continuous Pulse Oximetry    SLP--DYSPHAGIA SCREENING    POCT INR    POCT Glucose    EKG 12 Lead    EKG 12 Lead    Saline lock IV        MDM  Number of Diagnoses or Management Options  Ischemic cerebral vascular accident (CVA) due to stenosis of large extracranial artery (HCC)  TIA (transient ischemic attack)  Diagnosis management comments: Patient's symptoms have all resolved except for some mild tingling involving his right hand. Patient currently has no slurred speech or headache or right leg numbness or tingling. CT brain was negative. Teleneurology recommended admission CTA and CT perfusion. Amount and/or Complexity of Data Reviewed  Clinical lab tests: ordered and reviewed  Tests in the radiology section of CPT®: ordered and reviewed  Tests in the medicine section of CPT®: ordered and reviewed    Risk of Complications, Morbidity, and/or Mortality  Presenting problems: high  Management options: high    Critical Care  Total time providing critical care: 30-74 minutes    Patient Progress  Patient progress: stable       Ramsey Weinstein is a 81 y.o. male who presents to the Emergency Department with chief complaint of    Chief Complaint   Patient presents with    Aphasia      Pt is a 80 y/o male presenting with right arm and right leg tingling since 2 pm acute onset while at home. Pt had difficulty speaking and a mild global headache.  Pt right leg tingling and difficulty speaking had dramatically improved and currently has no difficulty speaking. Pt has similar sxs and was admitted 2/20/22 for 1 days and CTA and MRI was negative. The history is provided by the spouse and the patient. Cerebrovascular Accident  Presenting symptoms: sensory loss (right arm and right leg tingling)    Onset quality:  Sudden  Duration:  1 hour  Timing:  Constant  Progression:  Improving  Similar to previous episodes: yes    Associated symptoms: paresthesias      All other systems reviewed and are negative. Review of Systems   Neurological: Positive for numbness and paresthesias. All other systems reviewed and are negative.       Past Medical History:   Diagnosis Date    Acute hypoxemic respiratory failure ([facility] 12/4/2021    Anterior myocardial infarction [facility]) 10/2003    Arrhythmia     paroxysmal a fib    Arteriosclerotic coronary artery disease 11/2005    Arthritis     fingers    Atrial fibrillation with RVR ([facility] 12/4/2021    Cancer ([facility]     prostate    Coronary atherosclerosis of native coronary vessel 10/2001    Elevated serum creatinine 12/15/2021    Family history of premature CAD     Father hx cabg/CHF  Brother CABG x 2    Gout     right toe but no problem now    Hypercholesterolemia     Hypertension     Sinus bradycardia 12/14/2021        Past Surgical History:   Procedure Laterality Date    COLONOSCOPY N/A 12/21/2016    COLONOSCOPY  BMI 34 performed by Hilton Haque [facility] GRAFT  3/9/2016    x4 - Dr. Rubin Goldmann      tonsillectomy    ORTHOPEDIC SURGERY      2 back surg    [place] CARDIAC SURG PROCEDURE UNLIST  March 9,2016    quidrupal bypass     [place] CARDIAC SURG PROCEDURE UNLIST      stent    PROSTATECTOMY          Family History   Problem Relation Age of Onset    Colon Cancer Neg Hx     Cancer Sister     Heart Disease Brother     Diabetes Brother     Heart Disease Father            Social Connections:     Frequency of Communication with Friends and Family: Not on file    Frequency of Social Gatherings with Friends and Family: Not on file    Attends Holiness Services: Not on file    Active Member of Clubs or Organizations: Not on file    Attends Club or Organization Meetings: Not on file    Marital Status: Not on file        Allergies   Allergen Reactions    Latex Rash    Codeine Nausea And Vomiting        Vitals signs and nursing note reviewed. Patient Vitals for the past 4 hrs:   Temp Pulse Resp BP SpO2   05/29/22 1445 97.8 °F (36.6 °C) 81 16 (!) 191/62 96 %          Physical Exam  Vitals and nursing note reviewed. Constitutional:       Appearance: Normal appearance. HENT:      Head: Normocephalic and atraumatic. Nose: Nose normal.      Mouth/Throat:      Mouth: Mucous membranes are dry. Eyes:      Extraocular Movements: Extraocular movements intact. Conjunctiva/sclera: Conjunctivae normal.      Pupils: Pupils are equal, round, and reactive to light. Cardiovascular:      Rate and Rhythm: Normal rate. Pulses: Normal pulses. Heart sounds: Normal heart sounds. Pulmonary:      Effort: Pulmonary effort is normal.      Breath sounds: Normal breath sounds. Abdominal:      General: Abdomen is flat. Musculoskeletal:         General: Normal range of motion. Cervical back: Normal range of motion and neck supple. Skin:     General: Skin is warm. Capillary Refill: Capillary refill takes less than 2 seconds. Neurological:      General: No focal deficit present. Mental Status: He is alert and oriented to person, place, and time. Sensory: Sensory deficit (per patient: increased sensation right arm) present. Psychiatric:         Mood and Affect: Mood normal.         Behavior: Behavior normal.         Thought Content:  Thought content normal.         Judgment: Judgment normal.          Procedures    Labs Reviewed   CBC WITH AUTO DIFFERENTIAL   BASIC METABOLIC PANEL   TROPONIN URINALYSIS   POCT PT/INR   POCT GLUCOSE        XR CHEST PORTABLE    (Results Pending)   CT Head W/O Contrast    (Results Pending)      Ct head:   1. No CT evidence of acute intracranial abnormality. 2. Left maxillary sinusitis. CXR: No acute disease. Junior Coma Scale  Eye Opening: Spontaneous  Best Verbal Response: Oriented  Best Motor Response: Obeys commands  Lawrence Coma Scale Score: 15           EKG interpretation: Normal sinus rhythm, rate of 72, right axis deviation, no STEMI, normal intervals. Voice dictation software was used during the making of this note. This software is not perfect and grammatical and other typographical errors may be present. This note has not been completely proofread for errors.       Stephanie Kuhn MD  05/29/22 [address], [place]  09/02/22 [address], [place]  09/12/22 [address], [place]  09/12/22 8515

## 2022-05-29 NOTE — ED NOTES
[facility] consult paged.      Pankaj Lew  05/29/22 [id number]

## 2022-05-30 ENCOUNTER — APPOINTMENT (OUTPATIENT)
Dept: MRI IMAGING | Age: 72
End: 2022-05-30
Payer: MEDICARE

## 2022-05-30 LAB
ALBUMIN SERPL-MCNC: 3.5 G/DL (ref 3.2–4.6)
ALBUMIN/GLOB SERPL: 1 (ref 1.2–3.5)
ALP SERPL-CCNC: 76 U/L (ref 50–136)
ALT SERPL-CCNC: 38 U/L (ref 12–65)
ANION GAP SERPL CALC-SCNC: 5 MMOL/L (ref 7–16)
AST SERPL-CCNC: 23 U/L (ref 15–37)
BILIRUB DIRECT SERPL-MCNC: 0.2 MG/DL
BILIRUB SERPL-MCNC: 0.7 MG/DL (ref 0.2–1.1)
BUN SERPL-MCNC: 14 MG/DL (ref 8–23)
CALCIUM SERPL-MCNC: 9.1 MG/DL (ref 8.3–10.4)
CHLORIDE SERPL-SCNC: 108 MMOL/L (ref 98–107)
CHOLEST SERPL-MCNC: 74 MG/DL
CO2 SERPL-SCNC: 29 MMOL/L (ref 21–32)
CREAT SERPL-MCNC: 0.9 MG/DL (ref 0.8–1.5)
ERYTHROCYTE [DISTWIDTH] IN BLOOD BY AUTOMATED COUNT: 13.7 % (ref 11.9–14.6)
EST. AVERAGE GLUCOSE BLD GHB EST-MCNC: 114 MG/DL
GLOBULIN SER CALC-MCNC: 3.6 G/DL (ref 2.3–3.5)
GLUCOSE SERPL-MCNC: 94 MG/DL (ref 65–100)
HBA1C MFR BLD: 5.6 % (ref 4.2–6.3)
HCT VFR BLD AUTO: 46.8 % (ref 41.1–50.3)
HDLC SERPL-MCNC: 41 MG/DL (ref 40–60)
HDLC SERPL: 1.8
HGB BLD-MCNC: 15.5 G/DL (ref 13.6–17.2)
LDLC SERPL CALC-MCNC: 17 MG/DL
MCH RBC QN AUTO: 28.3 PG (ref 26.1–32.9)
MCHC RBC AUTO-ENTMCNC: 33.1 G/DL (ref 31.4–35)
MCV RBC AUTO: 85.6 FL (ref 79.6–97.8)
NRBC # BLD: 0 K/UL (ref 0–0.2)
PLATELET # BLD AUTO: 196 K/UL (ref 150–450)
PMV BLD AUTO: 9.8 FL (ref 9.4–12.3)
POTASSIUM SERPL-SCNC: 3.9 MMOL/L (ref 3.5–5.1)
PROT SERPL-MCNC: 7.1 G/DL (ref 6.3–8.2)
RBC # BLD AUTO: 5.47 M/UL (ref 4.23–5.6)
SODIUM SERPL-SCNC: 142 MMOL/L (ref 136–145)
TRIGL SERPL-MCNC: 80 MG/DL (ref 35–150)
TROPONIN I SERPL HS-MCNC: 10.4 PG/ML (ref 0–14)
VLDLC SERPL CALC-MCNC: 16 MG/DL (ref 6–23)
WBC # BLD AUTO: 9.3 K/UL (ref 4.3–11.1)

## 2022-05-30 PROCEDURE — 80076 HEPATIC FUNCTION PANEL: CPT

## 2022-05-30 PROCEDURE — G0378 HOSPITAL OBSERVATION PER HR: HCPCS

## 2022-05-30 PROCEDURE — 80048 BASIC METABOLIC PNL TOTAL CA: CPT

## 2022-05-30 PROCEDURE — APPNB30 APP NON BILLABLE TIME 0-30 MINS: Performed by: NURSE PRACTITIONER

## 2022-05-30 PROCEDURE — 36415 COLL VENOUS BLD VENIPUNCTURE: CPT

## 2022-05-30 PROCEDURE — 92610 EVALUATE SWALLOWING FUNCTION: CPT

## 2022-05-30 PROCEDURE — 97530 THERAPEUTIC ACTIVITIES: CPT

## 2022-05-30 PROCEDURE — 2580000003 HC RX 258: Performed by: EMERGENCY MEDICINE

## 2022-05-30 PROCEDURE — 6370000000 HC RX 637 (ALT 250 FOR IP): Performed by: PHYSICIAN ASSISTANT

## 2022-05-30 PROCEDURE — 6370000000 HC RX 637 (ALT 250 FOR IP): Performed by: INTERNAL MEDICINE

## 2022-05-30 PROCEDURE — 84484 ASSAY OF TROPONIN QUANT: CPT

## 2022-05-30 PROCEDURE — 85027 COMPLETE CBC AUTOMATED: CPT

## 2022-05-30 PROCEDURE — 1100000000 HC RM PRIVATE

## 2022-05-30 PROCEDURE — 99222 1ST HOSP IP/OBS MODERATE 55: CPT | Performed by: INTERNAL MEDICINE

## 2022-05-30 PROCEDURE — 70551 MRI BRAIN STEM W/O DYE: CPT

## 2022-05-30 PROCEDURE — 99223 1ST HOSP IP/OBS HIGH 75: CPT | Performed by: PSYCHIATRY & NEUROLOGY

## 2022-05-30 PROCEDURE — 83036 HEMOGLOBIN GLYCOSYLATED A1C: CPT

## 2022-05-30 PROCEDURE — 80061 LIPID PANEL: CPT

## 2022-05-30 PROCEDURE — 97161 PT EVAL LOW COMPLEX 20 MIN: CPT

## 2022-05-30 PROCEDURE — 97165 OT EVAL LOW COMPLEX 30 MIN: CPT

## 2022-05-30 RX ORDER — METOPROLOL TARTRATE 50 MG/1
50 TABLET, FILM COATED ORAL 2 TIMES DAILY
Status: DISCONTINUED | OUTPATIENT
Start: 2022-05-30 | End: 2022-05-31 | Stop reason: HOSPADM

## 2022-05-30 RX ORDER — FLUTICASONE PROPIONATE 50 MCG
1 SPRAY, SUSPENSION (ML) NASAL DAILY
Status: DISCONTINUED | OUTPATIENT
Start: 2022-05-30 | End: 2022-05-30

## 2022-05-30 RX ADMIN — Medication 3 ML: at 06:03

## 2022-05-30 RX ADMIN — ASPIRIN 81 MG: 81 TABLET, CHEWABLE ORAL at 07:57

## 2022-05-30 RX ADMIN — CLOPIDOGREL BISULFATE 75 MG: 75 TABLET ORAL at 07:57

## 2022-05-30 RX ADMIN — CETIRIZINE HYDROCHLORIDE 10 MG: 10 TABLET, FILM COATED ORAL at 07:58

## 2022-05-30 RX ADMIN — Medication 3 ML: at 21:08

## 2022-05-30 RX ADMIN — METOPROLOL TARTRATE 50 MG: 50 TABLET ORAL at 14:02

## 2022-05-30 RX ADMIN — ATORVASTATIN CALCIUM 80 MG: 80 TABLET, FILM COATED ORAL at 21:08

## 2022-05-30 RX ADMIN — RIVAROXABAN 20 MG: 20 TABLET, FILM COATED ORAL at 16:50

## 2022-05-30 RX ADMIN — Medication 3 ML: at 14:57

## 2022-05-30 RX ADMIN — FLUTICASONE PROPIONATE 2 SPRAY: 50 SPRAY, METERED NASAL at 07:58

## 2022-05-30 RX ADMIN — METOPROLOL TARTRATE 50 MG: 50 TABLET ORAL at 21:08

## 2022-05-30 ASSESSMENT — ENCOUNTER SYMPTOMS
VOMITING: 0
SHORTNESS OF BREATH: 0
BLURRED VISION: 0
HEMOPTYSIS: 0
ABDOMINAL PAIN: 0
NAUSEA: 0
ORTHOPNEA: 0
DOUBLE VISION: 0
BLOATING: 0
BACK PAIN: 0
COUGH: 0

## 2022-05-30 ASSESSMENT — PAIN SCALES - GENERAL
PAINLEVEL_OUTOF10: 0

## 2022-05-30 NOTE — CONSULTS
Consult    Patient: Lyn Bear MRN: [id number]     YOB: 1950  Age: 81 y.o. Sex: male      Subjective:      Lyn Bear is a 81 y.o. male who is being seen for stroke. The patient has a history of multiple risk factors for stroke including coronary artery disease status post CABG, TIA, paroxysmal atrial fibrillation (not on oral anticoagulation), hypertension, hyperlipidemia, and obstructive sleep apnea (untreated). The patient presented with acute onset headache, dizziness, sensory changes in the right hand, and other symptoms    He states that he has had multiple episodes similar to this. He states that he first experiences sensory changes in his right hand and then it slowly migrates up to the level of the elbow, then the mouth, then he has visual obscuration, and finally, headache.   He states he has a history of migraine headache in the past.    Past Medical History:   Diagnosis Date    Acute hypoxemic respiratory failure ([facility] [address] 12/4/2021    Anterior myocardial infarction [facility]) 10/2003    Arrhythmia     paroxysmal a fib    Arteriosclerotic coronary artery disease 11/2005    Arthritis     fingers    Atrial fibrillation with RVR ([facility] [address] 12/4/2021    Cancer (HCC)     prostate    Coronary atherosclerosis of native coronary vessel 10/2001    Elevated serum creatinine 12/15/2021    Family history of premature CAD     Father hx cabg/CHF  Brother CABG x 2    Gout     right toe but no problem now    Hypercholesterolemia     Hypertension     Sinus bradycardia 12/14/2021     Past Surgical History:   Procedure Laterality Date    COLONOSCOPY N/A 12/21/2016    COLONOSCOPY  BMI 34 performed by Ravinder Beck MD at [address] [place] [address] GRAFT  3/9/2016    x4 - Dr. Jed Perez      tonsillectomy    ORTHOPEDIC SURGERY      2 back surg    HI CARDIAC SURG PROCEDURE UNLIST  March 9,2016    quidrupal bypass     HI CARDIAC SURG PROCEDURE UNLIST      stent    PROSTATECTOMY        Family History   Problem Relation Age of Onset    Colon Cancer Neg Hx     Cancer Sister     Heart Disease Brother     Diabetes Brother     Heart Disease Father      Social History     Tobacco Use    Smoking status: Never Smoker    Smokeless tobacco: Never Used   Substance Use Topics    Alcohol use: Yes      Current Facility-Administered Medications   Medication Dose Route Frequency Provider Last Rate Last Admin    sodium chloride flush 0.9 % injection 3 mL  3 mL IntraVENous Q8H Everett De Leon MD   3 mL at 05/30/22 0603    albuterol sulfate  (90 Base) MCG/ACT inhaler 2 puff  2 puff Inhalation Q4H PRN Burak Yeboah MD        clopidogrel (PLAVIX) tablet 75 mg  75 mg Oral Daily Burak Yeboah MD   75 mg at 05/30/22 0757    aspirin chewable tablet 81 mg  81 mg Oral Daily Burak Yeboah MD   81 mg at 05/30/22 0757    fluticasone (FLONASE) 50 MCG/ACT nasal spray 2 spray  2 spray Each Nostril Daily Burak Yeboah MD   2 spray at 05/30/22 0758    cetirizine (ZYRTEC) tablet 10 mg  10 mg Oral Daily Burak Yeboah MD   10 mg at 05/30/22 0758    nicotine (NICODERM CQ) 14 MG/24HR 1 patch  1 patch TransDERmal Daily PRN Burak Yeboah MD        acetaminophen (TYLENOL) tablet 650 mg  650 mg Oral Q4H PRN Burak Yeboah MD        Or    acetaminophen (TYLENOL) suppository 650 mg  650 mg Rectal Q4H PRN Burak Yeboah MD        ondansetron (ZOFRAN-ODT) disintegrating tablet 4 mg  4 mg Oral Q8H PRN Burak Yeboah MD        Or    ondansetron (ZOFRAN) injection 4 mg  4 mg IntraVENous Q6H PRN Burak Yeboah MD        polyethylene glycol (GLYCOLAX) packet 17 g  17 g Oral Daily PRN Burak Yeboah MD        bisacodyl (DULCOLAX) suppository 10 mg  10 mg Rectal Daily PRN Burak Yeboah MD        atorvastatin (LIPITOR) tablet 80 mg  80 mg Oral Nightly Burak Yeboah MD   80 mg at 05/29/22 1884 Allergies   Allergen Reactions    Latex Rash    Codeine Nausea And Vomiting       Review of Systems:  CONSTITUTIONAL: No weight loss, fever, chills, weakness or fatigue. HEENT: Eyes: No visual loss, blurred vision, double vision or yellow sclerae. Ears, Nose, Throat: No hearing loss, sneezing, congestion, runny nose or sore throat. SKIN: No rash or itching. CARDIOVASCULAR: No chest pain, chest pressure or chest discomfort. No palpitations or edema. RESPIRATORY: No shortness of breath, cough or sputum. GASTROINTESTINAL: No anorexia, nausea, vomiting or diarrhea. No abdominal pain or blood. GENITOURINARY: no burning with urination. NEUROLOGICAL: Positive for headaches  MUSCULOSKELETAL: No muscle, back pain, joint pain or stiffness. HEMATOLOGIC: No anemia, bleeding or bruising. LYMPHATICS: No enlarged nodes. No history of splenectomy. PSYCHIATRIC: Not currently anxious or depressed  ENDOCRINOLOGIC: No reports of sweating, cold or heat intolerance. No polyuria or polydipsia. ALLERGIES: No history of asthma, hives, eczema or rhinitis. Objective:     Vitals:    05/30/22 0400 05/30/22 0433 05/30/22 0731 05/30/22 0800   BP: (!) 177/76   (!) 164/75   Pulse: 88   84   Resp: 22 17   Temp: 98.1 °F (36.7 °C)   99 °F (37.2 °C)   TempSrc: Oral   Oral   SpO2:  95% 94%    Weight:       Height:            Physical Exam:  General - Well developed, well nourished, in no apparent distress. Pleasant and conversant  HEENT - Normocephalic, atraumatic. Conjunctiva, tympanic membranes, and oropharynx are clear. Neck - Supple without masses, no bruits   Cardiovascular - Regular rate and rhythm. Normal S1, S2 without murmurs, rubs, or gallops. Lungs - Clear to auscultation. Abdomen - Soft, nontender with normal bowel sounds. Extremities - Peripheral pulses intact. No edema and no rashes. Neurological examination - Comprehension, attention, memory and reasoning are intact.  Language and speech are normal. On cranial nerve examination pupils are equal round and reactive to light. Fundoscopic examination is normal. Visual acuity is adequate. Visual fields are full to finger confrontation. Extraocular motility is normal. Face is symmetric and sensation is intact to light touch. Hearing is intact to finger rustle bilaterally. Motor examination - There is normal muscle tone and bulk. Power is full throughout. Muscle stretch reflexes are diminished throughout. Sensation is intact to light touch, pinprick, vibration and proprioception in all extremities. Cerebellar examination is normal. Gait and stance are normal.     NIHSS   NIHSS Score: 0  1a-Level of Consciousness 0  1b-What is Month/Age 0  1c-Open/Close Eyes&Hand 0  2 -Best Gaze 0  3 -Visual Fields 0  4 -Facial Palsy 0  5a-Motor-Left Arm 0  5b-Motor-Right Arm 0  6a-Motor-Left Leg 0  6b-Motor-Right Leg 0  7 -Limb Ataxia 0  8 -Sensory 0  9 -Best Language 0  10-Dysarthria 0  11-Extinction/Inattention 0    Lab Results   Component Value Date    CHOL 74 05/30/2022    HDL 41 05/30/2022    VLDL 20 01/19/2022        No results found for: HBA1C, EMO4BMLO     CT Results (most recent): Personally Reviewed   Results for orders placed during the hospital encounter of 05/29/22    CT Head W/O Contrast    Narrative  Noncontrast head CT    Clinical Indication: Acute code stroke with severe dizziness, aphasia, right  upper extremity numbness. Technique: Noncontrast axial images were obtained through the brain. All CT  scans at this location are performed using dose modulation techniques as  appropriate including the following: Automated exposure control, adjustment of  the MA and/or kV according to patient's size, or use of iterative reconstruction  technique. Reformatted sagittal, coronal images obtained to further evaluate  bones, soft tissues, extra-axial spaces. Comparison: 2/21/2022 MRI and 2/20/2022 CT    Findings:  There is no acute intracranial hemorrhage, hydrocephalus, intra-axial  mass, or mass-effect. Again noted are mild bilateral white matter  hypoattenuation changes, nonspecific but most often chronic microangiopathy,  similar to prior. The possibility of acute small developing or superimposed  infarct cannot BE axilla by CT if clinically suspected. There is no CT evidence  of acute large artery territorial infarction or abnormal extra-axial fluid  collection. The mastoid air cells are aerated. Chronic left maxillary sinus disease again  evident. Paranasal sinuses are otherwise clear where imaged. No displaced skull fractures are present. Impression  1. No CT evidence of acute intracranial abnormality. 2. Left maxillary sinusitis. Most recent CTA Personally Reviewed  Results for orders placed during the hospital encounter of 05/29/22    CTA HEAD NECK W WO CONTRAST    Narrative  History: Code stroke. FINDINGS:    CT angiography was performed of the neck and head with contrast and  three-dimensional CT angiography reconstruction and reformat was performed. NASCET criteria as needed. CT dose reduction was achieved through use of a  standardized protocol tailored for this examination and automatic exposure  control for dose modulation. Study analyzed by the Where [employer] software package per the hospital protocol if  presented as such by the facility technologists in the pacs system. However,  the results of the analysis are neither reviewed nor provided in this exam  interpretation and report. This statement is provided at the request of the  hospital for hospital billing purposes only. Status post median sternotomy. Aortic arch is patent. Proximal great vessel  segments are patent bilaterally. The left vertebral artery is patent. The right  vertebral artery is small at the cervical segment and occludes at the  intracranial segment. The basilar artery is patent. The posterior cerebral arteries are patent  bilaterally.  The anterior cerebral arteries are patent. The middle cerebral  arteries are patent. Atherosclerosis at the carotid bulbs bilaterally. However,  no hemodynamically significant stenosis. Left maxillary sinus disease. The dural venous sinuses are patent. Impression  Occluded distal segment of the right vertebral artery. This was present on the  exam from February 20, 2022 as well. Assessment and Plan    72-year-old man with multiple episodes of migrating sensory abnormalities, visual obscuration, and mild headache. These events are almost certainly migraines. His symptoms mostly localize to the postcentral gyrus and the migrational pattern suggests cortical spreading depression which is part of the pathophysiology of migraine headache. That being said, the patient is at very high risk for stroke given his multiple comorbidities. If he does have atrial fibrillation then he should be on oral anticoagulation, unless there is some contraindication. We will see with the patient's MRI of the brain reveals, but even if he has an infarct somewhere, this would still be migraine with aura. He would benefit from neurology follow-up and being prescribed a CGRP antagonist for migraine prevention and rescue.

## 2022-05-30 NOTE — PROGRESS NOTES
Hospitalist Progress Note   Admit Date:  2022  3:01 PM   Name:  Ruth Cabrera   Age:  81 y.o. Sex:  male  :  1950   MRN:  [id number]   Room:  [facility]    Presenting Complaint: Aphasia     Reason(s) for Admission: TIA (transient ischemic attack) [G45.9]  Ischemic cerebral vascular accident (CVA) due to stenosis of large extracranial artery [facility] Course & Interval History:   Ruth Cabrera is a 81 y.o. male with medical history of CAD s/p CABG, TIA, paroxysmal AFIB (states not taking eliquis or xarelto) , HTN, HLP, BILL not on CPAP, obesity, COVID 19, untreated BILL who is evaluated with acute onset of headache, dizziness, right sided facial / arm numbness and change in speech. Wife present and gives details as well as chart reviewed and spoke with referring Dr. Aishwarya Iyer called. STAT head CT no acute issues. CTP negative. CTA head and neck shows prior noted occluded distal right vertebral artery. He has been seen by tele neurology.      While in the ED he has improved somewhat and still has some headache and facial numbness. He recalls prior event 2022 that was similar. He has intermittent afib and at one point was taking eliquis that was stopped by cardiology. Pt's wife reports since Feb episode of numbness and dizziness, he's had \"smaller episodes\" which resolved on it's own and he never sought out medical attention. Pt was admitted for CVA w/u.       Subjective/24hr Events (22): \"I feel fine now. \"  Pleasant 72y.o. MO WM sitting up in bed, wife at bedside without any complaints. Denies HA, visual deficits, numbness, tingling, weakness, speech deficits, chest pain, palpitations.       Assessment & Plan:     Principal Problem:    TIA  - appreciate Dr. Ana Sabillon assistance / evaluation  - MRI brain negative for acute CVA, echo pending  - per neuro eval, symptoms consistent with migraines; recommend outpatient neuro f/u and CGRP (calcitonin gene-related peptide) antagonist for migraine prevention  - cont ASA / plavix / statin tx; lipid profile wnl  - PT/OT eval pending    Active Problems:    PAF  - cardiology consulted  - noted pt was on apixaban at one time but currently not on [address]      CAD / s/p CABG  - cont ASA / plavix / lipitor  - cards consulted; denies CP, negative trops x 3 sets  - BB resumed      BILL  - reports to intolerance to PAP tx  - higher risk for acute neuro / cardiac event  - supplemental O2 at night      Class 1 obesity due to excess calories with serious comorbidity and (BMI) of 31.0 to 31.9 in adult  - encourage wt loss  - adds to complexity      Remote COVID-19  - aware      Primary hypertension  - BB resumed  - monitor BP       Discharge Planning:    - anticipate at least 2 midnight hospitalization; home with wife on dc    Diet:  ADULT DIET; Regular; Low Fat/Low Chol/High Fiber/PREETI  DVT PPx: SCD  Code status: Full Code      Hospital Problems:  Principal Problem:    Ischemic cerebral vascular accident (CVA) due to stenosis of large extracranial artery (HCC)  Active Problems:    Right sided numbness    CAD (coronary artery disease)    Secondary hypercoagulable state ([facility] [address]    Dyslipidemia    S/P CABG x 4    Sleep apnea    Class 1 obesity due to excess calories with serious comorbidity and body mass index (BMI) of 31.0 to 31.9 in adult    COVID-19    Primary hypertension    Paroxysmal atrial fibrillation ([facility] [address]  Resolved Problems:    * No resolved hospital problems.  *      Objective:     Patient Vitals for the past 24 hrs:   Temp Pulse Resp BP SpO2   05/30/22 0800 99 °F (37.2 °C) 84 17 (!) 164/75 --   05/30/22 0731 -- -- -- -- 94 %   05/30/22 0433 -- -- -- -- 95 %   05/30/22 0400 98.1 °F (36.7 °C) 88 22 (!) 177/76 --   05/30/22 0052 -- -- -- -- 94 %   05/30/22 0000 98.2 °F (36.8 °C) 71 15 (!) 152/76 --   05/29/22 2243 -- 66 -- (!) 165/78 --   05/29/22 2040 98.8 °F (37.1 °C) 66 19 (!) 165/78 96 %   05/29/22 2015 -- 68 11 (!) 168/75 97 %   05/29/22 1717 -- -- -- (!) 170/69 --   05/29/22 1632 -- 76 24 -- 91 %   05/29/22 1617 -- 75 23 (!) 171/62 90 %   05/29/22 1602 -- 77 30 (!) 168/59 92 %   05/29/22 1547 -- 83 29 (!) 176/63 91 %   05/29/22 1532 -- 72 22 (!) 162/58 92 %   05/29/22 1517 -- 75 17 (!) 162/60 95 %   05/29/22 1445 97.8 °F (36.6 °C) 81 16 (!) 191/62 96 %       Oxygen Therapy  SpO2: 94 %  O2 Device: None (Room air)    Estimated body mass index is 31.57 kg/m² as calculated from the following:    Height as of this encounter: 5' 10\" (1.778 m). Weight as of this encounter: 220 lb (99.8 kg). No intake or output data in the 24 hours ending 05/30/22 1229      Physical Exam:     Blood pressure (!) 164/75, pulse 84, temperature 99 °F (37.2 °C), temperature source Oral, resp. rate 17, height 5' 10\" (1.778 m), weight 220 lb (99.8 kg), SpO2 94 %. General:    Morbidly obese. No overt distress  Head:  Normocephalic, atraumatic  Eyes:  Sclerae appear normal.  Pupils equally round. Sensation intact b/l face, no facial droop, symmetrical, no tongue deviation, no nystagmus  ENT:  Nares appear normal, no drainage. Moist oral mucosa  Neck:  Short, obese, +ROM. Trachea midline   CV:   RRR. No m/r/g. No jugular venous distension. Lungs:   CTAB. No wheezing, rhonchi, or rales. Respirations even, unlabored  Abdomen: Bowel sounds present. Soft, nontender, nondistended. Extremities: No cyanosis or clubbing. No edema; + mvmt x 4, muscle strength symmetrical  Skin:     No rashes and normal coloration. Warm and dry. Neuro:  CN II-XII grossly intact. Sensation intact. A&Ox3  Psych:  Normal mood and affect.       I have reviewed ordered lab tests and independently visualized imaging below:    Recent Labs:  Recent Results (from the past 48 hour(s))   POCT Glucose    Collection Time: 05/29/22  2:58 PM   Result Value Ref Range    POC Glucose 152 (H) 65 - 100 mg/dL    Performed by: Sharlene    CBC with Auto Differential    Collection Time: 05/29/22  3:00 PM   Result Value Ref Range    WBC 7.8 4.3 - 11.1 K/uL    RBC 5.61 (H) 4.23 - 5.6 M/uL    Hemoglobin 15.8 13.6 - 17.2 g/dL    Hematocrit 48.6 41.1 - 50.3 %    MCV 86.6 79.6 - 97.8 FL    MCH 28.2 26.1 - 32.9 PG    MCHC 32.5 31.4 - 35.0 g/dL    RDW 13.7 11.9 - 14.6 %    Platelets 590 394 - 054 K/uL    MPV 10.1 9.4 - 12.3 FL    nRBC 0.00 0.0 - 0.2 K/uL    Differential Type AUTOMATED      Seg Neutrophils 67 43 - 78 %    Lymphocytes 21 13 - 44 %    Monocytes 8 4.0 - 12.0 %    Eosinophils % 3 0.5 - 7.8 %    Basophils 1 0.0 - 2.0 %    Immature Granulocytes 0 0.0 - 5.0 %    Segs Absolute 5.2 1.7 - 8.2 K/UL    Absolute Lymph # 1.7 0.5 - 4.6 K/UL    Absolute Mono # 0.6 0.1 - 1.3 K/UL    Absolute Eos # 0.2 0.0 - 0.8 K/UL    Basophils Absolute 0.1 0.0 - 0.2 K/UL    Absolute Immature Granulocyte 0.0 0.0 - 0.5 K/UL   Basic Metabolic Panel    Collection Time: 05/29/22  3:00 PM   Result Value Ref Range    Sodium 140 138 - 145 mmol/L    Potassium 3.9 3.5 - 5.1 mmol/L    Chloride 104 98 - 107 mmol/L    CO2 33 (H) 21 - 32 mmol/L    Anion Gap 3 (L) 7 - 16 mmol/L    Glucose 161 (H) 65 - 100 mg/dL    BUN 14 8 - 23 MG/DL    CREATININE 1.00 0.8 - 1.5 MG/DL    GFR African American >60 >60 ml/min/1.73m2    GFR Non- >60 >60 ml/min/1.73m2    Calcium 9.2 8.3 - 10.4 MG/DL   Troponin    Collection Time: 05/29/22  3:00 PM   Result Value Ref Range    Troponin, High Sensitivity 7.9 0 - 14 pg/mL   POCT INR    Collection Time: 05/29/22  3:00 PM   Result Value Ref Range    POC Protime 15.7 (H) 9.6 - 11.6 SECS    POC INR 1.3 (H) 0.9 - 1.2     EKG 12 Lead    Collection Time: 05/29/22  3:15 PM   Result Value Ref Range    Ventricular Rate 72 BPM    Atrial Rate 72 BPM    P-R Interval 152 ms    QRS Duration 108 ms    Q-T Interval 395 ms    QTc Calculation (Bazett) 433 ms    P Axis 41 degrees    R Axis 95 degrees    T Axis 49 degrees    Diagnosis Sinus rhythm    Urinalysis    Collection Time: 05/29/22  5:23 PM   Result Value Ref Range    Color, UA YELLOW      Appearance CLEAR      Specific Gravity, UA 1.015 1.001 - 1.023      pH, Urine 7.5 5.0 - 9.0      Protein, UA Negative NEG mg/dL    Glucose, UA Negative mg/dL    Ketones, Urine Negative NEG mg/dL    Bilirubin Urine Negative NEG      Blood, Urine Negative NEG      Urobilinogen, Urine 0.2 0.2 - 1.0 EU/dL    Nitrite, Urine Negative NEG      Leukocyte Esterase, Urine Negative NEG     Troponin    Collection Time: 05/29/22  9:12 PM   Result Value Ref Range    Troponin, High Sensitivity 9.0 0 - 14 pg/mL   Basic Metabolic Panel w/ Reflex to MG    Collection Time: 05/30/22 12:49 AM   Result Value Ref Range    Sodium 142 136 - 145 mmol/L    Potassium 3.9 3.5 - 5.1 mmol/L    Chloride 108 (H) 98 - 107 mmol/L    CO2 29 21 - 32 mmol/L    Anion Gap 5 (L) 7 - 16 mmol/L    Glucose 94 65 - 100 mg/dL    BUN 14 8 - 23 MG/DL    CREATININE 0.90 0.8 - 1.5 MG/DL    GFR African American >60 >60 ml/min/1.73m2    GFR Non- >60 >60 ml/min/1.73m2    Calcium 9.1 8.3 - 10.4 MG/DL   CBC    Collection Time: 05/30/22 12:49 AM   Result Value Ref Range    WBC 9.3 4.3 - 11.1 K/uL    RBC 5.47 4.23 - 5.6 M/uL    Hemoglobin 15.5 13.6 - 17.2 g/dL    Hematocrit 46.8 41.1 - 50.3 %    MCV 85.6 79.6 - 97.8 FL    MCH 28.3 26.1 - 32.9 PG    MCHC 33.1 31.4 - 35.0 g/dL    RDW 13.7 11.9 - 14.6 %    Platelets 336 163 - 185 K/uL    MPV 9.8 9.4 - 12.3 FL    nRBC 0.00 0.0 - 0.2 K/uL   Troponin    Collection Time: 05/30/22 12:49 AM   Result Value Ref Range    Troponin, High Sensitivity 10.4 0 - 14 pg/mL   Lipid panel - fasting    Collection Time: 05/30/22 12:49 AM   Result Value Ref Range    Cholesterol, Total 74 <200 MG/DL    Triglycerides 80 35 - 150 MG/DL    HDL 41 40 - 60 MG/DL    LDL Calculated 17 <100 MG/DL    VLDL Cholesterol Calculated 16 6.0 - 23.0 MG/DL    Chol/HDL Ratio 1.8     Hemoglobin A1C    Collection Time: 05/30/22 12:49 AM   Result Value Ref Range    Hemoglobin A1C 5.6 4.20 - 6.30 %    eAG 114 mg/dL Hepatic Function Panel    Collection Time: 05/30/22 12:49 AM   Result Value Ref Range    Total Protein 7.1 6.3 - 8.2 g/dL    Albumin 3.5 3.2 - 4.6 g/dL    Globulin 3.6 (H) 2.3 - 3.5 g/dL    Albumin/Globulin Ratio 1.0 (L) 1.2 - 3.5      Total Bilirubin 0.7 0.2 - 1.1 MG/DL    Bilirubin, Direct 0.2 <0.4 MG/DL    Alk Phosphatase 76 50 - 136 U/L    AST 23 15 - 37 U/L    ALT 38 12 - 65 U/L       Other Studies:  CTA HEAD NECK W WO CONTRAST    Result Date: 5/29/2022  History: Code stroke. FINDINGS: CT angiography was performed of the neck and head with contrast and three-dimensional CT angiography reconstruction and reformat was performed. NASCET criteria as needed. CT dose reduction was achieved through use of a standardized protocol tailored for this examination and automatic exposure control for dose modulation. Study analyzed by the [employer] software package per the hospital protocol if presented as such by the facility technologists in the pacs system. However, the results of the analysis are neither reviewed nor provided in this exam interpretation and report. This statement is provided at the request of the hospital for hospital billing purposes only. Status post median sternotomy. Aortic arch is patent. Proximal great vessel segments are patent bilaterally. The left vertebral artery is patent. The right vertebral artery is small at the cervical segment and occludes at the intracranial segment. The basilar artery is patent. The posterior cerebral arteries are patent bilaterally. The anterior cerebral arteries are patent. The middle cerebral arteries are patent. Atherosclerosis at the carotid bulbs bilaterally. However, no hemodynamically significant stenosis. Left maxillary sinus disease. The dural venous sinuses are patent. Occluded distal segment of the right vertebral artery. This was present on the exam from February 20, 2022 as well.     CT Head W/O Contrast    Result Date: 5/29/2022  Noncontrast head CT Clinical Indication: Acute code stroke with severe dizziness, aphasia, right upper extremity numbness. Technique: Noncontrast axial images were obtained through the brain. All CT scans at this location are performed using dose modulation techniques as appropriate including the following: Automated exposure control, adjustment of the MA and/or kV according to patient's size, or use of iterative reconstruction technique. Reformatted sagittal, coronal images obtained to further evaluate bones, soft tissues, extra-axial spaces. Comparison: 2/21/2022 MRI and 2/20/2022 CT Findings: There is no acute intracranial hemorrhage, hydrocephalus, intra-axial mass, or mass-effect. Again noted are mild bilateral white matter hypoattenuation changes, nonspecific but most often chronic microangiopathy, similar to prior. The possibility of acute small developing or superimposed infarct cannot BE axilla by CT if clinically suspected. There is no CT evidence of acute large artery territorial infarction or abnormal extra-axial fluid collection. The mastoid air cells are aerated. Chronic left maxillary sinus disease again evident. Paranasal sinuses are otherwise clear where imaged. No displaced skull fractures are present. 1. No CT evidence of acute intracranial abnormality. 2. Left maxillary sinusitis. XR CHEST PORTABLE    Result Date: 5/29/2022  Chest portable CLINICAL INDICATION: Acute severe dizziness, stroke evaluation, aphasia and right arm numbness COMPARISON: Radiography and CT 12/26/2021 TECHNIQUE: single AP portable view chest at 3:08 PM sitting upright FINDINGS: Lungs are well-inflated with improved aeration since prior. There is no evidence of consolidation, pneumothorax, pleural effusion or pulmonary edema. Sternotomy wires, cardiac surgical changes are again evident. The mediastinal and hilar contours are normal given technique. Unchanged chronic right hemidiaphragm eventration anteriorly. No acute disease. CT BRAIN PERFUSION    Result Date: 5/29/2022  CT Perfusion Imaging INDICATION:  Acute stroke evaluation with dizziness, right upper extremity and lower extremity paresthesias, intermittent numbness today, generalized headache COMPARISON: CT earlier today, perfusion CT 2/20/2022 CT perfusion imaging of the brain was performed after the administration of 100 mL Isovue-370 intravenous contrast.  Perfusion maps and perfusion analysis output were generated using the RAPID perfusion processing software algorithm. Radiation dose reduction techniques were used for this study: All CT scans performed at this facility use one or all of the following: Automated exposure control, adjustment of the mA and/or kVp according to patient's size, iterative reconstruction. FINDINGS: Small areas of hypoperfusion involving left temporoparietal lobe of unclear significance, possibly artifactual. RAPID Output Values: CBF < 30% volume:  0 ml   (core infarction volume greater than 50 cc associated with poor outcomes) Tmax > 6 seconds: 0 ml Tmax/CBF Mismatch Volume: 0 ml Tmax/CBF Mismatch Ratio: N/A Hypoperfusion Intensity Ratio: 0   (values greater than 0.5 associated with poor outcome) Tmax > 10 seconds Volume: 0 ml   (volume greater than 100 mL is associated with poor outcome)     No CT evidence of core infarct or ischemic penumbra. Please note that the determination of patient treatment is not based solely upon imaging factors or calculation values. Management of ischemia is at the discretion of the primary physician and is based upon a combination of clinical and imaging data, along other factors. MRI brain without contrast    Result Date: 5/30/2022  EXAMINATION: BRAIN MRI 5/30/2022 11:46 AM ACCESSION NUMBER: [id number] INDICATION: Acute neuro deficit. Concern for acute stroke. Chronically occluded distal right vertebral artery. COMPARISON: CTA head and neck, 5/29/2022. CT head and perfusion, 5/29/2022.  TECHNIQUE: Multiplanar multisequence MRI of the brain without the administration of intravenous contrast. FINDINGS: No evidence of restricted diffusion to suggest recent infarct. No evidence of intracranial hemorrhage or extra-axial fluid collection. No mass effect or midline shift. Generalized parenchymal volume loss with commensurate ex vacuo ventriculomegaly. Ventricles are symmetric in size and configuration. Basilar cisterns are patent. Bilateral periventricular and deep white matter T2/flair hyperintensities most compatible with chronic microvascular ischemic changes. There is loss of flow void in the right vertebral artery corresponding to the known chronic occlusion. Orbits and globes are normal. There is chronic mucosal thickening in the left maxillary sinus with mineralization in the secretions. Small mucous retention cyst in the right maxillary sinus. Trace right mastoid air cell effusions. Middle ears are clear. No significant extra cranial soft tissue abnormality. No acute or aggressive osseous abnormality. 1. No acute intracranial abnormality. 2. Mild generalized volume loss and chronic microvascular ischemic changes. 3. Chronically occluded distal right vertebral artery. 4. Chronic left maxillary sinus disease.        Current Meds:  Current Facility-Administered Medications   Medication Dose Route Frequency    sodium chloride flush 0.9 % injection 3 mL  3 mL IntraVENous Q8H    albuterol sulfate  (90 Base) MCG/ACT inhaler 2 puff  2 puff Inhalation Q4H PRN    clopidogrel (PLAVIX) tablet 75 mg  75 mg Oral Daily    aspirin chewable tablet 81 mg  81 mg Oral Daily    fluticasone (FLONASE) 50 MCG/ACT nasal spray 2 spray  2 spray Each Nostril Daily    cetirizine (ZYRTEC) tablet 10 mg  10 mg Oral Daily    nicotine (NICODERM CQ) 14 MG/24HR 1 patch  1 patch TransDERmal Daily PRN    acetaminophen (TYLENOL) tablet 650 mg  650 mg Oral Q4H PRN    Or    acetaminophen (TYLENOL) suppository 650 mg  650 mg Rectal Q4H PRN  ondansetron (ZOFRAN-ODT) disintegrating tablet 4 mg  4 mg Oral Q8H PRN    Or    ondansetron (ZOFRAN) injection 4 mg  4 mg IntraVENous Q6H PRN    polyethylene glycol (GLYCOLAX) packet 17 g  17 g Oral Daily PRN    bisacodyl (DULCOLAX) suppository 10 mg  10 mg Rectal Daily PRN    atorvastatin (LIPITOR) tablet 80 mg  80 mg Oral Nightly       Signed:  Pennie Lerner    Part of this note may have been written by using a voice dictation software. The note has been proof read but may still contain some grammatical/other typographical errors.

## 2022-05-30 NOTE — PROGRESS NOTES
SPEECH LANGUAGE PATHOLOGY: DYSPHAGIA  Initial Assessment and Discharge    NAME: Enrique Gusman  : 1950  MRN: [id number]    ADMISSION DATE: 2022  ADMITTING DIAGNOSIS: has CAD (coronary artery disease); Secondary hypercoagulable state ([facility] [address] Right sided weakness; Slurred speech; TIA (transient ischemic attack); Shortness of breath; NSTEMI (non-ST elevated myocardial infarction) ([facility] [address] Dyslipidemia; S/P CABG x 4; Hypoxia; Sleep apnea; Class 1 obesity due to excess calories with serious comorbidity and body mass index (BMI) of 31.0 to 31.9 in adult; Severe obesity ([facility] [address] COVID-19; Facial droop; COVID-19 long hauler manifesting chronic dyspnea; Coronary artery disease involving coronary bypass graft of native heart with unstable angina pectoris ([facility] [address] Primary hypertension; Paroxysmal atrial fibrillation ([facility] [address] Ischemic cerebral vascular accident (CVA) due to stenosis of large extracranial artery ([facility] [address] and Right sided numbness on their problem list.  Date of Eval: 2022  ICD-10: Treatment Diagnosis: R13.12 Dysphagia, Oropharyngeal Phase    RECOMMENDATIONS   Diet:  Diet Solids Recommendation: Regular  Liquid Consistency Recommendation: Thin    Medications: PO     Therapeutic Intervention: (N/A)   Patient continues to require skilled intervention:  (TBD pending imaging results)   D/C Recommendations: To be determined     ADDENDUM: Patient's MRI negative for acute findings. No additional speech therapy indicated at this time. Please consider re-consult if new concerns arise. ASSESSMENT    Dysphagia Diagnosis: Swallow function appears [facility]  Patient presents with oropharyngeal swallow function that is within normal limits. No dysphagia identified. Recommend regular diet/thin liquids. Medications as tolerated. Dysphagia treatment is not indicated. Possible SLP follow up for cognitive-linguistic evaluation pending imaging results. ADDENDUM: MRI negative for acute findings.  SLP to sign off    GENERAL History of Present Injury/Illness: Mr. Idalia Vera  has a past medical history of Acute hypoxemic respiratory failure ([facility] [address] Anterior myocardial infarction [facility]), Arrhythmia, Arteriosclerotic coronary artery disease, Arthritis, Atrial fibrillation with RVR ([facility] [address] Cancer ([facility] [address] Coronary atherosclerosis of native coronary vessel, Elevated serum creatinine, Family history of premature CAD, Gout, Hypercholesterolemia, Hypertension, and Sinus bradycardia. Jennifer Stands He also  has a past surgical history that includes Colonoscopy (N/A, 12/21/2016); Prostatectomy; pr cardiac surg procedure unlist (March 9,2016); Coronary artery bypass graft (3/9/2016); orthopedic surgery; heent; Colonoscopy; and pr cardiac surg procedure unlist.  Chart Reviewed: Yes  Subjective: Patient alert and oriented x4. Wife at bedside. Denies dysphagia     Respiratory Status: Room air              Prior Dysphagia History: Denies     Current Diet : Regular  Current Liquid Diet : Thin  Pain:   Patient does not c/o pain                  OBJECTIVE        Oral Motor   Labial: No impairment  Dentition: Intact  Oral Hygiene: Moist;Clean  Lingual: No impairment  Velum: No Impairment  Dentition: Adequate      Oropharyngeal Phase:     Assessment Method(s): Observation;Palpation  Vocal Quality: No Impairment  Consistency Presented: Thin;Regular;Mixed consistency;Pureed  How Presented: Self-fed/presented;Cup/sip;Cup/gulp; Spoon;Straw  Bolus Acceptance: No impairment  Bolus Formation/Control: No impairment  Propulsion: No impairment  Oral Residue: None  Initiation of Swallow: No impairment  Laryngeal Elevation: Absent  Aspiration Signs/Symptoms: None  Pharyngeal Phase Characteristics: No impairment, issues, or problems  Effective Modifications:  (N/A)  Cues for Modifications:  (N/A)        Oral Phase - Comment: WNL  Pharyngeal Phase: WNL  PLAN    Duration/Frequency: SLP will follow up for possible cognitive-linguistic evaluation if indicated by imaging or course of hospitalization. Frequency/Duration TBD. ADDENDUM: MRI negative for acute findings. SLP to sign off    Dysphagia Outcome and Severity Scale (KESHAWN)  Dysphagia Outcome Severity Scale: Level 7: Normal in all situations  Interpretation of Tool: The Dysphagia Outcome and Severity Scale (KESHAWN) is a simple, easy-to-use, 7-point scale developed to systematically rate the functional severity of dysphagia based on objective assessment and make recommendations for diet level, independence level, and type of nutrition. Normal(7), Functional(6), Mild(5), Mild-Moderate(4), Moderate(3), Moderate-Severe(2), Severe(1)    Speech Therapy Prognosis  Prognosis: Excellent    Education: Julia Boateng Patient Education: dysphagia, role of speech therapy   Patient Education Response: Verbalizes understanding    Current Medications:   No current facility-administered medications on file prior to encounter.      Current Outpatient Medications on File Prior to Encounter   Medication Sig Dispense Refill    clopidogrel (PLAVIX) 75 MG tablet Take 75 mg by mouth daily      aspirin 81 MG EC tablet Take 81 mg by mouth daily      atorvastatin (LIPITOR) 80 MG tablet Take 80 mg by mouth daily      albuterol sulfate  (90 Base) MCG/ACT inhaler Inhale 2 puffs into the lungs every 4 hours as needed      Evolocumab 140 MG/ML SOSY Inject 140 mg into the skin every 14 days      fluticasone (FLONASE) 50 MCG/ACT nasal spray One spray in each nostril twice daily      fluticasone-salmeterol (ADVAIR HFA) 230-21 MCG/ACT inhaler Inhale 2 puffs into the lungs 2 times daily      loratadine (CLARITIN) 10 MG tablet Take 10 mg by mouth      metoprolol tartrate (LOPRESSOR) 50 MG tablet Take 50 mg by mouth 2 times daily      nitroGLYCERIN (NITROSTAT) 0.4 MG SL tablet Place 0.4 mg under the tongue         PRECAUTIONS/ALLERGIES: Latex and Codeine   Safety Devices in place: Yes  Type of devices: Nurse notified (Wife at bedside)    Therapy Time  SLP Individual Minutes  Time In: 1024  Time Out: 8998  Minutes: LORRIE Brice  5/30/2022 11:05 AM

## 2022-05-30 NOTE — FLOWSHEET NOTE
05/30/22 1900   Neurological   NIHSS Stroke Scale Assessed Yes   NIHSS Stroke Scale   Interval Hand-off/Transfer   Level of Consciousness (1a) 0   LOC Questions (1b) 0   LOC Commands (1c) 0   Best Gaze (2) 0   Visual (3) 0   Facial Palsy (4) 0   Motor Arm, Left (5a) 0   Motor Arm, Right (5b) 0   Motor Leg, Left (6a) 0   Motor Leg, Right (6b) 0   Limb Ataxia (7) 0   Sensory (8) 0   Best Language (9) 0   Dysarthria (10) 0   Extinction and Inattention (11) 0   Total 0

## 2022-05-30 NOTE — PROGRESS NOTES
OCCUPATIONAL THERAPY Initial Assessment and Discharge          Acknowledge Orders  Time  OT Charge Capture  Rehab Caseload Tracker      Kierra Mojica is a 81 y.o. male   PRIMARY DIAGNOSIS: Ischemic cerebral vascular accident (CVA) due to stenosis of large extracranial artery (HCC)  TIA (transient ischemic attack) [G45.9]  Ischemic cerebral vascular accident (CVA) due to stenosis of large extracranial artery ([address] [I63.20]       Reason for Referral: Other lack of cordination (R27.8)  Inpatient: Payor: Eriberto Hester / Plan: [facility] PPO / Product Type: Medicare /     ASSESSMENT:     REHAB RECOMMENDATIONS:   Recommendation to date pending progress:  Setting:   No further skilled therapy after discharge from hospital    Equipment:     None     ASSESSMENT:  Mr. Dionna Hill is a 82 y/o M with relevant PMH of COVID-19 & TIA admitted with R hand numbness and aphasia. MRI negative for acute infarct. Baseline independent, lives with wife. Had similar episode in February. Today appears to be functioning at baseline for all ADLs. BUE WFL and equal bilaterally; patient is up ad ema in room. We discussed warnings signs and symptoms of CVA for future reference. No acute needs at this time as he appears to be functioning at baseline.       [address] [place] [facility]\"" [address] [address] AM-PAC 6 Clicks Daily Activity Inpatient Short Form:    AM-PAC Daily Activity Inpatient   How much help for putting on and taking off regular lower body clothing?: None  How much help for Bathing?: None  How much help for Toileting?: None  How much help for putting on and taking off regular upper body clothing?: None  How much help for taking care of personal grooming?: None  How much help for eating meals?: None  AM-[facility] Inpatient Daily Activity Raw Score: 24  AM-PAC Inpatient ADL T-Scale Score : 57.54  ADL Inpatient CMS 0-100% Score: 0  ADL Inpatient CMS G-Code Modifier : CH           SUBJECTIVE:     Mr. Dionna Hill states, \"I work on my hot magen a few hours every day. \"     Social/Functional Lives With: Spouse  Type of Home: House  Home Layout: One level  Home Access: Level entry  Has the patient had two or more falls in the past year or any fall with injury in the past year?: No  ADL Assistance: Independent  Ambulation Assistance: Independent  Transfer Assistance: Independent  Active : Yes  Mode of Transportation: Car  Occupation: Retired    OBJECTIVE:     Victor Manuel Pena / [place] / [place]: IV    RESTRICTIONS/PRECAUTIONS:  Restrictions/Precautions: None    PAIN: Delrae Popper / O2:   Pre Treatment:   Pain Assessment: None - Denies Pain      Post Treatment: none        Vitals          Oxygen  O2 Therapy: Room air         GROSS EVALUATION: INTACT IMPAIRED   (See Comments)   UE AROM [x] []   UE PROM [x] []   Strength [x]  BUE 5/5 and equal      Posture / Balance [x]     Sensation [x]  to light touch and deep pressure    Coordination [x]       Tone [x]       Edema [x] NA    Activity Tolerance [x]       Hand Dominance R [x] L []      COGNITION/  PERCEPTION: INTACT IMPAIRED   (See Comments)   Orientation [x]     Vision []     Hearing []     Cognition  []       MOBILITY: I Mod I S SBA CGA Min Mod Max Total  NT x2 Comments:   Bed Mobility    Rolling [] [] [] [] [] [] [] [] [] [x] []    Supine to Sit [] [] [] [] [] [] [] [] [] [x] []    Scooting [] [] [] [] [] [] [] [] [] [x] []    Sit to Supine [] [] [] [] [] [] [] [] [] [x] []    Transfers    Sit to Stand [x] [] [] [] [] [] [] [] [] [] []    Bed to Chair [x] [] [] [] [] [] [] [] [] [] []    Stand to Sit [x] [] [] [] [] [] [] [] [] [] []    I=Independent, Mod I=Modified Independent, S=Supervision/Setup, SBA=Standby Assistance, CGA=Contact Guard Assistance, Min=Minimal Assistance, Mod=Moderate Assistance, Max=Maximal Assistance, Total=Total Assistance, NT=Not Tested    ACTIVITIES OF DAILY LIVING: I Mod I S SBA CGA Min Mod Max Total NT Comments   BASIC ADLs:              Bathing/ Showering [x] [] [] [] [] [] [] [] [] []    Toileting [x] [] [] [] [] [] [] [] [] []    Upper Body Dressing [x] [] [] [] [] [] [] [] [] []    Lower Body Dressing [x] [] [] [] [] [] [] [] [] []    Feeding [x] [] [] [] [] [] [] [] [] []    Grooming [x] [] [] [] [] [] [] [] [] []    Personal Device Care [x] [] [] [] [] [] [] [] [] []    Functional Mobility [x] [] [] [] [] [] [] [] [] []    I=Independent, Mod I=Modified Independent, S=Supervision/Setup, SBA=Standby Assistance, CGA=Contact Guard Assistance, Min=Minimal Assistance, Mod=Moderate Assistance, Max=Maximal Assistance, Total=Total Assistance, NT=Not Tested    PLAN:     FREQUENCY/DURATION   Discharge OTZack Traylor Red PROBLEM LIST:   (Skilled intervention is medically necessary to address:)  NONE- all resolved   INTERVENTIONS PLANNED:  (Benefits and precautions of occupational therapy have been discussed with the patient.)  Education         TREATMENT:     EVALUATION: LOW COMPLEXITY: (Untimed Charge)    TREATMENT:   no treatment     TREATMENT GRID:  N/A    AFTER TREATMENT PRECAUTIONS: Chair and Visitors at bedside    INTERDISCIPLINARY COLLABORATION:  RN/ PCT, OT/ ASHRAF and RN Case Manager/      EDUCATION:  Education Given To: Patient; Family  Education Provided: Role of Therapy;Plan of Care  Education Provided Comments:  (warning signs and symptoms of CVA)  Education Method: Verbal  Barriers to Learning: None  Education Outcome: Verbalized understanding    TOTAL TREATMENT DURATION AND TIME:  Time In: 1314  Time Out: [address]  Minutes: 222 Aman Gaffney, OT

## 2022-05-30 NOTE — PLAN OF CARE
Problem: Discharge Planning  Goal: Discharge to home or other facility with appropriate resources  Outcome: Progressing  Flowsheets (Taken 5/30/2022 0800)  Discharge to home or other facility with appropriate resources: Identify discharge learning needs (meds, wound care, etc)     Problem: Pain  Goal: Verbalizes/displays adequate comfort level or baseline comfort level  Outcome: Progressing     Problem: Discharge Planning  Goal: Discharge to home or other facility with appropriate resources  Outcome: Progressing  Flowsheets (Taken 5/30/2022 0800)  Discharge to home or other facility with appropriate resources: Identify discharge learning needs (meds, wound care, etc)     Problem: Pain  Goal: Verbalizes/displays adequate comfort level or baseline comfort level  Outcome: Progressing

## 2022-05-30 NOTE — FLOWSHEET NOTE
05/29/22 2042   Dual Clinician Skin Assessment   Dual Skin Assessment (4 Eyes) WDL   Second Clinical  (First and Last Name) Brendan Lopez RN   Skin Integumentary    Skin Integumentary (WDL) WDL   Skin Color Pink   Skin Condition/Temp Warm;Dry   Skin Integrity Scars (comment)   Wound Prevention/Protection Method Yes   Location of Wound Prevention Sacrum   Orientation of Wound Prevention Posterior   Wound Offloading (Prevention Methods) Bed, pressure reduction mattress;Pillows;Repositioning   Dressing Present  No

## 2022-05-30 NOTE — FLOWSHEET NOTE
05/30/22 0710   Neurological   NIHSS Stroke Scale Assessed Yes   NIHSS Stroke Scale   Interval Hand-off/Transfer  Tika Cormier RN)   Level of Consciousness (1a) 0   LOC Questions (1b) 0   LOC Commands (1c) 0   Best Gaze (2) 0   Visual (3) 0   Facial Palsy (4) 0   Motor Arm, Left (5a) 0   Motor Arm, Right (5b) 0   Motor Leg, Left (6a) 0   Motor Leg, Right (6b) 0   Limb Ataxia (7) 0   Sensory (8) (!) 1   Best Language (9) 0   Dysarthria (10) 0   Extinction and Inattention (11) 0   Total 1

## 2022-05-30 NOTE — ED NOTES
TRANSFER - OUT REPORT:    Verbal report given to [facility] RN on Susi Yates  being transferred to [address] [place] [address] for routine progression of patient care       Report consisted of patient's Situation, Background, Assessment and   Recommendations(SBAR). Information from the following report(s) Nurse Handoff Report was reviewed with the receiving nurse. Lines:   Peripheral IV 05/29/22 Right Antecubital (Active)   Site Assessment Clean, dry & intact 05/29/22 1702   Line Status Blood return noted 05/29/22 1702   Phlebitis Assessment No symptoms 05/29/22 1702   Infiltration Assessment 0 05/29/22 1702        Opportunity for questions and clarification was provided.       Patient transported with:  Registered Nurse       Anthony Cooley RN  05/29/22 2024

## 2022-05-30 NOTE — CONSULTS
[facility] Cardiology Consult                Date of  Admission: 5/29/2022  3:01 PM     Primary Care Physician: Marilyn Dee MD  Primary Cardiologist:  [facility] [place]  Referring Physician: Dr Pamela Ferris  Consulting Physician: Dr. Song Bland    CC/Reason for consult: TIA/history of atrial fibrillation    Leeanna Duffy is a 81 y.o. male     History of coronary disease with prior CABG (no prior records available; appears CABG x 4). Last noted echo from 2/2022 with preserved EF and mild left atrial enlargement. Also noted some possibly postoperative atrial fibrillation post CABG per record and subsequently had PAF during admission for COVID from 12/2021. Appears on previous intolerance to Eliquis but during last admission with TIA-like symptoms from 2/2022, was started on Xarelto. Other history of hypertension, hyperlipidemia, BILL not on CPAP, prostate cancer. During admission for TIA from 2/20/2022, MRI of the brain negative and CTA of the head and neck with noted occluded distal segment of the right vertebral artery (also noted on study from 5/29/2022 and unchanged). Patient [facility] ED on [facility]/29[facility]22 with headache/dizziness and right-sided numbness. Work-up currently unremarkable for CVA. Negative MRI and CTA of the head neck unchanged. Has been seen by neurology with suspicion for a migraine. Noted admission from 2/2022 with aphasia and right-sided weakness and was diagnosed with a TIA at the time. Patient was started on Xarelto during that visit but appears subsequently an outpatient follow-up with his primary cardiologist from 3/2022, anticoagulation was held. It was felt that his PAF was related to Altamease Tiffany. Currently back to his baseline.      Patient Active Problem List   Diagnosis    CAD (coronary artery disease)    Secondary hypercoagulable state ([address]    Right sided weakness    Slurred speech    TIA (transient ischemic attack)    Shortness of breath    NSTEMI (non-ST elevated myocardial infarction) ([address]    Dyslipidemia    S/P CABG x 4    Hypoxia    Sleep apnea    Class 1 obesity due to excess calories with serious comorbidity and body mass index (BMI) of 31.0 to 31.9 in adult    Severe obesity ([address]    COVID-19    Facial droop    COVID-19 long hauler manifesting chronic dyspnea    Coronary artery disease involving coronary bypass graft of native heart with unstable angina pectoris ([address]    Primary hypertension    Paroxysmal atrial fibrillation ([address]    Ischemic cerebral vascular accident (CVA) due to stenosis of large extracranial artery ([address]       Past Medical History:   Diagnosis Date    Acute hypoxemic respiratory failure ([address] 12/4/2021    Anterior myocardial infarction ([address] 10/2003    Arrhythmia     paroxysmal a fib    Arteriosclerotic coronary artery disease 11/2005    Arthritis     fingers    Atrial fibrillation with RVR (HCC) 12/4/2021    Cancer (HCC)     prostate    Coronary atherosclerosis of native coronary vessel 10/2001    Elevated serum creatinine 12/15/2021    Family history of premature CAD     Father hx cabg/CHF  Brother CABG x 2    Gout     right toe but no problem now    Hypercholesterolemia     Hypertension     Sinus bradycardia 12/14/2021      Past Surgical History:   Procedure Laterality Date    COLONOSCOPY N/A 12/21/2016    COLONOSCOPY  BMI 34 performed by Moi Lew [facility] at [facility] ENDOSCOPY    COLONOSCOPY      CORONARY ARTERY BYPASS GRAFT  3/9/2016    x4 - Dr. Srinivas Chan      tonsillectomy    ORTHOPEDIC SURGERY      2 back surg    NH CARDIAC SURG PROCEDURE UNLIST  March 9,2016    quidrupal bypass     NH CARDIAC SURG PROCEDURE UNLIST      stent    PROSTATECTOMY       Allergies   Allergen Reactions    Latex Rash    Codeine Nausea And Vomiting      Family History   Problem Relation Age of Onset    Colon Cancer Neg Hx     Cancer Sister     Heart Disease Brother     Diabetes Brother     Heart Disease Father Current Facility-Administered Medications   Medication Dose Route Frequency    sodium chloride flush 0.9 % injection 3 mL  3 mL IntraVENous Q8H    albuterol sulfate  (90 Base) MCG/ACT inhaler 2 puff  2 puff Inhalation Q4H PRN    clopidogrel (PLAVIX) tablet 75 mg  75 mg Oral Daily    aspirin chewable tablet 81 mg  81 mg Oral Daily    fluticasone (FLONASE) 50 MCG/ACT nasal spray 2 spray  2 spray Each Nostril Daily    cetirizine (ZYRTEC) tablet 10 mg  10 mg Oral Daily    nicotine (NICODERM CQ) 14 MG/24HR 1 patch  1 patch TransDERmal Daily PRN    acetaminophen (TYLENOL) tablet 650 mg  650 mg Oral Q4H PRN    Or    acetaminophen (TYLENOL) suppository 650 mg  650 mg Rectal Q4H PRN    ondansetron (ZOFRAN-ODT) disintegrating tablet 4 mg  4 mg Oral Q8H PRN    Or    ondansetron (ZOFRAN) injection 4 mg  4 mg IntraVENous Q6H PRN    polyethylene glycol (GLYCOLAX) packet 17 g  17 g Oral Daily PRN    bisacodyl (DULCOLAX) suppository 10 mg  10 mg Rectal Daily PRN    atorvastatin (LIPITOR) tablet 80 mg  80 mg Oral Nightly       Review of Systems   Constitutional: Negative for chills, decreased appetite, fever, malaise/fatigue and weight gain. HENT: Negative for nosebleeds. Eyes: Negative for blurred vision and double vision. Cardiovascular: Negative for chest pain, claudication, dyspnea on exertion, leg swelling, orthopnea, palpitations, paroxysmal nocturnal dyspnea and syncope. Respiratory: Negative for cough, hemoptysis and shortness of breath. Endocrine: Negative for cold intolerance and heat intolerance. Hematologic/Lymphatic: Negative for bleeding problem. Skin: Negative for rash. Musculoskeletal: Negative for back pain, joint pain, muscle weakness and myalgias. Gastrointestinal: Negative for bloating, abdominal pain, nausea and vomiting. Genitourinary: Negative for dysuria. Neurological: Positive for dizziness and sensory change. Negative for light-headedness and weakness. Psychiatric/Behavioral: Negative for altered mental status. Physical Exam  Vitals:    05/30/22 0400 05/30/22 0433 05/30/22 0731 05/30/22 0800   BP: (!) 177/76   (!) 164/75   Pulse: 88   84   Resp: 22   17   Temp: 98.1 °F (36.7 °C)   99 °F (37.2 °C)   TempSrc: Oral   Oral   SpO2:  95% 94%    Weight:       Height:           Physical Exam:  General: Well Developed, Well Nourished, No Acute Distress  HEENT: pupils equal and round, no abnormalities noted  Neck: supple, no JVD, no carotid bruits  Heart: S1S2 with RRR without murmurs or gallops  Lungs: Clear throughout auscultation bilaterally without adventitious sounds  Abd: soft, nontender, nondistended, with good bowel sounds  Ext: warm, no edema, calves supple/nontender, pulses 2+ bilaterally  Skin: warm and dry  Psychiatric: Normal mood and affect  Neurologic: Alert and oriented X 3    Labs:   Recent Labs     05/29/22  1500 05/30/22  0049    142   K 3.9 3.9   BUN 14 14   WBC 7.8 9.3   HGB 15.8 15.5   HCT 48.6 46.8    196   INR 1.3*  --    HDL  --  41        Assessment/Plan:     Assessment:      Principal Problem:  TIA  -Prior history. Current presentation noted and evaluated by neurology and felt to be related to migraines. -Appears has been maintained on aspirin/Plavix in the outpatient setting. Was on Xarelto previously with presentation from 2/2022 but appears stopped in the outpatient setting. See below    Paroxysmal atrial fibrillation  -Per history and appears noted postoperative around CABG in 2016 but no records available and subsequently recurrent around [facility] from 12/2021  -Previous intolerance to Eliquis. -Extensively discussed options today. Would recommend anticoagulation with prior history of TIAs and noted prior paroxysms of atrial fibrillation although uncertain if triggered by acute underlying issues. Has multiple risk factors for recurrent atrial fibrillation.   Discussed loop recorder to guide long-term anticoagulation and if not noted, can likely plan stopping anticoagulation and maintaining antiplatelet therapy.  -For now, plan aspirin/Xarelto which patient tolerated previously.  -Can consider loop in a.m. versus in the outpatient setting. Active Problems:    CAD (coronary artery disease)  -Prior CABG. States some prior statin intolerance and has been maintained on Repatha. Currently on high-dose statin and appears tolerating. Can reassess in the outpatient setting.  -Last echo with preserved EF       Dyslipidemia  -See above      Sleep apnea  -Stressed need for CPAP compliance and increased cardiovascular risk overall if untreated    Thank you very much for this referral. We appreciate the opportunity to participate in this patient's care. We will follow along with above stated plan.     Claudene Cline, MD

## 2022-05-30 NOTE — PROGRESS NOTES
Pt is a 82 yo male admitted due to concerns for a CVA. Chart reviewed and CM met with pt/spouse to complete assessment. PT/OT/ST evals complete with no further therapy or DME needed. Pt/spouse are in agreement with these recommendations and denied any dc needs or concerns at present. There are no apparent dc needs at this time but CM remains available to assist should any arise. 05/30/22 4406   Service Assessment   Patient Orientation Alert and Oriented   Cognition Alert   History Provided By Patient   Primary Caregiver Self   Support Systems Spouse/Significant Other   PCP Verified by CM Yes   Last Visit to PCP Within last 6 months   Prior Functional Level Independent in ADLs/IADLs   Current Functional Level Independent in ADLs/IADLs   Can patient return to prior living arrangement Yes   Ability to make needs known: Good   Family able to assist with home care needs: Yes   Social/Functional History   Lives With Spouse   Type of [address] One level   Home Access Level entry   ADL Assistance Independent   Ambulation Assistance Independent   Transfer Assistance Independent   Active  Yes   Mode of Transportation Car   Occupation Retired   Discharge Planning   Type of [address] Prior To Admission None   Potential Assistance Needed N/A   DME Ordered? No   Potential Assistance Purchasing Medications No   Type of Home Care Services None   [facility]   Services At/After Discharge None   [facility] Information Provided?  No   Mode of Transport at Discharge Other (see comment)  (Spouse)   Confirm Follow Up Transport Self

## 2022-05-30 NOTE — PROGRESS NOTES
PHYSICAL THERAPY Initial Assessment, Discharge and AM  (Link to Caseload Tracking: PT Visit Days : 1  Acknowledge Orders  Time In/Out  PT Charge Capture  Rehab Caseload Tracker    Elif Light is a 81 y.o. male   PRIMARY DIAGNOSIS: Ischemic cerebral vascular accident (CVA) due to stenosis of large extracranial artery (HCC)  TIA (transient ischemic attack) [G45.9]  Ischemic cerebral vascular accident (CVA) due to stenosis of large extracranial artery ([address] [I63.20]       Reason for Referral: Other abnormalities of gait and mobility (R26.89)  Inpatient: Payor: Chaz Card / Plan: Rolly Hamlin PPO / Product Type: Medicare /     ASSESSMENT:     REHAB RECOMMENDATIONS:   Recommendation to date pending progress:  Setting:   No further skilled therapy after discharge from hospital    Equipment:     None     ASSESSMENT:  Mr. Kirti Gonzalez  is a 81year old M who presents with episode of progressive R sided numbness that has since resolved. He has had episodes like this before. Neurology following, believes this to be migraines with auras. At baseline, pt is independent. This date pt performs mobility including bed mobility with independence. He was able to ambulate 250 ft with independence and no AD. No gait or balance deficits noted. Strength is WFL and symmetrical. Pt presents as functioning at his baseline and therefore skilled PT is not indicated. Will remove from caseload at this time.      [address] [facility] [address] [address] AM-PAC 6 Clicks Basic Mobility Inpatient Short Form  AM-PAC Mobility Inpatient   How much difficulty turning over in bed?: None  How much difficulty sitting down on / standing up from a chair with arms?: None  How much difficulty moving from lying on back to sitting on side of bed?: None  How much help from another person moving to and from a bed to a chair?: None  How much help from another person needed to walk in hospital room?: None  How much help from another person for climbing 3-5 steps with a railing?: None  AM-PAC Inpatient Mobility Raw Score : 24  AM-PAC Inpatient T-Scale Score : 61.14  Mobility Inpatient CMS 0-100% Score: 0  Mobility Inpatient CMS G-Code Modifier : CH    SUBJECTIVE:   Mr. Elisabet Pierce states, \"I''m not as coordinated as I used to be\"     Social/Functional Lives With: Spouse  Type of Home: House  Home Layout: One level  Home Access: Level entry  Has the patient had two or more falls in the past year or any fall with injury in the past year?: No  ADL Assistance: Independent  Ambulation Assistance: Independent  Transfer Assistance: Independent  Active : Yes  Mode of Transportation: Car    OBJECTIVE:     PAIN: Teretha Backers / O2: Eron Thompson / Moisés Buchanan / Urszula Hallmark:   Pre Treatment:   Pain Assessment: None - Denies Pain  Pain Level: 0      Post Treatment: 0 Vitals        Oxygen      None    RESTRICTIONS/PRECAUTIONS:  Restrictions/Precautions: None                 GROSS EVALUATION: Intact Impaired (Comments):   AROM [x]     PROM [x]    Strength [x]     Balance [x]     Posture [x] N/A   Sensation [x]  B LE light touch intact   Coordination [x]      Tone []     Edema []    Activity Tolerance [x] Patient Tolerated treatment well    []      COGNITION/  PERCEPTION: Intact Impaired (Comments):   Orientation [x]     Vision [x]     Hearing [x]     Cognition  [x]       MOBILITY: I Mod I S SBA CGA Min Mod Max Total  NT x2 Comments:   Bed Mobility    Rolling [x] [] [] [] [] [] [] [] [] [] []    Supine to Sit [x] [] [] [] [] [] [] [] [] [] []    Scooting [x] [] [] [] [] [] [] [] [] [] []    Sit to Supine [x] [] [] [] [] [] [] [] [] [] []    Transfers    Sit to Stand [x] [] [] [] [] [] [] [] [] [] []    Bed to Chair [] [] [] [] [] [] [] [] [] [x] []    Stand to Sit [x] [] [] [] [] [] [] [] [] [] []    I=Independent, Mod I=Modified Independent, S=Supervision, SBA=Standby Assistance, CGA=Contact Guard Assistance,   Min=Minimal Assistance, Mod=Moderate Assistance, Max=Maximal Assistance, Total=Total Assistance, NT=Not Tested    GAIT: I Mod I S SBA CGA Min Mod Max Total  NT x2 Comments:   Level of Assistance [x] [] [] [] [] [] [] [] [] [] []    Distance 250 feet    DME None    Gait Quality WFL    Weightbearing Status Restrictions/Precautions  Restrictions/Precautions: None    Stairs      I=Independent, Mod I=Modified Independent, S=Supervision, SBA=Standby Assistance, CGA=Contact Guard Assistance,   Min=Minimal Assistance, Mod=Moderate Assistance, Max=Maximal Assistance, Total=Total Assistance, NT=Not Tested    PLAN:   ACUTE PHYSICAL THERAPY GOALS:   (Developed with and agreed upon by patient and/or caregiver.)    (1.) Natasha Catherines  will move from supine to sit and sit to supine  with INDEPENDENCE within 7 treatment day(s). GOAL MET 5/30/22  (3.) Natasha Catherines will ambulate with INDEPENDENCE for 250 feet with the least restrictive device within 7 treatment day(s). GOAL MET 5/30/22      FREQUENCY AND DURATION:  (eval and d/c) for duration of hospital stay or until stated goals are met, whichever comes first.    THERAPY PROGNOSIS: Excellent    PROBLEM LIST:   (Skilled intervention is medically necessary to address:)  n/a INTERVENTIONS PLANNED:   (Benefits and precautions of physical therapy have been discussed with the patient.)  n/a       TREATMENT:   EVALUATION: LOW COMPLEXITY: (Untimed Charge)    TREATMENT:   Therapeutic Activity (8 Minutes): Therapeutic activity included Rolling, Supine to Sit, Sit to Supine, Scooting, Ambulation on level ground, Sitting balance  and Standing balance to improve functional Activity tolerance, Balance, Mobility and Strength. TREATMENT GRID:  N/A    AFTER TREATMENT PRECAUTIONS: Bed, Call light within reach, Needs within reach, RN notified and Visitors at bedside    INTERDISCIPLINARY COLLABORATION:  RN/ PCT    EDUCATION: Education Given To: Patient; Family  Education Provided: Role of Therapy;Plan of Care;Transfer Training; Fall Prevention Strategies  Education Method: Verbal  Barriers to Learning: None  Education Outcome: Verbalized understanding    TIME IN/OUT:  Time In: 0939  Time Out: 1583  Minutes: [address], PT

## 2022-05-30 NOTE — PROGRESS NOTES
Occupational Therapy Note:    Attempted to see patient this AM for occupational therapy evaluation session. Patient off floor at MRI. Will follow and re-attempt as schedule permits/patient available.  Thank you,    Raiza Negrete, OT    Rehab Caseload Tracker

## 2022-05-30 NOTE — CONSULTS
Anton Fontenot MD  Medical Director  [facility], [address] [facility]  Tel: [phone]       Physical Medicine & Rehab Consult     Admit Date: 5/29/2022  Referring Provider: Hospitalist service    Medical Decision Making / Plan / Recommend: Medical chart reviewed. Kassy Puentes is a 81 y.o. WM whom we have been requested to evaluate for consideration for inpatient rehabilitation as part of stroke protocol. Consult acknowledged, EMR reviewed in conjunction with admissions coordinator. Briefly, patient with PMH including CAD s/p CABG, aFib (not on [address]), HTN, HL, TIA, untreated BILL, obesity, migraines, past COVID infection. He presented to the ED 5/29/2022 with right hemibody dysesthesia, expressive aphasia and global HA and was admitted for CVA work-up. All head imaging (CT, CTA, CTp, brain MRI) negative for stroke. Neurology opined migraine. Physical therapy found patient independent with all mobility; he ambulated 250' without AD. PT recommended no further therapy. Occupational therapy evaluation pending. Without medical indication and at this high level of function and activity, patient does not require 3 hours of daily intense therapy offered at [facility]. Thank you for inviting us to participate in this patient's care. Please notify us of any changes; otherwise, we will sign off. Denise Simpson PA-C  Physician Assistant with [facility]  Goldie Mortensen MD, Medical Director        Time spent reviewing EMR and medical decision-making was 15 minutes. Detail Level: Zone Tetracycline Counseling: Patient counseled regarding possible photosensitivity and increased risk for sunburn.  Patient instructed to avoid sunlight, if possible.  When exposed to sunlight, patients should wear protective clothing, sunglasses, and sunscreen.  The patient was instructed to call the office immediately if the following severe adverse effects occur:  hearing changes, easy bruising/bleeding, severe headache, or vision changes.  The patient verbalized understanding of the proper use and possible adverse effects of tetracycline.  All of the patient's questions and concerns were addressed. Patient understands to avoid pregnancy while on therapy due to potential birth defects. High Dose Vitamin A Pregnancy And Lactation Text: High dose vitamin A therapy is contraindicated during pregnancy and breast feeding. Azithromycin Pregnancy And Lactation Text: This medication is considered safe during pregnancy and is also secreted in breast milk. Erythromycin Counseling:  I discussed with the patient the risks of erythromycin including but not limited to GI upset, allergic reaction, drug rash, diarrhea, increase in liver enzymes, and yeast infections. Include Pregnancy/Lactation Warning?: No Birth Control Pills Pregnancy And Lactation Text: This medication should be avoided if pregnant and for the first 30 days post-partum. Dapsone Counseling: I discussed with the patient the risks of dapsone including but not limited to hemolytic anemia, agranulocytosis, rashes, methemoglobinemia, kidney failure, peripheral neuropathy, headaches, GI upset, and liver toxicity.  Patients who start dapsone require monitoring including baseline LFTs and weekly CBCs for the first month, then every month thereafter.  The patient verbalized understanding of the proper use and possible adverse effects of dapsone.  All of the patient's questions and concerns were addressed. Topical Retinoid Pregnancy And Lactation Text: This medication is Pregnancy Category C. It is unknown if this medication is excreted in breast milk. Tazorac Counseling:  Patient advised that medication is irritating and drying.  Patient may need to apply sparingly and wash off after an hour before eventually leaving it on overnight.  The patient verbalized understanding of the proper use and possible adverse effects of tazorac.  All of the patient's questions and concerns were addressed. Tetracycline Pregnancy And Lactation Text: This medication is Pregnancy Category D and not consider safe during pregnancy. It is also excreted in breast milk. Topical Sulfur Applications Counseling: Topical Sulfur Counseling: Patient counseled that this medication may cause skin irritation or allergic reactions.  In the event of skin irritation, the patient was advised to reduce the amount of the drug applied or use it less frequently.   The patient verbalized understanding of the proper use and possible adverse effects of topical sulfur application.  All of the patient's questions and concerns were addressed. Bactrim Counseling:  I discussed with the patient the risks of sulfa antibiotics including but not limited to GI upset, allergic reaction, drug rash, diarrhea, dizziness, photosensitivity, and yeast infections.  Rarely, more serious reactions can occur including but not limited to aplastic anemia, agranulocytosis, methemoglobinemia, blood dyscrasias, liver or kidney failure, lung infiltrates or desquamative/blistering drug rashes. Minocycline Counseling: Patient advised regarding possible photosensitivity and discoloration of the teeth, skin, lips, tongue and gums.  Patient instructed to avoid sunlight, if possible.  When exposed to sunlight, patients should wear protective clothing, sunglasses, and sunscreen.  The patient was instructed to call the office immediately if the following severe adverse effects occur:  hearing changes, easy bruising/bleeding, severe headache, or vision changes.  The patient verbalized understanding of the proper use and possible adverse effects of minocycline.  All of the patient's questions and concerns were addressed. Erythromycin Pregnancy And Lactation Text: This medication is Pregnancy Category B and is considered safe during pregnancy. It is also excreted in breast milk. Topical Sulfur Applications Pregnancy And Lactation Text: This medication is Pregnancy Category C and has an unknown safety profile during pregnancy. It is unknown if this topical medication is excreted in breast milk. Isotretinoin Counseling: Patient should get monthly blood tests, not donate blood, not drive at night if vision affected, not share medication, and not undergo elective surgery for 6 months after tx completed. Side effects reviewed, pt to contact office should one occur. Dapsone Pregnancy And Lactation Text: This medication is Pregnancy Category C and is not considered safe during pregnancy or breast feeding. Tazorac Pregnancy And Lactation Text: This medication is not safe during pregnancy. It is unknown if this medication is excreted in breast milk. Benzoyl Peroxide Counseling: Patient counseled that medicine may cause skin irritation and bleach clothing.  In the event of skin irritation, the patient was advised to reduce the amount of the drug applied or use it less frequently.   The patient verbalized understanding of the proper use and possible adverse effects of benzoyl peroxide.  All of the patient's questions and concerns were addressed. Bactrim Pregnancy And Lactation Text: This medication is Pregnancy Category D and is known to cause fetal risk.  It is also excreted in breast milk. Benzoyl Peroxide Pregnancy And Lactation Text: This medication is Pregnancy Category C. It is unknown if benzoyl peroxide is excreted in breast milk. Birth Control Pills Counseling: Birth Control Pill Counseling: I discussed with the patient the potential side effects of OCPs including but not limited to increased risk of stroke, heart attack, thrombophlebitis, deep venous thrombosis, hepatic adenomas, breast changes, GI upset, headaches, and depression.  The patient verbalized understanding of the proper use and possible adverse effects of OCPs. All of the patient's questions and concerns were addressed. Spironolactone Counseling: Patient advised regarding risks of diarrhea, abdominal pain, hyperkalemia, birth defects (for female patients), liver toxicity and renal toxicity. The patient may need blood work to monitor liver and kidney function and potassium levels while on therapy. The patient verbalized understanding of the proper use and possible adverse effects of spironolactone.  All of the patient's questions and concerns were addressed. Isotretinoin Pregnancy And Lactation Text: This medication is Pregnancy Category X and is considered extremely dangerous during pregnancy. It is unknown if it is excreted in breast milk. Doxycycline Counseling:  Patient counseled regarding possible photosensitivity and increased risk for sunburn.  Patient instructed to avoid sunlight, if possible.  When exposed to sunlight, patients should wear protective clothing, sunglasses, and sunscreen.  The patient was instructed to call the office immediately if the following severe adverse effects occur:  hearing changes, easy bruising/bleeding, severe headache, or vision changes.  The patient verbalized understanding of the proper use and possible adverse effects of doxycycline.  All of the patient's questions and concerns were addressed. Doxycycline Pregnancy And Lactation Text: This medication is Pregnancy Category D and not consider safe during pregnancy. It is also excreted in breast milk but is considered safe for shorter treatment courses. Topical Clindamycin Counseling: Patient counseled that this medication may cause skin irritation or allergic reactions.  In the event of skin irritation, the patient was advised to reduce the amount of the drug applied or use it less frequently.   The patient verbalized understanding of the proper use and possible adverse effects of clindamycin.  All of the patient's questions and concerns were addressed. Topical Clindamycin Pregnancy And Lactation Text: This medication is Pregnancy Category B and is considered safe during pregnancy. It is unknown if it is excreted in breast milk. Topical Retinoid counseling:  Patient advised to apply a pea-sized amount only at bedtime and wait 30 minutes after washing their face before applying.  If too drying, patient may add a non-comedogenic moisturizer. The patient verbalized understanding of the proper use and possible adverse effects of retinoids.  All of the patient's questions and concerns were addressed. Spironolactone Pregnancy And Lactation Text: This medication can cause feminization of the male fetus and should be avoided during pregnancy. The active metabolite is also found in breast milk. Azithromycin Counseling:  I discussed with the patient the risks of azithromycin including but not limited to GI upset, allergic reaction, drug rash, diarrhea, and yeast infections. High Dose Vitamin A Counseling: Side effects reviewed, pt to contact office should one occur.

## 2022-05-31 VITALS
WEIGHT: 220 LBS | HEART RATE: 65 BPM | SYSTOLIC BLOOD PRESSURE: 162 MMHG | TEMPERATURE: 98.8 F | OXYGEN SATURATION: 96 % | BODY MASS INDEX: 31.5 KG/M2 | DIASTOLIC BLOOD PRESSURE: 72 MMHG | RESPIRATION RATE: 20 BRPM | HEIGHT: 70 IN

## 2022-05-31 LAB
ANION GAP SERPL CALC-SCNC: 5 MMOL/L (ref 7–16)
BUN SERPL-MCNC: 12 MG/DL (ref 8–23)
CALCIUM SERPL-MCNC: 8.6 MG/DL (ref 8.3–10.4)
CHLORIDE SERPL-SCNC: 105 MMOL/L (ref 98–107)
CO2 SERPL-SCNC: 29 MMOL/L (ref 21–32)
CREAT SERPL-MCNC: 0.9 MG/DL (ref 0.8–1.5)
ERYTHROCYTE [DISTWIDTH] IN BLOOD BY AUTOMATED COUNT: 13.8 % (ref 11.9–14.6)
GLUCOSE SERPL-MCNC: 78 MG/DL (ref 65–100)
HCT VFR BLD AUTO: 48.8 % (ref 41.1–50.3)
HGB BLD-MCNC: 16 G/DL (ref 13.6–17.2)
MCH RBC QN AUTO: 28.4 PG (ref 26.1–32.9)
MCHC RBC AUTO-ENTMCNC: 32.8 G/DL (ref 31.4–35)
MCV RBC AUTO: 86.5 FL (ref 79.6–97.8)
NRBC # BLD: 0 K/UL (ref 0–0.2)
PLATELET # BLD AUTO: 198 K/UL (ref 150–450)
PMV BLD AUTO: 10.1 FL (ref 9.4–12.3)
POTASSIUM SERPL-SCNC: 3.8 MMOL/L (ref 3.5–5.1)
RBC # BLD AUTO: 5.64 M/UL (ref 4.23–5.6)
SODIUM SERPL-SCNC: 139 MMOL/L (ref 138–145)
WBC # BLD AUTO: 8.8 K/UL (ref 4.3–11.1)

## 2022-05-31 PROCEDURE — 2580000003 HC RX 258: Performed by: EMERGENCY MEDICINE

## 2022-05-31 PROCEDURE — 99232 SBSQ HOSP IP/OBS MODERATE 35: CPT | Performed by: PSYCHIATRY & NEUROLOGY

## 2022-05-31 PROCEDURE — 36415 COLL VENOUS BLD VENIPUNCTURE: CPT

## 2022-05-31 PROCEDURE — G0378 HOSPITAL OBSERVATION PER HR: HCPCS

## 2022-05-31 PROCEDURE — 6370000000 HC RX 637 (ALT 250 FOR IP): Performed by: PHYSICIAN ASSISTANT

## 2022-05-31 PROCEDURE — 99231 SBSQ HOSP IP/OBS SF/LOW 25: CPT | Performed by: INTERNAL MEDICINE

## 2022-05-31 PROCEDURE — 6370000000 HC RX 637 (ALT 250 FOR IP): Performed by: INTERNAL MEDICINE

## 2022-05-31 PROCEDURE — 85027 COMPLETE CBC AUTOMATED: CPT

## 2022-05-31 PROCEDURE — 80048 BASIC METABOLIC PNL TOTAL CA: CPT

## 2022-05-31 RX ORDER — ENOXAPARIN SODIUM 100 MG/ML
40 INJECTION SUBCUTANEOUS DAILY
Status: DISCONTINUED | OUTPATIENT
Start: 2022-05-31 | End: 2022-05-31

## 2022-05-31 RX ADMIN — ASPIRIN 81 MG: 81 TABLET, CHEWABLE ORAL at 07:59

## 2022-05-31 RX ADMIN — Medication 3 ML: at 05:14

## 2022-05-31 RX ADMIN — METOPROLOL TARTRATE 50 MG: 50 TABLET ORAL at 07:59

## 2022-05-31 RX ADMIN — CETIRIZINE HYDROCHLORIDE 10 MG: 10 TABLET, FILM COATED ORAL at 07:59

## 2022-05-31 NOTE — FLOWSHEET NOTE
05/31/22 0711   Neurological   NIHSS Stroke Scale Assessed Yes   NIHSS Stroke Scale   Interval Hand-off/Transfer  (JENNA Thomas)   Level of Consciousness (1a) 0   LOC Questions (1b) 0   LOC Commands (1c) 0   Best Gaze (2) 0   Visual (3) 0   Facial Palsy (4) 0   Motor Arm, Left (5a) 0   Motor Arm, Right (5b) 0   Motor Leg, Left (6a) 0   Motor Leg, Right (6b) 0   Limb Ataxia (7) 0   Sensory (8) 0   Best Language (9) 0   Dysarthria (10) 0   Extinction and Inattention (11) 0   Total 0

## 2022-05-31 NOTE — PROGRESS NOTES
[facility] CARDIOLOGY PROGRESS NOTE           5/31/2022 7:37 AM    Cam Blight   Admit Date: 5/29/2022      Subjective:   No cp  Or sob    Objective:      Vitals:    05/30/22 1600 05/30/22 2000 05/31/22 0000 05/31/22 0400   BP: (!) 164/75 (!) 148/76 (!) 147/75 (!) 162/81   Pulse: 84 61 88 61   Resp: 17 18 18 18   Temp: 98.6 °F (37 °C) 97.7 °F (36.5 °C) 98.1 °F (36.7 °C) 97.7 °F (36.5 °C)   TempSrc: Oral Oral Oral Oral   SpO2:  90% 94% 92%   Weight:       Height:           Rhythm:     Physical Exam:  General-No Acute Distress      Data Review:   Recent Labs     05/29/22  1500 05/29/22  1500 05/30/22  0049 05/31/22  0454      < > 142 139   K 3.9   < > 3.9 3.8   BUN 14   < > 14 12   WBC 7.8   < > 9.3 8.8   HGB 15.8   < > 15.5 16.0   HCT 48.6   < > 46.8 48.8      < > 196 198   INR 1.3*  --   --   --    CHOL  --   --  74  --    HDL  --   --  41  --     < > = values in this interval not displayed. No results found for this or any previous visit. Assessment/Plan:     Need for [address]:    I suspect his outpt Xarelto was stopped due to cost.  If Xarelto or Pradaxa ( Intol. Eliquis) are unaffordable, then he will need warfarin. No need for a loop implant.       Lito Whittaker MD  5/31/2022 7:37 AM

## 2022-05-31 NOTE — PLAN OF CARE
Problem: Pain  Goal: Verbalizes/displays adequate comfort level or baseline comfort level  5/30/2022 2027 by Chica Foster, RN  Outcome: Progressing  Flowsheets (Taken 5/30/2022 2000)  Verbalizes/displays adequate comfort level or baseline comfort level:   Encourage patient to monitor pain and request assistance   Assess pain using appropriate pain scale  5/30/2022 1540 by Lexis Ridley RN  Outcome: Progressing

## 2022-05-31 NOTE — DISCHARGE SUMMARY
Hospitalist Discharge Summary   Admit Date:  2022  3:01 PM   DC Note date: 2022  Name:  Erick Posey   Age:  81 y.o. Sex:  male  :  1950   MRN:  [id number]   Room:  [facility]  PCP:  Jose E Woodson MD    Presenting Complaint: Aphasia     Initial Admission Diagnosis: TIA (transient ischemic attack) [G45.9]  Ischemic cerebral vascular accident (CVA) due to stenosis of large extracranial artery ([address] [I63.20]     Problem List for this Hospitalization (present on admission):    Principal Problem:    Ischemic cerebral vascular accident (CVA) due to stenosis of large extracranial artery ([address]  Active Problems:    Right sided numbness    Persistent migraine aura without cerebral infarction and without status migrainosus, not intractable    CAD (coronary artery disease)    Secondary hypercoagulable state ([address]    Dyslipidemia    S/P CABG x 4    Sleep apnea    Class 1 obesity due to excess calories with serious comorbidity and body mass index (BMI) of 31.0 to 31.9 in adult    COVID-19    Primary hypertension    PAF (paroxysmal atrial fibrillation) ([address]  Resolved Problems:    * No resolved hospital problems. *    Did Patient have Sepsis (YES OR NO): no    Hospital Course:  Carmela Serrato a 81 y. o. male with medical history of CAD s/p CABG, TIA, paroxysmal AFIB (states not taking eliquis or xarelto) , HTN, HLP, BILL not on CPAP, obesity, COVID 19, untreated BILL who is evaluated with acute onset of headache, dizziness, right sided facial / arm numbness and change in speech. Wife present and gives details as well as chart reviewed and spoke with referring Dr. Neetu Russell called. STAT head CT no acute issues. CTP negative. CTA head and neck shows prior noted occluded distal right vertebral artery. He has been seen by tele neurology.      While in the ED he has improved somewhat and still has some headache and facial numbness. He recalls prior event 2022 that was similar.  He has intermittent afib and at one point was taking eliquis that was stopped by cardiology.  Pt's wife reports since Feb episode of numbness and dizziness, he's had \"smaller episodes\" which resolved on it's own and he never sought out medical attention. Pt was admitted for CVA w/u though by this time, neuro symptoms resolved. Pt was seen in consultation by Dr. Dora li and discussed possible etiology of presentation as related to migraine HA. Given hx of PAF, TIA from microembolic event feasible and recommended [facility] [place] [facility]. MRI brain without acute event. As pt had a recent echo 2/2022 without acute abnl (specifically interatrial septum), no repeat echo warranted per Dr. Ayaka Yarbroguh for this admission.       Mr. Celestino Castillo was also seen in consultation by cardiology who agreed with [facility]. Per my discussion with Dr. Sarah Walker today, he did not believe loop recorder warranted and further recommended to stop plavix and continue ASA 81mg with newly started xarelto. Per my discussion with patient he has been on xarelto in the past and insurance costs was NOT an issue. He is medically stable for dc home today. Plan for outpatient f/u with PCP, neurology and cardiology. Disposition: home  Diet: ADULT DIET; Regular; Low Fat/Low Chol/High Fiber/PREETI  Code Status: Full Code      Time spent in patient discharge and coordination 45 minutes. Plan was discussed with patient, RN, case mgmt, neurology, cardiology. All questions answered. Patient was stable at time of discharge. Instructions given to call a physician or return if any concerns.     Current Discharge Medication List      START taking these medications    Details   rivaroxaban (XARELTO) 20 MG TABS tablet Take 1 tablet by mouth Daily with supper  Qty: 30 tablet, Refills: 1         CONTINUE these medications which have NOT CHANGED    Details   aspirin 81 MG EC tablet Take 81 mg by mouth daily      atorvastatin (LIPITOR) 80 MG tablet Take 80 mg by mouth daily      albuterol sulfate  (90 Base) MCG/ACT inhaler Inhale 2 puffs into the lungs every 4 hours as needed      Evolocumab 140 MG/ML SOSY Inject 140 mg into the skin every 14 days      fluticasone (FLONASE) 50 MCG/ACT nasal spray One spray in each nostril twice daily      fluticasone-salmeterol (ADVAIR HFA) 230-21 MCG/ACT inhaler Inhale 2 puffs into the lungs 2 times daily      loratadine (CLARITIN) 10 MG tablet Take 10 mg by mouth      metoprolol tartrate (LOPRESSOR) 50 MG tablet Take 50 mg by mouth 2 times daily      nitroGLYCERIN (NITROSTAT) 0.4 MG SL tablet Place 0.4 mg under the tongue         STOP taking these medications       clopidogrel (PLAVIX) 75 MG tablet Comments:   Reason for Stopping:               Procedures done this admission:  * No surgery found *    Consults this admission:  IP CONSULT TO PHYSICAL MEDICINE REHAB  IP CONSULT TO CASE MANAGEMENT  IP CONSULT TO NEUROLOGY  IP CONSULT TO CARDIOLOGY  IP CONSULT TO STROKE COORDINATOR  FOLLOW UP VISIT  FOLLOW UP VISIT    Echocardiogram results:  No results found for this or any previous visit. Diagnostic Imaging/Tests:   CTA HEAD NECK W WO CONTRAST    Result Date: 5/29/2022  History: Code stroke. FINDINGS: CT angiography was performed of the neck and head with contrast and three-dimensional CT angiography reconstruction and reformat was performed. NASCET criteria as needed. CT dose reduction was achieved through use of a standardized protocol tailored for this examination and automatic exposure control for dose modulation. Study analyzed by the "[employer] software package per the hospital protocol if presented as such by the facility technologists in the pacs system. However, the results of the analysis are neither reviewed nor provided in this exam interpretation and report. This statement is provided at the request of the hospital for hospital billing purposes only. Status post median sternotomy. Aortic arch is patent.  Proximal great vessel segments are patent bilaterally. The left vertebral artery is patent. The right vertebral artery is small at the cervical segment and occludes at the intracranial segment. The basilar artery is patent. The posterior cerebral arteries are patent bilaterally. The anterior cerebral arteries are patent. The middle cerebral arteries are patent. Atherosclerosis at the carotid bulbs bilaterally. However, no hemodynamically significant stenosis. Left maxillary sinus disease. The dural venous sinuses are patent. Occluded distal segment of the right vertebral artery. This was present on the exam from February 20, 2022 as well. CT Head W/O Contrast    Result Date: 5/29/2022  Noncontrast head CT Clinical Indication: Acute code stroke with severe dizziness, aphasia, right upper extremity numbness. Technique: Noncontrast axial images were obtained through the brain. All CT scans at this location are performed using dose modulation techniques as appropriate including the following: Automated exposure control, adjustment of the MA and/or kV according to patient's size, or use of iterative reconstruction technique. Reformatted sagittal, coronal images obtained to further evaluate bones, soft tissues, extra-axial spaces. Comparison: 2/21/2022 MRI and 2/20/2022 CT Findings: There is no acute intracranial hemorrhage, hydrocephalus, intra-axial mass, or mass-effect. Again noted are mild bilateral white matter hypoattenuation changes, nonspecific but most often chronic microangiopathy, similar to prior. The possibility of acute small developing or superimposed infarct cannot BE axilla by CT if clinically suspected. There is no CT evidence of acute large artery territorial infarction or abnormal extra-axial fluid collection. The mastoid air cells are aerated. Chronic left maxillary sinus disease again evident. Paranasal sinuses are otherwise clear where imaged. No displaced skull fractures are present.      1. No CT evidence of acute intracranial abnormality. 2. Left maxillary sinusitis. XR CHEST PORTABLE    Result Date: 5/29/2022  Chest portable CLINICAL INDICATION: Acute severe dizziness, stroke evaluation, aphasia and right arm numbness COMPARISON: Radiography and CT 12/26/2021 TECHNIQUE: single AP portable view chest at 3:08 PM sitting upright FINDINGS: Lungs are well-inflated with improved aeration since prior. There is no evidence of consolidation, pneumothorax, pleural effusion or pulmonary edema. Sternotomy wires, cardiac surgical changes are again evident. The mediastinal and hilar contours are normal given technique. Unchanged chronic right hemidiaphragm eventration anteriorly. No acute disease. CT BRAIN PERFUSION    Result Date: 5/29/2022  CT Perfusion Imaging INDICATION:  Acute stroke evaluation with dizziness, right upper extremity and lower extremity paresthesias, intermittent numbness today, generalized headache COMPARISON: CT earlier today, perfusion CT 2/20/2022 CT perfusion imaging of the brain was performed after the administration of 100 mL Isovue-370 intravenous contrast.  Perfusion maps and perfusion analysis output were generated using the RAPID perfusion processing software algorithm. Radiation dose reduction techniques were used for this study: All CT scans performed at this facility use one or all of the following: Automated exposure control, adjustment of the mA and/or kVp according to patient's size, iterative reconstruction.  FINDINGS: Small areas of hypoperfusion involving left temporoparietal lobe of unclear significance, possibly artifactual. RAPID Output Values: CBF < 30% volume:  0 ml   (core infarction volume greater than 50 cc associated with poor outcomes) Tmax > 6 seconds: 0 ml Tmax/CBF Mismatch Volume: 0 ml Tmax/CBF Mismatch Ratio: N/A Hypoperfusion Intensity Ratio: 0   (values greater than 0.5 associated with poor outcome) Tmax > 10 seconds Volume: 0 ml   (volume greater than 100 mL is associated with poor outcome)     No CT evidence of core infarct or ischemic penumbra. Please note that the determination of patient treatment is not based solely upon imaging factors or calculation values. Management of ischemia is at the discretion of the primary physician and is based upon a combination of clinical and imaging data, along other factors. MRI brain without contrast    Result Date: 5/30/2022  EXAMINATION: BRAIN MRI 5/30/2022 11:46 AM ACCESSION NUMBER: [id number] INDICATION: Acute neuro deficit. Concern for acute stroke. Chronically occluded distal right vertebral artery. COMPARISON: CTA head and neck, 5/29/2022. CT head and perfusion, 5/29/2022. TECHNIQUE: Multiplanar multisequence MRI of the brain without the administration of intravenous contrast. FINDINGS: No evidence of restricted diffusion to suggest recent infarct. No evidence of intracranial hemorrhage or extra-axial fluid collection. No mass effect or midline shift. Generalized parenchymal volume loss with commensurate ex vacuo ventriculomegaly. Ventricles are symmetric in size and configuration. Basilar cisterns are patent. Bilateral periventricular and deep white matter T2/flair hyperintensities most compatible with chronic microvascular ischemic changes. There is loss of flow void in the right vertebral artery corresponding to the known chronic occlusion. Orbits and globes are normal. There is chronic mucosal thickening in the left maxillary sinus with mineralization in the secretions. Small mucous retention cyst in the right maxillary sinus. Trace right mastoid air cell effusions. Middle ears are clear. No significant extra cranial soft tissue abnormality. No acute or aggressive osseous abnormality. 1. No acute intracranial abnormality. 2. Mild generalized volume loss and chronic microvascular ischemic changes. 3. Chronically occluded distal right vertebral artery. 4. Chronic left maxillary sinus disease.          Labs: Results:       BMP, Mg, Phos Recent Labs     05/29/22  1500 05/30/22 0049 05/31/22 0454    142 139   K 3.9 3.9 3.8    108* 105   CO2 33* 29 29   BUN 14 14 12      CBC Recent Labs     05/29/22  1500 05/30/22 0049 05/31/22 0454   WBC 7.8 9.3 8.8   RBC 5.61* 5.47 5.64*   HGB 15.8 15.5 16.0   HCT 48.6 46.8 48.8    196 198      LFT Recent Labs     05/30/22 0049   ALT 38   GLOB 3.6*      Cardiac Testing No results found for: BNP, CPK, RCK1, CKMB   Coagulation Tests Lab Results   Component Value Date    INR 1.3 05/29/2022    INR 1.3 02/20/2022    INR 1.4 12/05/2021      A1c No results found for: HBA1C   Lipid Panel Lab Results   Component Value Date    CHOL 74 05/30/2022    HDL 41 05/30/2022    VLDL 20 01/19/2022      Thyroid Panel Lab Results   Component Value Date    TSH 2.990 02/21/2022    TSH 2.920 01/19/2022        Most Recent UA Lab Results   Component Value Date    UCOM RESULTS VERIFIED MANUALLY 12/05/2021          All Labs from Last 24 Hrs:  Recent Results (from the past 24 hour(s))   Basic Metabolic Panel w/ Reflex to MG    Collection Time: 05/31/22  4:54 AM   Result Value Ref Range    Sodium 139 138 - 145 mmol/L    Potassium 3.8 3.5 - 5.1 mmol/L    Chloride 105 98 - 107 mmol/L    CO2 29 21 - 32 mmol/L    Anion Gap 5 (L) 7 - 16 mmol/L    Glucose 78 65 - 100 mg/dL    BUN 12 8 - 23 MG/DL    CREATININE 0.90 0.8 - 1.5 MG/DL    GFR African American >60 >60 ml/min/1.73m2    GFR Non- >60 >60 ml/min/1.73m2    Calcium 8.6 8.3 - 10.4 MG/DL   CBC    Collection Time: 05/31/22  4:54 AM   Result Value Ref Range    WBC 8.8 4.3 - 11.1 K/uL    RBC 5.64 (H) 4.23 - 5.6 M/uL    Hemoglobin 16.0 13.6 - 17.2 g/dL    Hematocrit 48.8 41.1 - 50.3 %    MCV 86.5 79.6 - 97.8 FL    MCH 28.4 26.1 - 32.9 PG    MCHC 32.8 31.4 - 35.0 g/dL    RDW 13.8 11.9 - 14.6 %    Platelets 715 812 - 721 K/uL    MPV 10.1 9.4 - 12.3 FL    nRBC 0.00 0.0 - 0.2 K/uL       Allergies   Allergen Reactions    Latex Rash    Codeine Nausea And Vomiting Immunization History   Administered Date(s) Administered    PPD Test 03/09/2016, 02/20/2022    Tdap (Boostrix, Adacel) 10/21/2014       Recent Vital Data:  Patient Vitals for the past 24 hrs:   Temp Pulse Resp BP SpO2   05/31/22 0800 98.8 °F (37.1 °C) 65 20 (!) 162/72 96 %   05/31/22 0400 97.7 °F (36.5 °C) 61 18 (!) 162/81 92 %   05/31/22 0000 98.1 °F (36.7 °C) 88 18 (!) 147/75 94 %   05/30/22 2000 97.7 °F (36.5 °C) 61 18 (!) 148/76 90 %   05/30/22 1600 98.6 °F (37 °C) 84 17 (!) 164/75 --       Oxygen Therapy  SpO2: 96 %  O2 Device: None (Room air)    Estimated body mass index is 31.57 kg/m² as calculated from the following:    Height as of this encounter: 5' 10\" (1.778 m). Weight as of this encounter: 220 lb (99.8 kg). Intake/Output Summary (Last 24 hours) at 5/31/2022 1202  Last data filed at 5/31/2022 0800  Gross per 24 hour   Intake 280 ml   Output --   Net 280 ml         Physical Exam:  General:    obese. No overt distress  Head:  Normocephalic, atraumatic  Eyes:  Sclerae appear normal.  Pupils equally round. HENT:  Nares appear normal, no drainage. Moist mucous membranes  Neck:  No restricted ROM. Trachea midline  CV:   RRR. No m/r/g. No JVD  Lungs:   CTAB. No wheezing, rhonchi, or rales. Even, unlabored  Abdomen:   Soft, nontender, nondistended. Extremities: Warm and dry. No cyanosis or clubbing. No edema. Skin:     No rashes. Normal coloration  Neuro:  CN II-XII grossly intact. Psych:  Normal mood and affect. Signed:  Pennie Lerner    Part of this note may have been written by using a voice dictation software. The note has been proof read but may still contain some grammatical/other typographical errors.

## 2022-05-31 NOTE — PROGRESS NOTES
[facility] Neurology [facility]  [address], [address] [facility] is a 81 y.o. male who is seen in follow-up he was seen yesterday and there are 2 problems 1 being atrial fibrillation and the other being possible TIA versus complicated migraine      Past Medical History:   Diagnosis Date    Acute hypoxemic respiratory failure ([address] 12/4/2021    Anterior myocardial infarction ([address] 10/2003    Arrhythmia     paroxysmal a fib    Arteriosclerotic coronary artery disease 11/2005    Arthritis     fingers    Atrial fibrillation with RVR ([address] 12/4/2021    Cancer ([address]     prostate    Coronary atherosclerosis of native coronary vessel 10/2001    Elevated serum creatinine 12/15/2021    Family history of premature CAD     Father hx cabg/CHF  Brother CABG x 2    Gout     right toe but no problem now    Hypercholesterolemia     Hypertension     Sinus bradycardia 12/14/2021       Past Surgical History:   Procedure Laterality Date    COLONOSCOPY N/A 12/21/2016    COLONOSCOPY  BMI 34 performed by Edouard De Santiago MD at [address] [facility] GRAFT  3/9/2016    x4 - Dr. Regan Dakin      tonsillectomy    [address]      2 back surg    DE CARDIAC SURG PROCEDURE UNLIST  March 9,2016    quidrupal bypass     DE CARDIAC SURG PROCEDURE UNLIST      stent    PROSTATECTOMY          Family History   Problem Relation Age of Onset    Colon Cancer Neg Hx     Cancer Sister     Heart Disease Brother     Diabetes Brother     Heart Disease Father         Social History     Socioeconomic History    Marital status:      Spouse name: None    Number of children: None    Years of education: None    Highest education level: None   Occupational History    None   Tobacco Use    Smoking status: Never Smoker    Smokeless tobacco: Never Used   Substance and Sexual Activity    Alcohol use:  Yes    Drug use: No    Sexual activity: None   Other Topics Concern    None   Social History Narrative    None     Social Determinants of Health     Financial Resource Strain:     Difficulty of Paying Living Expenses: Not on file   Food Insecurity:     Worried About Running Out of Food in the Last Year: Not on file    Ger of Food in the Last Year: Not on file   Transportation Needs:     Lack of Transportation (Medical): Not on file    Lack of Transportation (Non-Medical):  Not on file   Physical Activity:     Days of Exercise per Week: Not on file    Minutes of Exercise per Session: Not on file   Stress:     Feeling of Stress : Not on file   Social Connections:     Frequency of Communication with Friends and Family: Not on file    Frequency of Social Gatherings with Friends and Family: Not on file    Attends Tenriism Services: Not on file    Active Member of [address] [place] [address] or Organizations: Not on file    Attends Club or Organization Meetings: Not on file    Marital Status: Not on file   Intimate Partner Violence:     Fear of Current or Ex-Partner: Not on file    Emotionally Abused: Not on file    Physically Abused: Not on file    Sexually Abused: Not on file   Housing Stability:     Unable to Pay for Housing in the Last Year: Not on file    Number of [place] in the Last Year: Not on file    Unstable Housing in the Last Year: Not on file         Current Facility-Administered Medications   Medication Dose Route Frequency Provider Last Rate Last Admin    metoprolol tartrate (LOPRESSOR) tablet 50 mg  50 mg Oral BID May Emile Awanma   50 mg at 05/31/22 7998    rivaroxaban (XARELTO) tablet 20 mg  20 mg Oral Ken Jorge MD   20 mg at 05/30/22 1650    sodium chloride flush 0.9 % injection 3 mL  3 mL IntraVENous Q8H Everett CANCINO MD   3 mL at 05/31/22 0514    albuterol sulfate  (90 Base) MCG/ACT inhaler 2 puff  2 puff Inhalation Q4H PRN Jake Pagan MD        aspirin chewable tablet 81 mg 81 mg Oral Daily Teresa West MD   81 mg at 05/31/22 0759    fluticasone (FLONASE) 50 MCG/ACT nasal spray 2 spray  2 spray Each Nostril Daily Teresa West MD   2 spray at 05/30/22 0758    cetirizine (ZYRTEC) tablet 10 mg  10 mg Oral Daily Teresa West MD   10 mg at 05/31/22 0759    nicotine (NICODERM CQ) 14 MG/24HR 1 patch  1 patch TransDERmal Daily PRN Teresa West MD        acetaminophen (TYLENOL) tablet 650 mg  650 mg Oral Q4H PRN Teresa West MD        Or   Roy acetaminophen (TYLENOL) suppository 650 mg  650 mg Rectal Q4H PRN Teresa West MD        ondansetron (ZOFRAN-ODT) disintegrating tablet 4 mg  4 mg Oral Q8H PRN Teresa West MD        Or    ondansetron (ZOFRAN) injection 4 mg  4 mg IntraVENous Q6H PRN Teresa West MD        polyethylene glycol (GLYCOLAX) packet 17 g  17 g Oral Daily PRN Tersea West MD        bisacodyl (DULCOLAX) suppository 10 mg  10 mg Rectal Daily PRN Teresa West MD        atorvastatin (LIPITOR) tablet 80 mg  80 mg Oral Nightly Teresa West MD   80 mg at 05/30/22 2108        Allergies   Allergen Reactions    Latex Rash    Codeine Nausea And Vomiting       Review of Systems    BP (!) 162/72   Pulse 65   Temp 98.8 °F (37.1 °C) (Oral)   Resp 20   Ht 5' 10\" (1.778 m)   Wt 220 lb (99.8 kg)   SpO2 96%   BMI 31.57 kg/m²     Neurologic Exam    The patient is alert cooperative and oriented. No evident skin lesions no evidence of excessive bruising no trauma  Patient looks well-hydrated. Does not appear chronically ill. Cranial nerve examination normal visual fields. Normal random eye movements. No ptosis.   No lid lag  Face moves strongly symmetrically and equally  Speech is normal  Motor  Upper extremities  Normal bulk strength tone and symmetric arm swing  Lower extremities  Normal bulk strength tone  Deep tendon reflexes 1+ and symmetric at biceps brachioradialis and triceps  Casual gait is normal with no evidence of ataxia  Fine motor coordination is normal in the hands bilaterally    Peripheral sensation light touch normal  There are no carotid bruits  Vital signs are reviewed    Most recent MRI   Results for orders placed during the hospital encounter of 05/29/22    MRI brain without contrast    Narrative  EXAMINATION: BRAIN MRI 5/30/2022 11:46 AM    ACCESSION NUMBER: [id number]    INDICATION: Acute neuro deficit. Concern for acute stroke. Chronically occluded  distal right vertebral artery. COMPARISON: CTA head and neck, 5/29/2022. CT head and perfusion, 5/29/2022. TECHNIQUE: Multiplanar multisequence MRI of the brain without the administration  of intravenous contrast.    FINDINGS:    No evidence of restricted diffusion to suggest recent infarct. No evidence of  intracranial hemorrhage or extra-axial fluid collection. No mass effect or  midline shift. Generalized parenchymal volume loss with commensurate ex vacuo  ventriculomegaly. Ventricles are symmetric in size and configuration. Basilar  cisterns are patent. Bilateral periventricular and deep white matter T2/flair  hyperintensities most compatible with chronic microvascular ischemic changes. There is loss of flow void in the right vertebral artery corresponding to the  known chronic occlusion. Orbits and globes are normal. There is chronic mucosal thickening in the left  maxillary sinus with mineralization in the secretions. Small mucous retention  cyst in the right maxillary sinus. Trace right mastoid air cell effusions. Middle ears are clear. No significant extra cranial soft tissue abnormality. No  acute or aggressive osseous abnormality. Impression  1. No acute intracranial abnormality. 2. Mild generalized volume loss and chronic microvascular ischemic changes. 3. Chronically occluded distal right vertebral artery. 4. Chronic left maxillary sinus disease.        Most recent MRA   No results found for this or any previous visit.        Most recent CTA  Results for orders placed during the hospital encounter of 05/29/22    CTA HEAD NECK W WO CONTRAST    Narrative  History: Code stroke. FINDINGS:    CT angiography was performed of the neck and head with contrast and  three-dimensional CT angiography reconstruction and reformat was performed. NASCET criteria as needed. CT dose reduction was achieved through use of a  standardized protocol tailored for this examination and automatic exposure  control for dose modulation. Study analyzed by the [employer] software package per the hospital protocol if  presented as such by the facility technologists in the pacs system. However,  the results of the analysis are neither reviewed nor provided in this exam  interpretation and report. This statement is provided at the request of the  hospital for hospital billing purposes only. Status post median sternotomy. Aortic arch is patent. Proximal great vessel  segments are patent bilaterally. The left vertebral artery is patent. The right  vertebral artery is small at the cervical segment and occludes at the  intracranial segment. The basilar artery is patent. The posterior cerebral arteries are patent  bilaterally. The anterior cerebral arteries are patent. The middle cerebral  arteries are patent. Atherosclerosis at the carotid bulbs bilaterally. However,  no hemodynamically significant stenosis. Left maxillary sinus disease. The dural venous sinuses are patent. Impression  Occluded distal segment of the right vertebral artery. This was present on the  exam from February 20, 2022 as well. Most recent Echo  No results found for this or any previous visit.          Most recent lipid panels  Lab Results   Component Value Date    CHOL 74 05/30/2022    HDL 41 05/30/2022    VLDL 20 01/19/2022       Most recent Hgb A1C  No results found for: HBA1C, [id number]      Assessment/Plan:    Agree with Wendi Samuel and Wade Walters re treatment  There may be issues with cost re Ubrelvy/Nurtec but with vascular dz not a candidate for a Triptan  Can I suspect sample him reasonably well- see Minor Knock in Neuro Stroke FUp clinic  Explained to the patient with regards to the necessity of aggressive management of atrial fibrillation from the standpoint of stroke prevention-they are little concerned and confused by what they perceive as varying messages received from cardiology  I think their comfort level would be most served by anticoagulation and the concomitant usage of a loop recorder with therefore then the consideration if after a prolonged period of time atrial fibrillation was not recorded they could consider discontinuation of an anticoagulation approach.   At the present time however it is clearly necessary        Paulina Arredondo MD

## 2022-05-31 NOTE — FLOWSHEET NOTE
05/31/22 1240   NIHSS Stroke Scale   Interval Other (Comment)  (discharge)   Level of Consciousness (1a) 0   LOC Questions (1b) 0   LOC Commands (1c) 0   Best Gaze (2) 0   Visual (3) 0   Facial Palsy (4) 0   Motor Arm, Left (5a) 0   Motor Arm, Right (5b) 0   Motor Leg, Left (6a) 0   Motor Leg, Right (6b) 0   Limb Ataxia (7) 0   Sensory (8) 0   Best Language (9) 0   Dysarthria (10) 0   Extinction and Inattention (11) 0   Total 0   This RN reviewed DC paperwork with patient and wife at bedside, gave paper rx to patient, heart monitor removed, IV removed, NIH remains a 0. Pt has now questions at this time and is dc home with wife.

## 2022-06-01 LAB
EKG ATRIAL RATE: 72 BPM
EKG DIAGNOSIS: NORMAL
EKG P AXIS: 41 DEGREES
EKG P-R INTERVAL: 152 MS
EKG Q-T INTERVAL: 395 MS
EKG QRS DURATION: 108 MS
EKG QTC CALCULATION (BAZETT): 433 MS
EKG R AXIS: 95 DEGREES
EKG T AXIS: 49 DEGREES
EKG VENTRICULAR RATE: 72 BPM

## 2022-06-08 ENCOUNTER — TELEPHONE (OUTPATIENT)
Dept: CARDIOLOGY CLINIC | Age: 72
End: 2022-06-08

## 2022-06-08 NOTE — TELEPHONE ENCOUNTER
Patient's wife called stating patient is having sever headaches, hand numbness and is having trouble with vision and focusing.

## 2022-06-08 NOTE — TELEPHONE ENCOUNTER
Pt's wife, Yunior states pt is on Plavix and Asa. C/o headache, vision issues, weakness on R side. 5/31/22 hosp d/c for CVA. Yunior asks if pt on too much blood thinner? Confirmed Plavix 75mg daily and 81mg Asa is normal and expected CVA therapy. However, per 5/31/22 DC Summary, Plavix d/c'd. Xarelto 20mg daily ordered, noting paroxsymal A fib. Continue 81mg Asa. Yunior voiced understanding and is going to check if pt has medications mixed up. She thinks that could be causing sx. Informed her CVA, stroke, migraine are treated per PCP or Neurology. Call PCP for clarification and proceed to ER for any worsening, concerning sx. Yunior voiced understanding and agreement with POC. She states pt has Neuro Consult tomorrow.  cgh

## 2022-06-09 ENCOUNTER — OFFICE VISIT (OUTPATIENT)
Dept: NEUROLOGY | Age: 72
End: 2022-06-09
Payer: MEDICARE

## 2022-06-09 VITALS
WEIGHT: 222.8 LBS | SYSTOLIC BLOOD PRESSURE: 148 MMHG | HEART RATE: 87 BPM | HEIGHT: 70 IN | BODY MASS INDEX: 31.9 KG/M2 | OXYGEN SATURATION: 96 % | DIASTOLIC BLOOD PRESSURE: 80 MMHG

## 2022-06-09 DIAGNOSIS — Z09 HOSPITAL DISCHARGE FOLLOW-UP: Primary | ICD-10-CM

## 2022-06-09 DIAGNOSIS — R29.90 STROKE-LIKE SYMPTOM: ICD-10-CM

## 2022-06-09 DIAGNOSIS — Z86.69 HISTORY OF MIGRAINE: ICD-10-CM

## 2022-06-09 DIAGNOSIS — Z86.73 HISTORY OF TIAS: ICD-10-CM

## 2022-06-09 PROCEDURE — 99214 OFFICE O/P EST MOD 30 MIN: CPT | Performed by: NURSE PRACTITIONER

## 2022-06-09 PROCEDURE — 1123F ACP DISCUSS/DSCN MKR DOCD: CPT | Performed by: NURSE PRACTITIONER

## 2022-06-09 PROCEDURE — 1111F DSCHRG MED/CURRENT MED MERGE: CPT | Performed by: NURSE PRACTITIONER

## 2022-06-09 NOTE — Clinical Note
Inderjit Guzman,  I just wanted you to know that patient has stopped taking his Xarelto, after long discussion he has agreed to resume to take Xarelto 10 mg daily until he sees you. He does have a hx of pAF and prior TIAs.      Thank you,   Idalia Lai NP

## 2022-06-09 NOTE — PROGRESS NOTES
[facility] Neurology [facility]  [address]  [address] [facility], [address] [facility]      Chief Complaint   Patient presents with    Follow-Up from [facility] Migraine    Transient Ischemic Attack       Selvin Vera is a 81 y.o. male who presents for hospital follow up for TIA. PMH significant of CAD s/p CABG, TIA, paroxysmal AFIB (states not taking eliquis or xarelto) , HTN, HLP, BILL not on CPAP, obesity, COVID 19, untreated BILL who is evaluated with acute onset of headache, dizziness, right sided facial / arm numbness and change in speech. CODE S called. STAT head CT no acute issues. CTP negative. CTA head and neck shows prior noted occluded distal right vertebral artery.  He has been seen by tele neurology.      While in the ED, his sympotms improved. He recalls prior event Feb 2022 that was similar. He has hx of intermittent afib after CABG and recent MI, he was previously on eliquis. This was discontinued by Cardiology due to side effects and he was subsequently started on DAPT.       Pt was seen in consultation by Dr. Delona Klinefelter, and discussed possible etiology of presentation as related to migraine HA. Given hx of PAF, TIA from microembolic event feasible and recommended [address]. MRI brain without acute event. As pt had a recent echo 2/2022 without acute abnl (specifically interatrial septum). He was evaluated  by cardiology, who agreed with [address]. He was evaluated by PT/OT/ST. He was d/c home with recommendations to continue ASA 81mg daily, xarelto 20mg daily, and atrovastatin 80 mg daily for secondary stroke prevention. Interval history:    He is here today with spouse. Denies focal deficits. He is currently taking ASA 81 mg daily and atorvastatin 80 mg daily for secondary stroke prevention. He has stopped taking his Xarelto due to confusion over hx of pAF and felt this medication was related to his persistent headache. He has follow up with Cardiology scheduled for July.      Hx of migraine with aura- stated headaches started when he was a teenager. Associated with photophobia, phonophobia, nausea,  Visual aura (described as swiggling lines). Headaches are located in occipital region bilaterally and radiates to front of head. Describes as throbbing sensation. Alleviated with laying down, sleep. He currently takes tylenol without relief.         Past Medical History:   Diagnosis Date    Acute hypoxemic respiratory failure ([facility] 12/4/2021    Anterior myocardial infarction [facility]) 10/2003    Arrhythmia     paroxysmal a fib    Arteriosclerotic coronary artery disease 11/2005    Arthritis     fingers    Atrial fibrillation with RVR ([facility] 12/4/2021    Cancer (HCC)     prostate    Coronary atherosclerosis of native coronary vessel 10/2001    Elevated serum creatinine 12/15/2021    Family history of premature CAD     Father hx cabg/CHF  Brother CABG x 2    Gout     right toe but no problem now    Hypercholesterolemia     Hypertension     Sinus bradycardia 12/14/2021       Past Surgical History:   Procedure Laterality Date    COLONOSCOPY N/A 12/21/2016    COLONOSCOPY  BMI 34 performed by Noemy Johnston MD at [address] [facility] GRAFT  3/9/2016    x4 - Dr. Aura Irizarry      tonsillectomy    [address]      2 back surg    OR CARDIAC SURG PROCEDURE UNLIST  March 9,2016    quidrupal bypass     OR CARDIAC SURG PROCEDURE UNLIST      stent    PROSTATECTOMY         Family History   Problem Relation Age of Onset    Colon Cancer Neg Hx     Cancer Sister     Heart Disease Brother     Diabetes Brother     Heart Disease Father        Social History     Socioeconomic History    Marital status:      Spouse name: None    Number of children: None    Years of education: None    Highest education level: None   Occupational History    None   Tobacco Use    Smoking status: Never Smoker    Smokeless tobacco: Never Used   Substance and Sexual Activity    Alcohol use: Yes    Drug use: No    Sexual activity: None   Other Topics Concern    None   Social History Narrative    None     Social Determinants of Health     Financial Resource Strain:     Difficulty of Paying Living Expenses: Not on file   Food Insecurity:     Worried About Running Out of Food in the Last Year: Not on file    Ger of Food in the Last Year: Not on file   Transportation Needs:     Lack of Transportation (Medical): Not on file    Lack of Transportation (Non-Medical):  Not on file   Physical Activity:     Days of Exercise per Week: Not on file    Minutes of Exercise per Session: Not on file   Stress:     Feeling of Stress : Not on file   Social Connections:     Frequency of Communication with Friends and Family: Not on file    Frequency of Social Gatherings with Friends and Family: Not on file    Attends Yarsanism Services: Not on file    Active Member of [address] [place] [address] [employer] or Organizations: Not on file    Attends Club or Organization Meetings: Not on file    Marital Status: Not on file   Intimate Partner Violence:     Fear of Current or Ex-Partner: Not on file    Emotionally Abused: Not on file    Physically Abused: Not on file    Sexually Abused: Not on file   Housing Stability:     Unable to Pay for Housing in the Last Year: Not on file    Number of [place] in the Last Year: Not on file    Unstable Housing in the Last Year: Not on file         Current Outpatient Medications:     rivaroxaban (XARELTO) 20 MG TABS tablet, Take 1 tablet by mouth Daily with supper, Disp: 30 tablet, Rfl: 1    aspirin 81 MG EC tablet, Take 81 mg by mouth daily, Disp: , Rfl:     atorvastatin (LIPITOR) 80 MG tablet, Take 80 mg by mouth daily, Disp: , Rfl:     Evolocumab 140 MG/ML SOSY, Inject 140 mg into the skin every 14 days, Disp: , Rfl:     fluticasone-salmeterol (ADVAIR HFA) 230-21 MCG/ACT inhaler, Inhale 2 puffs into the lungs 2 times daily, Disp: , Rfl:     loratadine (CLARITIN) 10 MG tablet, Take 10 mg by mouth, Disp: , Rfl:     metoprolol tartrate (LOPRESSOR) 50 MG tablet, Take 50 mg by mouth 2 times daily, Disp: , Rfl:     nitroGLYCERIN (NITROSTAT) 0.4 MG SL tablet, Place 0.4 mg under the tongue, Disp: , Rfl:     albuterol sulfate  (90 Base) MCG/ACT inhaler, Inhale 2 puffs into the lungs every 4 hours as needed, Disp: , Rfl:     fluticasone (FLONASE) 50 MCG/ACT nasal spray, One spray in each nostril twice daily, Disp: , Rfl:     Allergies   Allergen Reactions    Latex Rash    Adhesive Tape Rash    Codeine Nausea And Vomiting       REVIEW OF SYSTEMS:  CONSTITUTIONAL: No weight loss, fever, chills, weakness or fatigue. HEENT: Eyes: No visual loss, blurred vision, double vision or yellow sclerae. Ears, Nose, Throat: No hearing loss, sneezing, congestion, runny nose or sore throat. SKIN: No rash or itching. CARDIOVASCULAR: No chest pain, chest pressure or chest discomfort. No palpitations or edema. RESPIRATORY: No shortness of breath, cough or sputum. GASTROINTESTINAL: No anorexia, nausea, vomiting or diarrhea. No abdominal pain or blood. GENITOURINARY: no burning with urination. NEUROLOGICAL: No headache, dizziness, syncope, paralysis, ataxia, numbness or tingling in the extremities. No change in bowel or bladder control. MUSCULOSKELETAL: No muscle, back pain, joint pain or stiffness. HEMATOLOGIC: No anemia, bleeding or bruising. LYMPHATICS: No enlarged nodes. No history of splenectomy. PSYCHIATRIC: No history of depression or anxiety. ENDOCRINOLOGIC: No reports of sweating, cold or heat intolerance. No polyuria or polydipsia. ALLERGIES: No history of asthma, hives, eczema or rhinitis.     Physical Examination  BP (!) 148/80 (Site: Left Upper Arm, Position: Sitting, Cuff Size: Large Adult)   Pulse 87   Ht 5' 10\" (1.778 m)   Wt 222 lb 12.8 oz (101.1 kg)   SpO2 96%   BMI 31.97 kg/m²     General - Well developed, well nourished, in no apparent distress. Pleasant and conversent. HEENT - Normocephalic, atraumatic. Conjunctiva, tympanic membranes, and oropharynx are clear. Neck - Supple without masses, no bruits   Cardiovascular - Regular rate and rhythm. Normal S1, S2 without murmurs, rubs, or gallops. Lungs - Clear to auscultation. Abdomen - Soft, nontender with normal bowel sounds. Extremities - Peripheral pulses intact. No edema and no rashes. Neurological examination - Comprehension, attention , memory and reasoning are intact. Language and speech are normal. On cranial nerve examination pupils are equal round and reactive to light. Fundoscopic examination is normal. Visual acuity is adequate. Visual fields are full to finger confrontation. Extraocular motility is normal. Face is symmetric and sensation is intact to light touch. Hearing is intact to finger rustle bilaterally. Motor examination - There is normal muscle tone and bulk. Power is full throughout. Muscle stretch reflexes are normoactive and there are no pathological reflexes present. Sensation is intact to light touch, pinprick, vibration and proprioception in all extremities.  Cerebellar examination is normal. Gait and stance are normal.     Lab Results   Component Value Date    CHOL 74 05/30/2022    CHOL 109 02/21/2022    CHOL 139 01/19/2022     Lab Results   Component Value Date    TRIG 80 05/30/2022    TRIG 77 02/21/2022    TRIG 108 01/19/2022     Lab Results   Component Value Date    HDL 41 05/30/2022    HDL 50 02/21/2022    HDL 44 01/19/2022     Lab Results   Component Value Date    LDLCALC 17 05/30/2022    LDLCALC 43.6 02/21/2022    LDLCALC 75 01/19/2022     Lab Results   Component Value Date    LABVLDL 16 05/30/2022    LABVLDL 15.4 02/21/2022    LABVLDL 23.6 (H) 04/02/2021    VLDL 20 01/19/2022     Lab Results   Component Value Date    CHOLHDLRATIO 1.8 05/30/2022    CHOLHDLRATIO 2.2 02/21/2022    CHOLHDLRATIO 2.5 04/02/2021     Lab Results   Component Value Date    LABA1C regarding the above patient concerns, reviewing the patient's medical record, my assessment and recommendations.           Malinda Lundborg, [address] [facility] [address] [facility] [address] [place] [address] 80, [address]  GTLUD:[phone]

## 2022-06-09 NOTE — PATIENT INSTRUCTIONS
Transient Ischemic Attack: Care Instructions  Overview     A transient ischemic attack (TIA) is when blood flow to a part of your brain is blocked for a short time. A TIA causes stroke symptoms that can last for at least a few minutes. Stroke symptoms include sudden weakness or loss of movement in a part of your body, confusion, vision changes, trouble speaking, and trouble walking or balancing. But unlike a stroke, a TIA doesn't causelasting brain damage. TIAs are often warning signs of a stroke. Some people who have a TIA may have a stroke in the future. If you have symptoms of a stroke, call for emergency help right away. Quick treatment can help limit damage to the brain and increase thechance of recovery. You can take steps to help prevent a stroke. These steps include managing health problems that raise your risk, taking medicine that prevents blood clots, and having a heart-healthy lifestyle. This lifestyle includes beingactive, eating healthy foods, staying at a healthy weight, and not smoking. Follow-up care is a key part of your treatment and safety. Be sure to make and go to all appointments, and call your doctor if you are having problems. It's also a good idea to know your test results and keep alist of the medicines you take. How can you care for yourself at home? Medicines     Be safe with medicines. Take your medicines exactly as prescribed. Call your doctor if you think you are having a problem with your medicine.      If you take a blood thinner, such as aspirin, be sure you get instructions about how to take your medicine safely. Blood thinners can cause serious bleeding problems.      Call your doctor if you are not able to take your medicines for any reason.      Do not take any over-the-counter medicines or herbal products without talking to your doctor first.      If you use hormonal birth control or hormone therapy, talk to your doctor. Ask if these are right for you.  They may raise the risk of stroke in some people. Heart-healthy lifestyle     Do not smoke. If you need help quitting, talk to your doctor about stop-smoking programs and medicines.      Be active. If your doctor recommends it, get more exercise. Walking is a good choice. Bit by bit, increase the amount you walk every day. Try for at least 30 minutes on most days of the week. You also may want to swim, bike, or do other activities.      Eat heart-healthy foods. These include vegetables, fruits, nuts, beans, lean meat, fish, and whole grains. Limit sodium and sugar.      Stay at a healthy weight. Lose weight if you need to.      Limit alcohol to 2 drinks a day for men and 1 drink a day for women. Staying healthy     Manage other health problems that raise your risk of stroke. These include atrial fibrillation, diabetes, high blood pressure, and high cholesterol.      If you think you may have a problem with alcohol or drug use, talk to your doctor.      Get the flu vaccine every year. When should you call for help? Call 911 anytime you think you may need emergency care. For example, call if:     You have symptoms of a stroke. These may include:  ? Sudden numbness, tingling, weakness, or loss of movement in your face, arm, or leg, especially on only one side of your body. ? Sudden vision changes. ? Sudden trouble speaking. ? Sudden confusion or trouble understanding simple statements. ? Sudden problems with walking or balance. ? A sudden, severe headache that is different from past headaches. Call 911 even if these symptoms go away in a few minutes.      You feel like you are having another TIA. Watch closely for changes in your health, and be sure to contact your doctor ifyou have any problems. Where can you learn more? Go to https://yue.[facility]. org and sign in to your [employer] account.  Enter [phone] in the Plast[employer] box to learn more about \"Transient Ischemic Attack: Care Instructions. \"     If you do not have an account, please click on the \"Sign Up Now\" link. Current as of: July 6, 2021               Content Version: 13.2  © 2006-2022 Healthwise, [employer]. Care instructions adapted under license by [facility] ([facility]). If you have questions about a medical condition or this instruction, always ask your healthcare professional. [address] any warranty or liability for your use of this information. Headache Education:   Instructed the patient on over-the-counter headache management medications including: CoQ10, magnesium oxide, and butterbur. To avoid a pain medication overuse headache trying not to take pain medicines more than 3 doses a week. To help relieve headache symptoms without taking pain medicine lie down under darkroom and drink glass of water. Consider lifestyle modification including good sleep hygiene, routine medial schedules, regular exercise and managing triggers. Keep a headache diary  to reveal triggers and possible patterns. Triggers may be: Food, stress, perfumes, alcohol, or even chocolate. Drink plenty of water and try to get 8 hours of sleep each night to reduce risk factors that may cause headaches.

## 2022-06-27 ENCOUNTER — OFFICE VISIT (OUTPATIENT)
Dept: CARDIOLOGY CLINIC | Age: 72
End: 2022-06-27
Payer: MEDICARE

## 2022-06-27 VITALS
HEIGHT: 70 IN | SYSTOLIC BLOOD PRESSURE: 168 MMHG | WEIGHT: 224.8 LBS | DIASTOLIC BLOOD PRESSURE: 62 MMHG | HEART RATE: 84 BPM | BODY MASS INDEX: 32.18 KG/M2

## 2022-06-27 DIAGNOSIS — I10 PRIMARY HYPERTENSION: ICD-10-CM

## 2022-06-27 DIAGNOSIS — I48.0 PAF (PAROXYSMAL ATRIAL FIBRILLATION) (HCC): ICD-10-CM

## 2022-06-27 DIAGNOSIS — I25.810 CORONARY ARTERY DISEASE INVOLVING CORONARY BYPASS GRAFT OF NATIVE HEART WITHOUT ANGINA PECTORIS: Primary | ICD-10-CM

## 2022-06-27 DIAGNOSIS — E78.5 DYSLIPIDEMIA: ICD-10-CM

## 2022-06-27 PROCEDURE — 1123F ACP DISCUSS/DSCN MKR DOCD: CPT | Performed by: INTERNAL MEDICINE

## 2022-06-27 PROCEDURE — 99214 OFFICE O/P EST MOD 30 MIN: CPT | Performed by: INTERNAL MEDICINE

## 2022-06-27 RX ORDER — LOSARTAN POTASSIUM 25 MG/1
25 TABLET ORAL DAILY
Qty: 30 TABLET | Refills: 3 | Status: SHIPPED | OUTPATIENT
Start: 2022-06-27 | End: 2022-08-24

## 2022-06-27 NOTE — PROGRESS NOTES
[facility] CARDIOLOGY  [address] [facility], [address]  PHONE: [phone]      22    NAME:  Melchor Riedel  : 1950  MRN: [id number]       SUBJECTIVE:   Melchor Riedel is a 81 y.o. male seen for a follow up visit regarding the following:     Chief Complaint   Patient presents with    Hypertension     3 month follow up          HPI:    No cp or marcum. No orthopnea or pnd. No palpitations or syncope. Past Medical History, Past Surgical History, Family history, Social History, and Medications were all reviewed with the patient today and updated as necessary. Current Outpatient Medications   Medication Sig Dispense Refill    rivaroxaban (XARELTO) 20 MG TABS tablet Take 1 tablet by mouth Daily with supper 30 tablet 1    losartan (COZAAR) 25 MG tablet Take 1 tablet by mouth daily 30 tablet 3    aspirin 81 MG EC tablet Take 81 mg by mouth daily      atorvastatin (LIPITOR) 80 MG tablet Take 80 mg by mouth daily      Evolocumab 140 MG/ML SOSY Inject 140 mg into the skin every 14 days      fluticasone (FLONASE) 50 MCG/ACT nasal spray One spray in each nostril twice daily      loratadine (CLARITIN) 10 MG tablet Take 10 mg by mouth      metoprolol tartrate (LOPRESSOR) 50 MG tablet Take 50 mg by mouth 2 times daily      nitroGLYCERIN (NITROSTAT) 0.4 MG SL tablet Place 0.4 mg under the tongue      albuterol sulfate  (90 Base) MCG/ACT inhaler Inhale 2 puffs into the lungs every 4 hours as needed (Patient not taking: Reported on 2022)      fluticasone-salmeterol (ADVAIR HFA) 230-21 MCG/ACT inhaler Inhale 2 puffs into the lungs 2 times daily (Patient not taking: Reported on 2022)       No current facility-administered medications for this visit. Social History     Tobacco Use    Smoking status: Never Smoker    Smokeless tobacco: Never Used   Substance Use Topics    Alcohol use:  Yes              PHYSICAL EXAM:   BP (!) 168/62 Comment: 5 minute recheck  Pulse 84   Ht 5' 10\" (1.778 m)   Wt 224 lb 12.8 oz (102 kg)   BMI 32.26 kg/m²    Constitutional: Oriented to person, place, and time. Appears well-developed and well-nourished. Head: Normocephalic and atraumatic. Neck: Neck supple. Cardiovascular: Normal rate and regular rhythm with no murmur -No JVP  Pulmonary/Chest: Breath sounds normal.   Abdominal: Soft. Musculoskeletal: No edema. Neurological: Alert and oriented to person, place, and time. Skin: Skin is warm and dry. Psychiatric: Normal mood and affect. Vitals reviewed. Wt Readings from Last 3 Encounters:   06/27/22 224 lb 12.8 oz (102 kg)   06/09/22 222 lb 12.8 oz (101.1 kg)   05/29/22 220 lb (99.8 kg)       Medical problems and test results were reviewed with the patient today. ASSESSMENT and PLAN    1. Coronary artery disease involving coronary bypass graft of native heart without angina pectoris  Stable. Continue asa      2. PAF (paroxysmal atrial fibrillation) (HCC)  Stable. Continue xarelto- long discussion of ac - will continue xarelto      3. Primary hypertension  Stable. Continue lopressor- add cozaar - cr in a week      4. Dyslipidemia  Stable. Continue repatha and lipitor- may decrease lipito pending labs results      - Lipid Panel; Future  - Comprehensive Metabolic Panel; Future       Return in about 2 months (around 8/27/2022) for cad.          Amira Adhikari MD  6/27/2022  2:16 PM

## 2022-07-19 ENCOUNTER — NURSE ONLY (OUTPATIENT)
Dept: PRIMARY CARE CLINIC | Facility: CLINIC | Age: 72
End: 2022-07-19

## 2022-07-19 DIAGNOSIS — Z12.5 PROSTATE CANCER SCREENING: ICD-10-CM

## 2022-07-19 DIAGNOSIS — E78.5 DYSLIPIDEMIA: ICD-10-CM

## 2022-07-19 DIAGNOSIS — Z79.899 ENCOUNTER FOR LONG-TERM (CURRENT) USE OF MEDICATIONS: ICD-10-CM

## 2022-07-19 DIAGNOSIS — I10 PRIMARY HYPERTENSION: ICD-10-CM

## 2022-07-19 DIAGNOSIS — Z00.00 WELL ADULT EXAM: ICD-10-CM

## 2022-07-19 DIAGNOSIS — E66.01 SEVERE OBESITY (HCC): ICD-10-CM

## 2022-07-19 DIAGNOSIS — I10 PRIMARY HYPERTENSION: Primary | ICD-10-CM

## 2022-07-19 LAB
ALBUMIN SERPL-MCNC: 4 G/DL (ref 3.2–4.6)
ALBUMIN/GLOB SERPL: 1.3 {RATIO} (ref 1.2–3.5)
ALP SERPL-CCNC: 95 U/L (ref 50–136)
ALT SERPL-CCNC: 43 U/L (ref 12–65)
ANION GAP SERPL CALC-SCNC: 5 MMOL/L (ref 7–16)
AST SERPL-CCNC: 29 U/L (ref 15–37)
BASOPHILS # BLD: 0.1 K/UL (ref 0–0.2)
BASOPHILS NFR BLD: 1 % (ref 0–2)
BILIRUB SERPL-MCNC: 1.2 MG/DL (ref 0.2–1.1)
BUN SERPL-MCNC: 10 MG/DL (ref 8–23)
CALCIUM SERPL-MCNC: 9 MG/DL (ref 8.3–10.4)
CHLORIDE SERPL-SCNC: 103 MMOL/L (ref 98–107)
CHOLEST SERPL-MCNC: 75 MG/DL
CO2 SERPL-SCNC: 30 MMOL/L (ref 21–32)
CREAT SERPL-MCNC: 1 MG/DL (ref 0.8–1.5)
DIFFERENTIAL METHOD BLD: ABNORMAL
EOSINOPHIL # BLD: 0.3 K/UL (ref 0–0.8)
EOSINOPHIL NFR BLD: 4 % (ref 0.5–7.8)
ERYTHROCYTE [DISTWIDTH] IN BLOOD BY AUTOMATED COUNT: 14.2 % (ref 11.9–14.6)
GLOBULIN SER CALC-MCNC: 3.2 G/DL (ref 2.3–3.5)
GLUCOSE SERPL-MCNC: 85 MG/DL (ref 65–100)
HCT VFR BLD AUTO: 52.7 % (ref 41.1–50.3)
HDLC SERPL-MCNC: 39 MG/DL (ref 40–60)
HDLC SERPL: 1.9 {RATIO}
HGB BLD-MCNC: 16.6 G/DL (ref 13.6–17.2)
IMM GRANULOCYTES # BLD AUTO: 0 K/UL (ref 0–0.5)
IMM GRANULOCYTES NFR BLD AUTO: 0 % (ref 0–5)
LDLC SERPL CALC-MCNC: 16.4 MG/DL
LYMPHOCYTES # BLD: 1.7 K/UL (ref 0.5–4.6)
LYMPHOCYTES NFR BLD: 22 % (ref 13–44)
MCH RBC QN AUTO: 29.3 PG (ref 26.1–32.9)
MCHC RBC AUTO-ENTMCNC: 31.5 G/DL (ref 31.4–35)
MCV RBC AUTO: 92.9 FL (ref 79.6–97.8)
MONOCYTES # BLD: 0.5 K/UL (ref 0.1–1.3)
MONOCYTES NFR BLD: 7 % (ref 4–12)
NEUTS SEG # BLD: 5 K/UL (ref 1.7–8.2)
NEUTS SEG NFR BLD: 66 % (ref 43–78)
NRBC # BLD: 0 K/UL (ref 0–0.2)
PLATELET # BLD AUTO: 227 K/UL (ref 150–450)
PMV BLD AUTO: 10.5 FL (ref 9.4–12.3)
POTASSIUM SERPL-SCNC: 3.9 MMOL/L (ref 3.5–5.1)
PROT SERPL-MCNC: 7.2 G/DL (ref 6.3–8.2)
PSA SERPL-MCNC: <0.1 NG/ML
RBC # BLD AUTO: 5.67 M/UL (ref 4.23–5.6)
SODIUM SERPL-SCNC: 138 MMOL/L (ref 138–145)
T4 FREE SERPL-MCNC: 1.2 NG/DL (ref 0.78–1.46)
TRIGL SERPL-MCNC: 98 MG/DL (ref 35–150)
TSH, 3RD GENERATION: 3.23 UIU/ML (ref 0.36–3.74)
VLDLC SERPL CALC-MCNC: 19.6 MG/DL (ref 6–23)
WBC # BLD AUTO: 7.6 K/UL (ref 4.3–11.1)

## 2022-07-25 ENCOUNTER — OFFICE VISIT (OUTPATIENT)
Dept: PRIMARY CARE CLINIC | Facility: CLINIC | Age: 72
End: 2022-07-25
Payer: MEDICARE

## 2022-07-25 VITALS
HEART RATE: 57 BPM | TEMPERATURE: 97.8 F | DIASTOLIC BLOOD PRESSURE: 67 MMHG | HEIGHT: 70 IN | WEIGHT: 225.8 LBS | OXYGEN SATURATION: 95 % | BODY MASS INDEX: 32.33 KG/M2 | SYSTOLIC BLOOD PRESSURE: 146 MMHG

## 2022-07-25 DIAGNOSIS — G47.30 SLEEP APNEA, UNSPECIFIED TYPE: ICD-10-CM

## 2022-07-25 DIAGNOSIS — E78.5 DYSLIPIDEMIA: ICD-10-CM

## 2022-07-25 DIAGNOSIS — I10 PRIMARY HYPERTENSION: ICD-10-CM

## 2022-07-25 DIAGNOSIS — G43.509 PERSISTENT MIGRAINE AURA WITHOUT CEREBRAL INFARCTION AND WITHOUT STATUS MIGRAINOSUS, NOT INTRACTABLE: ICD-10-CM

## 2022-07-25 DIAGNOSIS — Z95.1 S/P CABG X 4: ICD-10-CM

## 2022-07-25 DIAGNOSIS — I25.700 CORONARY ARTERY DISEASE INVOLVING CORONARY BYPASS GRAFT OF NATIVE HEART WITH UNSTABLE ANGINA PECTORIS (HCC): Primary | ICD-10-CM

## 2022-07-25 PROCEDURE — 99214 OFFICE O/P EST MOD 30 MIN: CPT | Performed by: FAMILY MEDICINE

## 2022-07-25 PROCEDURE — 1123F ACP DISCUSS/DSCN MKR DOCD: CPT | Performed by: FAMILY MEDICINE

## 2022-07-25 ASSESSMENT — PATIENT HEALTH QUESTIONNAIRE - PHQ9
SUM OF ALL RESPONSES TO PHQ QUESTIONS 1-9: 0
SUM OF ALL RESPONSES TO PHQ QUESTIONS 1-9: 0
SUM OF ALL RESPONSES TO PHQ9 QUESTIONS 1 & 2: 0
SUM OF ALL RESPONSES TO PHQ QUESTIONS 1-9: 0
SUM OF ALL RESPONSES TO PHQ QUESTIONS 1-9: 0
1. LITTLE INTEREST OR PLEASURE IN DOING THINGS: 0
2. FEELING DOWN, DEPRESSED OR HOPELESS: 0

## 2022-07-25 NOTE — PROGRESS NOTES
Janet Hewitt MD  [address] [place] [address].  [address]  Ph No:  [phone]  Fax:  [phone]:  Chief Complaint   Patient presents with    Follow-up     6 month follow up. Due for AWV at next visit     Discuss Labs          HISTORY OF PRESENT ILLNESS:  Mr. Nicholas Sales is a 81 y.o. male. Pt presents for 6 months follow up. Pt reports he is staying tired a lot, and having to pace self after Covid-19. Pt also reports he is having short term memory changes, as memory of names. Pt is noted to have a few lapses while in this interview. Pt says he suspects long covid. Pt is advised this may be true, or it is possible that the covid has unmasked an underlying condition already developing prior to covid. Pt reports his wife is concerned. Pt has a neurologist and is advised to consult with him regarding his memory concerns, and may need an evaluation of same. To keep himself active and to challenge  his mind, this pt is working on [employer] and works on it regularly. Pt has lost weight and cardiology has pt on rapatha, so is having excellent cholesterol management. Pt has had prostate cancer, but today PSA is <0.1 excellent, no cancer returning. Pt is up to date on medications. BP is slightly elevated 146/67, pt to monitor at home and advise pt to continue current therapy, increase of cozaar to 50mg may be advisable if pt continues to have elevated BP. Will monitor. Pt will RTC in 6 months for lab review, cmp, lipid, cbc, tsh, free t4, hgba1c, vit d.     HISTORY:  Allergies   Allergen Reactions    Latex Rash    Adhesive Tape Rash    Codeine Nausea And Vomiting     Past Medical History:   Diagnosis Date    Acute hypoxemic respiratory failure ([facility] [address] 12/4/2021    Anterior myocardial infarction [facility]) 10/2003    Arrhythmia     paroxysmal a fib    Arteriosclerotic coronary artery disease 11/2005    Arthritis     fingers    Atrial fibrillation with RVR ([address] 12/4/2021    Cancer ([facility] [address]     prostate    Coronary atherosclerosis of native coronary vessel 10/2001    Elevated serum creatinine 12/15/2021    Family history of premature CAD     Father hx cabg/CHF  Brother CABG x 2    Gout     right toe but no problem now    Hypercholesterolemia     Hypertension     Sinus bradycardia 12/14/2021     Past Surgical History:   Procedure Laterality Date    COLONOSCOPY N/A 12/21/2016    COLONOSCOPY  BMI 34 performed by Tamara Maloney MD at [address] GRAFT  3/9/2016    x4 - Dr. Armenta Parvin      tonsillectomy    [address]      2 back surg    [place] CARDI[place] SUR[place] PROCEDURE UNLIST  March 9,2016    quidrupal bypass     [place] CARDI[place] PROCEDURE UNLIST      stent    PROSTATECTOMY       Family History   Problem Relation Age of Onset    Colon Cancer Neg Hx     Cancer Sister     Heart Disease Brother     Diabetes Brother     Heart Disease Father      Social History     Socioeconomic History    Marital status:      Spouse name: Not on file    Number of children: Not on file    Years of education: Not on file    Highest education level: Not on file   Occupational History    Not on file   Tobacco Use    Smoking status: Never    Smokeless tobacco: Never   Substance and Sexual Activity    Alcohol use: Yes    Drug use: No    Sexual activity: Not on file   Other Topics Concern    Not on file   Social History Narrative    Not on file     Social Determinants of Health     Financial Resource Strain: Not on file   Food Insecurity: Not on file   Transportation Needs: Not on file   Physical Activity: Not on file   Stress: Not on file   Social Connections: Not on file   Intimate Partner Violence: Not on file   Housing Stability: Not on file     Current Outpatient Medications   Medication Sig Dispense Refill    rivaroxaban (XARELTO) 20 MG TABS tablet Take 1 tablet by mouth Daily with supper 30 tablet 1    aspirin 81 MG EC tablet Take 81 mg by mouth daily      atorvastatin (LIPITOR) 80 MG tablet Take 80 mg by mouth daily      Evolocumab 140 MG/ML SOSY Inject 140 mg into the skin every 14 days      fluticasone (FLONASE) 50 MCG/ACT nasal spray One spray in each nostril twice daily      loratadine (CLARITIN) 10 MG tablet Take 10 mg by mouth      metoprolol tartrate (LOPRESSOR) 50 MG tablet Take 50 mg by mouth 2 times daily      nitroGLYCERIN (NITROSTAT) 0.4 MG SL tablet Place 0.4 mg under the tongue      losartan (COZAAR) 25 MG tablet Take 1 tablet by mouth daily 30 tablet 3    albuterol sulfate  (90 Base) MCG/ACT inhaler Inhale 2 puffs into the lungs every 4 hours as needed (Patient not taking: Reported on 6/27/2022)      fluticasone-salmeterol (ADVAIR HFA) 230-21 MCG/ACT inhaler Inhale 2 puffs into the lungs 2 times daily (Patient not taking: No sig reported)       No current facility-administered medications for this visit. REVIEW OF SYSTEMS:  Review of systems is as indicated in HPI otherwise negative. PHYSICAL EXAM:  Vital Signs - BP (!) 146/67   Pulse 57   Temp 97.8 °F (36.6 °C) (Temporal)   Ht 5' 10\" (1.778 m)   Wt 225 lb 12.8 oz (102.4 kg)   SpO2 95%   BMI 32.40 kg/m²      Physical Exam  Vitals and nursing note reviewed. Constitutional:       General: He is in acute distress. Appearance: Normal appearance. Comments: See HPI, pt reports after covid infection, continuing fatigue upon exertion and mental changes, poorer memory. HENT:      Head: Normocephalic and atraumatic. Nose: Nose normal.      Mouth/Throat:      Mouth: Mucous membranes are moist.      Pharynx: Oropharynx is clear. Eyes:      Extraocular Movements: Extraocular movements intact. Conjunctiva/sclera: Conjunctivae normal.      Pupils: Pupils are equal, round, and reactive to light. Cardiovascular:      Rate and Rhythm: Normal rate and regular rhythm. Pulses: Normal pulses. Heart sounds: Normal heart sounds.    Pulmonary: Effort: Pulmonary effort is normal.      Comments: Coarse breath sounds, but otherwise mostly clear to ascultation bilaterally  Abdominal:      General: Bowel sounds are normal.      Palpations: Abdomen is soft. Comments: Obese, BMI 32.40, weight 225lbs, but loss of weight   Musculoskeletal:         General: Normal range of motion. Cervical back: Normal range of motion and neck supple. Skin:     General: Skin is warm and dry. Capillary Refill: Capillary refill takes 2 to 3 seconds. Neurological:      General: No focal deficit present. Mental Status: He is alert and oriented to person, place, and time. Comments: ? Of memory changes, vs long covid brain fog, see HPI   Psychiatric:         Behavior: Behavior normal.         Thought Content: Thought content normal.         Judgment: Judgment normal.      Comments: See HPI, pt is anxious about health. LABS  No results found for this visit on 07/25/22. IMPRESSION/PLAN     Diagnosis Orders   1. Coronary artery disease involving coronary bypass graft of native heart with unstable angina pectoris ([address]        2. Primary hypertension        3. Sleep apnea, unspecified type        4. Persistent migraine aura without cerebral infarction and without status migrainosus, not intractable        5. S/P CABG x 4        6. Dyslipidemia            No follow-up provider specified. Jose E Woodson MD            Dictated using voice recognition software.  Proofread, but unrecognized voice recognition errors may exist.

## 2022-08-18 ENCOUNTER — TELEPHONE (OUTPATIENT)
Dept: NEUROLOGY | Age: 72
End: 2022-08-18

## 2022-08-18 DIAGNOSIS — Z86.69 HISTORY OF MIGRAINE: Primary | ICD-10-CM

## 2022-08-18 NOTE — TELEPHONE ENCOUNTER
Please have patient come to get samples of Nurtec. He will need PA. Prescription has been sent to his pharmacy. He is contraindicated for triptan therapy due to hx of stroke and extensive cardiovascular disease.

## 2022-08-24 ENCOUNTER — OFFICE VISIT (OUTPATIENT)
Dept: CARDIOLOGY CLINIC | Age: 72
End: 2022-08-24
Payer: MEDICARE

## 2022-08-24 VITALS
HEART RATE: 60 BPM | DIASTOLIC BLOOD PRESSURE: 62 MMHG | BODY MASS INDEX: 32.64 KG/M2 | SYSTOLIC BLOOD PRESSURE: 138 MMHG | WEIGHT: 228 LBS | HEIGHT: 70 IN

## 2022-08-24 DIAGNOSIS — I25.810 CORONARY ARTERY DISEASE INVOLVING CORONARY BYPASS GRAFT OF NATIVE HEART WITHOUT ANGINA PECTORIS: ICD-10-CM

## 2022-08-24 DIAGNOSIS — E78.5 DYSLIPIDEMIA: ICD-10-CM

## 2022-08-24 DIAGNOSIS — I10 PRIMARY HYPERTENSION: Primary | ICD-10-CM

## 2022-08-24 DIAGNOSIS — I48.0 PAF (PAROXYSMAL ATRIAL FIBRILLATION) (HCC): ICD-10-CM

## 2022-08-24 PROCEDURE — 1123F ACP DISCUSS/DSCN MKR DOCD: CPT | Performed by: INTERNAL MEDICINE

## 2022-08-24 PROCEDURE — 99214 OFFICE O/P EST MOD 30 MIN: CPT | Performed by: INTERNAL MEDICINE

## 2022-08-24 RX ORDER — RIVAROXABAN 20 MG/1
TABLET, FILM COATED ORAL
Qty: 30 TABLET | Refills: 1 | Status: SHIPPED | OUTPATIENT
Start: 2022-08-24

## 2022-08-24 NOTE — PROGRESS NOTES
[facility] CARDIOLOGY  [address], [address]  PHONE: [phone]      22    NAME:  Dorothy Porter  : 1950  MRN: [id number]       SUBJECTIVE:   Dorothy Porter is a 81 y.o. male seen for a follow up visit regarding the following:     Chief Complaint   Patient presents with    Coronary Artery Disease     2 month f/u          HPI:    No cp or marcum. No orthopnea or pnd. No palpitations or syncope. Past Medical History, Past Surgical History, Family history, Social History, and Medications were all reviewed with the patient today and updated as necessary. Current Outpatient Medications   Medication Sig Dispense Refill    XARELTO 20 MG TABS tablet TAKE 1 TABLET BY MOUTH DAILY WITH SUPPER 30 tablet 1    Rimegepant Sulfate 75 MG TBDP Take 75 mg by mouth daily as needed (for migraine) 30 tablet 2    aspirin 81 MG EC tablet Take 81 mg by mouth daily      atorvastatin (LIPITOR) 80 MG tablet Take 80 mg by mouth daily      Evolocumab 140 MG/ML SOSY Inject 140 mg into the skin every 14 days      fluticasone (FLONASE) 50 MCG/ACT nasal spray One spray in each nostril twice daily      loratadine (CLARITIN) 10 MG tablet Take 10 mg by mouth      metoprolol tartrate (LOPRESSOR) 50 MG tablet Take 50 mg by mouth 2 times daily      nitroGLYCERIN (NITROSTAT) 0.4 MG SL tablet Place 0.4 mg under the tongue       No current facility-administered medications for this visit. Social History     Tobacco Use    Smoking status: Never    Smokeless tobacco: Never   Substance Use Topics    Alcohol use: Yes              PHYSICAL EXAM:   /62   Pulse 60   Ht 5' 10\" (1.778 m)   Wt 228 lb (103.4 kg)   BMI 32.71 kg/m²    Constitutional: Oriented to person, place, and time. Appears well-developed and well-nourished. Head: Normocephalic and atraumatic. Neck: Neck supple.    Cardiovascular: Normal rate and regular rhythm with no murmur -No JVP  Pulmonary/Chest: Breath sounds normal.   Abdominal: Soft. Musculoskeletal: No edema. Neurological: Alert and oriented to person, place, and time. Skin: Skin is warm and dry. Psychiatric: Normal mood and affect. Vitals reviewed. Wt Readings from Last 3 Encounters:   08/24/22 228 lb (103.4 kg)   07/25/22 225 lb 12.8 oz (102.4 kg)   06/27/22 224 lb 12.8 oz (102 kg)       Medical problems and test results were reviewed with the patient today. ASSESSMENT and PLAN    1. Primary hypertension  improved. Continue cozaar and lopressor      2. PAF (paroxysmal atrial fibrillation) (HCC)  Stable. Continue lopressor and xarelto      3. Dyslipidemia  Stable. Continue lipitor and repatha      4. Coronary artery disease involving coronary bypass graft of native heart without angina pectoris  Stable. Continue asa         Return in about 6 months (around 2/24/2023).          Flakita Domingo MD  8/24/2022  11:15 AM

## 2022-09-27 ENCOUNTER — OFFICE VISIT (OUTPATIENT)
Dept: NEUROLOGY | Age: 72
End: 2022-09-27
Payer: MEDICARE

## 2022-09-27 VITALS
HEIGHT: 70 IN | OXYGEN SATURATION: 94 % | DIASTOLIC BLOOD PRESSURE: 76 MMHG | WEIGHT: 229 LBS | HEART RATE: 56 BPM | SYSTOLIC BLOOD PRESSURE: 150 MMHG | BODY MASS INDEX: 32.78 KG/M2

## 2022-09-27 DIAGNOSIS — Z86.73 HISTORY OF TIAS: ICD-10-CM

## 2022-09-27 DIAGNOSIS — Z86.69 HISTORY OF MIGRAINE: Primary | ICD-10-CM

## 2022-09-27 DIAGNOSIS — I48.0 PAF (PAROXYSMAL ATRIAL FIBRILLATION) (HCC): ICD-10-CM

## 2022-09-27 PROCEDURE — 99214 OFFICE O/P EST MOD 30 MIN: CPT | Performed by: NURSE PRACTITIONER

## 2022-09-27 PROCEDURE — 1123F ACP DISCUSS/DSCN MKR DOCD: CPT | Performed by: NURSE PRACTITIONER

## 2022-09-27 RX ORDER — LOSARTAN POTASSIUM 25 MG/1
TABLET ORAL
Qty: 90 TABLET | Refills: 1 | Status: SHIPPED | OUTPATIENT
Start: 2022-09-27

## 2022-09-27 ASSESSMENT — PATIENT HEALTH QUESTIONNAIRE - PHQ9
SUM OF ALL RESPONSES TO PHQ QUESTIONS 1-9: 0
2. FEELING DOWN, DEPRESSED OR HOPELESS: 0
1. LITTLE INTEREST OR PLEASURE IN DOING THINGS: 0
SUM OF ALL RESPONSES TO PHQ QUESTIONS 1-9: 0
SUM OF ALL RESPONSES TO PHQ QUESTIONS 1-9: 0
SUM OF ALL RESPONSES TO PHQ9 QUESTIONS 1 & 2: 0
SUM OF ALL RESPONSES TO PHQ QUESTIONS 1-9: 0

## 2022-09-27 NOTE — PROGRESS NOTES
[facility] Neurology [facility]  [address]  [address] [facility], [address] [facility]      Chief Complaint   Patient presents with    Migraine    Follow-up       Salud Mcduffie is a 81 y.o. male who presents for follow up for migraine. PMH significant of CAD s/p CABG, TIA, paroxysmal AFIB (states not taking eliquis or xarelto) , HTN, HLP, BILL not on CPAP, obesity, COVID 19, untreated BILL who is evaluated with acute onset of headache, dizziness, right sided facial / arm numbness and change in speech. CODE S called. STAT head CT no acute issues. CTP negative. CTA head and neck shows prior noted occluded distal right vertebral artery. He has been seen by tele neurology. While in the ED, his sympotms improved. He recalls prior event Feb 2022 that was similar. He has hx of intermittent afib after CABG and recent MI, he was previously on eliquis. This was discontinued by Cardiology due to side effects and he was subsequently started on DAPT. Pt was seen in consultation by Dr. Breanna Gifford, and discussed possible etiology of presentation as related to migraine HA. Given hx of PAF, TIA from microembolic event feasible and recommended [facility]. MRI brain without acute event. As pt had a recent echo 2/2022 without acute abnl (specifically interatrial septum). He was evaluated  by cardiology, who agreed with [facility]. He was evaluated by PT/OT/ST. He was d/c home with recommendations to continue ASA 81mg daily, xarelto 20mg daily, and atrovastatin 80 mg daily for secondary stroke prevention. Interval history:    He is here today by himself. Denies focal deficits. He is currently taking Xarelto 20 mg daily and repatha  for secondary stroke prevention. He was transitioned to repatha due to myalgias. He is tolerating medication without side effects. Migraine with aura- Current headache frequency 1 per month. Associated with photophobia, phonophobia, nausea,  Visual aura (described as swiggling lines).  Headaches are located in occipital region bilaterally and radiates to front of head. Describes as throbbing sensation. Alleviated with laying down, nurtec ODT 75 mg. Indicated full relief after 20 minutes.        Past Medical History:   Diagnosis Date    Acute hypoxemic respiratory failure ([facility] [address] 12/4/2021    Anterior myocardial infarction [facility]) 10/2003    Arrhythmia     paroxysmal a fib    Arteriosclerotic coronary artery disease 11/2005    Arthritis     fingers    Atrial fibrillation with RVR ([facility] [address] 12/4/2021    Cancer ([facility] [address]     prostate    Coronary atherosclerosis of native coronary vessel 10/2001    Elevated serum creatinine 12/15/2021    Family history of premature CAD     Father hx cabg/CHF  Brother CABG x 2    Gout     right toe but no problem now    Hypercholesterolemia     Hypertension     Sinus bradycardia 12/14/2021       Past Surgical History:   Procedure Laterality Date    COLONOSCOPY N/A 12/21/2016    COLONOSCOPY  BMI 34 performed by Mona Barksdale MD at [address]  3/9/2016    x4 - Dr. Betsy Antonio      tonsillectomy    [address]      2 back surg    [facility] CARDIAC SURG PROCEDURE UNLIST  March 9,2016    quidrupal bypass     [facility] CARDIAC SURG PROCEDURE UNLIST      stent    PROSTATECTOMY         Family History   Problem Relation Age of Onset    Colon Cancer Neg Hx     Cancer Sister     Heart Disease Brother     Diabetes Brother     Heart Disease Father        Social History     Socioeconomic History    Marital status:      Spouse name: None    Number of children: None    Years of education: None    Highest education level: None   Tobacco Use    Smoking status: Never    Smokeless tobacco: Never   Vaping Use    Vaping Use: Never used   Substance and Sexual Activity    Alcohol use: Yes    Drug use: No    Sexual activity: Not Currently         Current Outpatient Medications:     losartan (COZAAR) 25 MG tablet, TAKE 1 TABLET BY MOUTH EVERY DAY, Disp: 90 tablet, Rfl: 1    XARELTO 20 MG TABS tablet, TAKE 1 TABLET BY MOUTH DAILY WITH SUPPER, Disp: 30 tablet, Rfl: 1    Rimegepant Sulfate 75 MG TBDP, Take 75 mg by mouth daily as needed (for migraine), Disp: 30 tablet, Rfl: 2    aspirin 81 MG EC tablet, Take 81 mg by mouth daily, Disp: , Rfl:     Evolocumab 140 MG/ML SOSY, Inject 140 mg into the skin every 14 days, Disp: , Rfl:     fluticasone (FLONASE) 50 MCG/ACT nasal spray, One spray in each nostril twice daily, Disp: , Rfl:     loratadine (CLARITIN) 10 MG tablet, Take 10 mg by mouth, Disp: , Rfl:     metoprolol tartrate (LOPRESSOR) 50 MG tablet, Take 50 mg by mouth 2 times daily, Disp: , Rfl:     nitroGLYCERIN (NITROSTAT) 0.4 MG SL tablet, Place 0.4 mg under the tongue, Disp: , Rfl:     Allergies   Allergen Reactions    Latex Rash    Adhesive Tape Rash    Codeine Nausea And Vomiting       REVIEW OF SYSTEMS:  CONSTITUTIONAL: No weight loss, fever, chills, weakness or fatigue. HEENT: Eyes: No visual loss, blurred vision, double vision or yellow sclerae. Ears, Nose, Throat: No hearing loss, sneezing, congestion, runny nose or sore throat. SKIN: No rash or itching. CARDIOVASCULAR: No chest pain, chest pressure or chest discomfort. No palpitations or edema. RESPIRATORY: No shortness of breath, cough or sputum. GASTROINTESTINAL: No anorexia, nausea, vomiting or diarrhea. No abdominal pain or blood. GENITOURINARY: no burning with urination. NEUROLOGICAL: No headache, dizziness, syncope, paralysis, ataxia, numbness or tingling in the extremities. No change in bowel or bladder control. MUSCULOSKELETAL: No muscle, back pain, joint pain or stiffness. HEMATOLOGIC: No anemia, bleeding or bruising. LYMPHATICS: No enlarged nodes. No history of splenectomy. PSYCHIATRIC: No history of depression or anxiety. ENDOCRINOLOGIC: No reports of sweating, cold or heat intolerance. No polyuria or polydipsia. ALLERGIES: No history of asthma, hives, eczema or rhinitis.     Physical Examination  BP (!) 150/76 (Site: Left Upper Arm, Position: Sitting, Cuff Size: Large Adult)   Pulse 56   Ht 5' 10\" (1.778 m)   Wt 229 lb (103.9 kg)   SpO2 94%   BMI 32.86 kg/m²     General - Well developed, well nourished, in no apparent distress. Pleasant and conversent. HEENT - Normocephalic, atraumatic. Conjunctiva, tympanic membranes, and oropharynx are clear. Neck - Supple without masses, no bruits   Cardiovascular - Regular rate and rhythm. Normal S1, S2 without murmurs, rubs, or gallops. Lungs - Clear to auscultation. Abdomen - Soft, nontender with normal bowel sounds. Extremities - Peripheral pulses intact. No edema and no rashes. Neurological examination - Comprehension, attention , memory and reasoning are intact. Language and speech are normal. On cranial nerve examination pupils are equal round and reactive to light. Fundoscopic examination is normal. Visual acuity is adequate. Visual fields are full to finger confrontation. Extraocular motility is normal. Face is symmetric and sensation is intact to light touch. Hearing is intact to finger rustle bilaterally. Motor examination - There is normal muscle tone and bulk. Power is full throughout. Muscle stretch reflexes are normoactive and there are no pathological reflexes present. Sensation is intact to light touch, pinprick, vibration and proprioception in all extremities.  Cerebellar examination is normal. Gait and stance are normal.     Lab Results   Component Value Date    CHOL 75 07/19/2022    CHOL 74 05/30/2022    CHOL 109 02/21/2022     Lab Results   Component Value Date    TRIG 98 07/19/2022    TRIG 80 05/30/2022    TRIG 77 02/21/2022     Lab Results   Component Value Date    HDL 39 (L) 07/19/2022    HDL 41 05/30/2022    HDL 50 02/21/2022     Lab Results   Component Value Date    LDLCALC 16.4 07/19/2022    LDLCALC 17 05/30/2022    LDLCALC 43.6 02/21/2022     Lab Results   Component Value Date    LABVLDL 19.6 07/19/2022    LABVLDL 16 05/30/2022    LABVLDL 15.4 02/21/2022    VLDL 20 01/19/2022     Lab Results   Component Value Date    CHOLHDLRATIO 1.9 07/19/2022    CHOLHDLRATIO 1.8 05/30/2022    CHOLHDLRATIO 2.2 02/21/2022     Lab Results   Component Value Date    LABA1C 5.6 05/30/2022     Lab Results   Component Value Date     05/30/2022       Diagnoses and all orders for this visit:    History of migraine  Continue Nurtec ODT 75 mg daily as needed for migraine abortive therapy. May not exceed 75 mg /24 hours. Medication and side effects discussed with patient. Samples provided in office with sample card. He is not a candidate for triptan therapy due to hx of cardiovascular and cerebrovascular events. Headache Education:   Instructed the patient on over-the-counter headache management medications including: CoQ10, magnesium oxide, and butterbur. To avoid a pain medication overuse headache trying not to take pain medicines more than 3 doses a week. To help relieve headache symptoms without taking pain medicine lie down under darkroom and drink glass of water. Consider lifestyle modification including good sleep hygiene, routine medial schedules, regular exercise and managing triggers. Keep a headache diary  to reveal triggers and possible patterns. Triggers may be: Food, stress, perfumes, alcohol, or even chocolate. Drink plenty of water and try to get 8 hours of sleep each night to reduce risk factors that may cause headaches. History of TIAs  Continue secondary stroke prevention ASA 81 mg daily, Xarelto 20 mg daily and repatha. Goal SBP <140/80  Goal LDL<70  Goal A1C <7.0  Discussed Mediterranean diet and increasing cardiovascular exercise to goal of 30 minutes daily. Depression screening completed. Reviewed BE FAST and when to call 911. Statin intolerance   Continue Repatha. PAF (paroxysmal atrial fibrillation) (HCC)  Stable. Currently rated controlled with BB and on xarelto 20 mg daily.      Follow up in 1 year or sooner      I spent greater than 50% of the 45 total minutes of today's visit in coordination of care and patient/family education and counseling regarding the above patient concerns, reviewing the patient's medical record, my assessment and recommendations.           Kenneth Ramos, [facility] [address] Neurology [address], [address] [place], [place] [address]R:[phone]

## 2022-09-27 NOTE — TELEPHONE ENCOUNTER
Requested Prescriptions     Pending Prescriptions Disp Refills    losartan (COZAAR) 25 MG tablet [Pharmacy Med Name: LOSARTAN POTASSIUM 25 MG TAB] 90 tablet 1     Sig: TAKE 1 TABLET BY MOUTH EVERY DAY

## 2022-11-07 RX ORDER — RIVAROXABAN 20 MG/1
TABLET, FILM COATED ORAL
Qty: 30 TABLET | Refills: 1 | Status: SHIPPED | OUTPATIENT
Start: 2022-11-07

## 2023-01-02 RX ORDER — RIVAROXABAN 20 MG/1
TABLET, FILM COATED ORAL
Qty: 30 TABLET | Refills: 1 | Status: SHIPPED | OUTPATIENT
Start: 2023-01-02

## 2023-01-09 ENCOUNTER — HOSPITAL ENCOUNTER (OUTPATIENT)
Age: 73
Setting detail: OBSERVATION
Discharge: HOME | End: 2023-01-11
Attending: EMERGENCY MEDICINE | Admitting: HOSPITALIST
Payer: MEDICARE

## 2023-01-09 ENCOUNTER — APPOINTMENT (OUTPATIENT)
Dept: GENERAL RADIOLOGY | Age: 73
End: 2023-01-09
Payer: MEDICARE

## 2023-01-09 ENCOUNTER — APPOINTMENT (OUTPATIENT)
Dept: CT IMAGING | Age: 73
End: 2023-01-09
Payer: MEDICARE

## 2023-01-09 ENCOUNTER — CLINICAL DOCUMENTATION (OUTPATIENT)
Dept: CARDIOLOGY CLINIC | Age: 73
End: 2023-01-09

## 2023-01-09 ENCOUNTER — APPOINTMENT (OUTPATIENT)
Dept: ULTRASOUND IMAGING | Age: 73
End: 2023-01-09
Payer: MEDICARE

## 2023-01-09 DIAGNOSIS — R51.9 ACUTE INTRACTABLE HEADACHE, UNSPECIFIED HEADACHE TYPE: ICD-10-CM

## 2023-01-09 DIAGNOSIS — L03.116 CELLULITIS OF LEFT LOWER EXTREMITY: ICD-10-CM

## 2023-01-09 DIAGNOSIS — R47.1 DYSARTHRIA: Primary | ICD-10-CM

## 2023-01-09 DIAGNOSIS — G47.30 SLEEP APNEA, UNSPECIFIED TYPE: ICD-10-CM

## 2023-01-09 LAB
ALBUMIN SERPL-MCNC: 3.4 G/DL (ref 3.2–4.6)
ALBUMIN/GLOB SERPL: 0.9 (ref 0.4–1.6)
ALP SERPL-CCNC: 89 U/L (ref 50–136)
ALT SERPL-CCNC: 34 U/L (ref 12–65)
ANION GAP SERPL CALC-SCNC: 5 MMOL/L (ref 2–11)
AST SERPL-CCNC: 42 U/L (ref 15–37)
BILIRUB SERPL-MCNC: 1.4 MG/DL (ref 0.2–1.1)
BUN SERPL-MCNC: 14 MG/DL (ref 8–23)
CALCIUM SERPL-MCNC: 8.8 MG/DL (ref 8.3–10.4)
CHLORIDE SERPL-SCNC: 104 MMOL/L (ref 101–110)
CO2 SERPL-SCNC: 25 MMOL/L (ref 21–32)
CREAT SERPL-MCNC: 1 MG/DL (ref 0.8–1.5)
ERYTHROCYTE [DISTWIDTH] IN BLOOD BY AUTOMATED COUNT: 13.4 % (ref 11.9–14.6)
GLOBULIN SER CALC-MCNC: 4 G/DL (ref 2.8–4.5)
GLUCOSE SERPL-MCNC: 118 MG/DL (ref 65–100)
HCT VFR BLD AUTO: 43.7 % (ref 41.1–50.3)
HGB BLD-MCNC: 14.4 G/DL (ref 13.6–17.2)
LACTATE SERPL-SCNC: 1.8 MMOL/L (ref 0.4–2)
MCH RBC QN AUTO: 29 PG (ref 26.1–32.9)
MCHC RBC AUTO-ENTMCNC: 33 G/DL (ref 31.4–35)
MCV RBC AUTO: 88.1 FL (ref 82–102)
NRBC # BLD: 0 K/UL (ref 0–0.2)
PLATELET # BLD AUTO: 241 K/UL (ref 150–450)
PMV BLD AUTO: 10.4 FL (ref 9.4–12.3)
POTASSIUM SERPL-SCNC: 4.8 MMOL/L (ref 3.5–5.1)
PROCALCITONIN SERPL-MCNC: <0.05 NG/ML (ref 0–0.49)
PROT SERPL-MCNC: 7.4 G/DL (ref 6.3–8.2)
RBC # BLD AUTO: 4.96 M/UL (ref 4.23–5.6)
SODIUM SERPL-SCNC: 134 MMOL/L (ref 133–143)
WBC # BLD AUTO: 9.5 K/UL (ref 4.3–11.1)

## 2023-01-09 PROCEDURE — 99285 EMERGENCY DEPT VISIT HI MDM: CPT

## 2023-01-09 PROCEDURE — 84145 PROCALCITONIN (PCT): CPT

## 2023-01-09 PROCEDURE — 93971 EXTREMITY STUDY: CPT

## 2023-01-09 PROCEDURE — 2580000003 HC RX 258: Performed by: PHYSICIAN ASSISTANT

## 2023-01-09 PROCEDURE — 73562 X-RAY EXAM OF KNEE 3: CPT

## 2023-01-09 PROCEDURE — 96375 TX/PRO/DX INJ NEW DRUG ADDON: CPT

## 2023-01-09 PROCEDURE — 85027 COMPLETE CBC AUTOMATED: CPT

## 2023-01-09 PROCEDURE — 6370000000 HC RX 637 (ALT 250 FOR IP): Performed by: PHYSICIAN ASSISTANT

## 2023-01-09 PROCEDURE — 96365 THER/PROPH/DIAG IV INF INIT: CPT

## 2023-01-09 PROCEDURE — 70450 CT HEAD/BRAIN W/O DYE: CPT

## 2023-01-09 PROCEDURE — 83605 ASSAY OF LACTIC ACID: CPT

## 2023-01-09 PROCEDURE — 6360000002 HC RX W HCPCS: Performed by: PHYSICIAN ASSISTANT

## 2023-01-09 PROCEDURE — 80053 COMPREHEN METABOLIC PANEL: CPT

## 2023-01-09 PROCEDURE — 96361 HYDRATE IV INFUSION ADD-ON: CPT

## 2023-01-09 PROCEDURE — 87040 BLOOD CULTURE FOR BACTERIA: CPT

## 2023-01-09 RX ORDER — 0.9 % SODIUM CHLORIDE 0.9 %
1000 INTRAVENOUS SOLUTION INTRAVENOUS
Status: COMPLETED | OUTPATIENT
Start: 2023-01-09 | End: 2023-01-10

## 2023-01-09 RX ORDER — METOCLOPRAMIDE HYDROCHLORIDE 5 MG/ML
10 INJECTION INTRAMUSCULAR; INTRAVENOUS
Status: COMPLETED | OUTPATIENT
Start: 2023-01-09 | End: 2023-01-09

## 2023-01-09 RX ORDER — KETOROLAC TROMETHAMINE 30 MG/ML
30 INJECTION, SOLUTION INTRAMUSCULAR; INTRAVENOUS ONCE
Status: COMPLETED | OUTPATIENT
Start: 2023-01-09 | End: 2023-01-09

## 2023-01-09 RX ORDER — METOPROLOL TARTRATE 50 MG/1
50 TABLET, FILM COATED ORAL
Status: COMPLETED | OUTPATIENT
Start: 2023-01-09 | End: 2023-01-09

## 2023-01-09 RX ADMIN — SODIUM CHLORIDE 1000 ML: 9 INJECTION, SOLUTION INTRAVENOUS at 23:54

## 2023-01-09 RX ADMIN — KETOROLAC TROMETHAMINE 30 MG: 30 INJECTION, SOLUTION INTRAMUSCULAR at 23:55

## 2023-01-09 RX ADMIN — METOPROLOL TARTRATE 50 MG: 50 TABLET ORAL at 22:56

## 2023-01-09 RX ADMIN — METOCLOPRAMIDE 10 MG: 5 INJECTION, SOLUTION INTRAMUSCULAR; INTRAVENOUS at 23:54

## 2023-01-09 RX ADMIN — CEFEPIME 2000 MG: 2 INJECTION, POWDER, FOR SOLUTION INTRAVENOUS at 22:00

## 2023-01-09 ASSESSMENT — PAIN - FUNCTIONAL ASSESSMENT: PAIN_FUNCTIONAL_ASSESSMENT: NONE - DENIES PAIN

## 2023-01-09 ASSESSMENT — ENCOUNTER SYMPTOMS
COUGH: 0
VOMITING: 0
ABDOMINAL PAIN: 0
SHORTNESS OF BREATH: 0
NAUSEA: 0

## 2023-01-09 ASSESSMENT — PAIN DESCRIPTION - LOCATION: LOCATION: HEAD

## 2023-01-09 ASSESSMENT — PAIN SCALES - GENERAL: PAINLEVEL_OUTOF10: 10

## 2023-01-09 NOTE — PROGRESS NOTES
BRYANT WHITE (Key: [id number])  Repatha SureClick 499DY/DJ auto-injectors    Message from Plan  The patient currently has access to the requested medication and a Prior Authorization is not needed for the patient/medication

## 2023-01-10 ENCOUNTER — APPOINTMENT (OUTPATIENT)
Dept: MRI IMAGING | Age: 73
End: 2023-01-10
Payer: MEDICARE

## 2023-01-10 PROBLEM — R47.1 DYSARTHRIA: Status: ACTIVE | Noted: 2023-01-10

## 2023-01-10 PROBLEM — G43.509: Status: ACTIVE | Noted: 2023-01-10

## 2023-01-10 PROCEDURE — G0378 HOSPITAL OBSERVATION PER HR: HCPCS

## 2023-01-10 PROCEDURE — 97530 THERAPEUTIC ACTIVITIES: CPT

## 2023-01-10 PROCEDURE — 2580000003 HC RX 258: Performed by: HOSPITALIST

## 2023-01-10 PROCEDURE — 6360000002 HC RX W HCPCS: Performed by: HOSPITALIST

## 2023-01-10 PROCEDURE — 96366 THER/PROPH/DIAG IV INF ADDON: CPT

## 2023-01-10 PROCEDURE — 97165 OT EVAL LOW COMPLEX 30 MIN: CPT

## 2023-01-10 PROCEDURE — 70551 MRI BRAIN STEM W/O DYE: CPT

## 2023-01-10 PROCEDURE — 87040 BLOOD CULTURE FOR BACTERIA: CPT

## 2023-01-10 PROCEDURE — 97162 PT EVAL MOD COMPLEX 30 MIN: CPT

## 2023-01-10 PROCEDURE — 96361 HYDRATE IV INFUSION ADD-ON: CPT

## 2023-01-10 PROCEDURE — 92610 EVALUATE SWALLOWING FUNCTION: CPT

## 2023-01-10 PROCEDURE — 6370000000 HC RX 637 (ALT 250 FOR IP): Performed by: HOSPITALIST

## 2023-01-10 PROCEDURE — 97535 SELF CARE MNGMENT TRAINING: CPT

## 2023-01-10 RX ORDER — ENOXAPARIN SODIUM 100 MG/ML
30 INJECTION SUBCUTANEOUS 2 TIMES DAILY
Status: DISCONTINUED | OUTPATIENT
Start: 2023-01-10 | End: 2023-01-10

## 2023-01-10 RX ORDER — ASPIRIN 81 MG/1
81 TABLET ORAL DAILY
Status: DISCONTINUED | OUTPATIENT
Start: 2023-01-10 | End: 2023-01-11 | Stop reason: HOSPADM

## 2023-01-10 RX ORDER — METOPROLOL TARTRATE 50 MG/1
50 TABLET, FILM COATED ORAL 2 TIMES DAILY
Status: DISCONTINUED | OUTPATIENT
Start: 2023-01-10 | End: 2023-01-11 | Stop reason: HOSPADM

## 2023-01-10 RX ORDER — ONDANSETRON 4 MG/1
4 TABLET, ORALLY DISINTEGRATING ORAL EVERY 8 HOURS PRN
Status: DISCONTINUED | OUTPATIENT
Start: 2023-01-10 | End: 2023-01-11 | Stop reason: HOSPADM

## 2023-01-10 RX ORDER — CEFDINIR 300 MG/1
300 CAPSULE ORAL EVERY 12 HOURS SCHEDULED
Status: DISCONTINUED | OUTPATIENT
Start: 2023-01-10 | End: 2023-01-10

## 2023-01-10 RX ORDER — ASPIRIN 300 MG/1
300 SUPPOSITORY RECTAL DAILY
Status: DISCONTINUED | OUTPATIENT
Start: 2023-01-10 | End: 2023-01-11 | Stop reason: HOSPADM

## 2023-01-10 RX ORDER — METOCLOPRAMIDE HYDROCHLORIDE 5 MG/ML
10 INJECTION INTRAMUSCULAR; INTRAVENOUS EVERY 6 HOURS PRN
Status: DISCONTINUED | OUTPATIENT
Start: 2023-01-10 | End: 2023-01-11 | Stop reason: HOSPADM

## 2023-01-10 RX ORDER — KETOROLAC TROMETHAMINE 30 MG/ML
30 INJECTION, SOLUTION INTRAMUSCULAR; INTRAVENOUS EVERY 6 HOURS PRN
Status: DISCONTINUED | OUTPATIENT
Start: 2023-01-10 | End: 2023-01-11 | Stop reason: HOSPADM

## 2023-01-10 RX ORDER — POLYETHYLENE GLYCOL 3350 17 G/17G
17 POWDER, FOR SOLUTION ORAL DAILY PRN
Status: DISCONTINUED | OUTPATIENT
Start: 2023-01-10 | End: 2023-01-11 | Stop reason: HOSPADM

## 2023-01-10 RX ORDER — ONDANSETRON 2 MG/ML
4 INJECTION INTRAMUSCULAR; INTRAVENOUS EVERY 6 HOURS PRN
Status: DISCONTINUED | OUTPATIENT
Start: 2023-01-10 | End: 2023-01-11 | Stop reason: HOSPADM

## 2023-01-10 RX ORDER — LOSARTAN POTASSIUM 25 MG/1
25 TABLET ORAL DAILY
Status: DISCONTINUED | OUTPATIENT
Start: 2023-01-10 | End: 2023-01-11 | Stop reason: HOSPADM

## 2023-01-10 RX ADMIN — METOPROLOL TARTRATE 50 MG: 50 TABLET, FILM COATED ORAL at 20:20

## 2023-01-10 RX ADMIN — RIVAROXABAN 20 MG: 20 TABLET, FILM COATED ORAL at 20:20

## 2023-01-10 RX ADMIN — ASPIRIN 81 MG: 81 TABLET ORAL at 12:31

## 2023-01-10 RX ADMIN — CEFDINIR 300 MG: 300 CAPSULE ORAL at 12:31

## 2023-01-10 RX ADMIN — CEFEPIME 2000 MG: 2 INJECTION, POWDER, FOR SOLUTION INTRAVENOUS at 15:05

## 2023-01-10 RX ADMIN — LOSARTAN POTASSIUM 25 MG: 25 TABLET, FILM COATED ORAL at 12:32

## 2023-01-10 RX ADMIN — METOPROLOL TARTRATE 50 MG: 50 TABLET, FILM COATED ORAL at 12:32

## 2023-01-10 ASSESSMENT — PAIN - FUNCTIONAL ASSESSMENT: PAIN_FUNCTIONAL_ASSESSMENT: ACTIVITIES ARE NOT PREVENTED

## 2023-01-10 ASSESSMENT — PAIN SCALES - GENERAL
PAINLEVEL_OUTOF10: 0
PAINLEVEL_OUTOF10: 1
PAINLEVEL_OUTOF10: 4

## 2023-01-10 ASSESSMENT — PAIN DESCRIPTION - FREQUENCY: FREQUENCY: INTERMITTENT

## 2023-01-10 ASSESSMENT — PAIN DESCRIPTION - DESCRIPTORS: DESCRIPTORS: ACHING

## 2023-01-10 ASSESSMENT — PAIN DESCRIPTION - ONSET: ONSET: ON-GOING

## 2023-01-10 ASSESSMENT — PAIN DESCRIPTION - LOCATION: LOCATION: HEAD;KNEE;LEG

## 2023-01-10 ASSESSMENT — PAIN DESCRIPTION - ORIENTATION: ORIENTATION: ANTERIOR;LEFT

## 2023-01-10 ASSESSMENT — PAIN DESCRIPTION - PAIN TYPE: TYPE: ACUTE PAIN

## 2023-01-10 NOTE — ED NOTES
TRANSFER - OUT REPORT:    Verbal report given to JENNA Downs on Laron Alonzo  being transferred to [facility] for routine progression of patient care       Report consisted of patient's Situation, Background, Assessment and   Recommendations(SBAR). Information from the following report(s) Nurse Handoff Report was reviewed with the receiving nurse. [place] Assessment: Presents to emergency department  because of falls (Syncope, seizure, or loss of consciousness): No, Age > 79: Yes, Altered Mental Status, Intoxication with alcohol or substance confusion (Disorientation, impaired judgment, poor safety awaremess, or inability to follow instructions): No, Impaired Mobility: Ambulates or transfers with assistive devices or assistance; Unable to ambulate or transer.: Yes  Lines:   Peripheral IV 01/09/23 Left Hand (Active)   Site Assessment Clean, dry & intact 01/09/23 2052   Phlebitis Assessment No symptoms 01/09/23 2052   Infiltration Assessment 0 01/09/23 2052   Alcohol Cap Used No 01/09/23 2052   Dressing Status New dressing applied 01/09/23 2052   Dressing Type Transparent 01/09/23 2052   Dressing Intervention New 01/09/23 2052       Peripheral IV 01/09/23 Right Antecubital (Active)   Site Assessment Clean, dry & intact 01/09/23 2052   Line Status Blood return noted 01/09/23 2052   Phlebitis Assessment No symptoms 01/09/23 2052   Infiltration Assessment 0 01/09/23 2052   Alcohol Cap Used No 01/09/23 2052   Dressing Status New dressing applied 01/09/23 2052   Dressing Type Transparent 01/09/23 2052   Dressing Intervention New 01/09/23 2052        Opportunity for questions and clarification was provided.       Patient transported with:  O2 @ 1.5lpm         Onelia Caballero RN  01/10/23 [id number]

## 2023-01-10 NOTE — H&P
Hospitalist History and Physical   Admit Date:  2023  8:34 PM   Name:  Celeste Ibarra   Age:  72 y.o. Sex:  male  :  1950   MRN:  [id number]   [facility]    Presenting Complaint: Leg Pain     Reason(s) for Admission: Dysarthria [R47.1]       Assessment & Plan:     Principal Problem:    Dysarthria -61-year-old gentleman with persistent migraine and strokelike symptoms in patient with underlying atrial fibrillation on Xarelto. Symptoms persistent past 2 days. Negative CT scan head in the ER. Patient has had recent fall and has bruising and ecchymoses left lower extremity. Possible cellulitis left lower extremity. He has gotten antibiotics    Plan:   1) Admit Med BED, with remote tele, OBS status  2) TIA protocol  3) Will check Brain MRI in AM, Neurochecks, lipid panel  4) Resume home meds, including xarelto and cholesterol medication (he gets injection every  weeks as outpatient)  5) check  hgb a1c  6) Therapy evaluations, Stroke navigator  7) supportive care for his migraines. 8) oral antibiotic Omnicef for early cellulitis. 9) patient can follow-up with neurologist Dr. Dorian Martin as an outpatient. Active Problems:    Persistent migraine aura  Plan:     CAD (coronary artery disease)  Plan:     Dyslipidemia  Plan:     PAF (paroxysmal atrial fibrillation) ([facility] [address]  Plan:       Anticipated discharge needs:         Diet: regular  VTE ppx: Xarelto  Code status: FULL      History of Present Illness:   Celeste Ibarra is a 72 y.o. male with medical history of CAD, TIA, paroxysmal A. Fib on Xarelto, hyperlipidemia, hypertension, sleep apnea, with history of COVID-19 who presents to facility today with acute on chronic aura migraine for which he sees neurology. He reports he normally takes Nurtec when he gets these migraines and it normally helps his symptoms mirtha. He is experiencing persistent dysarthria, headache.   Also complains of some redness and heat in his left calf from where he fell after tripping on a piece of wood about a week ago. He mentions bruising from his groin to his knee with swelling to the foot. Work-up here today showed reassuring labs. Normal head CT. Patient was given IV cefepime for presumed cellulitis on his leg. Patient was given fluids, Toradol, and Reglan to try and help with the headache with no relief. Patient reports he is only supposed to take his Nurtec once every 3 days and this is the first time he has had these migraines twice in the past 48 hours. Patient seems to have intermittent periods of dysarthria which was witnessed in his exam room tonight. Although there does appear to be some chronic component to this given the 2 migraines in 48 hours which is new, the lingering dysarthria which family reports is never lasted this long, and the patient's persistent headache, ER PA believed admission for further work-up to evaluate for TIA would be beneficial.      Review of Systems:  10 systems reviewed and negative except as noted in HPI.     Past History:     Past Medical History:   Diagnosis Date    Acute hypoxemic respiratory failure ([facility] [address] 12/4/2021    Anterior myocardial infarction [facility]) 10/2003    Arrhythmia     paroxysmal a fib    Arteriosclerotic coronary artery disease 11/2005    Arthritis     fingers    Atrial fibrillation with RVR ([address] 12/4/2021    Cancer ([facility] [address]     prostate    Coronary atherosclerosis of native coronary vessel 10/2001    Elevated serum creatinine 12/15/2021    Family history of premature CAD     Father hx cabg/CHF  Brother CABG x 2    Gout     right toe but no problem now    Hypercholesterolemia     Hypertension     Sinus bradycardia 12/14/2021       Past Surgical History:   Procedure Laterality Date    COLONOSCOPY N/A 12/21/2016    COLONOSCOPY  BMI 34 performed by Dai Mccarty MD at [facility] GRAFT  3/9/2016    x4 - Dr. Fran Bustos      2 back surg    TX CARDIAC SURG PROCEDURE UNLIST  March 9,2016    quidrupal bypass     TX CARDIAC SURG PROCEDURE UNLIST      stent    PROSTATECTOMY          Social History     Tobacco Use    Smoking status: Never    Smokeless tobacco: Never   Substance Use Topics    Alcohol use: Yes      Social History     Substance and Sexual Activity   Drug Use No       Family History   Problem Relation Age of Onset    Colon Cancer Neg Hx     Cancer Sister     Heart Disease Brother     Diabetes Brother     Heart Disease Father         Immunization History   Administered Date(s) Administered    PPD Test 03/09/2016, 02/20/2022    Tdap (Boostrix, Adacel) 10/21/2014     Allergies   Allergen Reactions    Latex Rash    Adhesive Tape Rash    Codeine Nausea And Vomiting     Prior to Admit Medications:  Current Outpatient Medications   Medication Instructions    aspirin 81 mg, Oral, DAILY    Evolocumab (REPATHA) 140 mg, SubCUTAneous, EVERY 14 DAYS    fluticasone (FLONASE) 50 MCG/ACT nasal spray One spray in each nostril twice daily    loratadine (CLARITIN) 10 mg, Oral    losartan (COZAAR) 25 MG tablet TAKE 1 TABLET BY MOUTH EVERY DAY    metoprolol tartrate (LOPRESSOR) 50 mg, Oral, 2 TIMES DAILY    nitroGLYCERIN (NITROSTAT) 0.4 mg, SubLINGual    Rimegepant Sulfate 75 mg, Oral, DAILY PRN    XARELTO 20 MG TABS tablet TAKE 1 TABLET BY MOUTH DAILY WITH SUPPER         Objective:   Patient Vitals for the past 24 hrs:   Temp Pulse Resp BP SpO2   01/09/23 2337 -- 92 -- (!) 151/125 --   01/09/23 2157 99 °F (37.2 °C) 84 20 (!) 207/69 95 %   01/09/23 2025 99.1 °F (37.3 °C) 84 18 (!) 190/67 97 %       Oxygen Therapy  SpO2: 95 %  O2 Device: None (Room air)    Estimated body mass index is 32.9 kg/m² as calculated from the following:    Height as of this encounter: 5' 10\" (1.778 m). Weight as of this encounter: 229 lb 4.5 oz (104 kg).   No intake or output data in the 24 hours ending 01/10/23 0558      Physical Exam:    Blood pressure (!) 151/125, pulse 92, temperature 99 °F (37.2 °C), resp. rate 20, height 5' 10\" (1.778 m), weight 229 lb 4.5 oz (104 kg), SpO2 95 %. General:    Well nourished. Obese no acute distress, alert and oriented x3. Head:  Normocephalic, atraumatic  Eyes:  Sclerae appear normal.  Pupils equally round. ENT:  Nares appear normal, no drainage. Moist oral mucosa  Neck:  No restricted ROM. Trachea midline   CV:   RRR. No m/r/g. No jugular venous distension. Lungs:   CTAB. No wheezing, rhonchi, or rales. Symmetric expansion. Abdomen: Bowel sounds present. Soft, nontender, nondistended. Extremities: No cyanosis or clubbing. Large ecchymoses left thigh. Some swelling and bruising left calf with increased warmth  Skin:     No rashes and normal coloration. Warm and dry. Neuro:  CN II-XII grossly intact. Sensation intact. A&Ox3  Psych:  Normal mood and affect.       I have personally reviewed labs and tests showing:  Recent Labs:  Recent Results (from the past 24 hour(s))   CBC    Collection Time: 01/09/23  8:48 PM   Result Value Ref Range    WBC 9.5 4.3 - 11.1 K/uL    RBC 4.96 4.23 - 5.6 M/uL    Hemoglobin 14.4 13.6 - 17.2 g/dL    Hematocrit 43.7 41.1 - 50.3 %    MCV 88.1 82 - 102 FL    MCH 29.0 26.1 - 32.9 PG    MCHC 33.0 31.4 - 35.0 g/dL    RDW 13.4 11.9 - 14.6 %    Platelets 385 535 - 501 K/uL    MPV 10.4 9.4 - 12.3 FL    nRBC 0.00 0.0 - 0.2 K/uL   Comprehensive Metabolic Panel    Collection Time: 01/09/23  8:48 PM   Result Value Ref Range    Sodium 134 133 - 143 mmol/L    Potassium 4.8 3.5 - 5.1 mmol/L    Chloride 104 101 - 110 mmol/L    CO2 25 21 - 32 mmol/L    Anion Gap 5 2 - 11 mmol/L    Glucose 118 (H) 65 - 100 mg/dL    BUN 14 8 - 23 MG/DL    Creatinine 1.00 0.8 - 1.5 MG/DL    Est, Glom Filt Rate >60 >60 ml/min/1.73m2    Calcium 8.8 8.3 - 10.4 MG/DL    Total Bilirubin 1.4 (H) 0.2 - 1.1 MG/DL    ALT 34 12 - 65 U/L    AST 42 (H) 15 - 37 U/L    Alk Phosphatase 89 50 - 136 U/L    Total Protein 7.4 6.3 - 8.2 g/dL    Albumin 3.4 3.2 - 4.6 g/dL    Globulin 4.0 2.8 - 4.5 g/dL    Albumin/Globulin Ratio 0.9 0.4 - 1.6     Lactic Acid    Collection Time: 01/09/23  8:48 PM   Result Value Ref Range    Lactic Acid, Plasma 1.8 0.4 - 2.0 MMOL/L   Procalcitonin    Collection Time: 01/09/23  8:48 PM   Result Value Ref Range    Procalcitonin <0.05 0.00 - 0.49 ng/mL       I have personally reviewed imaging studies showing:  XR KNEE LEFT (3 VIEWS)    Result Date: 1/9/2023  LEFT KNEE SERIES HISTORY:  Left knee pain; FINDINGS: 3 views of the knee demonstrate no displaced fracture or malalignment. Soft tissues are normal. Surgical clips are present in the medial aspect of the left leg. No acute findings. CT HEAD WO CONTRAST    Result Date: 1/9/2023  HEAD CT WITHOUT CONTRAST  1/9/2023 HISTORY:   headache, weakness, HTN TECHNIQUE: Noncontrast axial images were obtained through the brain. All CT scans at this facility used dose modulation, interactive reconstruction and/or weight based dosing when appropriate to reduce radiation dose to as low as reasonably achievable. COMPARISON: Brain MRI May 30, 2022 FINDINGS: There is no acute intracranial hemorrhage, significant mass effect or CT evidence of acute large artery territorial infarction. Please note that a hyperacute infarct or small vessel infarct may not be apparent on initial CT imaging. There are scattered areas of decreased attenuation within the periventricular white matter compatible with mild chronic small vessel ischemic disease. The left maxillary sinus is opacified. There is no hydrocephalus , intra-axial mass or abnormal extra-axial fluid collection. There are no displaced skull fractures. 1.  White matter findings compatible with chronic small vessel ischemic disease. 2.  Left maxillary sinus disease.     Vascular duplex lower extremity venous left    Result Date: 1/9/2023  LEFT LOWER EXTREMITY DOPPLER ULTRASOUND 1/9/2023  HISTORY:    CALF PAIN Technique: Sonographic imaging of the deep veins of the left leg was performed FINDINGS:    The common femoral vein, femoral vein, popliteal vein, posterior tibial veins and peroneal veins compress appropriately. There is normal color Doppler flow within these vessels. There is normal phasic, variable pulse wave Doppler flow from the popliteal vein to the common femoral vein. No DVT in the deep veins of the left leg. Echocardiogram:  No results found for this or any previous visit. Orders Placed This Encounter   Medications    cefepime (MAXIPIME) 2,000 mg in sodium chloride 0.9 % 50 mL IVPB mini-bag     Order Specific Question:   Antimicrobial Indications     Answer:   Skin and Soft Tissue Infection    metoprolol tartrate (LOPRESSOR) tablet 50 mg    metoclopramide (REGLAN) injection 10 mg    ketorolac (TORADOL) injection 30 mg    0.9 % sodium chloride bolus    OR Linked Order Group     ondansetron (ZOFRAN-ODT) disintegrating tablet 4 mg     ondansetron (ZOFRAN) injection 4 mg    polyethylene glycol (GLYCOLAX) packet 17 g    DISCONTD: enoxaparin Sodium (LOVENOX) injection 30 mg     Order Specific Question:   Indication of Use     Answer:   Prophylaxis-DVT/PE    OR Linked Order Group     aspirin EC tablet 81 mg     aspirin suppository 300 mg    cefdinir (OMNICEF) capsule 300 mg     Order Specific Question:   Antimicrobial Indications     Answer:   Skin and Soft Tissue Infection     Order Specific Question:   Skin duration of therapy     Answer:   5 days    ketorolac (TORADOL) injection 30 mg    metoclopramide (REGLAN) injection 10 mg    losartan (COZAAR) tablet 25 mg    metoprolol tartrate (LOPRESSOR) tablet 50 mg    rivaroxaban (XARELTO) tablet 20 mg     Order Specific Question:   Indication of Use     Answer:   A Fib/A Flutter         Signed:  Anjana Hanna MD    Part of this note may have been written by using a voice dictation software. The note has been proof read but may still contain some grammatical/other typographical errors.

## 2023-01-10 NOTE — PROGRESS NOTES
ACUTE OCCUPATIONAL THERAPY GOALS:   (Developed with and agreed upon by patient and/or caregiver.)  1. Patient will complete lower body dressing with min A and adaptive equipment as needed. GOAL MET 1/10/2023  2. Patient will complete toileting with mod I. GOAL MET 1/10/2023  3. Patient will tolerate 15 minutes of OT treatment with as needed rest breaks to increase activity tolerance for ADLs. GOAL MET 1/10/2023  4. Patient will complete functional transfers with mod I and adaptive equipment as needed. GOAL MET 1/10/2023  5. Patient will complete grooming tasks in standing at sink using B UEs independently. GOAL MET 1/10/2023  Timeframe: 7 visits      OCCUPATIONAL THERAPY Initial Assessment, Daily Note, Discharge, and AM       OT Visit Days: 1  Acknowledge Orders  Time  OT Charge Capture  Rehab Caseload Tracker      Ed Allen is a 72 y.o. male   PRIMARY DIAGNOSIS: Dysarthria  Dysarthria [R47.1]       Reason for Referral: Generalized Muscle Weakness (M62.81)  Other lack of cordination (R27.8)  Difficulty in walking, Not elsewhere classified (R26.2)  Other abnormalities of gait and mobility (R26.89)  History of falling (Z91.81)  Dizziness and Giddiness (R42)  Observation: Payor: Madelaine Garduno / Plan: Thien Wynn PPO / Product Type: Medicare /     ASSESSMENT:     REHAB RECOMMENDATIONS:   Recommendation to date pending progress:  Setting:  No further skilled therapy after discharge from hospital    Equipment:    None  Has SPC, might benefit from RW, PT following and will decide suitable DME needs     ASSESSMENT:  Mr. Sybil Caceres is a 73 YO R dominant WM seen in the ER on OBS status presenting with R sided stroke like symptoms and difficulty speaking. Thought to be a migraine as symptoms have resolved. Wife at bedside and reports she has noted pt having more of theses episodes the last several months where is \"talking out of his head\".  Pt had a fall last week while camping, tripped over a log and landed hard on his L knee with an open wound, not scabbed over, but has had L LE swelling and significant bruising as pt is on a blood thinner. Wife reported there was redness and warmth and she was concerned for DVT. Imaging was negative. Pt is normally independent with all ADLs, ambulation but has been using a SPC since the fall, driving, is retired, but wife still works so pt is home alone. Admits he sometimes gets SOB with activity and snores at night but did not like wearing the CPAP. Today, in the ER, pt able to manage his clothes, toileted independently, did have safer better gait with use of the RW as his SPC is at home. Grooming tasks in standing at sink independently. No differences noted in B UEs, R versus L AROM, strength or coordination. Reviewed signs and symptoms of stroke with verbal understanding from wife and pt. Pt is functioning very near his baseline with ADLs and does not need skilled OT at this time. PT will follow for balance and make recommendations for RW if needed. Goals met as above. Will DC our services. Pt and spouse in agreement.       [address] [facility] [address] [address] AM-PAC 6 Clicks Daily Activity Inpatient Short Form:    -PAC Daily Activity Inpatient   How much help for putting on and taking off regular lower body clothing?: A Little (L LE shoe and sock due to swelling and L knee pain)  How much help for Bathing?: A Little (L foot)  How much help for Toileting?: None  How much help for putting on and taking off regular upper body clothing?: None  How much help for taking care of personal grooming?: None  How much help for eating meals?: None  -[facility] Inpatient Daily Activity Raw Score: 22  AM-PAC Inpatient ADL T-Scale Score : 47.1  ADL Inpatient CMS 0-100% Score: 25.8  ADL Inpatient CMS G-Code Modifier : CJ        SUBJECTIVE:     Mr. Patricia Simmons states, \"I really am feeling better\"     Social/Functional Lives With: Spouse  ADL Assistance: Independent  Homemaking Assistance: Independent  Ambulation Assistance: Independent (been using [facility] since fall 1 week ago)  Transfer Assistance: Independent  Active : Yes  Mode of Transportation: Car  Occupation: Retired    OBJECTIVE:     Ama Slaughter / Radha Purple / AIRWAY: Continuous Pulse Oximetry and ER monitors    RESTRICTIONS/PRECAUTIONS:  Restrictions/Precautions: Fall Risk    PAIN: VITALS / O2:   Pre Treatment:   Pain Assessment: 0-10  Pain Level: 1  Patient's Stated Pain Goal: 0 - No pain  Pain Location: Head;Knee;Leg  Pain Orientation: Anterior; Left  Pain Descriptors: Aching  Functional Pain Assessment: Activities are not prevented  Pain Type: Acute pain  Pain Frequency: Intermittent  Pain Onset: On-going  Non-Pharmaceutical Pain Intervention(s): Ambulation/Increased Activity; Distraction; Emotional support; Environmental changes; Exercise; Family support;Nurse notified (comment); Repositioned;Rest;Therapeutic presence  Response to Pain Intervention: Patient satisfied  Side Effects: No reported side effects      Post Treatment: unchanged       Vitals 136/52 sitting, 152/67 after activity         Oxygen  was on 4 L NC, sats 99%, on room air and after activity, sats >93%, verbal cues to breathe deeply            GROSS EVALUATION: INTACT IMPAIRED   (See Comments)   UE AROM [x] []   UE PROM [] []   Strength [x]       Posture / Balance []  Good sitting, unable to bend L knee to complete LB care, so wife assisting.  Fair+ ambulating with no DME, good with RW, limping with L LE   Sensation [x]  B UEs   Coordination [x]  B UEs     Tone []       Edema [] L LE 2+ pitting edema   Activity Tolerance []  Generally decreased from baseline     Hand Dominance R [x] L []      COGNITION/  PERCEPTION: INTACT IMPAIRED   (See Comments)   Orientation [x]  x4   Vision [x]  glasses   Hearing [x]     Cognition  [x]     Perception [x]       MOBILITY: I Mod I S SBA CGA Min Mod Max Total  NT x2 Comments:   Bed Mobility    Rolling [x] [] [] [] [] [] [] [] [] [] []    Supine to Sit [x] [] [] [] [] [] [] [] [] [] []    Scooting [x] [] [] [] [] [] [] [] [] [] []    Sit to Supine [x] [] [] [] [] [] [] [] [] [] []    Transfers    Sit to Stand [] [] [] [x] [x] [] [] [] [] [] []    Bed to Chair [] [] [] [] [x] [] [] [] [] [] []    Stand to Sit [] [] [] [x] [] [] [] [] [] [] []    Tub/Shower [] [] [] [] [] [] [] [] [] [] []     Toilet [] [] [] [x] [] [] [] [] [] [] []  In ER bathroom    [] [] [] [] [] [] [] [] [] [] []    I=Independent, Mod I=Modified Independent, S=Supervision/Setup, SBA=Standby Assistance, CGA=Contact Guard Assistance, Min=Minimal Assistance, Mod=Moderate Assistance, Max=Maximal Assistance, Total=Total Assistance, NT=Not Tested    ACTIVITIES OF DAILY LIVING: I Mod I S SBA CGA Min Mod Max Total NT Comments   BASIC ADLs:              Upper Body Bathing  [] [] [] [] [] [] [] [] [] [x]    Lower Body Bathing [] [] [] [] [] [] [] [] [] [x]    Toileting [] [] [x] [] [] [] [] [] [] [] Improved balance using RW in hallway to get to ER bathroom   Upper Body Dressing [x] [] [] [] [] [] [] [] [] [] tshirt   Lower Body Dressing [] [] [] [] [] [x] [] [] [] [] Getting PJ pants over L LE, pt did rest after that, Max A L sock, setup R sock   Feeding [x] [] [] [] [] [] [] [] [] []    Grooming [] [] [x] [] [] [] [] [] [] [] In standing at sink    [address] [] [] [] [] [] [] [] [] [] []    Functional Mobility [] [] [x] [] [] [] [] [] [] [] With RW   I=Independent, Mod I=Modified Independent, S=Supervision/Setup, SBA=Standby Assistance, CGA=Contact Guard Assistance, Min=Minimal Assistance, Mod=Moderate Assistance, Max=Maximal Assistance, Total=Total Assistance, NT=Not Tested    PLAN:   [facility] of Care:  (Sandip and LOVE, near baseline)     PROBLEM LIST:   (Skilled intervention is medically necessary to address:)  Decreased ADL/Functional Activities  Decreased Activity Tolerance  Decreased AROM/PROM  Decreased Balance  Decreased Gait Ability  Increased Pain   INTERVENTIONS PLANNED:  (Benefits and precautions of occupational therapy have been discussed with the patient.)  Self Care Training  Therapeutic Activity  Education  Above completed         TREATMENT:     EVALUATION: LOW COMPLEXITY: (Untimed Charge)    TREATMENT:   Co-Treatment PT/OT necessary due to patient's decreased overall endurance/tolerance levels, as well as need for high level skilled assistance to complete functional transfers/mobility and functional tasks  Self Care (23 minutes): Patient participated in toileting, upper body dressing, lower body dressing, self feeding, and grooming ADLs in unsupported sitting and standing with minimal visual, verbal, and tactile cueing to increase independence, decrease assistance required, increase activity tolerance, and increase safety awareness. Patient also participated in functional mobility, functional transfer, energy conservation, and adaptive equipment training to increase independence, decrease assistance required, increase activity tolerance, and increase safety awareness. TREATMENT GRID:  N/A    AFTER TREATMENT PRECAUTIONS: Bed, Bed/Chair Locked, Call light within reach, Needs within reach, RN notified, Side rails x2, and Visitors at bedside    INTERDISCIPLINARY COLLABORATION:  RN/ PCT, PT/ PTA, and OT/ ASHRAF    EDUCATION:  Education Given To: Patient; Family;Staff  Education Provided: Role of Therapy;Plan of Care;Precautions; ADL Adaptive Strategies;Transfer Training;Energy Conservation; Family Education;Equipment; Fall Prevention Strategies  Education Provided Comments: pursed lip breathing  Education Method: Demonstration;Verbal;Teach Back  Barriers to Learning: None  Education Outcome: Verbalized understanding;Demonstrated understanding    TOTAL TREATMENT DURATION AND TIME:  Time In: 0940  Time Out: 1003  Minutes: 23    YVON DENSON, MS, OTR/L

## 2023-01-10 NOTE — ED TRIAGE NOTES
Patient with c/o left leg bruising, swelling and pain after tripping over a piece of wood while camping. Notes hit head. States bruising from groin to knee with swelling to foot. Wife also notes that patient is having difficulty speaking and weakness on right side. Wife notes hx of migraines for 2 years which cause these symptoms and has had 2 episodes in last day.  Sees neurologist.

## 2023-01-10 NOTE — CARE COORDINATION
01/10/23 1213   Service Assessment   Patient Orientation Alert and Oriented   Cognition Alert   History Provided By Patient   Primary Caregiver Self   Support Systems Spouse/Significant Other   PCP Verified by CM Yes   Last Visit to PCP Within last 3 months   Prior Functional Level Independent in ADLs/IADLs   Current Functional Level Independent in ADLs/IADLs   Can patient return to prior living arrangement Yes   Ability to make needs known: Good   Family able to assist with home care needs: Yes   Would you like for me to discuss the discharge plan with any other family members/significant others, and if so, who? Yes   Financial Resources Medicare   Social/Functional History   Lives With Spouse   Type of [address]   [address] [facility]   Ambulation Assistance Independent   Transfer Assistance Independent   Active  Yes   Mode of Transportation Car   Occupation Retired   Discharge Planning   Type of [address] Prior To Admission None   Potential Assistance Needed N/A   DME Ordered? No   Potential Assistance Purchasing Medications No   Type of Home Care Services None   Patient expects to be discharged to: [address] Discharge   Transition of Care Consult (CM Consult) Discharge Planning   Confirm Follow Up Transport Family   Condition of Participation: Discharge Planning   The Plan for Transition of Care is related to the following treatment goals: Home   The Patient and/or Patient Representative was provided with a Choice of Provider? Patient   The Patient and/Or Patient Representative agree with the Discharge Plan?  Yes   Freedom of Choice list was provided with basic dialogue that supports the patient's individualized plan of care/goals, treatment preferences, and shares the quality data associated with the providers? Yes         Pt awaiting bed assignment for admission. CM met with pt to discuss CM needs & DCP. Pt is A&Ox4. Pt is indep at home with all ADLS. Pt lives with spouse and sister. Pt has no DME needs. Pt has no difficulty with obtaining medications or transport. Patient denies hx of rehab, has had [facility] in the past- unsure of agency. No longer current with HH. PT OT eval: no skilled need. DCP home with spouse. No further needs noted.  CM to continue to monitor.'

## 2023-01-10 NOTE — PROGRESS NOTES
OBSERVATION STATUS  SPEECH LANGUAGE PATHOLOGY: DYSPHAGIA  Initial Assessment    NAME: Jo Vazquez  : 1950  MRN: [id number]    ADMISSION DATE: 2023  PRIMARY DIAGNOSIS: Dysarthria  Dysarthria [R47.1]    ICD-10: Treatment Diagnosis: R13.11 Dysphagia, Oral Phase    RECOMMENDATIONS   Diet:  Diet Solids Recommendation: Regular  Liquid Consistency Recommendation: Thin    Medications: PO        Therapeutic Interventions: Patient/Family education   Patient continues to require skilled intervention: Yes (TBD pending MRI)  D/C Recommendations: To be determined;possible cognitive linguistic evaluation pending MRI     ASSESSMENT    Dysphagia Diagnosis: Swallow function appears WFL-no dysphagia treatment indicated. GENERAL    History of Present Injury/Illness: Mr. Judit Ibarra  has a past medical history of Acute hypoxemic respiratory failure ([facility] [address] Anterior myocardial infarction [facility]), Arrhythmia, Arteriosclerotic coronary artery disease, Arthritis, Atrial fibrillation with RVR ([facility] [address] Cancer ([facility] [address] Coronary atherosclerosis of native coronary vessel, Elevated serum creatinine, Family history of premature CAD, Gout, Hypercholesterolemia, Hypertension, and Sinus bradycardia. Petra Alexander He also  has a past surgical history that includes Colonoscopy (N/A, 2016); Prostatectomy; pr unlisted procedure cardiac surgery (); Coronary artery bypass graft (3/9/2016); orthopedic surgery; heent; Colonoscopy; and pr unlisted procedure cardiac surgery. Subjective: wife present. patient's symptoms resolved per wife/patient  Behavior/Cognition: Alert; Cooperative;Pleasant mood  Communication Observation: Functional  Follows Directions: Complex    Prior Dysphagia History: none  Patient Complaint: migraines  Current Diet : Regular  Current Liquid Diet : Thin          O2 Device: Nasal cannula  Liters of Oxygen: 4 L     Pain:   Patient does not c/o pain                                        OBJECTIVE    Patient Positioning: Upright in bed   Oral Motor   Labial: No impairment  Oral Hygiene: Moist;Clean  Lingual: No impairment  Dentition: Adequate        Baseline Vocal Quality: Normal    Oropharyngeal Phase:   Patient consumed thin liquid via cup and straw and solid consistencies. Appropriate oral prep and clearance with all textures. Appeared to demonstrate timely swallow and single swallows upon palpation likely indicating adequate pharyngeal clearance. No overt signs or symptoms of airway compromise observed with liquid or solid textures. Voice remained clear. PLAN    Duration/Frequency: SLP will follow up for cognitive linguistic evaluation if indicated by imaging or course of hospitalization. Frequency/Duration TBD. Dysphagia Outcome and Severity Scale (KESHAWN)  Dysphagia Outcome Severity Scale: Level 7: Normal in all situations  Interpretation of Tool: The Dysphagia Outcome and Severity Scale (KESHAWN) is a simple, easy-to-use, 7-point scale developed to systematically rate the functional severity of dysphagia based on objective assessment and make recommendations for diet level, independence level, and type of nutrition. Normal(7), Functional(6), Mild(5), Mild-Moderate(4), Moderate(3), Moderate-Severe(2), Severe(1)    Speech Therapy Prognosis  Prognosis: Good    Education: Patient, RN, Family member  Patient Education: diet consistencies, role of SLP  Patient Education Response: Verbalizes understanding, Demonstrated understanding    PRECAUTIONS/ALLERGIES: Latex, Adhesive tape, and Codeine   Safety Devices in place: Yes  Type of devices: Call light within reach; Left in bed;Nurse notified        Therapy Time  SLP Individual Minutes  Time In: 0840  Time Out: 5885  Minutes: [address] [place], [place]  1/10/2023 9:31 AM

## 2023-01-10 NOTE — ED PROVIDER NOTES
Emergency Department Provider Note                   PCP:                Jennifer Harding MD               Age: 72 y.o. Sex: male       ICD-10-CM    1. Dysarthria  R47.1       2. Acute intractable headache, unspecified headache type  R51.9       3. Cellulitis of left lower extremity  L03. [address] To Admit 01/10/2023 02:21:31 AM        Medical Decision Making  Patient is a 21-year-old male with history of CAD, TIA, paroxysmal A. fib, hyperlipidemia, hypertension, sleep apnea, with history of COVID-19 who presents to facility today with acute on chronic aura migraine for which he sees neurology. He reports he normally takes Nurtec when he gets these migraines and it normally helps his symptoms mirtha. He is experiencing persistent dysarthria, headache, and has a left anterior leg cellulitis. Work-up here today showed reassuring labs. Normal head CT. Patient was given IV cefepime for the cellulitis on his leg. Patient was given fluids, Toradol, and Reglan to try and help with the headache with no relief. Patient reports he is only supposed to take his Nurtec once every 3 days and this is the first time he has had these migraines twice in the past 48 hours. Patient seems to have intermittent periods of this dysarthria which was witnessed in his exam room tonight. Although there does appear to be some chronic component to this given the 2 migraines in 48 hours which is new, the lingering dysarthria which family reports is never lasted this long, and the patient's persistent headache I believe admission for further work-up including advanced imaging is beneficial. Spoke to hospitalist regarding admission of patient. Amount and/or Complexity of Data Reviewed  Labs: ordered. Radiology: ordered. ECG/medicine tests: ordered. Risk  Prescription drug management. Decision regarding hospitalization. Complexity of Problem: 1 stable, chronic illness.  (3)  Complexity of Problem: 1 stable, acute illness. (3)    I have conducted an independent ordering and review of Labs. I have conducted an independent ordering and review of X-rays. I have conducted an independent ordering and review of CT Scan. I have conducted an independent ordering and review of Ultrasound. Considerations: Shared decision making was utilized in the care of this patient. I discussed case with Dr. Audrey Dooley, ED attending  Patient was admitted I have communication with admitting physician. ED Course as of 01/10/23 0222   Mon Jan 09, 2023   2341 Reevaluated patient. Headache is persistent so we will give migraine cocktail. Patient's blood pressure has come down with at home meds. Discussed results so far with patient and wife, they report understanding at this time. [TT]      ED Course User Index  [TT] Dee Reilly PA-C        Orders Placed This Encounter   Procedures    Blood Culture 1    Blood Culture 2    XR KNEE LEFT (3 VIEWS)    CT HEAD WO CONTRAST    CBC    Comprehensive Metabolic Panel    Lactic Acid    Procalcitonin    Vascular duplex lower extremity venous left        Medications   cefepime (MAXIPIME) 2,000 mg in sodium chloride 0.9 % 50 mL IVPB mini-bag (0 mg IntraVENous Stopped 1/9/23 2256)   metoprolol tartrate (LOPRESSOR) tablet 50 mg (50 mg Oral Given 1/9/23 2256)   metoclopramide (REGLAN) injection 10 mg (10 mg IntraVENous Given 1/9/23 2354)   ketorolac (TORADOL) injection 30 mg (30 mg IntraVENous Given 1/9/23 2355)   0.9 % sodium chloride bolus (1,000 mLs IntraVENous New Bag 1/9/23 2354)       New Prescriptions    No medications on file        Orquidea Mantilla is a 72 y.o. male who presents to the Emergency Department with chief complaint of    Chief Complaint   Patient presents with    Leg Pain      Patient is a 81-year-old male who presents to facility today with concern of left leg bruising, swelling, and pain after tripping over some wood about a week ago while camping.   He states he is on blood thinners because of his A. juan luis has bruising from his knee to the groin. He has some swelling in the left lower leg, left knee, and left ankle. He states they were concerned about potential blood clot and also may be some infection. He states he initially had an x-ray done a week ago and it was normal.  He states he is also having an aura migraine as diagnosed by neurology and has had 1 yesterday and 1 today. He states they mimic strokes but he states this feels exactly like other aura migraines he has had in the past.  He states his symptoms are starting to improve while he is here in the department which is part of the course for these headaches. He states the only thing different is that he has never had 1 on back-to-back days. His main complaint with this is feeling like he is having difficulty finding words, headache, and the feeling of right-sided weakness, all of which he experiences in previous headaches like this. Review of Systems   Constitutional:  Negative for chills and fever. Respiratory:  Negative for cough and shortness of breath. Cardiovascular:  Negative for chest pain. Gastrointestinal:  Negative for abdominal pain, nausea and vomiting. Genitourinary:  Negative for dysuria, frequency and urgency. Musculoskeletal:  Positive for arthralgias, gait problem and joint swelling. Skin:  Positive for wound. Neurological:  Positive for weakness and headaches. Negative for dizziness, seizures, syncope and facial asymmetry. Psychiatric/Behavioral:  Negative for agitation and behavioral problems. All other systems reviewed and are negative.     Past Medical History:   Diagnosis Date    Acute hypoxemic respiratory failure ([facility] [address] 12/4/2021    Anterior myocardial infarction [facility]) 10/2003    Arrhythmia     paroxysmal a fib    Arteriosclerotic coronary artery disease 11/2005    Arthritis     fingers    Atrial fibrillation with RVR ([address] 12/4/2021    Cancer ([facility] [address]     prostate Coronary atherosclerosis of native coronary vessel 10/2001    Elevated serum creatinine 12/15/2021    Family history of premature CAD     Father hx cabg/CHF  Brother CABG x 2    Gout     right toe but no problem now    Hypercholesterolemia     Hypertension     Sinus bradycardia 12/14/2021        Past Surgical History:   Procedure Laterality Date    COLONOSCOPY N/A 12/21/2016    COLONOSCOPY  BMI 34 performed by Albino Sharif MD at [address] GRAFT  3/9/2016    x4 - Dr. Church Raw      tonsillectomy    [address]      2 back surg    [place] CARDIAC SURG PROCEDURE UNLIST  March 9,2016    quidrupal bypass     [place] CARDIAC SURG PROCEDURE UNLIST      stent    PROSTATECTOMY          Family History   Problem Relation Age of Onset    Colon Cancer Neg Hx     Cancer Sister     Heart Disease Brother     Diabetes Brother     Heart Disease Father         Social History     Socioeconomic History    Marital status:    Tobacco Use    Smoking status: Never    Smokeless tobacco: Never   Vaping Use    Vaping Use: Never used   Substance and Sexual Activity    Alcohol use: Yes    Drug use: No    Sexual activity: Not Currently         Latex, Adhesive tape, and Codeine     Previous Medications    ASPIRIN 81 MG EC TABLET    Take 81 mg by mouth daily    EVOLOCUMAB 140 MG/ML SOSY    Inject 140 mg into the skin every 14 days    FLUTICASONE (FLONASE) 50 MCG/ACT NASAL SPRAY    One spray in each nostril twice daily    LORATADINE (CLARITIN) 10 MG TABLET    Take 10 mg by mouth    LOSARTAN (COZAAR) 25 MG TABLET    TAKE 1 TABLET BY MOUTH EVERY DAY    METOPROLOL TARTRATE (LOPRESSOR) 50 MG TABLET    Take 50 mg by mouth 2 times daily    NITROGLYCERIN (NITROSTAT) 0.4 MG SL TABLET    Place 0.4 mg under the tongue    RIMEGEPANT SULFATE 75 MG TBDP    Take 75 mg by mouth daily as needed (for migraine)    XARELTO 20 MG TABS TABLET    TAKE 1 TABLET BY MOUTH DAILY WITH SUPPER        Vitals signs and nursing note reviewed. Patient Vitals for the past 4 hrs:   Pulse BP   01/09/23 2337 92 (!) 151/125          Physical Exam     Procedures    Results for orders placed or performed during the hospital encounter of 01/09/23   XR KNEE LEFT (3 VIEWS)    Narrative    LEFT KNEE SERIES    HISTORY:  Left knee pain;    FINDINGS: 3 views of the knee demonstrate no displaced fracture or malalignment. Soft tissues are normal. Surgical clips are present in the medial aspect of the  left leg. Impression    No acute findings. CT HEAD WO CONTRAST    Narrative    HEAD CT WITHOUT CONTRAST  1/9/2023     HISTORY:   headache, weakness, HTN    TECHNIQUE: Noncontrast axial images were obtained through the brain. All CT  scans at this facility used dose modulation, interactive reconstruction and/or  weight based dosing when appropriate to reduce radiation dose to as low as  reasonably achievable. COMPARISON: Brain MRI May 30, 2022    FINDINGS: There is no acute intracranial hemorrhage, significant mass effect or  CT evidence of acute large artery territorial infarction. Please note that a  hyperacute infarct or small vessel infarct may not be apparent on initial CT  imaging. There are scattered areas of decreased attenuation within the periventricular  white matter compatible with mild chronic small vessel ischemic disease. The  left maxillary sinus is opacified. There is no hydrocephalus , intra-axial mass or abnormal extra-axial fluid  collection. There are no displaced skull fractures. Impression    1. White matter findings compatible with chronic small vessel ischemic disease. 2.  Left maxillary sinus disease.    CBC   Result Value Ref Range    WBC 9.5 4.3 - 11.1 K/uL    RBC 4.96 4.23 - 5.6 M/uL    Hemoglobin 14.4 13.6 - 17.2 g/dL    Hematocrit 43.7 41.1 - 50.3 %    MCV 88.1 82 - 102 FL    MCH 29.0 26.1 - 32.9 PG    MCHC 33.0 31.4 - 35.0 g/dL    RDW 13.4 11.9 - 14.6 %    Platelets 496 248 - 431 K/uL MPV 10.4 9.4 - 12.3 FL    nRBC 0.00 0.0 - 0.2 K/uL   Comprehensive Metabolic Panel   Result Value Ref Range    Sodium 134 133 - 143 mmol/L    Potassium 4.8 3.5 - 5.1 mmol/L    Chloride 104 101 - 110 mmol/L    CO2 25 21 - 32 mmol/L    Anion Gap 5 2 - 11 mmol/L    Glucose 118 (H) 65 - 100 mg/dL    BUN 14 8 - 23 MG/DL    Creatinine 1.00 0.8 - 1.5 MG/DL    Est, Glom Filt Rate >60 >60 ml/min/1.73m2    Calcium 8.8 8.3 - 10.4 MG/DL    Total Bilirubin 1.4 (H) 0.2 - 1.1 MG/DL    ALT 34 12 - 65 U/L    AST 42 (H) 15 - 37 U/L    Alk Phosphatase 89 50 - 136 U/L    Total Protein 7.4 6.3 - 8.2 g/dL    Albumin 3.4 3.2 - 4.6 g/dL    Globulin 4.0 2.8 - 4.5 g/dL    Albumin/Globulin Ratio 0.9 0.4 - 1.6     Lactic Acid   Result Value Ref Range    Lactic Acid, Plasma 1.8 0.4 - 2.0 MMOL/L   Procalcitonin   Result Value Ref Range    Procalcitonin <0.05 0.00 - 0.49 ng/mL   Vascular duplex lower extremity venous left    Narrative    LEFT LOWER EXTREMITY DOPPLER ULTRASOUND 1/9/2023      HISTORY:    CALF PAIN    Technique: Sonographic imaging of the deep veins of the left leg was performed    FINDINGS:    The common femoral vein, femoral vein, popliteal vein, posterior  tibial veins and peroneal veins compress appropriately. There is normal color  Doppler flow within these vessels. There is normal phasic, variable pulse wave  Doppler flow from the popliteal vein to the common femoral vein.        Impression    No DVT in the deep veins of the left leg.          CT HEAD WO CONTRAST   Final Result      1.  White matter findings compatible with chronic small vessel ischemic disease.      2.  Left maxillary sinus disease.      Vascular duplex lower extremity venous left   Final Result   No DVT in the deep veins of the left leg.         XR KNEE LEFT (3 VIEWS)   Final Result   No acute findings.           NIH Stroke Scale  Interval: Baseline  Level of Consciousness (1a): Alert  LOC Questions (1b): Answers both correctly  LOC Commands (1c): Performs  both tasks correctly  Best Gaze (2): Normal  Visual (3): No visual loss  Facial Palsy (4): Normal symmetrical movement  Motor Arm, Left (5a): No drift  Motor Arm, Right (5b): No drift  Motor Leg, Left (6a): No drift  Motor Leg, Right (6b): No drift  Limb Ataxia (7): Absent  Sensory (8): Normal  Best Language (9): Mild to moderate aphasia  Dysarthria (10): Mild to moderate, slurs some words  Extinction and Inattention (11): No abnormality  Total: 2              Voice dictation software was used during the making of this note. This software is not perfect and grammatical and other typographical errors may be present. This note has not been completely proofread for errors.      Thalia Lopez PA-C  01/10/23 0610

## 2023-01-10 NOTE — PROGRESS NOTES
Hospitalist Progress Note   Admit Date:  2023  8:34 PM   Name:  Krish Xie   Age:  72 y.o. Sex:  male  :  1950   MRN:  [id number]   Room:  [place] [address]    Presenting Complaint: Leg Pain     Reason(s) for Admission: Dysarthria [R47.1]     Hospital Course:   Krish Xie is a 72 y.o. male with medical history of paroxysmal atrial fibrillation, migraine, coronary artery disease, dyslipidemia, who presented with dysarthria, persistent migraine and strokelike symptoms for the past 2 days. CT head negative. Patient admitted for further evaluation management of TIA/stroke. Subjective & 24hr Events (01/10/23): Patient reports feeling better this morning. Dysarthria improved. Patient alert awake answering questions appropriately. No tingling numbness or weakness of extremities. No fever no chills. Patient has redness pain and swelling of the left lower extremity. Assessment & Plan: This is a 70-year-old male with    TIA/stroke/migraine aura  CT head negative. Obtain MRI of the brain. Xarelto, statin  Follow-up with neurology outpatient. Cellulitis of the left lower extremity  Cefepime. Coronary artery disease  Continue home medications    Dyslipidemia  Statin    Atrial fibrillation  Continue Xarelto. Anticipated discharge needs:          Diet:  ADULT DIET; Regular  DVT PPx: Xarelto  Code status: Full Code    Hospital Problems:  Principal Problem:    Dysarthria  Active Problems:    Persistent migraine aura    CAD (coronary artery disease)    Dyslipidemia    PAF (paroxysmal atrial fibrillation) ([facility] [address]  Resolved Problems:    * No resolved hospital problems.  *      Objective:   Patient Vitals for the past 24 hrs:   Temp Pulse Resp BP SpO2   01/10/23 1615 -- -- -- (!) 147/57 97 %   01/10/23 1600 -- -- -- (!) 135/54 (!) 89 %   01/10/23 1545 -- -- -- (!) 141/47 97 %   01/10/23 1530 -- -- -- (!) 150/60 96 %   01/10/23 1515 -- -- -- (!) 145/59 (!) 89 %   01/10/23 1300 -- -- -- (!) 151/58 92 %   01/10/23 1245 -- -- -- (!) 163/68 91 %   01/10/23 1232 -- 79 -- (!) 151/80 --   01/10/23 1230 -- -- -- (!) 151/80 91 %   01/10/23 1134 -- -- -- -- 94 %   01/10/23 0615 -- -- -- (!) 147/50 100 %   01/10/23 0601 -- -- -- -- 95 %   01/10/23 0600 -- -- -- 139/70 --   01/10/23 0545 -- -- -- (!) 148/54 90 %   01/10/23 0530 -- -- -- (!) 148/69 95 %   01/10/23 0515 -- -- -- (!) 159/78 99 %   01/10/23 0501 -- -- -- -- 91 %   01/10/23 0500 -- -- -- (!) 142/72 (!) 89 %   01/10/23 0448 -- -- -- -- 91 %   01/10/23 0447 -- -- -- (!) 152/70 --   01/09/23 2337 -- 92 -- (!) 151/125 --   01/09/23 2157 99 °F (37.2 °C) 84 20 (!) 207/69 95 %   01/09/23 2025 99.1 °F (37.3 °C) 84 18 (!) 190/67 97 %       Oxygen Therapy  SpO2: 97 %  Pulse via Oximetry: 67 beats per minute  O2 Device: Nasal cannula  O2 Flow Rate (L/min): 3 L/min    Estimated body mass index is 32.9 kg/m² as calculated from the following:    Height as of this encounter: 5' 10\" (1.778 m). Weight as of this encounter: 229 lb 4.5 oz (104 kg). No intake or output data in the 24 hours ending 01/10/23 2431      Physical Exam:     Blood pressure (!) 147/57, pulse 79, temperature 99 °F (37.2 °C), resp. rate 20, height 5' 10\" (1.778 m), weight 229 lb 4.5 oz (104 kg), SpO2 97 %. General:    Well nourished. Alert awake elderly male, ill-appearing,  Head:  Normocephalic, atraumatic  Eyes:  Sclerae appear normal.  Pupils equally round. ENT:  Nares appear normal.  Moist oral mucosa  Neck:  No restricted ROM. Trachea midline   CV:   RRR. No m/r/g. No jugular venous distension. Lungs:   CTAB. No wheezing, rhonchi, or rales. Symmetric expansion. Abdomen:   Soft, nontender, nondistended. Extremities: No cyanosis or clubbing, redness, swelling, tenderness of the left lower extremity,  Skin:     No rashes and normal coloration. Warm and dry. Neuro:  CN II-XII grossly intact. Sensation intact. Psych:  Normal mood and affect.       I have personally reviewed labs and tests showing:  Recent Labs:  Recent Results (from the past 48 hour(s))   CBC    Collection Time: 01/09/23  8:48 PM   Result Value Ref Range    WBC 9.5 4.3 - 11.1 K/uL    RBC 4.96 4.23 - 5.6 M/uL    Hemoglobin 14.4 13.6 - 17.2 g/dL    Hematocrit 43.7 41.1 - 50.3 %    MCV 88.1 82 - 102 FL    MCH 29.0 26.1 - 32.9 PG    MCHC 33.0 31.4 - 35.0 g/dL    RDW 13.4 11.9 - 14.6 %    Platelets 777 942 - 632 K/uL    MPV 10.4 9.4 - 12.3 FL    nRBC 0.00 0.0 - 0.2 K/uL   Comprehensive Metabolic Panel    Collection Time: 01/09/23  8:48 PM   Result Value Ref Range    Sodium 134 133 - 143 mmol/L    Potassium 4.8 3.5 - 5.1 mmol/L    Chloride 104 101 - 110 mmol/L    CO2 25 21 - 32 mmol/L    Anion Gap 5 2 - 11 mmol/L    Glucose 118 (H) 65 - 100 mg/dL    BUN 14 8 - 23 MG/DL    Creatinine 1.00 0.8 - 1.5 MG/DL    Est, Glom Filt Rate >60 >60 ml/min/1.73m2    Calcium 8.8 8.3 - 10.4 MG/DL    Total Bilirubin 1.4 (H) 0.2 - 1.1 MG/DL    ALT 34 12 - 65 U/L    AST 42 (H) 15 - 37 U/L    Alk Phosphatase 89 50 - 136 U/L    Total Protein 7.4 6.3 - 8.2 g/dL    Albumin 3.4 3.2 - 4.6 g/dL    Globulin 4.0 2.8 - 4.5 g/dL    Albumin/Globulin Ratio 0.9 0.4 - 1.6     Lactic Acid    Collection Time: 01/09/23  8:48 PM   Result Value Ref Range    Lactic Acid, Plasma 1.8 0.4 - 2.0 MMOL/L   Procalcitonin    Collection Time: 01/09/23  8:48 PM   Result Value Ref Range    Procalcitonin <0.05 0.00 - 0.49 ng/mL   Blood Culture 1    Collection Time: 01/09/23  8:48 PM    Specimen: Blood   Result Value Ref Range    Special Requests RIGHT  Antecubital        Culture NO GROWTH AFTER 10 HOURS         I have personally reviewed imaging studies showing: Other Studies:  CT HEAD WO CONTRAST   Final Result      1. White matter findings compatible with chronic small vessel ischemic disease. 2.  Left maxillary sinus disease. Vascular duplex lower extremity venous left   Final Result   No DVT in the deep veins of the left leg.          XR KNEE LEFT (3 VIEWS)   Final Result   No acute findings. MRI brain without contrast    (Results Pending)       Current Meds:  Current Facility-Administered Medications   Medication Dose Route Frequency    ondansetron (ZOFRAN-ODT) disintegrating tablet 4 mg  4 mg Oral Q8H PRN    Or    ondansetron (ZOFRAN) injection 4 mg  4 mg IntraVENous Q6H PRN    polyethylene glycol (GLYCOLAX) packet 17 g  17 g Oral Daily PRN    aspirin EC tablet 81 mg  81 mg Oral Daily    Or    aspirin suppository 300 mg  300 mg Rectal Daily    ketorolac (TORADOL) injection 30 mg  30 mg IntraVENous Q6H PRN    metoclopramide (REGLAN) injection 10 mg  10 mg IntraVENous Q6H PRN    losartan (COZAAR) tablet 25 mg  25 mg Oral Daily    metoprolol tartrate (LOPRESSOR) tablet 50 mg  50 mg Oral BID    rivaroxaban (XARELTO) tablet 20 mg  20 mg Oral Dinner    cefepime (MAXIPIME) 2,000 mg in sodium chloride 0.9 % 50 mL IVPB mini-bag  2,000 mg IntraVENous Q12H     Current Outpatient Medications   Medication Sig    XARELTO 20 MG TABS tablet TAKE 1 TABLET BY MOUTH DAILY WITH SUPPER    losartan (COZAAR) 25 MG tablet TAKE 1 TABLET BY MOUTH EVERY DAY    Rimegepant Sulfate 75 MG TBDP Take 75 mg by mouth daily as needed (for migraine)    aspirin 81 MG EC tablet Take 81 mg by mouth daily    Evolocumab 140 MG/ML SOSY Inject 140 mg into the skin every 14 days    fluticasone (FLONASE) 50 MCG/ACT nasal spray One spray in each nostril twice daily    loratadine (CLARITIN) 10 MG tablet Take 10 mg by mouth    metoprolol tartrate (LOPRESSOR) 50 MG tablet Take 50 mg by mouth 2 times daily    nitroGLYCERIN (NITROSTAT) 0.4 MG SL tablet Place 0.4 mg under the tongue       Signed:  Susi Henderson MD    Part of this note may have been written by using a voice dictation software. The note has been proof read but may still contain some grammatical/other typographical errors.

## 2023-01-10 NOTE — THERAPY EVALUATION
ACUTE PHYSICAL THERAPY GOALS:   (Developed with and agreed upon by patient and/or caregiver.)  (1.) Talia Solis  will move from supine to sit and sit to supine , scoot up and down, and roll side to side with MODIFIED INDEPENDENCE within 7 treatment day(s). (2.) Talia Solsi will transfer from bed to chair and chair to bed with MODIFIED INDEPENDENCE using the least restrictive device within 7 treatment day(s). (3.) Talia Solis will ambulate with MODIFIED INDEPENDENCE for 500+ feet with the least restrictive device within 7 treatment day(s). (4.) Talia Solis will perform standing static and dynamic balance activities x 25 minutes with MODIFIED INDEPENDENCE to improve safety and activity tolerance within 7 treatment day(s). (5.) Talia Solis will perform therapeutic exercises x 20 min for HEP with INDEPENDENCE to improve strength, endurance, and functional mobility within 7 treatment day(s). PHYSICAL THERAPY Initial Assessment, Daily Note, and AM  (Link to Caseload Tracking: PT Visit Days : 1  Acknowledge Orders  Time In/Out  PT Charge Capture  Rehab Caseload Tracker    Talia Solis is a 72 y.o. male   PRIMARY DIAGNOSIS: Dysarthria  Dysarthria [R47.1]     Reason for Referral: Generalized Muscle Weakness (M62.81)  Difficulty in walking, Not elsewhere classified (R26.2)  Other abnormalities of gait and mobility (R26.89)  Observation: Payor: Deven Gutierres / Plan: Annmarie Whipple PPO / Product Type: Medicare /     ASSESSMENT:     REHAB RECOMMENDATIONS:   Recommendation to date pending progress:  Setting:  No further skilled therapy after discharge from hospital    Equipment:    None (recommend pt utilize [facility] as LLE pain improves)     ASSESSMENT:  Mr. Rodney Gambino is a 72year old M who presents to hospital with aura migraine, intermittent dysarthria (which has resolved upon initial evaluation). Pt also with LLE cellulitis - swollen and a bit painful upon evaluation.  Pt has been using a SPC to offload his LLE since this injury. This date pt performs mobility including bed mobility, sitting balance activities, sit <> stand transfers, standing balance activities, and ambulation in room and hallway with CGA/SBA, use of RW. Spo2 >90% on room air throughout session. Pt presents as functioning slightly below his baseline, with deficits in mobility including transfers, gait, balance, and activity tolerance. Pt will benefit from skilled therapy services to address stated deficits during his acute stay to promote return to highest level of function, independence, and safety. Will continue to follow.      [address] [facility] [address] [address] AM-PAC 6 Clicks Basic Mobility Inpatient Short Form  AM-PAC Mobility Inpatient   How much difficulty turning over in bed?: None  How much difficulty sitting down on / standing up from a chair with arms?: A Little  How much difficulty moving from lying on back to sitting on side of bed?: None  How much help from another person moving to and from a bed to a chair?: None  How much help from another person needed to walk in hospital room?: A Little  How much help from another person for climbing 3-5 steps with a railing?: A Little  [facility] Inpatient Mobility Raw Score : 21  AM-PAC Inpatient T-Scale Score : 50.25  Mobility Inpatient CMS 0-100% Score: 28.97  Mobility Inpatient CMS G-Code Modifier : CJ    SUBJECTIVE:   Mr. Sybil Caceres states, \"I am feeling better \"     Social/Functional Lives With: Spouse  ADL Assistance: Independent  Homemaking Assistance: Independent  Ambulation Assistance: Independent (been using SPC since fall 1 week ago)  Transfer Assistance: Independent  Active : Yes  Mode of Transportation: Car  Occupation: Retired    OBJECTIVE:     PAIN: Lyle Hummingbird / O2: PRECAUTION / Tellis Merlin / Leola Sobia:   Pre Treatment: 1/10 LLE         Post Treatment: 1/10 Vitals        Oxygen  O2 Therapy: Room air  SpO2: 94 %   None    RESTRICTIONS/PRECAUTIONS:  Restrictions/Precautions: Fall Risk                 GROSS EVALUATION: Intact Impaired (Comments):   AROM [x]     PROM [x]    Strength [x]     Balance [] Sitting - Static: Good  Sitting - Dynamic: Good  Standing - Static: Good, -  Standing - Dynamic: Fair, +   Posture [] Forward Head  Rounded Shoulders   Sensation [x]     Coordination [x]      Tone []     Edema []    Activity Tolerance []   Slightly below baseline    []      COGNITION/  PERCEPTION: Intact Impaired (Comments):   Orientation [x]     Vision [x]     Hearing [x]     Cognition  [x]       MOBILITY: I Mod I S SBA CGA Min Mod Max Total  NT x2 Comments:   Bed Mobility    Rolling [] [] [] [x] [] [] [] [] [] [] []    Supine to Sit [] [] [] [x] [] [] [] [] [] [] []    Scooting [] [] [] [x] [] [] [] [] [] [] []    Sit to Supine [] [] [] [x] [] [] [] [] [] [] []    Transfers    Sit to Stand [] [] [] [x] [x] [] [] [] [] [] []    Bed to Chair [] [] [] [x] [x] [] [] [] [] [] []    Stand to Sit [] [] [] [x] [] [] [] [] [] [] []     [] [] [] [] [] [] [] [] [] [] []    I=Independent, Mod I=Modified Independent, S=Supervision, SBA=Standby Assistance, CGA=Contact Guard Assistance,   Min=Minimal Assistance, Mod=Moderate Assistance, Max=Maximal Assistance, Total=Total Assistance, NT=Not Tested    GAIT: I Mod I S SBA CGA Min Mod Max Total  NT x2 Comments:   Level of Assistance [] [] [] [x] [x] [] [] [] [] [] []    Distance 200 feet    DME None and then RW for second half due to antalgic pattern due to LLE pain    Gait Quality Antalgic    Weightbearing Status      Stairs      I=Independent, Mod I=Modified Independent, S=Supervision, SBA=Standby Assistance, CGA=Contact Guard Assistance,   Min=Minimal Assistance, Mod=Moderate Assistance, Max=Maximal Assistance, Total=Total Assistance, NT=Not Tested    PLAN:   FREQUENCY AND DURATION: 3 times/week for duration of hospital stay or until stated goals are met, whichever comes first.    THERAPY PROGNOSIS: Good    PROBLEM LIST:   (Skilled intervention is medically necessary to address:)  Decreased Activity Tolerance  Decreased Balance  Decreased Coordination  Decreased Gait Ability  Decreased Strength  Decreased Transfer Abilities INTERVENTIONS PLANNED:   (Benefits and precautions of physical therapy have been discussed with the patient.)  Self Care Training  Therapeutic Activity  Therapeutic Exercise/HEP  Neuromuscular Re-education  Gait Training  Education       TREATMENT:   EVALUATION: MODERATE COMPLEXITY: (Untimed Charge)    TREATMENT:   Therapeutic Activity (15 Minutes): Therapeutic activity included Rolling, Supine to Sit, Sit to Supine, Scooting, Transfer Training, Ambulation on level ground, Sitting balance , and Standing balance to improve functional Activity tolerance, Balance, Coordination, Mobility, and Strength. TREATMENT GRID:  N/A    AFTER TREATMENT PRECAUTIONS: Bed/Chair Locked, Call light within reach, Chair, Needs within reach, RN notified, and Visitors at bedside    INTERDISCIPLINARY COLLABORATION:  RN/ PCT, PT/ PTA, and OT/ ASHRAF    EDUCATION: Education Given To: Patient  Education Provided: Role of Therapy;Plan of Care; Fall Prevention Strategies; Equipment; Energy Conservation  Education Outcome: Verbalized understanding;Continued education needed    TIME IN/OUT:  Time In: 0940  Time Out: 1003  Minutes: Mike [place]

## 2023-01-11 ENCOUNTER — TELEPHONE (OUTPATIENT)
Dept: NEUROLOGY | Age: 73
End: 2023-01-11

## 2023-01-11 VITALS
OXYGEN SATURATION: 94 % | WEIGHT: 232.3 LBS | BODY MASS INDEX: 33.26 KG/M2 | SYSTOLIC BLOOD PRESSURE: 169 MMHG | DIASTOLIC BLOOD PRESSURE: 67 MMHG | HEIGHT: 70 IN | RESPIRATION RATE: 18 BRPM | HEART RATE: 75 BPM | TEMPERATURE: 97.9 F

## 2023-01-11 PROBLEM — R29.810 FACIAL DROOP: Status: RESOLVED | Noted: 2022-02-20 | Resolved: 2023-01-11

## 2023-01-11 PROBLEM — Z86.16 PERSONAL HISTORY OF COVID-19: Chronic | Status: ACTIVE | Noted: 2021-12-04

## 2023-01-11 PROBLEM — R53.1 RIGHT SIDED WEAKNESS: Status: RESOLVED | Noted: 2022-02-20 | Resolved: 2023-01-11

## 2023-01-11 PROBLEM — R47.1 DYSARTHRIA: Status: RESOLVED | Noted: 2023-01-10 | Resolved: 2023-01-11

## 2023-01-11 PROBLEM — G45.9 TIA (TRANSIENT ISCHEMIC ATTACK): Status: RESOLVED | Noted: 2022-02-20 | Resolved: 2023-01-11

## 2023-01-11 PROBLEM — R06.02 SHORTNESS OF BREATH: Status: RESOLVED | Noted: 2022-01-25 | Resolved: 2023-01-11

## 2023-01-11 PROBLEM — R47.81 SLURRED SPEECH: Status: RESOLVED | Noted: 2022-02-20 | Resolved: 2023-01-11

## 2023-01-11 LAB
ANION GAP SERPL CALC-SCNC: 8 MMOL/L (ref 2–11)
BUN SERPL-MCNC: 13 MG/DL (ref 8–23)
CALCIUM SERPL-MCNC: 8.7 MG/DL (ref 8.3–10.4)
CHLORIDE SERPL-SCNC: 103 MMOL/L (ref 101–110)
CHOLEST SERPL-MCNC: 53 MG/DL
CO2 SERPL-SCNC: 28 MMOL/L (ref 21–32)
CREAT SERPL-MCNC: 1 MG/DL (ref 0.8–1.5)
ERYTHROCYTE [DISTWIDTH] IN BLOOD BY AUTOMATED COUNT: 13.5 % (ref 11.9–14.6)
EST. AVERAGE GLUCOSE BLD GHB EST-MCNC: 103 MG/DL
GLUCOSE SERPL-MCNC: 81 MG/DL (ref 65–100)
HBA1C MFR BLD: 5.2 % (ref 4.8–5.6)
HCT VFR BLD AUTO: 42.9 % (ref 41.1–50.3)
HDLC SERPL-MCNC: 41 MG/DL (ref 40–60)
HDLC SERPL: 1.3
HGB BLD-MCNC: 14.1 G/DL (ref 13.6–17.2)
LDLC SERPL CALC-MCNC: NORMAL MG/DL
MCH RBC QN AUTO: 29 PG (ref 26.1–32.9)
MCHC RBC AUTO-ENTMCNC: 32.9 G/DL (ref 31.4–35)
MCV RBC AUTO: 88.3 FL (ref 82–102)
NRBC # BLD: 0 K/UL (ref 0–0.2)
PLATELET # BLD AUTO: 223 K/UL (ref 150–450)
PMV BLD AUTO: 10.3 FL (ref 9.4–12.3)
POTASSIUM SERPL-SCNC: 3.8 MMOL/L (ref 3.5–5.1)
RBC # BLD AUTO: 4.86 M/UL (ref 4.23–5.6)
SODIUM SERPL-SCNC: 139 MMOL/L (ref 133–143)
TRIGL SERPL-MCNC: 76 MG/DL (ref 35–150)
VLDLC SERPL CALC-MCNC: 15.2 MG/DL (ref 6–23)
WBC # BLD AUTO: 7.4 K/UL (ref 4.3–11.1)

## 2023-01-11 PROCEDURE — 83036 HEMOGLOBIN GLYCOSYLATED A1C: CPT

## 2023-01-11 PROCEDURE — 80048 BASIC METABOLIC PNL TOTAL CA: CPT

## 2023-01-11 PROCEDURE — G0378 HOSPITAL OBSERVATION PER HR: HCPCS

## 2023-01-11 PROCEDURE — 6360000002 HC RX W HCPCS: Performed by: HOSPITALIST

## 2023-01-11 PROCEDURE — 85027 COMPLETE CBC AUTOMATED: CPT

## 2023-01-11 PROCEDURE — 96366 THER/PROPH/DIAG IV INF ADDON: CPT

## 2023-01-11 PROCEDURE — 2580000003 HC RX 258: Performed by: HOSPITALIST

## 2023-01-11 PROCEDURE — 36415 COLL VENOUS BLD VENIPUNCTURE: CPT

## 2023-01-11 PROCEDURE — 80061 LIPID PANEL: CPT

## 2023-01-11 PROCEDURE — 6370000000 HC RX 637 (ALT 250 FOR IP): Performed by: HOSPITALIST

## 2023-01-11 RX ORDER — ROSUVASTATIN CALCIUM 20 MG/1
20 TABLET, COATED ORAL NIGHTLY
Status: DISCONTINUED | OUTPATIENT
Start: 2023-01-11 | End: 2023-01-11 | Stop reason: HOSPADM

## 2023-01-11 RX ORDER — SACCHAROMYCES BOULARDII 250 MG
250 CAPSULE ORAL 2 TIMES DAILY
Qty: 14 CAPSULE | Refills: 0 | Status: SHIPPED | OUTPATIENT
Start: 2023-01-11 | End: 2023-01-18

## 2023-01-11 RX ORDER — CEFDINIR 300 MG/1
300 CAPSULE ORAL 2 TIMES DAILY
Qty: 10 CAPSULE | Refills: 0 | Status: SHIPPED | OUTPATIENT
Start: 2023-01-11 | End: 2023-01-16

## 2023-01-11 RX ORDER — ROSUVASTATIN CALCIUM 20 MG/1
20 TABLET, COATED ORAL NIGHTLY
Qty: 30 TABLET | Refills: 3 | Status: SHIPPED | OUTPATIENT
Start: 2023-01-11 | End: 2023-01-16 | Stop reason: ALTCHOICE

## 2023-01-11 RX ADMIN — LOSARTAN POTASSIUM 25 MG: 25 TABLET, FILM COATED ORAL at 08:47

## 2023-01-11 RX ADMIN — ASPIRIN 81 MG: 81 TABLET ORAL at 08:47

## 2023-01-11 RX ADMIN — CEFEPIME 2000 MG: 2 INJECTION, POWDER, FOR SOLUTION INTRAVENOUS at 02:10

## 2023-01-11 RX ADMIN — METOPROLOL TARTRATE 50 MG: 50 TABLET, FILM COATED ORAL at 08:47

## 2023-01-11 ASSESSMENT — PAIN SCALES - GENERAL
PAINLEVEL_OUTOF10: 0

## 2023-01-11 NOTE — CARE COORDINATION
Discharge order is in. Pt is discharging home in stable condition. No discharge needs identified. Tx goals have been met.       01/11/23 [address] [facility] Discharge   Transition of Care Consult (CM Consult) Discharge [place] [address] Discharge None   [place] Resource Information Provided? No   Mode of Transport at Discharge Other (see comment)  (Family)   Confirm Follow Up Transport Family   Condition of Participation: Discharge Planning   The Patient and/or Patient Representative was provided with a Choice of Provider? Patient   The Patient and/Or Patient Representative agree with the Discharge Plan? Yes   Freedom of Choice list was provided with basic dialogue that supports the patient's individualized plan of care/goals, treatment preferences, and shares the quality data associated with the providers?   Yes

## 2023-01-11 NOTE — TELEPHONE ENCOUNTER
This approval authorizes your coverage from 01/01/2023 - 12/31/2023    [facility] Medicare    If you have questions or need help, please talk to your doctor or pharmacist, or contact  Customer Care at [phone], 24 hours a day, 7 days a week. TTY users may call [phone].

## 2023-01-11 NOTE — PLAN OF CARE
Problem: Discharge Planning  Goal: Discharge to home or other facility with appropriate resources  Outcome: Progressing  Flowsheets (Taken 1/10/2023 2012)  Discharge to home or other facility with appropriate resources: Identify barriers to discharge with patient and caregiver     Problem: Pain  Goal: Verbalizes/displays adequate comfort level or baseline comfort level  Outcome: Progressing  Flowsheets (Taken 1/10/2023 2012)  Verbalizes/displays adequate comfort level or baseline comfort level:   Assess pain using appropriate pain scale   Encourage patient to monitor pain and request assistance   Administer analgesics based on type and severity of pain and evaluate response   Implement non-pharmacological measures as appropriate and evaluate response   Consider cultural and social influences on pain and pain management   Notify Licensed Independent Practitioner if interventions unsuccessful or patient reports new pain     Problem: Safety - Adult  Goal: Free from fall injury  Outcome: Progressing     Problem: ABCDS Injury Assessment  Goal: Absence of physical injury  Outcome: Progressing

## 2023-01-11 NOTE — PROGRESS NOTES
TRANSFER - IN REPORT:    Verbal report received from [facility] on Jamel Latedry being received from ED for routine progression of care. Report consisted of patients Situation, Background, Assessment and Recommendations(SBAR). Information from the following report(s) SBAR, ED Summary, and Recent Results was reviewed. Opportunity for questions and clarification was provided. Assessment completed upon patients arrival to unit and care assumed. Patient received to room 315. Patient connected to monitor and assessment completed. Plan of care reviewed. Patient oriented to room and call light. Patient aware to use call light to communicate any chest pain or needs. Admission skin assessment completed with second RN and reveals the following: LLE red and swollen from knee to shin, pitting edema 2+ , bruising and large scab on L knee, bruising on inner ankle and foot. Scars on midsternum and lower back. Skin is generally warm and dry. Heels and sacrum intact.

## 2023-01-11 NOTE — DISCHARGE SUMMARY
Hospitalist Discharge Summary   Admit Date:  2023  8:34 PM   DC Note date: 2023  Name:  Brigid Nails   Age:  72 y.o. Sex:  male  :  1950   MRN:  [id number]   Room:  [facility]  PCP:  Mary Loya MD    Presenting Complaint: Leg Pain     Initial Admission Diagnosis: Dysarthria [R47.1]  Cellulitis of left lower extremity [L03.116]  Acute intractable headache, unspecified headache type [R51.9]     Problem List for this Hospitalization (present on admission):    Principal Problem (Resolved):    Dysarthria  Active Problems:    Persistent migraine aura    CAD (coronary artery disease)    Secondary hypercoagulable state ([facility] [address]    Dyslipidemia    Sleep apnea    Class 1 obesity due to excess calories with serious comorbidity and body mass index (BMI) of 33.0 to 33.9 in adult    Primary hypertension    PAF (paroxysmal atrial fibrillation) [facility])      Hospital Course:  72 y.o. male with medical history of paroxysmal atrial fibrillation, migraine, coronary artery disease, dyslipidemia, who presented with dysarthria, persistent migraine and strokelike symptoms for the past 2 days. CT head negative. Patient admitted for further evaluation management of TIA/stroke. He has established migraine with aura that is previously. With aphasia. He is followed by outpatient neurology for this. He underwent MRI and again confirmed no evidence of cerebrovascular accident. He was determined appropriate for discharge . Disposition: Home with outpatient follow-up  Diet: ADULT DIET; Regular  Code Status: Full Code    Follow Ups:   Follow-up Information     KAILEE Hudson. Schedule an appointment as soon as possible for a   visit in 1 week(s). Specialty: Neurology  Why: Routine progression of care  Contact information:  [address] [place] [address]  [phone]             [facility] Emiliano Anderson).  Schedule an   appointment as soon as possible for a visit in 1 month(s). Specialty: Jimbo Rios  Why: Routine progression of care  Contact information:  [place]  [address] [facility]  One Dru Jaime MD. Schedule an appointment as soon as possible   for a visit in 2 week(s). Specialty: Family Medicine  Why: Transition of Care Management  Contact information:  [address] [facility]    [address]  [phone]                       Time spent in patient discharge and coordination 38 minutes. Follow up labs/diagnostics (ultimately defer to outpatient provider):  Consider prophylactic migraine medication  Follow-up with neurology  Return precautions  Establishment with [facility] for success and aging    Plan was discussed with patient, wife, nurse, . All questions answered. Patient was stable at time of discharge. Instructions given to call a physician or return if any concerns.     Current Discharge Medication List        START taking these medications    Details   rosuvastatin (CRESTOR) 20 MG tablet Take 1 tablet by mouth nightly  Qty: 30 tablet, Refills: 3      cefdinir (OMNICEF) 300 MG capsule Take 1 capsule by mouth 2 times daily for 5 days  Qty: 10 capsule, Refills: 0      saccharomyces boulardii (FLORASTOR) 250 MG capsule Take 1 capsule by mouth 2 times daily for 7 days  Qty: 14 capsule, Refills: 0           CONTINUE these medications which have NOT CHANGED    Details   XARELTO 20 MG TABS tablet TAKE 1 TABLET BY MOUTH DAILY WITH SUPPER  Qty: 30 tablet, Refills: 1      losartan (COZAAR) 25 MG tablet TAKE 1 TABLET BY MOUTH EVERY DAY  Qty: 90 tablet, Refills: 1      Rimegepant Sulfate 75 MG TBDP Take 75 mg by mouth daily as needed (for migraine)  Qty: 30 tablet, Refills: 2    Associated Diagnoses: History of migraine      aspirin 81 MG EC tablet Take 81 mg by mouth daily      Evolocumab 140 MG/ML SOSY Inject 140 mg into the skin every 14 days      fluticasone (FLONASE) 50 MCG/ACT nasal spray One spray in each nostril twice daily      loratadine (CLARITIN) 10 MG tablet Take 10 mg by mouth      metoprolol tartrate (LOPRESSOR) 50 MG tablet Take 50 mg by mouth 2 times daily      nitroGLYCERIN (NITROSTAT) 0.4 MG SL tablet Place 0.4 mg under the tongue             Procedures done this admission:  * No surgery found *    Consults this admission:  IP CONSULT TO CASE MANAGEMENT    Echocardiogram results:  No results found for this or any previous visit. Diagnostic Imaging/Tests:   XR KNEE LEFT (3 VIEWS)    Result Date: 1/9/2023  No acute findings. CT HEAD WO CONTRAST    Result Date: 1/9/2023  1. White matter findings compatible with chronic small vessel ischemic disease. 2.  Left maxillary sinus disease. MRI brain without contrast    Result Date: 1/11/2023  1. No evidence of acute infarction. 2. Mild chronic small vessel ischemic change. 3. Moderate left maxillary sinus disease with some interval improvement. Vascular duplex lower extremity venous left    Result Date: 1/9/2023  No DVT in the deep veins of the left leg.         Labs: Results:       BMP, Mg, Phos Recent Labs     01/09/23 2048 01/11/23  0553    139   K 4.8 3.8    103   CO2 25 28   ANIONGAP 5 8   BUN 14 13   CREATININE 1.00 1.00   LABGLOM >60 >60   CALCIUM 8.8 8.7   GLUCOSE 118* 81      CBC Recent Labs     01/09/23 2048 01/11/23  0553   WBC 9.5 7.4   RBC 4.96 4.86   HGB 14.4 14.1   HCT 43.7 42.9   MCV 88.1 88.3   MCH 29.0 29.0   MCHC 33.0 32.9   RDW 13.4 13.5    223   MPV 10.4 10.3   NRBC 0.00 0.00      LFT Recent Labs     01/09/23 2048   BILITOT 1.4*   ALKPHOS 89   AST 42*   ALT 34   PROT 7.4   LABALBU 3.4   GLOB 4.0      Cardiac  Lab Results   Component Value Date/Time    TROPHS 10.4 05/30/2022 12:49 AM    TROPHS 9.0 05/29/2022 09:12 PM    TROPHS 7.9 05/29/2022 03:00 PM      Coags Lab Results   Component Value Date/Time    PROTIME 15.7 05/29/2022 03:00 PM PROTIME 15.1 02/20/2022 11:45 AM    PROTIME 17.7 12/05/2021 04:07 AM    INR 1.3 05/29/2022 03:00 PM    INR 1.3 02/20/2022 11:45 AM    INR 1.4 12/05/2021 04:07 AM      A1c Lab Results   Component Value Date/Time    LABA1C 5.2 01/11/2023 05:53 AM    LABA1C 5.6 05/30/2022 12:49 AM    LABA1C 5.0 02/21/2022 04:59 AM     01/11/2023 05:53 AM     05/30/2022 12:49 AM    EAG 97 02/21/2022 04:59 AM      Lipids Lab Results   Component Value Date/Time    CHOL 53 01/11/2023 05:53 AM    LDLCALC Not calculated due to elevated triglyceride level 01/11/2023 05:53 AM    LABVLDL 15.2 01/11/2023 05:53 AM    HDL 41 01/11/2023 05:53 AM    CHOLHDLRATIO 1.3 01/11/2023 05:53 AM    TRIG 76 01/11/2023 05:53 AM      Thyroid  Lab Results   Component Value Date/Time    YLV6EFM 3.230 07/19/2022 08:09 AM        Most Recent UA Lab Results   Component Value Date/Time    COLORU YELLOW 05/29/2022 05:23 PM    APPEARANCE CLEAR 05/29/2022 05:23 PM    SPECGRAV 1.015 05/29/2022 05:23 PM    LABPH 7.5 05/29/2022 05:23 PM    PROTEINU Negative 05/29/2022 05:23 PM    GLUCOSEU Negative 05/29/2022 05:23 PM    KETUA Negative 05/29/2022 05:23 PM    BILIRUBINUR Negative 05/29/2022 05:23 PM    BILIRUBINUR Negative 12/05/2021 05:50 AM    BLOODU Negative 05/29/2022 05:23 PM    UROBILINOGEN 0.2 05/29/2022 05:23 PM    NITRU Negative 05/29/2022 05:23 PM    LEUKOCYTESUR Negative 05/29/2022 05:23 PM    WBCUA 3-5 12/05/2021 05:50 AM    RBCUA 0-3 12/05/2021 05:50 AM    BACTERIA 3+ 12/05/2021 05:50 AM        Recent Labs     01/10/23  1320 01/09/23 2048   CULTURE NO GROWTH AFTER 18 HOURS NO GROWTH 2 DAYS       All Labs from Last 24 Hrs:  Recent Results (from the past 24 hour(s))   Blood Culture 2    Collection Time: 01/10/23  1:20 PM    Specimen: Blood   Result Value Ref Range    Special Requests RIGHT ANTECUBITAL      Culture NO GROWTH AFTER 18 HOURS     CBC    Collection Time: 01/11/23  5:53 AM   Result Value Ref Range    WBC 7.4 4.3 - 11.1 K/uL    RBC 4.86 4.23 - 5.6 M/uL    Hemoglobin 14.1 13.6 - 17.2 g/dL    Hematocrit 42.9 41.1 - 50.3 %    MCV 88.3 82 - 102 FL    MCH 29.0 26.1 - 32.9 PG    MCHC 32.9 31.4 - 35.0 g/dL    RDW 13.5 11.9 - 14.6 %    Platelets 413 406 - 763 K/uL    MPV 10.3 9.4 - 12.3 FL    nRBC 0.00 0.0 - 0.2 K/uL   Hemoglobin A1c    Collection Time: 01/11/23  5:53 AM   Result Value Ref Range    Hemoglobin A1C 5.2 4.8 - 5.6 %    eAG 103 mg/dL   Lipid Panel    Collection Time: 01/11/23  5:53 AM   Result Value Ref Range    Cholesterol, Total 53 <200 MG/DL    Triglycerides 76 35 - 150 MG/DL    HDL 41 40 - 60 MG/DL    LDL Calculated Not calculated due to elevated triglyceride level <100 MG/DL    VLDL Cholesterol Calculated 15.2 6.0 - 23.0 MG/DL    Chol/HDL Ratio 1.3     Basic Metabolic Panel w/ Reflex to MG    Collection Time: 01/11/23  5:53 AM   Result Value Ref Range    Sodium 139 133 - 143 mmol/L    Potassium 3.8 3.5 - 5.1 mmol/L    Chloride 103 101 - 110 mmol/L    CO2 28 21 - 32 mmol/L    Anion Gap 8 2 - 11 mmol/L    Glucose 81 65 - 100 mg/dL    BUN 13 8 - 23 MG/DL    Creatinine 1.00 0.8 - 1.5 MG/DL    Est, Glom Filt Rate >60 >60 ml/min/1.73m2    Calcium 8.7 8.3 - 10.4 MG/DL       Allergies   Allergen Reactions    Latex Rash    Adhesive Tape Rash    Codeine Nausea And Vomiting     Immunization History   Administered Date(s) Administered    PPD Test 03/09/2016, 02/20/2022    Tdap (Boostrix, Adacel) 10/21/2014       Recent Vital Data:  Patient Vitals for the past 24 hrs:   Temp Pulse Resp BP SpO2   01/11/23 0735 97.9 °F (36.6 °C) 75 18 (!) 169/67 94 %   01/11/23 0400 98.5 °F (36.9 °C) 73 16 (!) 160/89 95 %   01/11/23 0006 97.9 °F (36.6 °C) 73 16 (!) 150/52 95 %   01/10/23 2348 -- -- -- -- 95 %   01/10/23 2012 97.9 °F (36.6 °C) 71 15 (!) 160/71 96 %   01/10/23 1840 98.7 °F (37.1 °C) 76 20 (!) 174/88 94 %   01/10/23 1745 -- -- -- -- 96 %   01/10/23 1730 -- -- -- -- 95 %   01/10/23 1715 -- -- -- -- 95 %   01/10/23 1700 -- -- -- -- 94 %   01/10/23 1615 -- -- -- (!) 147/57 97 %   01/10/23 1600 -- -- -- (!) 135/54 (!) 89 %   01/10/23 1545 -- -- -- (!) 141/47 97 %   01/10/23 1530 -- -- -- (!) 150/60 96 %   01/10/23 1515 -- -- -- (!) 145/59 (!) 89 %   01/10/23 1300 -- -- -- (!) 151/58 92 %   01/10/23 1245 -- -- -- (!) 163/68 91 %   01/10/23 1232 -- 79 -- (!) 151/80 --   01/10/23 1230 -- -- -- (!) 151/80 91 %   01/10/23 1134 -- -- -- -- 94 %       Oxygen Therapy  SpO2: 94 %  Pulse via Oximetry: 77 beats per minute  O2 Device: None (Room air)  O2 Flow Rate (L/min): 3 L/min    Estimated body mass index is 33.33 kg/m² as calculated from the following:    Height as of this encounter: 5' 10\" (1.778 m). Weight as of this encounter: 232 lb 4.8 oz (105.4 kg). Intake/Output Summary (Last 24 hours) at 1/11/2023 1027  Last data filed at 1/11/2023 0400  Gross per 24 hour   Intake 250 ml   Output 0 ml   Net 250 ml       Physical Exam  Vitals and nursing note reviewed. Constitutional:       General: He is not in acute distress. Appearance: He is obese. He is not ill-appearing or diaphoretic. HENT:      Head: Normocephalic and atraumatic. Eyes:      Extraocular Movements: Extraocular movements intact. Cardiovascular:      Rate and Rhythm: Normal rate. Pulmonary:      Effort: Pulmonary effort is normal. No respiratory distress. Abdominal:      General: There is no distension. Musculoskeletal:         General: No deformity. Skin:     Coloration: Skin is not jaundiced or pale. Neurological:      General: No focal deficit present. Mental Status: He is alert and oriented to person, place, and time. Cranial Nerves: No cranial nerve deficit or dysarthria. Psychiatric:         Mood and Affect: Mood normal.         Behavior: Behavior normal. Behavior is cooperative.          Signed:  Haley Morgan MD

## 2023-01-11 NOTE — TELEPHONE ENCOUNTER
[facility]   Key: [id number])  our information has been submitted to [facility] Medicare Part D. [employer] Medicare Part D will review the request and will issue a decision, typically within 1-3 days from your submission. You can check the updated outcome later by reopening this request.    If [facility] Medicare Part D has not responded in 1-3 days or if you have any questions about your ePA request, please contact [facility] Medicare Part D at [phone]. If you think there may be a problem with your PA request, use our live chat feature at the bottom right.

## 2023-01-12 ENCOUNTER — TELEPHONE (OUTPATIENT)
Dept: NEUROLOGY | Age: 73
End: 2023-01-12

## 2023-01-12 ENCOUNTER — CARE COORDINATION (OUTPATIENT)
Dept: CARE COORDINATION | Facility: CLINIC | Age: 73
End: 2023-01-12

## 2023-01-12 RX ORDER — METOPROLOL TARTRATE 50 MG/1
TABLET, FILM COATED ORAL
Qty: 180 TABLET | Refills: 3 | Status: SHIPPED | OUTPATIENT
Start: 2023-01-12

## 2023-01-12 RX ORDER — LOSARTAN POTASSIUM 25 MG/1
TABLET ORAL
Qty: 90 TABLET | Refills: 1 | Status: SHIPPED | OUTPATIENT
Start: 2023-01-12

## 2023-01-12 NOTE — CARE COORDINATION
[facility] Care Transitions Initial Follow Up Call    Call within 2 business days of discharge: Yes    LPN Care Coordinator contacted the patient by telephone to perform post hospital discharge assessment. Verified name and  with patient as identifiers. Provided introduction to self, and explanation of the LPN Care Coordinator role. Patient: Edouard Dubon Patient : 1950   MRN: [id number]  Reason for Admission: left leg pain, bruising, swelling s/p fall. Discharge Date: 23 RARS: Readmission Risk Score: 5.5 ( )      Last Discharge  Street       Date Complaint Diagnosis Description Type Department Provider    23 Leg Pain Dysarthria . .. ED to Hosp-Admission (Discharged) (ADMITTED) Mercedes Madsen MD; Verna Kim . .. Was this an external facility discharge? No Discharge Facility: [facility]    Challenges to be reviewed by the provider   Additional needs identified to be addressed with provider: No  none               Method of communication with provider: none. LPN Care Coordinator reviewed discharge instructions with patient who verbalized understanding. The patient was given an opportunity to ask questions and does not have any further questions or concerns at this time. Were discharge instructions available to patient? Yes. Reviewed appropriate site of care based on symptoms and resources available to patient including: PCP  Specialist  Urgent care clinics  When to call [address] The patient agrees to contact the PCP office for questions related to their healthcare. Advance Care Planning:   Does patient have an Advance Directive: reviewed and current. Medication reconciliation was performed with patient, who verbalizes understanding of administration of home medications.  Medications reviewed, 1111F entered: N/A    Was patient discharged with a pulse oximeter? no    Non-face-to-face services provided:  Obtained and reviewed discharge summary and/or continuity of care documents  Education of patient/family/caregiver/guardian to support self-management-Faiza Rosa LPN -[phone]  All scheduled appointments. Offered patient enrollment in the Remote Patient Monitoring (RPM) program for in-home monitoring: NA.    Care Transitions 24 Hour Call    Do you have a copy of your discharge instructions?: Yes  Do you have all of your prescriptions and are they filled?: Yes  Have you been contacted by a [employer] [address]?: No  Have you scheduled your follow up appointment?: Yes  How are you going to get to your appointment?: Car - family or friend to transport  Do you have support at home?: Partner/Spouse/SO  Do you feel like you have everything you need to keep you well at home?: Yes  Are you an active caregiver in your home?: No  Care Transitions Interventions  No Identified Needs         Follow Up  Future Appointments   Date Time Provider Obdulio May   1/16/2023  8:20 AM KAILEE Lozano [facility] AMB   1/19/2023  8:00 AM POW LAB POW [facility] AMB   1/26/2023  8:40 AM MD KAIDEN Quinn [facility] AMB   3/1/2023 10:45 AM MD LAURENCE Valentine [facility] AMB   9/27/2023  9:40 [facility] contact information. Plan for follow-up call in 5-7 days based on severity of symptoms and risk factors. Plan for next call: self management-diet, hydration, exercise, follow up appointments.      Esteban Dennison LPN

## 2023-01-14 LAB
BACTERIA SPEC CULT: NORMAL
SERVICE CMNT-IMP: NORMAL

## 2023-01-15 LAB
BACTERIA SPEC CULT: NORMAL
SERVICE CMNT-IMP: NORMAL

## 2023-01-15 NOTE — PROGRESS NOTES
[facility]  [address]  [address] [facility], [address] [facility]      Chief Complaint   Patient presents with    Follow-Up from Hospital     Complicated migraines      Other     History of TIA       Keren Perry is a 72 y.o. male who presents hospital follow up for complicated migraine. PMH significant for CAD, MI, pAF on Xarelto, HTN, HLD,  TIA, migraine with aura (nurtec), and sleep apnea  who presented to ED  on 1/9/23 with who presented with dysarthria, persistent migraine for the past 2 days. Treated with IVF, Toradol, and Reglan to try and help with the headache with no relief. Currently treated with Nurtec for migraines, however taking medication every 3 days. CT head negative. MRI of brain negative for acute infarct; mild chronic small vessel disease; incidental finding of left maxillary sinus disease. Treated with IV cefepime and discharged on cefdinir. for left LE cellulitis. He was discharged home with recommendations for OP follow up with. Currently taking ASA 81 mg daily, Xarelto 20 mg daily, rosuvastatin 20 mg daily. Interval history:    He is here today with his spouse. Denies headache or focal deficits. Headaches are located on bitemporal associated with aura, photophobia and right sided sensory changes. He is currently taking Nurtec 75 ODT for headache abortive therapy. Current headache frequency 2 per month. Gradual onset. Described as throbbing. Duration greater than 24 hours. He has taken Nurtec and reported relief after 20 minutes in duration. Stated he was unclear on correct frequency that he could take his medication, thought it was every 3 days. He did not take Nurtec when he went to the hospital this last time. Headaches are resolved after 20-30 minutes of taking Nurtec. NSAIDs are contraindicated due to being on Xarelto. He is not candidate for triptan therapy due to extensive cardiac history. He drinks water daily, ~60 oz daily.  Denies tobacco use, ETOH use or recreational drug use. Endorses snoring and wife has noted apneic episodes associated with daytime somnolence. No prior history of BILL or sleep study. Hx of tia- statin intolerance, currently on repatha and xarelto. Hx of a.fib- managed on BB and xarelto. LLE cellulitis- resolved. s/p treatment with cefdinir.    Past Medical History:   Diagnosis Date    Acute hypoxemic respiratory failure ([facility] [address] 12/4/2021    Anterior myocardial infarction [facility]) 10/2003    Arrhythmia     paroxysmal a fib    Arteriosclerotic coronary artery disease 11/2005    Arthritis     fingers    Atrial fibrillation with RVR ([address] 12/4/2021    Cancer ([facility] [address]     prostate    Coronary atherosclerosis of native coronary vessel 10/2001    Dysarthria 1/10/2023    Elevated serum creatinine 12/15/2021    Facial droop 2/20/2022    Family history of premature CAD     Father hx cabg/CHF  Brother CABG x 2    Gout     right toe but no problem now    Hypercholesterolemia     Hypertension     Hypoxia 3/11/2016    Right sided numbness     Right sided weakness 2/20/2022    Shortness of breath 1/25/2022    Sinus bradycardia 12/14/2021    Slurred speech 2/20/2022    TIA (transient ischemic attack) 2/20/2022       Past Surgical History:   Procedure Laterality Date    COLONOSCOPY N/A 12/21/2016    COLONOSCOPY  BMI 34 performed by Ashlyn Grewal MD at [facility] 53 GRAFT  3/9/2016    x4 - Dr. Burgess Gonzalez      2 back surg    [place] UNLISTED PROCEDURE CARDIAC SURGERY  March 9,2016    quidrupal bypass     [place] UNLISTED PROCEDURE CARDIAC SURGERY      stent    PROSTATECTOMY         Family History   Problem Relation Age of Onset    Colon Cancer Neg Hx     Cancer Sister     Heart Disease Brother     Diabetes Brother     Heart Disease Father        Social History     Socioeconomic History    Marital status:    Tobacco Use    Smoking status: Never    Smokeless tobacco: Never   Vaping Use    Vaping Use: Never used   Substance and Sexual Activity    Alcohol use: Yes    Drug use: No    Sexual activity: Not Currently         Current Outpatient Medications:     metoprolol tartrate (LOPRESSOR) 50 MG tablet, TAKE 1 TABLET BY MOUTH TWO TIMES A DAY., Disp: 180 tablet, Rfl: 3    losartan (COZAAR) 25 MG tablet, TAKE 1 TABLET BY MOUTH EVERY DAY, Disp: 90 tablet, Rfl: 1    cefdinir (OMNICEF) 300 MG capsule, Take 1 capsule by mouth 2 times daily for 5 days, Disp: 10 capsule, Rfl: 0    saccharomyces boulardii (FLORASTOR) 250 MG capsule, Take 1 capsule by mouth 2 times daily for 7 days, Disp: 14 capsule, Rfl: 0    XARELTO 20 MG TABS tablet, TAKE 1 TABLET BY MOUTH DAILY WITH SUPPER, Disp: 30 tablet, Rfl: 1    Rimegepant Sulfate 75 MG TBDP, Take 75 mg by mouth daily as needed (for migraine), Disp: 30 tablet, Rfl: 2    aspirin 81 MG EC tablet, Take 81 mg by mouth daily, Disp: , Rfl:     Evolocumab 140 MG/ML SOSY, Inject 140 mg into the skin every 14 days, Disp: , Rfl:     fluticasone (FLONASE) 50 MCG/ACT nasal spray, One spray in each nostril twice daily, Disp: , Rfl:     loratadine (CLARITIN) 10 MG tablet, Take 10 mg by mouth daily, Disp: , Rfl:     nitroGLYCERIN (NITROSTAT) 0.4 MG SL tablet, Place 0.4 mg under the tongue, Disp: , Rfl:     Allergies   Allergen Reactions    Latex Rash    Adhesive Tape Rash    Codeine Nausea And Vomiting       REVIEW OF SYSTEMS:  CONSTITUTIONAL: No weight loss, fever, chills, weakness or fatigue. HEENT: Eyes: No visual loss, blurred vision, double vision or yellow sclerae. Ears, Nose, Throat: No hearing loss, sneezing, congestion, runny nose or sore throat. SKIN: No rash or itching. CARDIOVASCULAR: No chest pain, chest pressure or chest discomfort. No palpitations or edema. RESPIRATORY: No shortness of breath, cough or sputum. GASTROINTESTINAL: No anorexia, nausea, vomiting or diarrhea. No abdominal pain or blood.   GENITOURINARY: no burning with urination. NEUROLOGICAL: No headache, dizziness, syncope, paralysis, ataxia, numbness or tingling in the extremities. No change in bowel or bladder control. MUSCULOSKELETAL: No muscle, back pain, joint pain or stiffness. HEMATOLOGIC: No anemia, bleeding or bruising. LYMPHATICS: No enlarged nodes. No history of splenectomy. PSYCHIATRIC: No history of depression or anxiety. ENDOCRINOLOGIC: No reports of sweating, cold or heat intolerance. No polyuria or polydipsia. ALLERGIES: No history of asthma, hives, eczema or rhinitis. Physical Examination  BP (!) 130/54   Pulse 71   Wt 237 lb (107.5 kg)   SpO2 93%   BMI 34.01 kg/m²     General - Well developed, well nourished, in no apparent distress. Pleasant and conversent. HEENT - Normocephalic, atraumatic. Conjunctiva, tympanic membranes, and oropharynx are clear. Neck - Supple without masses, no bruits   Cardiovascular - Regular rate and rhythm. Normal S1, S2 without murmurs, rubs, or gallops. Lungs - Clear to auscultation. Abdomen - Soft, nontender with normal bowel sounds. Extremities - Peripheral pulses intact. No edema and no rashes. Neurological examination - Comprehension, attention , memory and reasoning are intact. Language and speech are normal. On cranial nerve examination pupils are equal round and reactive to light. Fundoscopic examination is normal. Visual acuity is adequate. Visual fields are full to finger confrontation. Extraocular motility is normal. Face is symmetric and sensation is intact to light touch. Hearing is intact to finger rustle bilaterally. Motor examination - There is normal muscle tone and bulk. Power is full throughout. Muscle stretch reflexes are normoactive and there are no pathological reflexes present. Sensation is intact to light touch, pinprick, vibration and proprioception in all extremities.  Cerebellar examination is normal. Gait and stance are normal.     Most recent CT  - I personally reviewed this images  CT Result (most recent):  CT HEAD WO CONTRAST 01/09/2023    Narrative  HEAD CT WITHOUT CONTRAST  1/9/2023    HISTORY:   headache, weakness, HTN    TECHNIQUE: Noncontrast axial images were obtained through the brain. All CT  scans at this facility used dose modulation, interactive reconstruction and/or  weight based dosing when appropriate to reduce radiation dose to as low as  reasonably achievable. COMPARISON: Brain MRI May 30, 2022    FINDINGS: There is no acute intracranial hemorrhage, significant mass effect or  CT evidence of acute large artery territorial infarction. Please note that a  hyperacute infarct or small vessel infarct may not be apparent on initial CT  imaging. There are scattered areas of decreased attenuation within the periventricular  white matter compatible with mild chronic small vessel ischemic disease. The  left maxillary sinus is opacified. There is no hydrocephalus , intra-axial mass or abnormal extra-axial fluid  collection. There are no displaced skull fractures. Impression  1. White matter findings compatible with chronic small vessel ischemic disease. 2.  Left maxillary sinus disease. Most recent MRI - I personally reviewed this image   MRI Result (most recent):  MRI BRAIN WO CONTRAST 01/10/2023    Narrative  MRI brain without contrast    History: Persistent migraine headaches. Stroke like symptoms. Symptoms have been  present for 2 days. Imaging sequences: Multiplanar multisequence imaging was performed on a 1.5  Isabella magnet. Comparison: 05/30/2020. Findings: The ventricles are normal in size and configuration. There are no  extra-axial fluid collections. 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 This finding is  unchanged. No evidence of intraparenchymal hemorrhage or mass effect is  identified. There are no areas of restricted diffusion to suggest an acute or  subacute infarction. Patchy and discrete foci of T2 prolongation are present within the  supratentorial white matter. These are nonspecific findings but would be most  compatible with mild chronic small vessel ischemic changes. There is moderate left maxillary sinus opacification. Impression  1. No evidence of acute infarction. 2. Mild chronic small vessel ischemic change. 3. Moderate left maxillary sinus disease with some interval improvement.     Lab Results   Component Value Date    WBC 7.4 01/11/2023    HGB 14.1 01/11/2023    HCT 42.9 01/11/2023    MCV 88.3 01/11/2023     01/11/2023     Lab Results   Component Value Date     01/11/2023    K 3.8 01/11/2023     01/11/2023    CO2 28 01/11/2023    BUN 13 01/11/2023    CREATININE 1.00 01/11/2023    GLUCOSE 81 01/11/2023    CALCIUM 8.7 01/11/2023    PROT 7.4 01/09/2023    LABALBU 3.4 01/09/2023    BILITOT 1.4 (H) 01/09/2023    ALKPHOS 89 01/09/2023    AST 42 (H) 01/09/2023    ALT 34 01/09/2023    LABGLOM >60 01/11/2023    GFRAA >60 07/19/2022    AGRATIO 1.7 01/19/2022    GLOB 4.0 01/09/2023         Lab Results   Component Value Date    CHOL 53 01/11/2023    CHOL 75 07/19/2022    CHOL 74 05/30/2022     Lab Results   Component Value Date    TRIG 76 01/11/2023    TRIG 98 07/19/2022    TRIG 80 05/30/2022     Lab Results   Component Value Date    HDL 41 01/11/2023    HDL 39 (L) 07/19/2022    HDL 41 05/30/2022     Lab Results   Component Value Date    LDLCALC Not calculated due to elevated triglyceride level 01/11/2023    LDLCALC 16.4 07/19/2022    LDLCALC 17 05/30/2022     Lab Results   Component Value Date    LABVLDL 15.2 01/11/2023    LABVLDL 19.6 07/19/2022    LABVLDL 16 05/30/2022    VLDL 20 01/19/2022     Lab Results   Component Value Date    CHOLHDLRATIO 1.3 01/11/2023    CHOLHDLRATIO 1.9 07/19/2022    CHOLHDLRATIO 1.8 05/30/2022     Hemoglobin A1C   Date Value Ref Range Status   01/11/2023 5.2 4.8 - 5.6 % Final         Rodolfo Whitney was seen today for follow-up from hospital and other. Diagnoses and all orders for this visit:    Hospital discharge follow-up  -     MS DISCHARGE MEDS RECONCILED W/ CURRENT OUTPATIENT MED LIST    Chronic migraine with aura  Stable. Continue Nurtec 75 mg ODT daily as needed for migraine abortive therapy. Not to exceed 75 mg/24 hours. Samples provided to patient. Medication instructions clarified with patient and spouse. Will obtain sleep study to rule out BILL. Headache Education:   Instructed the patient on over-the-counter headache management medications including: CoQ10, magnesium oxide, and butterbur. To avoid a pain medication overuse headache trying not to take pain medicines more than 3 doses a week. To help relieve headache symptoms without taking pain medicine lie down under darkroom and drink glass of water. Consider lifestyle modification including good sleep hygiene, routine medial schedules, regular exercise and managing triggers. Keep a headache diary  to reveal triggers and possible patterns. Triggers may be: Food, stress, perfumes, alcohol, or even chocolate. Drink plenty of water and try to get 8 hours of sleep each night to reduce risk factors that may cause headaches. -     Baseline Diagnostic Sleep Study; Future    History of TIAs  Continue secondary stroke prevention   Hx of statin intolerance on repatha. Goal SBP <140/80  Goal LDL<70  Goal A1C <7.0  Discussed Mediterranean diet and increasing cardiovascular exercise to goal of 30 minutes daily. Depression screening completed. Reviewed BE FAST and when to call 911. -     Baseline Diagnostic Sleep Study; Future    PAF (paroxysmal atrial fibrillation) (HCC)  On Xarelto and rated controlled on BB. Followed by Cardiology. -     Baseline Diagnostic Sleep Study;  Future    Suspected sleep apnea  -     Baseline Diagnostic Sleep Study; Future    Snoring   -     Baseline Diagnostic Sleep Study; Future    Cellulitis of left leg  Resolved. S/p cefdinir. Follow up in 3 months or sooner if needed    I spent greater than 50% of the 60 total minutes of today's visit in coordination of care and patient/family education and counseling regarding the above patient concerns, reviewing the patient's medical record, my assessment and recommendations.             Ban Choe, [facility] [address] Neurology [address] [place] [address], [address] [place] [address]  VHLYJ:[phone]

## 2023-01-16 ENCOUNTER — OFFICE VISIT (OUTPATIENT)
Dept: NEUROLOGY | Age: 73
End: 2023-01-16
Payer: MEDICARE

## 2023-01-16 VITALS
BODY MASS INDEX: 34.01 KG/M2 | DIASTOLIC BLOOD PRESSURE: 54 MMHG | WEIGHT: 237 LBS | HEART RATE: 71 BPM | SYSTOLIC BLOOD PRESSURE: 130 MMHG | OXYGEN SATURATION: 93 %

## 2023-01-16 DIAGNOSIS — I48.0 PAF (PAROXYSMAL ATRIAL FIBRILLATION) (HCC): ICD-10-CM

## 2023-01-16 DIAGNOSIS — L03.116 CELLULITIS OF LEFT LEG: ICD-10-CM

## 2023-01-16 DIAGNOSIS — G43.109 CHRONIC MIGRAINE WITH AURA: ICD-10-CM

## 2023-01-16 DIAGNOSIS — Z09 HOSPITAL DISCHARGE FOLLOW-UP: Primary | ICD-10-CM

## 2023-01-16 DIAGNOSIS — Z86.73 HISTORY OF TIAS: ICD-10-CM

## 2023-01-16 DIAGNOSIS — R06.83 SNORING: ICD-10-CM

## 2023-01-16 DIAGNOSIS — R29.818 SUSPECTED SLEEP APNEA: ICD-10-CM

## 2023-01-16 PROCEDURE — 3078F DIAST BP <80 MM HG: CPT | Performed by: NURSE PRACTITIONER

## 2023-01-16 PROCEDURE — 1111F DSCHRG MED/CURRENT MED MERGE: CPT | Performed by: NURSE PRACTITIONER

## 2023-01-16 PROCEDURE — 3075F SYST BP GE 130 - 139MM HG: CPT | Performed by: NURSE PRACTITIONER

## 2023-01-16 PROCEDURE — 1123F ACP DISCUSS/DSCN MKR DOCD: CPT | Performed by: NURSE PRACTITIONER

## 2023-01-16 PROCEDURE — 99215 OFFICE O/P EST HI 40 MIN: CPT | Performed by: NURSE PRACTITIONER

## 2023-01-16 ASSESSMENT — PATIENT HEALTH QUESTIONNAIRE - PHQ9
1. LITTLE INTEREST OR PLEASURE IN DOING THINGS: 0
SUM OF ALL RESPONSES TO PHQ QUESTIONS 1-9: 0
2. FEELING DOWN, DEPRESSED OR HOPELESS: 0
SUM OF ALL RESPONSES TO PHQ9 QUESTIONS 1 & 2: 0
SUM OF ALL RESPONSES TO PHQ QUESTIONS 1-9: 0

## 2023-01-17 ENCOUNTER — TELEPHONE (OUTPATIENT)
Dept: NEUROLOGY | Age: 73
End: 2023-01-17

## 2023-01-17 NOTE — TELEPHONE ENCOUNTER
[place] APPROVED NURTEC    (Key: [id number])  [employer] Medicare Electronic PA Form  This approval authorizes your coverage from 01/01/2023 - 12/31/2023,    If you have questions or need help, please talk to your doctor or pharmacist, or contact  Customer Care at [phone], 24 hours a day, 7 days a week.  TTY users may call 70

## 2023-01-18 ENCOUNTER — NURSE ONLY (OUTPATIENT)
Dept: PRIMARY CARE CLINIC | Facility: CLINIC | Age: 73
End: 2023-01-18

## 2023-01-18 DIAGNOSIS — R53.83 OTHER FATIGUE: ICD-10-CM

## 2023-01-18 DIAGNOSIS — E66.01 SEVERE OBESITY (HCC): ICD-10-CM

## 2023-01-18 DIAGNOSIS — E78.5 DYSLIPIDEMIA: ICD-10-CM

## 2023-01-18 DIAGNOSIS — I10 PRIMARY HYPERTENSION: Primary | ICD-10-CM

## 2023-01-18 DIAGNOSIS — Z79.899 ENCOUNTER FOR LONG-TERM (CURRENT) USE OF MEDICATIONS: ICD-10-CM

## 2023-01-18 DIAGNOSIS — Z00.00 WELL ADULT EXAM: ICD-10-CM

## 2023-01-18 DIAGNOSIS — R79.89 LOW VITAMIN D LEVEL: ICD-10-CM

## 2023-01-18 LAB
ALBUMIN SERPL-MCNC: 3.7 G/DL (ref 3.2–4.6)
ALBUMIN/GLOB SERPL: 1.1 (ref 0.4–1.6)
ALP SERPL-CCNC: 102 U/L (ref 50–136)
ALT SERPL-CCNC: 61 U/L (ref 12–65)
ANION GAP SERPL CALC-SCNC: 6 MMOL/L (ref 2–11)
AST SERPL-CCNC: 36 U/L (ref 15–37)
BILIRUB SERPL-MCNC: 1.2 MG/DL (ref 0.2–1.1)
BUN SERPL-MCNC: 13 MG/DL (ref 8–23)
CALCIUM SERPL-MCNC: 8.9 MG/DL (ref 8.3–10.4)
CHLORIDE SERPL-SCNC: 104 MMOL/L (ref 101–110)
CO2 SERPL-SCNC: 31 MMOL/L (ref 21–32)
CREAT SERPL-MCNC: 1 MG/DL (ref 0.8–1.5)
GLOBULIN SER CALC-MCNC: 3.3 G/DL (ref 2.8–4.5)
GLUCOSE SERPL-MCNC: 103 MG/DL (ref 65–100)
POTASSIUM SERPL-SCNC: 4.3 MMOL/L (ref 3.5–5.1)
PROT SERPL-MCNC: 7 G/DL (ref 6.3–8.2)
SODIUM SERPL-SCNC: 141 MMOL/L (ref 133–143)
T4 FREE SERPL-MCNC: 1.3 NG/DL (ref 0.78–1.46)
TSH, 3RD GENERATION: 3.23 UIU/ML (ref 0.36–3.74)

## 2023-01-19 ENCOUNTER — CARE COORDINATION (OUTPATIENT)
Dept: CARE COORDINATION | Facility: CLINIC | Age: 73
End: 2023-01-19

## 2023-01-19 DIAGNOSIS — Z13.6 SCREENING FOR ISCHEMIC HEART DISEASE: ICD-10-CM

## 2023-01-19 DIAGNOSIS — R79.9 ABNORMAL BLOOD CHEMISTRY: ICD-10-CM

## 2023-01-19 DIAGNOSIS — Z13.1 SCREENING FOR DIABETES MELLITUS: Primary | ICD-10-CM

## 2023-01-19 DIAGNOSIS — Z13.228 SCREENING FOR PHENYLKETONURIA (PKU): ICD-10-CM

## 2023-01-19 DIAGNOSIS — R53.82 CHRONIC FATIGUE: ICD-10-CM

## 2023-01-19 NOTE — CARE COORDINATION
[facility] Care Transitions Follow Up Call    LPN Care Coordinator contacted the patient by telephone to follow up after admission on 2023. Verified name and  with patient as identifiers. Patient: Jeet Cedillo  Patient : 1950   MRN: [id number]  Reason for Admission: left leg pain, swelling, bruising  Discharge Date: 23 RARS: Readmission Risk Score: 5.5 ( )      Needs to be reviewed by the provider   Additional needs identified to be addressed with provider: No  none             Method of communication with provider: none. Addressed changes since last contact:  none  Discussed follow-up appointments. If no appointment was previously scheduled, appointment scheduling offered: Yes. Is follow up appointment scheduled within 7 days of discharge? Yes. Follow Up  Future Appointments   Date Time Provider Obdulio May   2023  8:40 AM MD KAIDEN Oneal [facility] AMB   3/1/2023 10:45 AM MD LAURENCE Ramirez [facility] AMB   3/2/2023  9:20 AM KAILEE Chacko CNP [facility] AMB   2023  8:40 AM KAILEE Grimm [facility] AMB   2023  9:40 AM KAILEE Grimm [facility] AMB     Non-[facility] follow up appointment(s): delaney    N Care Coordinator reviewed discharge instructions, medical action plan, and red flags with patient and discussed any barriers to care and/or understanding of plan of care after discharge. Discussed appropriate site of care based on symptoms and resources available to patient including: PCP  Specialist  Urgent care clinics  When to call [address] The patient agrees to contact the PCP office for questions related to their healthcare. Advance Care Planning:   reviewed and current. Patients top risk factors for readmission: medical condition-Ischemic CVA, CAD, NSTEMI, HTN, Afib, Obesity, Migraine, HLD, CABG, Dysarthria, Gout.    Interventions to address risk factors: Obtained and reviewed discharge summary and/or continuity of care documents, Education of patient/family/caregiver/guardian to support self-management-Faiza Rosa LPN -[phone], and all scheduled appointments. Offered patient enrollment in the Remote Patient Monitoring (RPM) program for in-home monitoring: NA.     Care Transitions Subsequent and Final Call    Subsequent and Final Calls  Do you have any ongoing symptoms?: No  Have your medications changed?: No  Do you have any questions related to your medications?: No  Do you currently have any active services?: No  Do you have any needs or concerns that I can assist you with?: No  Identified Barriers: None  Care Transitions Interventions  Other Interventions:             LPN Care Coordinator provided contact information for future needs. Plan for follow-up call in 7-10 days based on severity of symptoms and risk factors. Plan for next call: self management-diet, hydration, exercise, follow up appointments.      Jeovany Armando LPN

## 2023-01-26 ENCOUNTER — CARE COORDINATION (OUTPATIENT)
Dept: CARE COORDINATION | Facility: CLINIC | Age: 73
End: 2023-01-26

## 2023-01-26 ENCOUNTER — OFFICE VISIT (OUTPATIENT)
Dept: PRIMARY CARE CLINIC | Facility: CLINIC | Age: 73
End: 2023-01-26
Payer: MEDICARE

## 2023-01-26 ENCOUNTER — TELEPHONE (OUTPATIENT)
Dept: CARDIOLOGY CLINIC | Age: 73
End: 2023-01-26

## 2023-01-26 VITALS
WEIGHT: 236 LBS | SYSTOLIC BLOOD PRESSURE: 155 MMHG | OXYGEN SATURATION: 96 % | DIASTOLIC BLOOD PRESSURE: 57 MMHG | TEMPERATURE: 97.7 F | BODY MASS INDEX: 33.79 KG/M2 | HEIGHT: 70 IN | HEART RATE: 60 BPM

## 2023-01-26 DIAGNOSIS — Z86.16 PERSONAL HISTORY OF COVID-19: Chronic | ICD-10-CM

## 2023-01-26 DIAGNOSIS — I25.700 CORONARY ARTERY DISEASE INVOLVING CORONARY BYPASS GRAFT OF NATIVE HEART WITH UNSTABLE ANGINA PECTORIS (HCC): ICD-10-CM

## 2023-01-26 DIAGNOSIS — I63.20 ISCHEMIC CEREBRAL VASCULAR ACCIDENT (CVA) DUE TO STENOSIS OF LARGE EXTRACRANIAL ARTERY (HCC): ICD-10-CM

## 2023-01-26 DIAGNOSIS — Z95.1 S/P CABG X 4: ICD-10-CM

## 2023-01-26 DIAGNOSIS — I10 PRIMARY HYPERTENSION: ICD-10-CM

## 2023-01-26 DIAGNOSIS — I21.4 NSTEMI (NON-ST ELEVATED MYOCARDIAL INFARCTION) (HCC): ICD-10-CM

## 2023-01-26 DIAGNOSIS — Z00.00 MEDICARE ANNUAL WELLNESS VISIT, SUBSEQUENT: Primary | ICD-10-CM

## 2023-01-26 DIAGNOSIS — E78.5 DYSLIPIDEMIA: ICD-10-CM

## 2023-01-26 DIAGNOSIS — M25.561 ACUTE PAIN OF RIGHT KNEE: ICD-10-CM

## 2023-01-26 DIAGNOSIS — E66.09 CLASS 1 OBESITY DUE TO EXCESS CALORIES WITH SERIOUS COMORBIDITY AND BODY MASS INDEX (BMI) OF 33.0 TO 33.9 IN ADULT: Chronic | ICD-10-CM

## 2023-01-26 PROCEDURE — 3078F DIAST BP <80 MM HG: CPT | Performed by: FAMILY MEDICINE

## 2023-01-26 PROCEDURE — 1123F ACP DISCUSS/DSCN MKR DOCD: CPT | Performed by: FAMILY MEDICINE

## 2023-01-26 PROCEDURE — 3074F SYST BP LT 130 MM HG: CPT | Performed by: FAMILY MEDICINE

## 2023-01-26 PROCEDURE — 99214 OFFICE O/P EST MOD 30 MIN: CPT | Performed by: FAMILY MEDICINE

## 2023-01-26 PROCEDURE — G0439 PPPS, SUBSEQ VISIT: HCPCS | Performed by: FAMILY MEDICINE

## 2023-01-26 ASSESSMENT — PATIENT HEALTH QUESTIONNAIRE - PHQ9
SUM OF ALL RESPONSES TO PHQ QUESTIONS 1-9: 0
SUM OF ALL RESPONSES TO PHQ9 QUESTIONS 1 & 2: 0
SUM OF ALL RESPONSES TO PHQ QUESTIONS 1-9: 0
2. FEELING DOWN, DEPRESSED OR HOPELESS: 0
SUM OF ALL RESPONSES TO PHQ QUESTIONS 1-9: 0
SUM OF ALL RESPONSES TO PHQ QUESTIONS 1-9: 0
1. LITTLE INTEREST OR PLEASURE IN DOING THINGS: 0

## 2023-01-26 ASSESSMENT — LIFESTYLE VARIABLES
HOW MANY STANDARD DRINKS CONTAINING ALCOHOL DO YOU HAVE ON A TYPICAL DAY: PATIENT DOES NOT DRINK
HOW OFTEN DO YOU HAVE A DRINK CONTAINING ALCOHOL: NEVER

## 2023-01-26 NOTE — TELEPHONE ENCOUNTER
Patient Assistance for [id number] [employer] stated no prior authorization needed for medication

## 2023-01-26 NOTE — CARE COORDINATION
Care Transitions Outreach Attempt    Call within 2 business days of discharge: Yes   Attempted to reach patient for transitions of care follow up. Unable to reach patient. Patient: Laura Luna Patient : 1950 MRN: [id number]    Last Discharge [address]       Date Complaint Diagnosis Description Type Department Provider    23 Leg Pain Dysarthria . .. ED to Hosp-Admission (Discharged) (ADMITTED) Willi Kirby MD; David Eduardo . .. Was this an external facility discharge? No Discharge Facility: [facility]    Noted following upcoming appointments from discharge chart review:   [address] [place] [address]  follow up appointment(s):   Future Appointments   Date Time Provider Obdulio May   2023  8:05 AM Patricia Adame MD PO[facility] AMB   3/1/2023 10:45 AM Brittany Bravo MD [facility] AMB   3/2/2023  9:20 AM Keya Form KAILEE Wang - CNP PSCD [facility] AMB   2023  8:40 AM KAILEE Gilbert BSND [facility] AMB   2023  9:40 AM KAILEE Gilbert BSND [facility] AMB   2024  8:00 AM POW LAB POW [facility] AMB   2024  8:00 AM Moise Nyhan., MD POW [facility] AMB     Non-[facility] follow up appointment(s): na    AMBROSE outreach follow up call. Left message, identified self, reason for call, return call contact information, Martha Gipson [address].

## 2023-01-26 NOTE — PROGRESS NOTES
Paulette Marks MD  [address] [facility]   [facility], [place]  Ph No:  [phone]  Fax:  [phone]:  Chief Complaint   Patient presents with    Medicare AWV     Here today for AWV/follow up     Dizziness     Slight dizziness when he first lays down, or when he sits/stands up     Fall     Patient fell a few weeks ago and injured left knee           HISTORY OF PRESENT ILLNESS:  Mr. Idalia Vera is a 72 y.o. male. Pt presents for AWV. Pt will discuss with cardiology plan to stopping rapatha, due to cost.  Pt is also paying for xarelto as well, and both are costly. Pt is advised he may need to speak to pharmacological  who makes rapatha, as he is very high risk for another cardiac or stroke event and this medication is life-saving, as well as xarelto (as pt was unable to take eliquis initially, but says he wants to discuss retry with cardiology as he feels twice daily is safer than once daily). Pt is advised his PCP does not recommend that he stop either rapatha or xarelto (or eliquis if given) as blood thinner for any cause, so he should explore all avenues to continue both for best long term health outcome. Pt is advised based on his cardiac and other health concerns, he is currently on excellent medical therapy. Pt reports a slight dizziness when he lies down at night on bed or when he sits or stands in am.  Pt is advised this is due to lag time with catch up of heart and fluid balance to visual center of brain (as 02 in blood), which is related to use of lopressor. Pt is advised unfortunately, since lopressor suppresses his sympathetic nervous system and slows his HR and cardiac response to change of position, he will simply need to accommodate to this as long as he is on a b blocker as lopressor. Pt agrees to same. Pt's BP is 155/57 this am, no change in medication is given, as pt will discuss with cardiology.   An increase of losartan from 25mg to 50mg may be needed, but this is deferred to cardiology evaluation. Pt is reviewed as to the following labs:  GFR>60, healthy kidney function. Fasting glucose 103, last hgba1c 5.2%, non diabetic. Total cholesterol 53, hdl 41, ldl 76, cardiac risk ratio 1.3, low  (Excellent, Result of rapatha)    Liver enzymes alt 61, ast 36, normal  Thyroid normal tsh 3.230, free t4 1.3  Hgb 14.1, MCV 88.3, normal    Pt is given no medication refills, as these are given by cardiology, but see discussion above. Pt has considered discontinuing med, but advised against by PCP. Pt reports he and wife were camping, and parked in a camping area still under construction. Pt says he saw the 2X4 board on ground but stepped on it accidentally, and fell to ground on left knee which is now hurting and painful, with right knee also painful. Pt is referred to orthopaedic for evaluation and treatment. Pt has crepitus in right knee and possible instability in left. Pt is not checking PSA, but advised we will check next visit, history of prostate cancer. Pt says saw urologist 15 years, but advised no need to return as PSA undetectable for 15 years. Pt has not checked recently, so will be done next years labs. Pt will RTC in 1  year, labs indicated, cmp, lipid, cbc, tsh, free t4, hgba1c, vit d, psa. Pt advised any labs done by cardiology can be omitted from testing here.         HISTORY:  Allergies   Allergen Reactions    Latex Rash    Adhesive Tape Rash    Codeine Nausea And Vomiting     Past Medical History:   Diagnosis Date    Acute hypoxemic respiratory failure ([facility] [address] 12/4/2021    Anterior myocardial infarction [facility]) 10/2003    Arrhythmia     paroxysmal a fib    Arteriosclerotic coronary artery disease 11/2005    Arthritis     fingers    Atrial fibrillation with RVR ([address] 12/4/2021    Cancer ([facility] [address]     prostate    Coronary atherosclerosis of native coronary vessel 10/2001    Dysarthria 1/10/2023    Elevated serum creatinine 12/15/2021    Facial droop 2/20/2022    Family history of premature CAD     Father hx cabg/CHF  Brother CABG x 2    Gout     right toe but no problem now    Hypercholesterolemia     Hypertension     Hypoxia 3/11/2016    Right sided numbness     Right sided weakness 2/20/2022    Shortness of breath 1/25/2022    Sinus bradycardia 12/14/2021    Slurred speech 2/20/2022    TIA (transient ischemic attack) 2/20/2022     Past Surgical History:   Procedure Laterality Date    COLONOSCOPY N/A 12/21/2016    COLONOSCOPY  BMI 34 performed by Kylee Fry MD at [facility] GRAFT  3/9/2016    x4 - Dr. Dunia Montalvo      2 back surg    [place] UNLISTED PROCEDURE CARDIAC SURGERY  March 9,2016    quidrupal bypass     [place] UNLISTED PROCEDURE CARDIAC SURGERY      stent    PROSTATECTOMY       Family History   Problem Relation Age of Onset    Colon Cancer Neg Hx     Cancer Sister     Heart Disease Brother     Diabetes Brother     Heart Disease Father      Social History     Socioeconomic History    Marital status:      Spouse name: Not on file    Number of children: Not on file    Years of education: Not on file    Highest education level: Not on file   Occupational History    Not on file   Tobacco Use    Smoking status: Never    Smokeless tobacco: Never   Vaping Use    Vaping Use: Never used   Substance and Sexual Activity    Alcohol use: Yes    Drug use: No    Sexual activity: Not Currently   Other Topics Concern    Not on file   Social History Narrative    Not on file     Social Determinants of Health     Financial Resource Strain: Not on file   Food Insecurity: Not on file   Transportation Needs: Not on file   Physical Activity: Insufficiently Active    Days of Exercise per Week: 1 day    Minutes of Exercise per Session: 20 min   Stress: Not on file   Social Connections: Not on file   Intimate Partner Violence: Not on file Housing Stability: Not on file     Current Outpatient Medications   Medication Sig Dispense Refill    metoprolol tartrate (LOPRESSOR) 50 MG tablet TAKE 1 TABLET BY MOUTH TWO TIMES A DAY. 180 tablet 3    losartan (COZAAR) 25 MG tablet TAKE 1 TABLET BY MOUTH EVERY DAY 90 tablet 1    XARELTO 20 MG TABS tablet TAKE 1 TABLET BY MOUTH DAILY WITH SUPPER 30 tablet 1    Rimegepant Sulfate 75 MG TBDP Take 75 mg by mouth daily as needed (for migraine) 30 tablet 2    aspirin 81 MG EC tablet Take 81 mg by mouth daily      fluticasone (FLONASE) 50 MCG/ACT nasal spray One spray in each nostril twice daily      loratadine (CLARITIN) 10 MG tablet Take 10 mg by mouth daily      nitroGLYCERIN (NITROSTAT) 0.4 MG SL tablet Place 0.4 mg under the tongue      Evolocumab 140 MG/ML SOSY Inject 140 mg into the skin every 14 days       No current facility-administered medications for this visit. REVIEW OF SYSTEMS:  Review of systems is as indicated in HPI otherwise negative. PHYSICAL EXAM:  Vital Signs - BP (!) 155/57 (Site: Right Upper Arm, Position: Standing)   Pulse 60   Temp 97.7 °F (36.5 °C) (Temporal)   Ht 5' 10\" (1.778 m)   Wt 236 lb (107 kg)   SpO2 96%   BMI 33.86 kg/m²      Physical Exam  Vitals and nursing note reviewed. Constitutional:       General: He is in acute distress. Appearance: Normal appearance. He is obese. Comments: See HPI, pt has acute and general medical concerns, mostly regarding cost of meds rapatha and xarelto. HENT:      Head: Normocephalic and atraumatic. Nose: Nose normal.      Mouth/Throat:      Mouth: Mucous membranes are moist.      Pharynx: Oropharynx is clear. Eyes:      Extraocular Movements: Extraocular movements intact. Conjunctiva/sclera: Conjunctivae normal.      Pupils: Pupils are equal, round, and reactive to light. Cardiovascular:      Rate and Rhythm: Normal rate and regular rhythm. Pulses: Normal pulses. Heart sounds: Normal heart sounds. Pulmonary:      Effort: Pulmonary effort is normal.      Comments: Coarse breath sounds, but otherwise mostly clear to ascultation bilaterally  Abdominal:      General: Bowel sounds are normal.      Palpations: Abdomen is soft. Comments: Obese, BMI 33.86, weight 236lbs   Musculoskeletal:         General: Tenderness and signs of injury present. Cervical back: Normal range of motion and neck supple. Comments: Left knee injury, right knee pain, crepitus, fall on both knees in camping area   Skin:     General: Skin is warm and dry. Capillary Refill: Capillary refill takes 2 to 3 seconds. Neurological:      General: No focal deficit present. Mental Status: He is alert and oriented to person, place, and time. Psychiatric:         Behavior: Behavior normal.         Thought Content: Thought content normal.         Judgment: Judgment normal.      Comments: Pt has acute health anxiety, related to cost of medication rapatha and xarelto, advised to discuss with cardiology and contact  of product to request discounted for free life saving medications, see HPI           LABS  No results found for this visit on 01/26/23. IMPRESSION/PLAN     Diagnosis Orders   1. Medicare annual wellness visit, subsequent        2. Acute pain of right knee  [address] [place] [address]      3. Ischemic cerebral vascular accident (CVA) due to stenosis of large extracranial artery ([facility] [address]        4. NSTEMI (non-ST elevated myocardial infarction) ([facility] [address]        5. Coronary artery disease involving coronary bypass graft of native heart with unstable angina pectoris ([facility] [address]        6. Primary hypertension        7. Dyslipidemia        8. S/P CABG x 4        9. Class 1 obesity due to excess calories with serious comorbidity and body mass index (BMI) of 33.0 to 33.9 in adult        10. Personal history of COVID-19            No follow-up provider specified.         Bella Herbert Mesfin Goldsmith MD            Dictated using voice recognition software. Proofread, but unrecognized voice recognition errors may exist.    Medicare Annual Wellness Visit    Rashad Campo is here for Medicare AWV (Here today for AWV/follow up ), Dizziness (Slight dizziness when he first lays down, or when he sits/stands up ), and Fall (Patient fell a few weeks ago and injured left knee )    Assessment & Plan   Medicare annual wellness visit, subsequent  Acute pain of right knee  -     [address] [place] [address], [address] [place] [address]  Ischemic cerebral vascular accident (CVA) due to stenosis of large extracranial artery ([address]  NSTEMI (non-ST elevated myocardial infarction) ([address]  Coronary artery disease involving coronary bypass graft of native heart with unstable angina pectoris ([address]  Primary hypertension  Dyslipidemia  S/P CABG x 4  Class 1 obesity due to excess calories with serious comorbidity and body mass index (BMI) of 33.0 to 33.9 in adult  Personal history of COVID-19    Recommendations for Preventive Services Due: see orders and patient instructions/AVS.  Recommended screening schedule for the next 5-10 years is provided to the patient in written form: see Patient Instructions/AVS.     Return in 1 year (on 1/26/2024), or cmp, lipid, cbc, tsh, free t4,  hgba1c, vit d, psa, for Medicare Annual Wellness Visit in 1 year, for labs one week before appt. Subjective       Patient's complete Health Risk Assessment and screening values have been reviewed and are found in Flowsheets. The following problems were reviewed today and where indicated follow up appointments were made and/or referrals ordered.     Positive Risk Factor Screenings with Interventions:    Fall Risk:  Do you feel unsteady or are you worried about falling? : no  2 or more falls in past year?: (!) yes  Fall with injury in past year?: (!) yes     Interventions:    Patient declines any further evaluation or treatment            General HRA Questions:  Select all that apply: (!) Stress, New or Increased Pain    Pain Interventions:  Patient declined any further interventions or treatment    Stress Interventions:  Patient declined any further interventions or treatment       Weight and Activity:  Physical Activity: Insufficiently Active    Days of Exercise per Week: 1 day    Minutes of Exercise per Session: 20 min     On average, how many days per week do you engage in moderate to strenuous exercise (like a brisk walk)?: 1 day  Have you lost any weight without trying in the past 3 months?: No  Body mass index: (!) 33.86  Obesity Interventions:  Patient declines any further evaluation or treatment                               Objective   Vitals:    01/26/23 0840 01/26/23 0842 01/26/23 0843   BP: (!) 158/68 (!) 149/59 (!) 155/57   Site: Right Upper Arm Right Upper Arm Right Upper Arm   Position: Supine Sitting Standing   Pulse: 68 62 60   Temp: 97.7 °F (36.5 °C)     TempSrc: Temporal     SpO2: 91% 97% 96%   Weight: 236 lb (107 kg)     Height: 5' 10\" (1.778 m)        Body mass index is 33.86 kg/m². Allergies   Allergen Reactions    Latex Rash    Adhesive Tape Rash    Codeine Nausea And Vomiting     Prior to Visit Medications    Medication Sig Taking? Authorizing Provider   metoprolol tartrate (LOPRESSOR) 50 MG tablet TAKE 1 TABLET BY MOUTH TWO TIMES A DAY.  Yes Ángela Turner MD   losartan (COZAAR) 25 MG tablet TAKE 1 TABLET BY MOUTH EVERY DAY Yes Ángela Turner MD   XARELTO 20 MG TABS tablet TAKE 1 TABLET BY MOUTH DAILY WITH SUPPER Yes Ángela Turner MD   Rimegepant Sulfate 75 MG TBDP Take 75 mg by mouth daily as needed (for migraine) Yes KAILEE Pelaez   aspirin 81 MG EC tablet Take 81 mg by mouth daily Yes Historical Provider, MD   fluticasone (FLONASE) 50 MCG/ACT nasal spray One spray in each nostril twice daily Yes Ar Automatic Reconciliation   loratadine (CLARITIN) 10 MG tablet Take 10 mg by mouth daily Yes Ar Automatic Reconciliation   nitroGLYCERIN (NITROSTAT) 0.4 MG SL tablet Place 0.4 mg under the tongue Yes Ar Automatic Reconciliation   Evolocumab 140 MG/ML SOSY Inject 140 mg into the skin every 14 days  Ar Automatic Reconciliation       CareTeam (Including outside providers/suppliers regularly involved in providing care):   Patient Care Team:  Aimee Cortez MD as PCP - General  Aimee Cortez MD as PCP - [facility] Empaneled Provider  Sammie Marshall LPN as Care Coordinator [facility] [place] Coordinator)     Reviewed and updated this visit:  Allergies  Meds  Problems

## 2023-01-27 NOTE — ADDENDUM NOTE
Addended by: Tamika Angel on: 1/27/2023 04:16 AM     Modules accepted: Orders, Level of Service, SmartSet

## 2023-01-27 NOTE — PATIENT INSTRUCTIONS
Preventing Falls: Care Instructions  Overview     Getting around your home safely can be a challenge if you have injuries or health problems that make it easy for you to fall. Loose rugs and furniture in walkways are among the dangers for many older people who have problems walking or who have poor eyesight. People who have conditions such as arthritis, osteoporosis, or dementia also have to be careful not to fall. You can make your home safer with a few simple measures. Follow-up care is a key part of your treatment and safety. Be sure to make and go to all appointments, and call your doctor if you are having problems. It's also a good idea to know your test results and keep a list of the medicines you take. How can you care for yourself at home? Taking care of yourself  · Exercise regularly to improve your strength, muscle tone, and balance. Walk if you can. Swimming may be a good choice if you cannot walk easily. · Have your vision and hearing checked each year or any time you notice a change. If you have trouble seeing and hearing, you might not be able to avoid objects and could lose your balance. · Know the side effects of the medicines you take. Ask your doctor or pharmacist whether the medicines you take can affect your balance. Sleeping pills or sedatives can affect your balance. · Limit the amount of alcohol you drink. Alcohol can impair your balance and other senses. · Ask your doctor whether calluses or corns on your feet need to be removed. If you wear loose-fitting shoes because of calluses or corns, you can lose your balance and fall. · Talk to your doctor if you have numbness in your feet. · You may get dizzy if you do not drink enough water. To prevent dehydration, drink plenty of fluids. Choose water and other clear liquids. If you have kidney, heart, or liver disease and have to limit fluids, talk with your doctor before you increase the amount of fluids you drink.   Preventing falls at home  · Remove raised doorway thresholds, throw rugs, and clutter. Repair loose carpet or raised areas in the floor. · Move furniture and electrical cords to keep them out of walking paths. · Use nonskid floor wax, and wipe up spills right away, especially on ceramic tile floors. · If you use a walker or cane, put rubber tips on it. If you use crutches, clean the bottoms of them regularly with an abrasive pad, such as steel wool. · Keep your house well lit, especially [place] Oyster, and outside walkways. Use night-lights in areas such as hallways and bathrooms. Add extra light switches or use remote switches (such as switches that go on or off when you clap your hands) to make it easier to turn lights on if you have to get up during the night. · Install sturdy handrails on stairways. · Move items in your cabinets so that the things you use a lot are on the lower shelves (about waist level). · Keep a cordless phone and a flashlight with new batteries by your bed. If possible, put a phone in each of the main rooms of your house, or carry a cell phone in case you fall and cannot reach a phone. Or, you can wear a device around your neck or wrist. You push a button that sends a signal for help. · Wear low-heeled shoes that fit well and give your feet good support. Use footwear with nonskid soles. Check the heels and soles of your shoes for wear. Repair or replace worn heels or soles. · Do not wear socks without shoes on smooth floors, such as wood. · Walk on the grass when the sidewalks are slippery. If you live in an area that gets snow and ice in the winter, sprinkle salt on slippery steps and sidewalks. Or ask a family member or friend to do this for you. Preventing falls in the bath  · Install grab bars and nonskid mats inside and outside your shower or tub and near the toilet and sinks. · Use shower chairs and bath benches.   · Use a hand-held shower head that will allow you to sit while showering. · Get into a tub or shower by putting the weaker leg in first. Get out of a tub or shower with your strong side first.  · Repair loose toilet seats and consider installing a raised toilet seat to make getting on and off the toilet easier. · Keep your bathroom door unlocked while you are in the shower. Where can you learn more? Go to http://www.serrano.com/ and enter G117 to learn more about \"Preventing Falls: Care Instructions. \"  Current as of: May 4, 2022               Content Version: 13.5  © 9376-0039 [employer]. Care instructions adapted under license by [facility] ([facility]). If you have questions about a medical condition or this instruction, always ask your healthcare professional. [address] any warranty or liability for your use of this information. Learning About Stress  What is stress? Stress is what you feel when you have to handle more than you are used to. Stress is a fact of life for most people, and it affects everyone differently. What causes stress for you may not be stressful for someone else. A lot of things can cause stress. You may feel stress when you go on a job interview, take a test, or run a race. This kind of short-term stress is normal and even useful. It can help you if you need to work hard or react quickly. For example, stress can help you finish an important job on time. Stress also can last a long time. Long-term stress is caused by stressful situations or events. Examples of long-term stress include long-term health problems, ongoing problems at work, or conflicts in your family. Long-term stress can harm your health. How does stress affect your health? When you are stressed, your body responds as though you are in danger. It makes hormones that speed up your heart, make you breathe faster, and give you a burst of energy. This is called the fight-or-flight stress response.  If the stress is over quickly, your body goes back to normal and no harm is done. But if stress happens too often or lasts too long, it can have bad effects. Long-term stress can make you more likely to get sick, and it can make symptoms of some diseases worse. If you tense up when you are stressed, you may develop neck, shoulder, or low back pain. Stress is linked to high blood pressure and heart disease. Stress also harms your emotional health. It can make you mcdonough, tense, or depressed. Your relationships may suffer, and you may not do well at work or school. What can you do to manage stress? How to relax your mind   · Write. It may help to write about things that are bothering you. This helps you find out how much stress you feel and what is causing it. When you know this, you can find better ways to cope. · Let your feelings out. Talk, laugh, cry, and express anger when you need to. Talking with friends, family, a counselor, or a member of the clergy about your feelings is a healthy way to relieve stress. · Do something you enjoy. For example, listen to music or go to a movie. Practice your hobby or do volunteer work. · Meditate. This can help you relax, because you are not worrying about what happened before or what may happen in the future. · Do guided imagery. Imagine yourself in any setting that helps you feel calm. You can use audiotapes, books, or a teacher to guide you. How to relax your body   · Do something active. Exercise or activity can help reduce stress. Walking is a great way to get started. Even everyday activities such as housecleaning or yard work can help. · Do breathing exercises. For example:  ? From a standing position, bend forward from the waist with your knees slightly bent. Let your arms dangle close to the floor. ? Breathe in slowly and deeply as you return to a standing position. Roll up slowly and lift your head last.  ? Hold your breath for just a few seconds in the standing position. ?  Breathe out slowly and bend forward from the waist.  · Try yoga or tung chi. These techniques combine exercise and meditation. You may need some training at first to learn them. What can you do to prevent stress? · Manage your time. This helps you find time to do the things you want and need to do. · Get enough sleep. Your body recovers from the stresses of the day while you are sleeping. · Get support. Your family, friends, and community can make a difference in how you experience stress. Where can you learn more? Go to http://www.serrano.com/ and enter N032 to learn more about \"Learning About Stress. \"  Current as of: October 6, 2021               Content Version: 13.5  © 2006-2022 [employer]. Care instructions adapted under license by [facility] ([facility]). If you have questions about a medical condition or this instruction, always ask your healthcare professional. [place] [address] any warranty or liability for your use of this information. Hearing Loss: Care Instructions  Overview     Hearing loss is a sudden or slow decrease in how well you hear. It can range from mild to severe. Permanent hearing loss can occur with aging. It also can happen when you are exposed long-term to loud noise. Examples include listening to loud music, riding motorcycles, or being around other loud machines. Hearing loss can affect your work and home life. It can make you feel lonely or depressed. You may feel that you have lost your independence. But hearing aids and other devices can help you hear better and feel connected to others. Follow-up care is a key part of your treatment and safety. Be sure to make and go to all appointments, and call your doctor if you are having problems. It's also a good idea to know your test results and keep a list of the medicines you take. How can you care for yourself at home? · Avoid loud noises whenever possible.  This helps keep your hearing from getting worse.  · Always wear hearing protection around loud noises.  · Wear a hearing aid as directed. See a professional who can help you pick a hearing aid that fits you.  · Have hearing tests as your doctor suggests. They can show whether your hearing has changed. Your hearing aid may need to be adjusted.  · Use other devices as needed. These may include:  ? Telephone amplifiers and hearing aids that can connect to a television, stereo, radio, or microphone.  ? Devices that use lights or vibrations. These alert you to the doorbell, a ringing telephone, or a baby monitor.  ? Television closed-captioning. This shows the words at the bottom of the screen. Most new TVs can do this.  ? TTY (text telephone). This lets you type messages back and forth on the telephone instead of talking or listening. These devices are also called TDD. When messages are typed on the keyboard, they are sent over the phone line to a receiving TTY. The message is shown on a monitor.  · Use text messaging, social media, and email if it is hard for you to communicate by telephone.  · Try to learn a listening technique called speechreading. It is not lipreading. You pay attention to people's gestures, expressions, posture, and tone of voice. These clues can help you understand what a person is saying. Face the person you are talking to, and have them face you. Make sure the lighting is good. You need to see the other person's face clearly.  · Think about counseling if you need help to adjust to your hearing loss.  When should you call for help?  Watch closely for changes in your health, and be sure to contact your doctor if:    · You think your hearing is getting worse.     · You have new symptoms, such as dizziness or nausea.   Where can you learn more?  Go to https://www.healthwise.net/patientEd and enter R798 to learn more about \"Hearing Loss: Care Instructions.\"  Current as of: May 4, 2022               Content Version: 13.5  © 5853-8206  [employer]. Care instructions adapted under license by [facility] ([facility]). If you have questions about a medical condition or this instruction, always ask your healthcare professional. [address] any warranty or liability for your use of this information. Advance Directives: Care Instructions  Overview  An advance directive is a legal way to state your wishes at the end of your life. It tells your family and your doctor what to do if you can't say what you want. There are two main types of advance directives. You can change them any time your wishes change. Living will. This form tells your family and your doctor your wishes about life support and other treatment. The form is also called a declaration. Medical power of . This form lets you name a person to make treatment decisions for you when you can't speak for yourself. This person is called a health care agent (health care proxy, health care surrogate). The form is also called a durable power of  for health care. If you do not have an advance directive, decisions about your medical care may be made by a family member, or by a doctor or a  who doesn't know you. It may help to think of an advance directive as a gift to the people who care for you. If you have one, they won't have to make tough decisions by themselves. For more information, including forms for your state, see the [address] website (www.caringinfo.org/planning/advance-directives/). Follow-up care is a key part of your treatment and safety. Be sure to make and go to all appointments, and call your doctor if you are having problems. It's also a good idea to know your test results and keep a list of the medicines you take. What should you include in an advance directive? Many states have a unique advance directive form.  (It may ask you to address specific issues.) Or you might use a universal form that's approved by many states. If your form doesn't tell you what to address, it may be hard to know what to include in your advance directive. Use the questions below to help you get started. · Who do you want to make decisions about your medical care if you are not able to? · What life-support measures do you want if you have a serious illness that gets worse over time or can't be cured? · What are you most afraid of that might happen? (Maybe you're afraid of having pain, losing your independence, or being kept alive by machines.)  · Where would you prefer to die? (Your home? A hospital? A nursing home?)  · Do you want to donate your organs when you die? · Do you want certain Voodoo practices performed before you die? When should you call for help? Be sure to contact your doctor if you have any questions. Where can you learn more? Go to http://www.serrano.com/ and enter R264 to learn more about \"Advance Directives: Care Instructions. \"  Current as of: June 16, 2022               Content Version: 13.5  © 8757-1969 [employer]. Care instructions adapted under license by [facility] ([facility]). If you have questions about a medical condition or this instruction, always ask your healthcare professional. [address] any warranty or liability for your use of this information. Personalized Preventive Plan for Angelita Duarte - 1/26/2023  Medicare offers a range of preventive health benefits. Some of the tests and screenings are paid in full while other may be subject to a deductible, co-insurance, and/or copay. Some of these benefits include a comprehensive review of your medical history including lifestyle, illnesses that may run in your family, and various assessments and screenings as appropriate. After reviewing your medical record and screening and assessments performed today your provider may have ordered immunizations, labs, imaging, and/or referrals for you.   A list of these orders (if applicable) as well as your Preventive Care list are included within your After Visit Summary for your review. Other Preventive Recommendations:    A preventive eye exam performed by an eye specialist is recommended every 1-2 years to screen for glaucoma; cataracts, macular degeneration, and other eye disorders. A preventive dental visit is recommended every 6 months. Try to get at least 150 minutes of exercise per week or 10,000 steps per day on a pedometer . Order or download the FREE \"Exercise & Physical Activity: Your Everyday Guide\" from [employer]. Call 0-146.331.8583 or search The [employer] online. You need 7923-1088 mg of calcium and 5444-7419 IU of vitamin D per day. It is possible to meet your calcium requirement with diet alone, but a vitamin D supplement is usually necessary to meet this goal.  When exposed to the sun, use a sunscreen that protects against both UVA and UVB radiation with an SPF of 30 or greater. Reapply every 2 to 3 hours or after sweating, drying off with a towel, or swimming. Always wear a seat belt when traveling in a car. Always wear a helmet when riding a bicycle or motorcycle.

## 2023-02-02 ENCOUNTER — CARE COORDINATION (OUTPATIENT)
Dept: CARE COORDINATION | Facility: CLINIC | Age: 73
End: 2023-02-02

## 2023-02-02 ASSESSMENT — VISUAL ACUITY: OU: 1

## 2023-02-02 NOTE — CARE COORDINATION
[facility] Care Transitions Follow Up Call    LPN Care Coordinator contacted the patient by telephone to follow up after admission on 2023. Verified name and  with patient as identifiers. Patient: Redd Salas  Patient : 1950   MRN: [id number]  Reason for Admission: left leg bruising, swelling, pain  Discharge Date: 23 RARS: Readmission Risk Score: 5.5 ( )      Needs to be reviewed by the provider   Additional needs identified to be addressed with provider: No  none             Method of communication with provider: none. Addressed changes since last contact:  none  Discussed follow-up appointments. If no appointment was previously scheduled, appointment scheduling offered: Yes. Is follow up appointment scheduled within 7 days of discharge? Yes. Follow Up  Future Appointments   Date Time Provider Obdulio May   2023  8:05 AM Demarco Nguyen MD POAG [facility] AMB   3/1/2023 10:45 AM Genna Bender MD UCDE [facility] AMB   3/2/2023  9:20 AM KAILEE Wayne - CNP PSCD [facility] AMB   2023  8:40 AM KAILEE Hyde [facility] AMB   2023  9:40 AM KAILEE Hyde [facility] AMB   2024  8:00 AM POW LAB POW [facility] AMB   2024  8:00 AM Zi Santiago MD POW [facility] AMB     [facility] follow up appointment(s): delaney    LPN Care Coordinator reviewed discharge instructions, medical action plan, and red flags with patient and discussed any barriers to care and/or understanding of plan of care after discharge. Discussed appropriate site of care based on symptoms and resources available to patient including: PCP  Specialist  Urgent care clinics  When to call [address] The patient agrees to contact the PCP office for questions related to their healthcare. Advance Care Planning:   reviewed and current.      Patients top risk factors for readmission: falls and medical condition-Ischemic CVA, CAD, NSTEMI, HTN, Afib, Obesity, Migraine, HLD, CABG, Dysarthria, Gout  Interventions to address risk factors: Obtained and reviewed discharge summary and/or continuity of care documents, Education of patient/family/caregiver/guardian to support self-management-Faiza Rosa LPN -[phone], and all scheduled appointments. Offered patient enrollment in the Remote Patient Monitoring (RPM) program for in-home monitoring: NA.     Care Transitions Subsequent and Final Call    Subsequent and Final Calls  Do you have any ongoing symptoms?: No  Have your medications changed?: No  Do you have any questions related to your medications?: No  Do you currently have any active services?: No  Do you have any needs or concerns that I can assist you with?: No  Identified Barriers: None  Care Transitions Interventions  Other Interventions:             LPN Care Coordinator provided contact information for future needs. Plan for follow-up call in 7-10 days based on severity of symptoms and risk factors. Plan for next call: self management-diet, hydration, exercise, follow up appointments.      Gavino Pierce LPN

## 2023-02-09 ENCOUNTER — HOSPITAL ENCOUNTER (OUTPATIENT)
Dept: SLEEP CENTER | Age: 73
End: 2023-02-09
Payer: MEDICARE

## 2023-02-09 ENCOUNTER — CARE COORDINATION (OUTPATIENT)
Dept: CARE COORDINATION | Facility: CLINIC | Age: 73
End: 2023-02-09

## 2023-02-09 PROCEDURE — 95811 POLYSOM 6/>YRS CPAP 4/> PARM: CPT

## 2023-02-09 NOTE — CARE COORDINATION
Patient has graduated from the Care Transitions program on 02/09/2023. Patient/family has the ability to self-manage at this time. Patient has no further care management needs, no referral to the [facility] team for further management. Patient has Care Transition Nurse's contact information for any further questions, concerns, or needs. Kim Parker N [address] [place] [address] [phone].   Patients upcoming visits:    Future Appointments   Date Time Provider Obdulio May   2/24/2023  8:05 AM Aleta Moreland MD POAG [facility] AMB   3/1/2023 10:45 AM MD ONEAL NegreteDE [facility] AMB   3/2/2023  9:20 AM KAILEE Harper - CNP PSCD [facility] AMB   4/17/2023  8:40 AM KAILEE Mcnair [facility] AMB   9/27/2023  9:40 AM KAILEE Mcnair [facility] AMB   1/19/2024  8:00 AM POW LAB POW [facility] AMB   1/29/2024  8:00 AM Lauren Yuen MD POW [facility] AMB

## 2023-02-10 RX ORDER — EVOLOCUMAB 140 MG/ML
INJECTION, SOLUTION SUBCUTANEOUS
Qty: 2 EACH | Refills: 11 | Status: SHIPPED | OUTPATIENT
Start: 2023-02-10

## 2023-02-20 ASSESSMENT — SLEEP AND FATIGUE QUESTIONNAIRES
HOW LIKELY ARE YOU TO NOD OFF OR FALL ASLEEP WHILE LYING DOWN TO REST IN THE AFTERNOON WHEN CIRCUMSTANCES PERMIT: 2
HOW LIKELY ARE YOU TO NOD OFF OR FALL ASLEEP WHILE SITTING AND TALKING TO SOMEONE: 0
HOW LIKELY ARE YOU TO NOD OFF OR FALL ASLEEP IN A CAR, WHILE STOPPED FOR A FEW MINUTES IN TRAFFIC: 0
HOW LIKELY ARE YOU TO NOD OFF OR FALL ASLEEP WHILE SITTING AND READING: 0
HOW LIKELY ARE YOU TO NOD OFF OR FALL ASLEEP WHILE WATCHING TV: 2
HOW LIKELY ARE YOU TO NOD OFF OR FALL ASLEEP WHILE SITTING QUIETLY AFTER LUNCH WITHOUT ALCOHOL: 1
HOW LIKELY ARE YOU TO NOD OFF OR FALL ASLEEP WHEN YOU ARE A PASSENGER IN A CAR FOR AN HOUR WITHOUT A BREAK: 1
ESS TOTAL SCORE: 6
HOW LIKELY ARE YOU TO NOD OFF OR FALL ASLEEP WHILE SITTING INACTIVE IN A PUBLIC PLACE: 0

## 2023-02-20 NOTE — PROGRESS NOTES
[address] [place] [address] Court, [address] [facility]  [phone]    Patient Name:  Andrés Shay  YOB: 1950      Office Visit 2/22/2023    CHIEF COMPLAINT:    Chief Complaint   Patient presents with    New Patient    Sleep Study     Results       HISTORY OF PRESENT ILLNESS:      The patient presents in outpatient consultation at the request of Rich Elias NP for management of obstructive sleep apnea. Significant PMH of atrial fibrillation, hypertension, TIAs, obesity, and CAD. The diagnostic polysomnography was notable for an apnea hypopnea index of 88.7 including 77 obstructive apneas and 11 hypopneas. Oxygen desaturations are low as 70% were noted with SpO2 less than 89% for a total of 40.4 minutes of the test.  Significant cardiac arrhythmias were not evident. The patient was noted to have 0.0 limb movements with a limb movement arousal index being about 0.0. A subsequent CPAP titration study was conducted. CPAP levels as high as BIPAP 19/15 were performed. BIPAP was significantly effective in eliminating disordered breathing. CPAP was tolerated well by the patient. The patient reports feeling great the day following. He reports that even though it was only a brief period of time that he was on the BiPAP, he remembers feeling great the next day. He reports a history of his wife telling him that he snores for years. States that she has also had to awaken him during the night because he was not breathing. He denies being excessively sleepy during the day. [place] score 7/24. His normal bedtime hours are from 9:30 PM until 7:15 AM and he states that he never has any difficulty initially going to sleep. He does report that he may awaken 3-5 times during the night with 2 of those times to go to the bathroom. Denies any history of restless leg syndrome or symptoms.   He has recently started having headaches during the day and reports some difficulty remembering things. He denies any alcohol use, tobacco use, or illicit drug use. States that he does drink caffeine in the morning but tries to avoid caffeine in the afternoons and evenings. He denies any major medical changes over the last year. Does report that he continues to struggle with trying to lose weight. We did discuss decreasing carbohydrates in his diet and increasing his activity by walking. His blood pressure slightly elevated today at 158/72. Advised him to monitor his blood pressure at home and if it remains elevated to follow-up with his primary care provider.     SPLIT NIGHT 2/9/23        Sleepiness Scale:   Sleep Medicine 2/20/2023   Sitting and reading 0   Watching TV 2   Sitting, inactive in a public place (e.g. a theatre or a meeting) 0   As a passenger in a car for an hour without a break 1   Lying down to rest in the afternoon when circumstances permit 2   Sitting and talking to someone 0   Sitting quietly after a lunch without alcohol 1   In a car, while stopped for a few minutes in traffic 0   [place] Sleepiness Score 6     Sleep Medicine 2/20/2023   Sitting and reading 0   Watching TV 2   Sitting, inactive in a public place (e.g. a theatre or a meeting) 0   As a passenger in a car for an hour without a break 1   Lying down to rest in the afternoon when circumstances permit 2   Sitting and talking to someone 0   Sitting quietly after a lunch without alcohol 1   In a car, while stopped for a few minutes in traffic 0   [place] Sleepiness Score 6        Past Medical History:   Diagnosis Date    Acute hypoxemic respiratory failure ([facility] [address] 12/4/2021    Anterior myocardial infarction [facility]) 10/2003    Arrhythmia     paroxysmal a fib    Arteriosclerotic coronary artery disease 11/2005    Arthritis     fingers    Atrial fibrillation with RVR ([address] 12/4/2021    Cancer ([address]     prostate    Coronary atherosclerosis of native coronary vessel 10/2001    Dysarthria 1/10/2023    Elevated serum creatinine 12/15/2021    Facial droop 2/20/2022    Family history of premature CAD     Father hx cabg/CHF  Brother CABG x 2    Gout     right toe but no problem now    Hypercholesterolemia     Hypertension     Hypoxia 3/11/2016    Right sided numbness     Right sided weakness 2/20/2022    Shortness of breath 1/25/2022    Sinus bradycardia 12/14/2021    Slurred speech 2/20/2022    TIA (transient ischemic attack) 2/20/2022         Patient Active Problem List   Diagnosis    CAD (coronary artery disease)    Secondary hypercoagulable state ([address]    NSTEMI (non-ST elevated myocardial infarction) ([facility] [address]    Dyslipidemia    S/P CABG x 4    Nocturnal hypoxemia    BILL (obstructive sleep apnea)    Class 1 obesity due to excess calories with serious comorbidity and body mass index (BMI) of 33.0 to 33.9 in adult    Personal history of COVID-19    COVID-19 long hauler manifesting chronic dyspnea    Coronary artery disease involving coronary bypass graft of native heart with unstable angina pectoris ([facility] [address]    Primary hypertension    PAF (paroxysmal atrial fibrillation) (HCC)    Ischemic cerebral vascular accident (CVA) due to stenosis of large extracranial artery (HCC)    Persistent migraine aura without cerebral infarction and without status migrainosus, not intractable    Persistent migraine aura    Snoring    Non-restorative sleep    Obesity (BMI 30.0-34. 9)           Past Surgical History:   Procedure Laterality Date    COLONOSCOPY N/A 12/21/2016    COLONOSCOPY  BMI 34 performed by Dennys Panda MD at [facility] ENDOSCOPY    COLONOSCOPY      CORONARY ARTERY BYPASS GRAFT  3/9/2016    x4 - Dr. Wally Green      2 back surg    [place] UNLISTED PROCEDURE CARDIAC SURGERY  March 9,2016    quidrupal bypass     [place] UNLISTED PROCEDURE CARDIAC SURGERY      stent    PROSTATECTOMY           Social History     Socioeconomic History    Marital status:      Spouse name: Not on file    Number of children: Not on file    Years of education: Not on file    Highest education level: Not on file   Occupational History    Not on file   Tobacco Use    Smoking status: Never    Smokeless tobacco: Never   Vaping Use    Vaping Use: Never used   Substance and Sexual Activity    Alcohol use: Yes    Drug use: No    Sexual activity: Not Currently   Other Topics Concern    Not on file   Social History Narrative    Not on file     Social Determinants of Health     Financial Resource Strain: Not on file   Food Insecurity: Not on file   Transportation Needs: Not on file   Physical Activity: Insufficiently Active    Days of Exercise per Week: 1 day    Minutes of Exercise per Session: 20 min   Stress: Not on file   Social Connections: Not on file   Intimate Partner Violence: Not on file   Housing Stability: Not on file         Family History   Problem Relation Age of Onset    Colon Cancer Neg Hx     Cancer Sister     Heart Disease Brother     Diabetes Brother     Heart Disease Father          Allergies   Allergen Reactions    Latex Rash    Adhesive Tape Rash    Codeine Nausea And Vomiting         Current Outpatient Medications   Medication Sig    REPATHA SOSY syringe INJECT 1 ML BY SUBCUTANEOUS ROUTE EVERY FOURTEEN (14) DAYS. metoprolol tartrate (LOPRESSOR) 50 MG tablet TAKE 1 TABLET BY MOUTH TWO TIMES A DAY. losartan (COZAAR) 25 MG tablet TAKE 1 TABLET BY MOUTH EVERY DAY    XARELTO 20 MG TABS tablet TAKE 1 TABLET BY MOUTH DAILY WITH SUPPER    Rimegepant Sulfate 75 MG TBDP Take 75 mg by mouth daily as needed (for migraine)    aspirin 81 MG EC tablet Take 81 mg by mouth daily    fluticasone (FLONASE) 50 MCG/ACT nasal spray One spray in each nostril twice daily    loratadine (CLARITIN) 10 MG tablet Take 10 mg by mouth daily    nitroGLYCERIN (NITROSTAT) 0.4 MG SL tablet Place 0.4 mg under the tongue     No current facility-administered medications for this visit.            REVIEW OF SYSTEMS:   CONSTITUTIONAL:   There is no history of fever, chills, night sweats, weight loss, weight gain, persistent fatigue, or lethargy/hypersomnolence. EYES:   Denies problems with eye pain, erythema, blurred vision, or visual field loss. ENTM:   Denies history of tinnitus, epistaxis, sore throat, hoarseness, or dysphonia. LYMPH:   Denies swollen glands. CARDIAC:   No chest pain, pressure, discomfort, palpitations, orthopnea, murmurs, or edema. GI:   No dysphagia, heartburn reflux, nausea/vomiting, diarrhea, abdominal pain, or bleeding. :   Denies history of dysuria, hematuria, polyuria, or decreased urine output. MS:   No history of myalgias, arthralgias, bone pain, or muscle cramps. SKIN:   No history of rashes, jaundice, cyanosis, nodules, or ulcers. ENDO:   Negative for heat or cold intolerance. No history of DM. PSYCH:   Negative for anxiety, depression, insomnia, hallucinations. NEURO:   There is no history of AMS, persistent headache, decreased level of consciousness, seizures, or motor or sensory deficits. PHYSICAL EXAM:    Vitals:    02/22/23 1259   BP: (!) 158/72   Pulse: 71   Resp: 15   Temp: 97.1 °F (36.2 °C)   SpO2: 91%   Weight: 237 lb (107.5 kg)   Height: 5' 11\" (1.803 m)     Body mass index is 33.05 kg/m². GENERAL APPEARANCE:   The patient is obese and in no respiratory distress. HEENT:   PERRL. Conjunctivae unremarkable. Nasal mucosa is without epistaxis, exudate, or polyps. Nares patent bilateral.  Gums and dentition are unremarkable. There is oropharyngeal narrowing. M[place] score 4. NECK/LYMPHATIC:   Symmetrical with no elevation of jugular venous pulsation. Trachea midline. No thyroid enlargement. No cervical adenopathy. LUNGS:   Normal respiratory effort with symmetrical lung expansion. Breath sounds clear. HEART:   There is a regular rate and rhythm. No murmur, rub, or gallop. There is no edema in the lower extremities. ABDOMEN:   Soft and non-tender.   No hepatosplenomegaly. Bowel sounds are normal.     SKIN:   There are no rashes, cyanosis, jaundice, or ecchymosis present. EXTREMITIES:   The extremities are unremarkable without clubbing, cyanosis, joint inflammation, degenerative, or ischemic change. MUSCULOSKELETAL:   There is no abnormal tone, muscle atrophy, or abnormal movement present. NEURO:   The patient is alert and oriented to person, place, and time. Memory appears intact and mood is normal.  No gross sensorimotor deficits are present. ASSESSMENT:  (Medical Decision Making)         ICD-10-CM    1. BILL (obstructive sleep apnea)  G47.33 DME - DURABLE MEDICAL EQUIPMENT -severe sleep apnea with an AHI of 88.7 and lowest oxygen saturation of 70%. The pathophysiology of obstructive sleep apnea was reviewed with the patient. It's potential to promote severe neurologic, cardiac, pulmonary, and gastrointestinal problems was discussed. Specifically, the increased incidence of hypertension, coronary artery disease, congestive heart failure, pulmonary hypertension, gastroesophageal reflux, pathologic hypersomnolence, memory loss, and glucose intolerance was related to the consequences of hypoxemia, hypercapnia, airway obstruction, and sympathetic overdrive. We also discussed the ability of nasal CPAP to correct these abnormalities through maintenance of a patent airway. Therapeutic options including surgery, oral appliances, and weight loss were also reviewed. Patient is in agreement with starting BiPAP therapy as treatment plan. 2. Nocturnal hypoxemia  G47.34 Pulse oximetry, overnight. Hypoxemia appears to be corrected during titration study but will order overnight oximetry to be done in 2 weeks after starting BiPAP therapy to assure hypoxemia is corrected      3. Snoring  R06.83 Start BiPAP. Positional therapy to ensure you sleep in the side-lying position and avoid second supine      4.  Non-restorative sleep  G47.8 Anticipate this will improve with BiPAP therapy and control of sleep apnea      5. Obesity (BMI 30.0-34. 9)  E66.9 Patient can lose weight including ambulating 8-10,000 steps. Can Build Up Slowly as tolerated to this goal.    Also modifying dietary intake with decrease carbohydrates and sweets. Told to use avoid the P's --> Pizza, Pasta, Pastry, etc.  Increase fruits, vegetables, and lean meats e.g. [place] chicken or game meat. Patient is aware the goal is to consume less than 2000 ramon/day, since patient is a nondiabetic. We discussed using the Naomi LOSE IT to count calories. Patient can police themselves. If the future, if still having issues can use a more regimented diet e.g. [place], [address] etc. Clinical correlation suggested. PLAN:    Start BiPAP 19/15 cm H2O nightly compliance  New BiPAP set up and supplies ordered urgent  Recommendations as above  Follow-up in 3 months or sooner if needed         Orders Placed This Encounter   Procedures    Pulse oximetry, overnight     COLEMAN on BIPAP therapy     Standing Status:   Future     Standing Expiration Date:   2024     Scheduling Instructions:      Please send out Overnight Oximetry on Room Air      Please Fax results to: [phone]            Please use [facility]! DME - [address]  Phone: [employer] [address] [address]  Dept: [phone]      Patient Name: Jo Vazquez  : 1950  Gender: male  Address: [address] [place] [address] [place] [address]  Patient phone number: [phone] (home)         Primary Insurance: Payor: [employer] / Plan: Celestino Hubbard PPO / Product Type: Medicare /   Subscriber ID: [id number] - (Medicare Managed)      AMB Supply Order  Order Details     DME Location:  [facility]   Order Date: 2023   The encounter diagnosis was BILL (obstructive sleep apnea).           (  X   )New Set-Up     Bipap machine   (     ) CPAP Unit  (     ) Auto CPAP Unit  (   x  ) BiLevel Unit  (     ) Auto BiLevel Unit  (     ) ASV         Length of need: 12 months    Pressure: 19/15  cmH20  EPR:   Ramp Time:   min    Starting Ramp Pressure:    cm H20    Patient had a diagnostic Apnea Hypopnea Index (AHI) of :  88.7    *SUPPLIES* Replace all as needed, or per coverage guidelines     Masks Type: Full Face    (x   ) -Full Face Mask (1 per 3 mon)  <<< please provide Resmed F30i mask >>>  (  x  ) -Full Mask (1 per month) Interface/Cushion      (    ) -Nasal Mask (1 per 3 mon)  (    ) - Nasal Mask (1 per month) Interface/Cushion  (    ) -Pillow (2 per mon)  (    ) -Tsiultxow (1 per 6 mon)      _________________________________________________________________          Other Supplies:    (  X   )-Urrwsdoi (1 per 6 mon)  ( X    )-Yllzep Tubing (1 per 3 mon)  (  X   )- Disposable Filter (2 per mon)  (   X  )-Ugzumy Humidifier (1 per year)     (     )-Irislsybk (sometimes used with Full Face Mask) (1 per 6 mos)  (     )-Tubing-without heat (1 per 3 mos)  (     )-Non-Disposable Filter (1 per 6 mos)  (     )-Water Chamber (1 per 6 mos)  (     )-Humidifier non-heated (1 per 5 yrs)      Signed Date: 2/22/2023  Electronically Signed By: KAILEE Gonzales - CNP              Collaborating Physician: Dr. Joseline Toure     Over 50% of today's office visit was spent in face to face time reviewing test results, prognosis, importance of compliance, education about disease process, benefits of medications, instructions for management of acute flare-ups, and follow up plans. Total face to face time spent with patient was 40 minutes.     KAILEE Gonzales CNP  Electronically signed

## 2023-02-22 ENCOUNTER — OFFICE VISIT (OUTPATIENT)
Dept: SLEEP MEDICINE | Age: 73
End: 2023-02-22
Payer: MEDICARE

## 2023-02-22 VITALS
HEART RATE: 71 BPM | OXYGEN SATURATION: 91 % | DIASTOLIC BLOOD PRESSURE: 72 MMHG | RESPIRATION RATE: 15 BRPM | WEIGHT: 237 LBS | SYSTOLIC BLOOD PRESSURE: 158 MMHG | TEMPERATURE: 97.1 F | HEIGHT: 71 IN | BODY MASS INDEX: 33.18 KG/M2

## 2023-02-22 DIAGNOSIS — G47.33 OSA (OBSTRUCTIVE SLEEP APNEA): Primary | ICD-10-CM

## 2023-02-22 DIAGNOSIS — G47.8 NON-RESTORATIVE SLEEP: ICD-10-CM

## 2023-02-22 DIAGNOSIS — R06.83 SNORING: ICD-10-CM

## 2023-02-22 DIAGNOSIS — E66.9 OBESITY (BMI 30.0-34.9): ICD-10-CM

## 2023-02-22 DIAGNOSIS — G47.34 NOCTURNAL HYPOXEMIA: ICD-10-CM

## 2023-02-22 PROBLEM — E66.811 OBESITY (BMI 30.0-34.9): Status: ACTIVE | Noted: 2023-02-22

## 2023-02-22 PROCEDURE — 3078F DIAST BP <80 MM HG: CPT | Performed by: NURSE PRACTITIONER

## 2023-02-22 PROCEDURE — 99203 OFFICE O/P NEW LOW 30 MIN: CPT | Performed by: NURSE PRACTITIONER

## 2023-02-22 PROCEDURE — 3077F SYST BP >= 140 MM HG: CPT | Performed by: NURSE PRACTITIONER

## 2023-02-22 PROCEDURE — 1123F ACP DISCUSS/DSCN MKR DOCD: CPT | Performed by: NURSE PRACTITIONER

## 2023-02-22 NOTE — PATIENT INSTRUCTIONS
Start BiPAP 19/15 cm H2O nightly compliance  New BiPAP set up and supplies ordered urgent  Recommendations as above  Follow-up in 3 months or sooner if needed

## 2023-02-24 ENCOUNTER — OFFICE VISIT (OUTPATIENT)
Dept: ORTHOPEDIC SURGERY | Age: 73
End: 2023-02-24

## 2023-02-24 DIAGNOSIS — M25.561 RIGHT KNEE PAIN, UNSPECIFIED CHRONICITY: Primary | ICD-10-CM

## 2023-02-24 NOTE — PROGRESS NOTES
Patient ID:  Carl Solorzano  [id number]  13 y.o.  1950    Today: February 24, 2023          Chief Complaint:  Right Knee pain    HPI:       Carl Solorzano is a 72 y.o. male seen for evaluation and treatment of right knee pain. Patient reports a longstanding history of pain involving the knee. The patient complains of knee pain with activities, reports pain as mostly occurring along the joint lines, reports stiffness of the knee with prolonged inactivity, and swelling/pain at the end of the day and after increased physical activity. Generally, symptoms improve with sitting/rest. The pain affects the patients activities of daily living and quality of life. Patient reports progressive pain and instability in the knee. The pain has been present for an extended period of time. Pain ranges from approximately 4-8 in a cyclical fashion with periods of acute exacerbation. Prior procedures on the knee include none. Patient has attempted prior conservative treatment including Over-the-Counter medications including NSAID and/or Tylenol and Activity Modifications. They have not had success with prior treatments. Prior Activity Modifications has provided minimal relief.       Past Medical History:  Past Medical History:   Diagnosis Date    Acute hypoxemic respiratory failure ([address] 12/4/2021    Anterior myocardial infarction [facility]) 10/2003    Arrhythmia     paroxysmal a fib    Arteriosclerotic coronary artery disease 11/2005    Arthritis     fingers    Atrial fibrillation with RVR ([address] 12/4/2021    Cancer ([facility] [address]     prostate    Coronary atherosclerosis of native coronary vessel 10/2001    Dysarthria 1/10/2023    Elevated serum creatinine 12/15/2021    Facial droop 2/20/2022    Family history of premature CAD     Father hx cabg/CHF  Brother CABG x 2    Gout     right toe but no problem now    Hypercholesterolemia     Hypertension     Hypoxia 3/11/2016    Right sided numbness     Right sided weakness 2/20/2022 Shortness of breath 1/25/2022    Sinus bradycardia 12/14/2021    Slurred speech 2/20/2022    TIA (transient ischemic attack) 2/20/2022       Past Surgical History:  Past Surgical History:   Procedure Laterality Date    COLONOSCOPY N/A 12/21/2016    COLONOSCOPY  BMI 34 performed by Reginaldo Mcknight MD at [facility] ENDOSCOPY    COLONOSCOPY      CORONARY ARTERY BYPASS GRAFT  3/9/2016    x4 - Dr. Shaw Sers      2 back surg    [place] UNLISTED PROCEDURE CARDIAC SURGERY  March 9,2016    quidrupal bypass     [place] UNLISTED PROCEDURE CARDIAC SURGERY      stent    PROSTATECTOMY          Medications:     Prior to Admission medications    Medication Sig Start Date End Date Taking? Authorizing Provider   REPATHA SOSY syringe INJECT 1 ML BY SUBCUTANEOUS ROUTE EVERY FOURTEEN (14) DAYS.  2/10/23   Tab Post MD   metoprolol tartrate (LOPRESSOR) 50 MG tablet TAKE 1 TABLET BY MOUTH TWO TIMES A DAY. 1/12/23   Tab Post MD   losartan (COZAAR) 25 MG tablet TAKE 1 TABLET BY MOUTH EVERY DAY 1/12/23   Tab Post MD   XARELTO 20 MG TABS tablet TAKE 1 TABLET BY MOUTH DAILY WITH SUPPER 1/2/23   Tab Post MD   Rimegepant Sulfate 75 MG TBDP Take 75 mg by mouth daily as needed (for migraine) 8/18/22   KAILEE Bledsoe   aspirin 81 MG EC tablet Take 81 mg by mouth daily    Historical Provider, MD   fluticasone (FLONASE) 50 MCG/ACT nasal spray One spray in each nostril twice daily 11/18/14   Ar Automatic Reconciliation   loratadine (CLARITIN) 10 MG tablet Take 10 mg by mouth daily    Ar Automatic Reconciliation   nitroGLYCERIN (NITROSTAT) 0.4 MG SL tablet Place 0.4 mg under the tongue 9/3/21   Ar Automatic Reconciliation       Family History:     Family History   Problem Relation Age of Onset    Colon Cancer Neg Hx     Cancer Sister     Heart Disease Brother     Diabetes Brother     Heart Disease Father        Social History:      Social History     Tobacco Use Smoking status: Never    Smokeless tobacco: Never   Substance Use Topics    Alcohol use: Yes         Allergies: Allergies   Allergen Reactions    Latex Rash    Adhesive Tape Rash    Codeine Nausea And Vomiting        Vitals: There were no vitals taken for this visit. ROS:   Review of Systems         Objective:   General: Patient is awake and in no acute distress  Psych: Mood and affect appropriate  HEENT: Normocephalic. Atramatic. Pupils equal, round and reactive. Sclera normal.   Neck: Supple without obvious mass   Chest: Symmetric  Lungs:  Breathing non-labored. No tachypnea noted. Abdomen: Soft on gross examination without obvious distention. Neuro: No obvious neurologic deficit. Grossly moves bilateral upper extremities without motor or sensory deficits. No gross weakness noted in the lower extremities. No hyporeflexia or hyperreflexia noted. Vascular: No gross arterial or venous deficiency noted. DP and PT pulses are palpable in the lower extremities  Lymphatic: No lymphedema noted in the lower extremities. Skin: No prior incisions noted about the right knee. No obvious rashes noted about the area. No skin changes noted about the knee or about the adjacent thigh or leg. Extremities:  Patient ambulates with an antalgic gait. There is pain with ROM of the right knee. Range of motion is 0-130. Trace effusion noted in the knee. Patellofemoral crepitus is present. Distally the patient shows no neurologic deficit. Xrays (obtained either today or previously):    XR Knee 3/4 View  Views: AP knee, skiers PA, lateral knee, sunrise view right knee  Clinical indication: Right Knee Pain   Findings: Evidence of mild osteoarthritic changes. The joint line appears to be fairly well maintained although there appears to be some narrowing of the space. No significant osteophyte formation noted. No advanced subchondral cyst formation noted or sclerosis appreciated.   Impression:Knee Osteoarthritis    Ravinder NORTON Ramya Alva MD     Assessment:   1. Arthritis of the Right knee    Plan:   Differential diagnosis and treatment options have been discussed with the patient. Risks, benefits and alternatives of each were discussed and patient questions answered. We discussed oral anti-inflammatory medications, activity modifications, physical therapy, corticosteroid injections, weight loss (if appropriate), and surgery. We discussed that given the degenerative nature of the joint that in most cases surgery is the definitive treatment for this condition. We did discuss some of the details of surgery along with some of the risks, benefits and alternatives. At this point the patient is a candidate for surgery however they would like to try to postpone surgery at this point in time if possible. The patient understands the nature of knee arthritis and that this is generally a progressive condition. At this point the patient has failed the aforementioned treatment modalities. At this point the patient would like to proceed with Self-Administered Conservative Care. TREATMENT:    Self-Administered Conservative Care-patient would like to continue prior treatment regimens including PRN OTC medications and self directed activity modifications, stretching and strengthening.  If the patient feels at some point that this is no longer effective patient can return to office and we can discuss further treatment options        Signed By: Nik Haji MD  February 24, 2023

## 2023-03-01 ENCOUNTER — OFFICE VISIT (OUTPATIENT)
Dept: CARDIOLOGY CLINIC | Age: 73
End: 2023-03-01
Payer: MEDICARE

## 2023-03-01 VITALS
BODY MASS INDEX: 33.46 KG/M2 | WEIGHT: 239 LBS | HEART RATE: 58 BPM | SYSTOLIC BLOOD PRESSURE: 134 MMHG | DIASTOLIC BLOOD PRESSURE: 52 MMHG | HEIGHT: 71 IN

## 2023-03-01 DIAGNOSIS — I10 PRIMARY HYPERTENSION: ICD-10-CM

## 2023-03-01 DIAGNOSIS — I48.0 PAF (PAROXYSMAL ATRIAL FIBRILLATION) (HCC): ICD-10-CM

## 2023-03-01 DIAGNOSIS — I25.810 CORONARY ARTERY DISEASE INVOLVING CORONARY BYPASS GRAFT OF NATIVE HEART WITHOUT ANGINA PECTORIS: Primary | ICD-10-CM

## 2023-03-01 DIAGNOSIS — E78.5 DYSLIPIDEMIA: ICD-10-CM

## 2023-03-01 PROCEDURE — 3075F SYST BP GE 130 - 139MM HG: CPT | Performed by: INTERNAL MEDICINE

## 2023-03-01 PROCEDURE — 99202 OFFICE O/P NEW SF 15 MIN: CPT | Performed by: INTERNAL MEDICINE

## 2023-03-01 PROCEDURE — 3078F DIAST BP <80 MM HG: CPT | Performed by: INTERNAL MEDICINE

## 2023-03-01 RX ORDER — MECLIZINE HYDROCHLORIDE 25 MG/1
25 TABLET ORAL 3 TIMES DAILY PRN
Qty: 30 TABLET | Refills: 4 | Status: SHIPPED | OUTPATIENT
Start: 2023-03-01 | End: 2023-03-11

## 2023-03-01 NOTE — PROGRESS NOTES
[facility] CARDIOLOGY  [address], [address]  PHONE: [phone]      23    NAME:  Mandi Scales  : 1950  MRN: [id number]       SUBJECTIVE:   Mandi Scales is a 72 y.o. male seen for a follow up visit regarding the following:     Chief Complaint   Patient presents with    Coronary Artery Disease         HPI:    No cp or marcum. No orthopnea or pnd. No palpitations or syncope. Dizziness for a week. Better with closing eyes. Feels better on cpap. Past Medical History, Past Surgical History, Family history, Social History, and Medications were all reviewed with the patient today and updated as necessary. Current Outpatient Medications   Medication Sig Dispense Refill    REPATHA SOSY syringe INJECT 1 ML BY SUBCUTANEOUS ROUTE EVERY FOURTEEN (14) DAYS. 2 each 11    metoprolol tartrate (LOPRESSOR) 50 MG tablet TAKE 1 TABLET BY MOUTH TWO TIMES A DAY. 180 tablet 3    losartan (COZAAR) 25 MG tablet TAKE 1 TABLET BY MOUTH EVERY DAY 90 tablet 1    XARELTO 20 MG TABS tablet TAKE 1 TABLET BY MOUTH DAILY WITH SUPPER 30 tablet 1    Rimegepant Sulfate 75 MG TBDP Take 75 mg by mouth daily as needed (for migraine) 30 tablet 2    aspirin 81 MG EC tablet Take 81 mg by mouth daily      fluticasone (FLONASE) 50 MCG/ACT nasal spray One spray in each nostril twice daily      loratadine (CLARITIN) 10 MG tablet Take 10 mg by mouth daily      nitroGLYCERIN (NITROSTAT) 0.4 MG SL tablet Place 0.4 mg under the tongue       No current facility-administered medications for this visit. Social History     Tobacco Use    Smoking status: Never    Smokeless tobacco: Never   Substance Use Topics    Alcohol use: Yes              PHYSICAL EXAM:   BP (!) 134/52   Pulse 58   Ht 5' 11\" (1.803 m)   Wt 239 lb (108.4 kg)   BMI 33.33 kg/m²    Constitutional: Oriented to person, place, and time. Appears well-developed and well-nourished. Head: Normocephalic and atraumatic.    Neck: Neck supple. Cardiovascular: Normal rate and regular rhythm with no murmur -No JVP  Pulmonary/Chest: Breath sounds normal.   Abdominal: Soft. Musculoskeletal: No edema. Neurological: Alert and oriented to person, place, and time. Skin: Skin is warm and dry. Psychiatric: Normal mood and affect. Vitals reviewed. Wt Readings from Last 3 Encounters:   03/01/23 239 lb (108.4 kg)   02/22/23 237 lb (107.5 kg)   01/26/23 236 lb (107 kg)       Medical problems and test results were reviewed with the patient today. ASSESSMENT and PLAN    1. Coronary artery disease involving coronary bypass graft of native heart without angina pectoris  Stable. Continue asa      2. Dyslipidemia  Stable. Continue repatha      3. Primary hypertension  Stable. Continue lopressor and cozaar      4. PAF (paroxysmal atrial fibrillation) (HCC)  Stable. Continue xarelto     5. Dizziness  Vertigo by hx- meclizine 25mg prn    Return in about 6 months (around 9/1/2023).          Davis Mora MD  3/1/2023  10:36 AM

## 2023-03-02 RX ORDER — LOSARTAN POTASSIUM 25 MG/1
25 TABLET ORAL DAILY
Qty: 90 TABLET | Refills: 3 | Status: SHIPPED | OUTPATIENT
Start: 2023-03-02

## 2023-03-02 RX ORDER — METOPROLOL TARTRATE 50 MG/1
50 TABLET, FILM COATED ORAL 2 TIMES DAILY
Qty: 180 TABLET | Refills: 3 | Status: SHIPPED | OUTPATIENT
Start: 2023-03-02

## 2023-03-11 ENCOUNTER — PATIENT MESSAGE (OUTPATIENT)
Dept: CARDIOLOGY CLINIC | Age: 73
End: 2023-03-11

## 2023-03-13 RX ORDER — ATORVASTATIN CALCIUM 80 MG/1
80 TABLET, FILM COATED ORAL DAILY
Qty: 90 TABLET | Refills: 2 | Status: SHIPPED | OUTPATIENT
Start: 2023-03-13

## 2023-03-13 NOTE — TELEPHONE ENCOUNTER
Requested Prescriptions     Pending Prescriptions Disp Refills    atorvastatin (LIPITOR) 80 MG tablet 90 tablet 2     Sig: Take 1 tablet by mouth daily

## 2023-03-13 NOTE — TELEPHONE ENCOUNTER
From: Mary Nava  To: Dr. Santizo Proper: 3/11/2023 8:41 PM EST  Subject: Atorvastatin 80mg tablet    I am going to run out of this prescription in 15 days. Do I continue this medication?  I will be needing a prescription renewed if this is the case as it does not currently show up on [facility] med list.

## 2023-03-20 DIAGNOSIS — G47.34 NOCTURNAL HYPOXEMIA: ICD-10-CM

## 2023-03-21 ENCOUNTER — TELEPHONE (OUTPATIENT)
Dept: SLEEP MEDICINE | Age: 73
End: 2023-03-21

## 2023-03-21 NOTE — TELEPHONE ENCOUNTER
Called patient and reviewed overnight oximetry results. He did have oxygen saturations less than 88% for 6.8 minutes. However this is good in relation to his sleep study where he had oxygen saturations less than 89% for 45 minutes. I did look at a download of his therapy and his AHI is elevated at 10.5 but 5.5 of those events are unknown and he states he has been having a lot of trouble with mask leak. [facility] mask fitting and they did provide him with a new mask which she is going to try tonight. Overall he is doing well with BiPAP therapy with good nightly compliance and he reports that he can tell a big difference in how he feels in the morning and his daytime sleepiness has improved. We will continue with his regularly scheduled follow-up appointment in May. He was advised to call if he continues to have problems with leaks or any other issues.

## 2023-04-17 ENCOUNTER — OFFICE VISIT (OUTPATIENT)
Dept: NEUROLOGY | Age: 73
End: 2023-04-17
Payer: MEDICARE

## 2023-04-17 VITALS
SYSTOLIC BLOOD PRESSURE: 151 MMHG | BODY MASS INDEX: 33.96 KG/M2 | DIASTOLIC BLOOD PRESSURE: 65 MMHG | WEIGHT: 237.2 LBS | OXYGEN SATURATION: 94 % | HEIGHT: 70 IN | HEART RATE: 62 BPM

## 2023-04-17 DIAGNOSIS — G43.101 MIGRAINE WITH AURA AND WITH STATUS MIGRAINOSUS, NOT INTRACTABLE: Primary | ICD-10-CM

## 2023-04-17 DIAGNOSIS — Z86.73 HISTORY OF TIAS: ICD-10-CM

## 2023-04-17 DIAGNOSIS — I48.0 PAF (PAROXYSMAL ATRIAL FIBRILLATION) (HCC): ICD-10-CM

## 2023-04-17 DIAGNOSIS — G47.33 OSA TREATED WITH BIPAP: ICD-10-CM

## 2023-04-17 PROCEDURE — 1123F ACP DISCUSS/DSCN MKR DOCD: CPT | Performed by: NURSE PRACTITIONER

## 2023-04-17 PROCEDURE — 3077F SYST BP >= 140 MM HG: CPT | Performed by: NURSE PRACTITIONER

## 2023-04-17 PROCEDURE — 99214 OFFICE O/P EST MOD 30 MIN: CPT | Performed by: NURSE PRACTITIONER

## 2023-04-17 PROCEDURE — 3078F DIAST BP <80 MM HG: CPT | Performed by: NURSE PRACTITIONER

## 2023-04-17 RX ORDER — MECLIZINE HYDROCHLORIDE 25 MG/1
1 TABLET ORAL 3 TIMES DAILY
COMMUNITY
Start: 2023-03-27

## 2023-04-17 RX ORDER — UBROGEPANT 100 MG/1
1 TABLET ORAL AS NEEDED
Qty: 16 TABLET | Refills: 11 | Status: SHIPPED | OUTPATIENT
Start: 2023-04-17

## 2023-04-17 RX ORDER — UBROGEPANT 100 MG/1
1 TABLET ORAL AS NEEDED
COMMUNITY
End: 2023-04-17 | Stop reason: SDUPTHER

## 2023-04-27 ENCOUNTER — TELEPHONE (OUTPATIENT)
Dept: NEUROLOGY | Age: 73
End: 2023-04-27

## 2023-06-19 NOTE — PROGRESS NOTES
[address] [place] [address] Court, [address] [facility]  [phone]    Patient Name:  Nargis Pyle  YOB: 1950      Office Visit 6/22/2023    CHIEF COMPLAINT:    Chief Complaint   Patient presents with    CPAP/BiPAP    Sleep Apnea       HISTORY OF PRESENT ILLNESS: Patient is a 60-year-old male who presents today for follow-up of BILL. Diagnostic sleep study on 02/09/2023 with an AHI of 88.7 and lowest saturation 70%. He is prescribed bipap therapy with a humidifier set at 19/15 cm with a full face mask. Most recent download reveals AHI on PAP therapy is 7.4, leak is median 15.0 and 30.9 at 95th percentile and the hourly usage is 8 hours 16 minutes nightly. The overall use is 959 hours with days greater than four hours at 116/116. The patient is compliant with the Pap therapy and is feeling better as a result. He has excellent compliance with BiPAP therapy. He reports that his only issue has been mask leaks and they are occasionally waking him during the night. He is currently using an F30 fullface mask and has tried the F30 I as well. We did look at the [facility] Vitera fullface mask and he would like to try this. He does report having more energy in the morning and feeling like he has slept well through the night. He denies any excessive daytime sleepiness or fatigue. [place] score is 3/24. He denies any major medical changes since his last visit. Reports that his weight has consistently been around 230-235 pounds. His blood pressure is well controlled today.         23 Allison Damon   Sleep Medicine 6/22/2023 2/20/2023   Sitting and reading 1 0   Watching TV 2 2   Sitting, inactive in a public place (e.g. a theatre or a meeting) 0 0   As a passenger in a car for an hour without a break 0 1   Lying down to rest in the afternoon when circumstances permit 0 2   Sitting and talking to someone 0 0   Sitting quietly after a lunch without alcohol 0 1   In a car, while stopped

## 2023-06-22 ENCOUNTER — OFFICE VISIT (OUTPATIENT)
Dept: SLEEP MEDICINE | Age: 73
End: 2023-06-22
Payer: MEDICARE

## 2023-06-22 VITALS
TEMPERATURE: 96.8 F | RESPIRATION RATE: 16 BRPM | OXYGEN SATURATION: 95 % | BODY MASS INDEX: 33.47 KG/M2 | HEIGHT: 70 IN | DIASTOLIC BLOOD PRESSURE: 56 MMHG | WEIGHT: 233.8 LBS | HEART RATE: 63 BPM | SYSTOLIC BLOOD PRESSURE: 160 MMHG

## 2023-06-22 DIAGNOSIS — G47.34 NOCTURNAL HYPOXEMIA: ICD-10-CM

## 2023-06-22 DIAGNOSIS — E66.9 OBESITY (BMI 30.0-34.9): ICD-10-CM

## 2023-06-22 DIAGNOSIS — G47.33 OSA (OBSTRUCTIVE SLEEP APNEA): Primary | ICD-10-CM

## 2023-06-22 PROCEDURE — 1123F ACP DISCUSS/DSCN MKR DOCD: CPT | Performed by: NURSE PRACTITIONER

## 2023-06-22 PROCEDURE — 3078F DIAST BP <80 MM HG: CPT | Performed by: NURSE PRACTITIONER

## 2023-06-22 PROCEDURE — 3077F SYST BP >= 140 MM HG: CPT | Performed by: NURSE PRACTITIONER

## 2023-06-22 PROCEDURE — 99213 OFFICE O/P EST LOW 20 MIN: CPT | Performed by: NURSE PRACTITIONER

## 2023-06-22 ASSESSMENT — SLEEP AND FATIGUE QUESTIONNAIRES
HOW LIKELY ARE YOU TO NOD OFF OR FALL ASLEEP WHILE WATCHING TV: 2
HOW LIKELY ARE YOU TO NOD OFF OR FALL ASLEEP WHILE SITTING QUIETLY AFTER LUNCH WITHOUT ALCOHOL: 0
HOW LIKELY ARE YOU TO NOD OFF OR FALL ASLEEP WHILE SITTING AND READING: 1
HOW LIKELY ARE YOU TO NOD OFF OR FALL ASLEEP WHILE SITTING INACTIVE IN A PUBLIC PLACE: 0
HOW LIKELY ARE YOU TO NOD OFF OR FALL ASLEEP WHEN YOU ARE A PASSENGER IN A CAR FOR AN HOUR WITHOUT A BREAK: 0
HOW LIKELY ARE YOU TO NOD OFF OR FALL ASLEEP WHILE SITTING AND TALKING TO SOMEONE: 0
ESS TOTAL SCORE: 3
HOW LIKELY ARE YOU TO NOD OFF OR FALL ASLEEP WHILE LYING DOWN TO REST IN THE AFTERNOON WHEN CIRCUMSTANCES PERMIT: 0
HOW LIKELY ARE YOU TO NOD OFF OR FALL ASLEEP IN A CAR, WHILE STOPPED FOR A FEW MINUTES IN TRAFFIC: 0

## 2023-06-22 NOTE — PATIENT INSTRUCTIONS
Continue BiPAP 19/15 cm H2O nightly compliance  New supplies ordered with request specific for F&P Vitera full face mask  Recommendations as above  Follow-up in 6 months or sooner if needed

## 2023-07-27 ENCOUNTER — OFFICE VISIT (OUTPATIENT)
Dept: ORTHOPEDIC SURGERY | Age: 73
End: 2023-07-27
Payer: MEDICARE

## 2023-07-27 DIAGNOSIS — M25.561 RIGHT KNEE PAIN, UNSPECIFIED CHRONICITY: Primary | ICD-10-CM

## 2023-07-27 DIAGNOSIS — M17.11 PRIMARY OSTEOARTHRITIS OF RIGHT KNEE: ICD-10-CM

## 2023-07-27 PROCEDURE — 99214 OFFICE O/P EST MOD 30 MIN: CPT | Performed by: NURSE PRACTITIONER

## 2023-07-27 PROCEDURE — 20610 DRAIN/INJ JOINT/BURSA W/O US: CPT | Performed by: NURSE PRACTITIONER

## 2023-07-27 PROCEDURE — 1123F ACP DISCUSS/DSCN MKR DOCD: CPT | Performed by: NURSE PRACTITIONER

## 2023-07-27 RX ORDER — METHYLPREDNISOLONE ACETATE 40 MG/ML
40 INJECTION, SUSPENSION INTRA-ARTICULAR; INTRALESIONAL; INTRAMUSCULAR; SOFT TISSUE ONCE
Status: COMPLETED | OUTPATIENT
Start: 2023-07-27 | End: 2023-07-27

## 2023-07-27 RX ADMIN — METHYLPREDNISOLONE ACETATE 40 MG: 40 INJECTION, SUSPENSION INTRA-ARTICULAR; INTRALESIONAL; INTRAMUSCULAR; SOFT TISSUE at 09:55

## 2023-08-22 ENCOUNTER — OFFICE VISIT (OUTPATIENT)
Age: 73
End: 2023-08-22
Payer: MEDICARE

## 2023-08-22 VITALS
DIASTOLIC BLOOD PRESSURE: 70 MMHG | HEART RATE: 58 BPM | BODY MASS INDEX: 33.21 KG/M2 | HEIGHT: 70 IN | SYSTOLIC BLOOD PRESSURE: 139 MMHG | WEIGHT: 232 LBS

## 2023-08-22 DIAGNOSIS — E78.5 DYSLIPIDEMIA: ICD-10-CM

## 2023-08-22 DIAGNOSIS — I25.810 CORONARY ARTERY DISEASE INVOLVING CORONARY BYPASS GRAFT OF NATIVE HEART WITHOUT ANGINA PECTORIS: Primary | ICD-10-CM

## 2023-08-22 DIAGNOSIS — I48.0 PAF (PAROXYSMAL ATRIAL FIBRILLATION) (HCC): ICD-10-CM

## 2023-08-22 PROCEDURE — 1123F ACP DISCUSS/DSCN MKR DOCD: CPT | Performed by: INTERNAL MEDICINE

## 2023-08-22 PROCEDURE — 3075F SYST BP GE 130 - 139MM HG: CPT | Performed by: INTERNAL MEDICINE

## 2023-08-22 PROCEDURE — 3078F DIAST BP <80 MM HG: CPT | Performed by: INTERNAL MEDICINE

## 2023-08-22 PROCEDURE — 93000 ELECTROCARDIOGRAM COMPLETE: CPT | Performed by: INTERNAL MEDICINE

## 2023-08-22 PROCEDURE — 99214 OFFICE O/P EST MOD 30 MIN: CPT | Performed by: INTERNAL MEDICINE

## 2023-08-22 RX ORDER — EVOLOCUMAB 140 MG/ML
INJECTION, SOLUTION SUBCUTANEOUS
Qty: 2 EACH | Refills: 11 | Status: SHIPPED | OUTPATIENT
Start: 2023-08-22

## 2023-08-22 RX ORDER — METOPROLOL TARTRATE 50 MG/1
50 TABLET, FILM COATED ORAL 2 TIMES DAILY
Qty: 180 TABLET | Refills: 3 | Status: SHIPPED | OUTPATIENT
Start: 2023-08-22

## 2023-08-22 RX ORDER — LOSARTAN POTASSIUM 25 MG/1
25 TABLET ORAL DAILY
Qty: 90 TABLET | Refills: 3 | Status: SHIPPED | OUTPATIENT
Start: 2023-08-22

## 2023-08-22 NOTE — PROGRESS NOTES
[facility] CARDIOLOGY  [address]  PHONE: [phone]      23    NAME:  Lowell Landaverde  : 1950  MRN: [id number]       SUBJECTIVE:   Lowell Landaverde is a 72 y.o. male seen for a follow up visit regarding the following:     Chief Complaint   Patient presents with    Coronary Artery Disease    Follow-Up from Hospital    Hypertension         HPI:    No cp or marcum. No orthopnea or pnd. No palpitations or syncope. Occasional dizziness persists. Past Medical History, Past Surgical History, Family history, Social History, and Medications were all reviewed with the patient today and updated as necessary. Current Outpatient Medications   Medication Sig Dispense Refill    meclizine (ANTIVERT) 25 MG tablet Take 1 tablet by mouth in the morning, at noon, and at bedtime      BiPAP Machine MISC by Does not apply route      Ubrogepant (UBRELVY) 100 MG TABS Take 1 tablet by mouth as needed (May repeat for 1 dose in 2 hours if needed. Not to exceed 200 mg/24 hours.) 16 tablet 11    rivaroxaban (XARELTO) 20 MG TABS tablet Take 1 tablet by mouth daily (with breakfast) 90 tablet 3    metoprolol tartrate (LOPRESSOR) 50 MG tablet Take 1 tablet by mouth 2 times daily 180 tablet 3    losartan (COZAAR) 25 MG tablet Take 1 tablet by mouth daily 90 tablet 3    REPATHA SOSY syringe INJECT 1 ML BY SUBCUTANEOUS ROUTE EVERY FOURTEEN (14) DAYS. 2 each 11    aspirin 81 MG EC tablet Take 1 tablet by mouth daily      fluticasone (FLONASE) 50 MCG/ACT nasal spray One spray in each nostril twice daily      loratadine (CLARITIN) 10 MG tablet Take 1 tablet by mouth daily      nitroGLYCERIN (NITROSTAT) 0.4 MG SL tablet Place 1 tablet under the tongue      atorvastatin (LIPITOR) 80 MG tablet Take 1 tablet by mouth daily (Patient not taking: Reported on 2023) 90 tablet 2     No current facility-administered medications for this visit.                Social History     Tobacco Use

## 2023-11-07 ENCOUNTER — OFFICE VISIT (OUTPATIENT)
Dept: NEUROLOGY | Age: 73
End: 2023-11-07
Payer: MEDICARE

## 2023-11-07 VITALS
SYSTOLIC BLOOD PRESSURE: 162 MMHG | WEIGHT: 230.2 LBS | OXYGEN SATURATION: 95 % | HEART RATE: 53 BPM | HEIGHT: 70 IN | DIASTOLIC BLOOD PRESSURE: 71 MMHG | BODY MASS INDEX: 32.96 KG/M2

## 2023-11-07 DIAGNOSIS — Z86.73 HISTORY OF TIAS: ICD-10-CM

## 2023-11-07 DIAGNOSIS — I10 HTN, GOAL BELOW 140/80: ICD-10-CM

## 2023-11-07 DIAGNOSIS — I48.0 PAF (PAROXYSMAL ATRIAL FIBRILLATION) (HCC): ICD-10-CM

## 2023-11-07 DIAGNOSIS — G43.101 MIGRAINE WITH AURA AND WITH STATUS MIGRAINOSUS, NOT INTRACTABLE: Primary | ICD-10-CM

## 2023-11-07 PROCEDURE — 1123F ACP DISCUSS/DSCN MKR DOCD: CPT | Performed by: NURSE PRACTITIONER

## 2023-11-07 PROCEDURE — 99214 OFFICE O/P EST MOD 30 MIN: CPT | Performed by: NURSE PRACTITIONER

## 2023-11-07 PROCEDURE — 3077F SYST BP >= 140 MM HG: CPT | Performed by: NURSE PRACTITIONER

## 2023-11-07 PROCEDURE — 3078F DIAST BP <80 MM HG: CPT | Performed by: NURSE PRACTITIONER

## 2023-11-07 RX ORDER — UBROGEPANT 100 MG/1
1 TABLET ORAL AS NEEDED
Qty: 16 TABLET | Refills: 11 | Status: SHIPPED | OUTPATIENT
Start: 2023-11-07

## 2023-11-07 ASSESSMENT — PATIENT HEALTH QUESTIONNAIRE - PHQ9
SUM OF ALL RESPONSES TO PHQ QUESTIONS 1-9: 0
SUM OF ALL RESPONSES TO PHQ QUESTIONS 1-9: 0
1. LITTLE INTEREST OR PLEASURE IN DOING THINGS: 0
SUM OF ALL RESPONSES TO PHQ9 QUESTIONS 1 & 2: 0
2. FEELING DOWN, DEPRESSED OR HOPELESS: 0
SUM OF ALL RESPONSES TO PHQ QUESTIONS 1-9: 0
SUM OF ALL RESPONSES TO PHQ QUESTIONS 1-9: 0

## 2023-12-07 RX ORDER — ATORVASTATIN CALCIUM 80 MG/1
80 TABLET, FILM COATED ORAL DAILY
Qty: 90 TABLET | Refills: 2 | Status: SHIPPED | OUTPATIENT
Start: 2023-12-07

## 2023-12-11 NOTE — PROGRESS NOTES
[address] [place] [address]  [phone]    Patient Name:  Tanisha Murry  YOB: 1950      Office Visit 12/14/2023    CHIEF COMPLAINT:    Chief Complaint   Patient presents with    Sleep Apnea       HISTORY OF PRESENT ILLNESS: Patient is a 70-year-old male who presents today for follow-up of BILL. Diagnostic sleep study on 02/09/2023 with an AHI of 88.7 and lowest saturation 70%. He is prescribed bipap therapy with a humidifier set at 19/15 cm with a full face mask. Most recent download reveals AHI on PAP therapy is 9.9, leak is median 19.1 and 64.7 at 95th percentile and the hourly usage is 7 hours 15 minutes nightly. The overall use is 1240 hours with days greater than four hours at 162/172. The patient is compliant with the Pap therapy and is feeling better as a result. He reports that he is still having trouble with mask leak which awakens him around 3-4 in the morning. He has tried many different mask but has never tried a chinstrap with the mask. He does report that he is having dry mouth and he awakens. Anticipate his mouth may be following open when he is in deeper sleep. I did provide him with a chinstrap to try and see if this helps with mask leak. He reports that he does still awaken refreshed in the morning but is little more fatigued which is probably due to his nighttime awakenings. Watrous score 3/24. He denies any major medical changes since his last visit. Reports he has lost about 10 pounds. We did discuss decreasing carbohydrates in his diet and increasing his activity by walking to help with further weight reduction. His blood pressure is controlled today.                       Sleepiness Scale:            12/14/2023     8:19 AM 6/22/2023    11:28 AM 2/20/2023     1:28 PM   Sleep Medicine   Sitting and reading 0 1 0   Watching TV 1 2 2   Sitting, inactive in a public place (e.g. a theatre or a meeting) 0 0 0

## 2023-12-14 ENCOUNTER — OFFICE VISIT (OUTPATIENT)
Dept: SLEEP MEDICINE | Age: 73
End: 2023-12-14
Payer: MEDICARE

## 2023-12-14 VITALS
TEMPERATURE: 97.3 F | DIASTOLIC BLOOD PRESSURE: 68 MMHG | HEIGHT: 70 IN | OXYGEN SATURATION: 97 % | SYSTOLIC BLOOD PRESSURE: 130 MMHG | WEIGHT: 229 LBS | HEART RATE: 56 BPM | BODY MASS INDEX: 32.78 KG/M2

## 2023-12-14 DIAGNOSIS — E66.9 OBESITY (BMI 30.0-34.9): ICD-10-CM

## 2023-12-14 DIAGNOSIS — G47.33 OSA (OBSTRUCTIVE SLEEP APNEA): Primary | ICD-10-CM

## 2023-12-14 DIAGNOSIS — G47.34 NOCTURNAL HYPOXEMIA: ICD-10-CM

## 2023-12-14 PROCEDURE — 1123F ACP DISCUSS/DSCN MKR DOCD: CPT | Performed by: NURSE PRACTITIONER

## 2023-12-14 PROCEDURE — 99213 OFFICE O/P EST LOW 20 MIN: CPT | Performed by: NURSE PRACTITIONER

## 2023-12-14 PROCEDURE — 3075F SYST BP GE 130 - 139MM HG: CPT | Performed by: NURSE PRACTITIONER

## 2023-12-14 PROCEDURE — 3078F DIAST BP <80 MM HG: CPT | Performed by: NURSE PRACTITIONER

## 2023-12-14 ASSESSMENT — SLEEP AND FATIGUE QUESTIONNAIRES
HOW LIKELY ARE YOU TO NOD OFF OR FALL ASLEEP WHILE LYING DOWN TO REST IN THE AFTERNOON WHEN CIRCUMSTANCES PERMIT: 0
ESS TOTAL SCORE: 3
HOW LIKELY ARE YOU TO NOD OFF OR FALL ASLEEP IN A CAR, WHILE STOPPED FOR A FEW MINUTES IN TRAFFIC: 0
HOW LIKELY ARE YOU TO NOD OFF OR FALL ASLEEP WHILE SITTING AND TALKING TO SOMEONE: 0
HOW LIKELY ARE YOU TO NOD OFF OR FALL ASLEEP WHILE SITTING AND READING: 0
HOW LIKELY ARE YOU TO NOD OFF OR FALL ASLEEP WHILE SITTING INACTIVE IN A PUBLIC PLACE: 0
HOW LIKELY ARE YOU TO NOD OFF OR FALL ASLEEP WHILE SITTING QUIETLY AFTER LUNCH WITHOUT ALCOHOL: 1
HOW LIKELY ARE YOU TO NOD OFF OR FALL ASLEEP WHEN YOU ARE A PASSENGER IN A CAR FOR AN HOUR WITHOUT A BREAK: 1
HOW LIKELY ARE YOU TO NOD OFF OR FALL ASLEEP WHILE WATCHING TV: 1

## 2023-12-14 NOTE — PATIENT INSTRUCTIONS
Continue BiPAP 19/15 cm H2O nightly compliance  New supplies ordered   Recommendations as above  Follow-up in 6 months or sooner if needed

## 2024-01-18 ENCOUNTER — TELEPHONE (OUTPATIENT)
Age: 74
End: 2024-01-18

## 2024-01-18 NOTE — TELEPHONE ENCOUNTER
Patient needs PA for this drugEvolocumab (REPATHA) SOSY syringe [[id number]]    Order Details    Dose, Route, Frequency: As Directed   Dispense Quantity: 2 each Refills: 11          Sig: INJECT 1 ML BY SUBCUTANEOUS ROUTE EVERY FOURTEEN (14) DAYS.

## 2024-01-19 ENCOUNTER — NURSE ONLY (OUTPATIENT)
Dept: PRIMARY CARE CLINIC | Facility: CLINIC | Age: 74
End: 2024-01-19

## 2024-01-19 DIAGNOSIS — R79.9 ABNORMAL BLOOD CHEMISTRY: ICD-10-CM

## 2024-01-19 DIAGNOSIS — R53.82 CHRONIC FATIGUE: ICD-10-CM

## 2024-01-19 DIAGNOSIS — Z12.5 SCREENING FOR PROSTATE CANCER: ICD-10-CM

## 2024-01-19 DIAGNOSIS — Z13.1 SCREENING FOR DIABETES MELLITUS: ICD-10-CM

## 2024-01-19 DIAGNOSIS — Z13.6 SCREENING FOR ISCHEMIC HEART DISEASE: ICD-10-CM

## 2024-01-19 DIAGNOSIS — Z13.228 SCREENING FOR PHENYLKETONURIA (PKU): ICD-10-CM

## 2024-01-19 DIAGNOSIS — Z12.5 SCREENING FOR PROSTATE CANCER: Primary | ICD-10-CM

## 2024-01-19 LAB
ALBUMIN SERPL-MCNC: 3.8 G/DL (ref 3.2–4.6)
ALBUMIN/GLOB SERPL: 1 (ref 0.4–1.6)
ALP SERPL-CCNC: 88 U/L (ref 50–136)
ALT SERPL-CCNC: 40 U/L (ref 12–65)
ANION GAP SERPL CALC-SCNC: 5 MMOL/L (ref 2–11)
AST SERPL-CCNC: 32 U/L (ref 15–37)
BASOPHILS # BLD: 0.1 K/UL (ref 0–0.2)
BASOPHILS NFR BLD: 1 % (ref 0–2)
BILIRUB SERPL-MCNC: 1.3 MG/DL (ref 0.2–1.1)
BUN SERPL-MCNC: 12 MG/DL (ref 8–23)
CALCIUM SERPL-MCNC: 8.9 MG/DL (ref 8.3–10.4)
CHLORIDE SERPL-SCNC: 107 MMOL/L (ref 103–113)
CHOLEST SERPL-MCNC: 68 MG/DL
CO2 SERPL-SCNC: 26 MMOL/L (ref 21–32)
CREAT SERPL-MCNC: 1.1 MG/DL (ref 0.8–1.5)
DIFFERENTIAL METHOD BLD: NORMAL
EOSINOPHIL # BLD: 0.2 K/UL (ref 0–0.8)
EOSINOPHIL NFR BLD: 3 % (ref 0.5–7.8)
EST. AVERAGE GLUCOSE BLD GHB EST-MCNC: 105 MG/DL
GLOBULIN SER CALC-MCNC: 3.7 G/DL (ref 2.8–4.5)
GLUCOSE SERPL-MCNC: 84 MG/DL (ref 65–100)
HBA1C MFR BLD: 5.3 % (ref 4.8–5.6)
HCT VFR BLD AUTO: 49.7 % (ref 41.1–50.3)
HDLC SERPL-MCNC: 41 MG/DL (ref 40–60)
HDLC SERPL: 1.7
HGB BLD-MCNC: 16.2 G/DL (ref 13.6–17.2)
IMM GRANULOCYTES # BLD AUTO: 0 K/UL (ref 0–0.5)
IMM GRANULOCYTES NFR BLD AUTO: 0 % (ref 0–5)
LDLC SERPL CALC-MCNC: 9 MG/DL
LYMPHOCYTES # BLD: 1.6 K/UL (ref 0.5–4.6)
LYMPHOCYTES NFR BLD: 22 % (ref 13–44)
MCH RBC QN AUTO: 29.1 PG (ref 26.1–32.9)
MCHC RBC AUTO-ENTMCNC: 32.6 G/DL (ref 31.4–35)
MCV RBC AUTO: 89.2 FL (ref 82–102)
MONOCYTES # BLD: 0.5 K/UL (ref 0.1–1.3)
MONOCYTES NFR BLD: 7 % (ref 4–12)
NEUTS SEG # BLD: 4.8 K/UL (ref 1.7–8.2)
NEUTS SEG NFR BLD: 67 % (ref 43–78)
NRBC # BLD: 0 K/UL (ref 0–0.2)
PLATELET # BLD AUTO: 201 K/UL (ref 150–450)
PMV BLD AUTO: 10.8 FL (ref 9.4–12.3)
POTASSIUM SERPL-SCNC: 4.2 MMOL/L (ref 3.5–5.1)
PROT SERPL-MCNC: 7.5 G/DL (ref 6.3–8.2)
PSA SERPL-MCNC: <0.1 NG/ML
RBC # BLD AUTO: 5.57 M/UL (ref 4.23–5.6)
SODIUM SERPL-SCNC: 138 MMOL/L (ref 136–146)
T4 FREE SERPL-MCNC: 1.1 NG/DL (ref 0.78–1.46)
TRIGL SERPL-MCNC: 90 MG/DL (ref 35–150)
TSH, 3RD GENERATION: 3.36 UIU/ML (ref 0.36–3.74)
VLDLC SERPL CALC-MCNC: 18 MG/DL (ref 6–23)
WBC # BLD AUTO: 7 K/UL (ref 4.3–11.1)

## 2024-01-23 ASSESSMENT — PATIENT HEALTH QUESTIONNAIRE - PHQ9
SUM OF ALL RESPONSES TO PHQ QUESTIONS 1-9: 0
SUM OF ALL RESPONSES TO PHQ QUESTIONS 1-9: 0
SUM OF ALL RESPONSES TO PHQ9 QUESTIONS 1 & 2: 0
1. LITTLE INTEREST OR PLEASURE IN DOING THINGS: 0
SUM OF ALL RESPONSES TO PHQ QUESTIONS 1-9: 0
SUM OF ALL RESPONSES TO PHQ9 QUESTIONS 1 & 2: 0
1. LITTLE INTEREST OR PLEASURE IN DOING THINGS: NOT AT ALL
SUM OF ALL RESPONSES TO PHQ QUESTIONS 1-9: 0
2. FEELING DOWN, DEPRESSED OR HOPELESS: NOT AT ALL
2. FEELING DOWN, DEPRESSED OR HOPELESS: 0

## 2024-01-29 ENCOUNTER — OFFICE VISIT (OUTPATIENT)
Dept: PRIMARY CARE CLINIC | Facility: CLINIC | Age: 74
End: 2024-01-29
Payer: MEDICARE

## 2024-01-29 VITALS
HEART RATE: 60 BPM | HEIGHT: 70 IN | OXYGEN SATURATION: 94 % | SYSTOLIC BLOOD PRESSURE: 146 MMHG | BODY MASS INDEX: 33.07 KG/M2 | DIASTOLIC BLOOD PRESSURE: 67 MMHG | TEMPERATURE: 98.3 F | WEIGHT: 231 LBS

## 2024-01-29 DIAGNOSIS — E66.09 CLASS 1 OBESITY DUE TO EXCESS CALORIES WITH SERIOUS COMORBIDITY AND BODY MASS INDEX (BMI) OF 33.0 TO 33.9 IN ADULT: Chronic | ICD-10-CM

## 2024-01-29 DIAGNOSIS — Z00.00 MEDICARE ANNUAL WELLNESS VISIT, SUBSEQUENT: Primary | ICD-10-CM

## 2024-01-29 DIAGNOSIS — E78.5 DYSLIPIDEMIA: ICD-10-CM

## 2024-01-29 DIAGNOSIS — I10 PRIMARY HYPERTENSION: ICD-10-CM

## 2024-01-29 DIAGNOSIS — Z85.46 HISTORY OF PROSTATE CANCER: ICD-10-CM

## 2024-01-29 PROCEDURE — 1036F TOBACCO NON-USER: CPT | Performed by: FAMILY MEDICINE

## 2024-01-29 PROCEDURE — G0439 PPPS, SUBSEQ VISIT: HCPCS | Performed by: FAMILY MEDICINE

## 2024-01-29 PROCEDURE — G8484 FLU IMMUNIZE NO ADMIN: HCPCS | Performed by: FAMILY MEDICINE

## 2024-01-29 PROCEDURE — 3077F SYST BP >= 140 MM HG: CPT | Performed by: FAMILY MEDICINE

## 2024-01-29 PROCEDURE — 3017F COLORECTAL CA SCREEN DOC REV: CPT | Performed by: FAMILY MEDICINE

## 2024-01-29 PROCEDURE — 1123F ACP DISCUSS/DSCN MKR DOCD: CPT | Performed by: FAMILY MEDICINE

## 2024-01-29 PROCEDURE — 3078F DIAST BP <80 MM HG: CPT | Performed by: FAMILY MEDICINE

## 2024-01-29 PROCEDURE — G8417 CALC BMI ABV UP PARAM F/U: HCPCS | Performed by: FAMILY MEDICINE

## 2024-01-29 PROCEDURE — G8427 DOCREV CUR MEDS BY ELIG CLIN: HCPCS | Performed by: FAMILY MEDICINE

## 2024-01-29 PROCEDURE — 99214 OFFICE O/P EST MOD 30 MIN: CPT | Performed by: FAMILY MEDICINE

## 2024-01-29 RX ORDER — LOSARTAN POTASSIUM 50 MG/1
50 TABLET ORAL DAILY
Qty: 90 TABLET | Refills: 1 | Status: SHIPPED | OUTPATIENT
Start: 2024-01-29

## 2024-01-29 SDOH — ECONOMIC STABILITY: FOOD INSECURITY: WITHIN THE PAST 12 MONTHS, THE FOOD YOU BOUGHT JUST DIDN'T LAST AND YOU DIDN'T HAVE MONEY TO GET MORE.: NEVER TRUE

## 2024-01-29 SDOH — ECONOMIC STABILITY: INCOME INSECURITY: HOW HARD IS IT FOR YOU TO PAY FOR THE VERY BASICS LIKE FOOD, HOUSING, MEDICAL CARE, AND HEATING?: VERY HARD

## 2024-01-29 SDOH — ECONOMIC STABILITY: HOUSING INSECURITY
IN THE LAST 12 MONTHS, WAS THERE A TIME WHEN YOU DID NOT HAVE A STEADY PLACE TO SLEEP OR SLEPT IN A SHELTER (INCLUDING NOW)?: NO

## 2024-01-29 ASSESSMENT — PATIENT HEALTH QUESTIONNAIRE - PHQ9
SUM OF ALL RESPONSES TO PHQ QUESTIONS 1-9: 0
2. FEELING DOWN, DEPRESSED OR HOPELESS: 0
SUM OF ALL RESPONSES TO PHQ QUESTIONS 1-9: 0
1. LITTLE INTEREST OR PLEASURE IN DOING THINGS: 0
SUM OF ALL RESPONSES TO PHQ9 QUESTIONS 1 & 2: 0

## 2024-01-29 ASSESSMENT — LIFESTYLE VARIABLES
HOW OFTEN DO YOU HAVE A DRINK CONTAINING ALCOHOL: NEVER
HOW MANY STANDARD DRINKS CONTAINING ALCOHOL DO YOU HAVE ON A TYPICAL DAY: PATIENT DOES NOT DRINK

## 2024-01-29 NOTE — PROGRESS NOTES
Automatic Reconciliation, Ar       CareTeam (Including outside providers/suppliers regularly involved in providing care):   Patient Care Team:  Zi Ramsey Jr., MD as PCP - General  Zi Ramsey Jr., MD as PCP - Empaneled Provider     Reviewed and updated this visit:  Tobacco  Allergies  Meds  Med Hx  Surg Hx  Soc Hx  Fam Hx

## 2024-01-29 NOTE — PATIENT INSTRUCTIONS
lifestyle, illnesses that may run in your family, and various assessments and screenings as appropriate.    After reviewing your medical record and screening and assessments performed today your provider may have ordered immunizations, labs, imaging, and/or referrals for you.  A list of these orders (if applicable) as well as your Preventive Care list are included within your After Visit Summary for your review.    Other Preventive Recommendations:    A preventive eye exam performed by an eye specialist is recommended every 1-2 years to screen for glaucoma; cataracts, macular degeneration, and other eye disorders.  A preventive dental visit is recommended every 6 months.  Try to get at least 150 minutes of exercise per week or 10,000 steps per day on a pedometer .  Order or download the FREE \"Exercise & Physical Activity: Your Everyday Guide\" from [employer]. Call 1-382.380.4896 or search [employer] online.  You need 1250-1044 mg of calcium and 9107-3963 IU of vitamin D per day. It is possible to meet your calcium requirement with diet alone, but a vitamin D supplement is usually necessary to meet this goal.  When exposed to the sun, use a sunscreen that protects against both UVA and UVB radiation with an SPF of 30 or greater. Reapply every 2 to 3 hours or after sweating, drying off with a towel, or swimming.  Always wear a seat belt when traveling in a car. Always wear a helmet when riding a bicycle or motorcycle.

## 2024-02-12 ENCOUNTER — OFFICE VISIT (OUTPATIENT)
Age: 74
End: 2024-02-12
Payer: MEDICARE

## 2024-02-12 VITALS
HEART RATE: 60 BPM | SYSTOLIC BLOOD PRESSURE: 150 MMHG | BODY MASS INDEX: 33.07 KG/M2 | WEIGHT: 231 LBS | DIASTOLIC BLOOD PRESSURE: 56 MMHG | HEIGHT: 70 IN

## 2024-02-12 DIAGNOSIS — E78.5 DYSLIPIDEMIA: ICD-10-CM

## 2024-02-12 DIAGNOSIS — I25.810 CORONARY ARTERY DISEASE INVOLVING CORONARY BYPASS GRAFT OF NATIVE HEART WITHOUT ANGINA PECTORIS: Primary | ICD-10-CM

## 2024-02-12 DIAGNOSIS — I10 PRIMARY HYPERTENSION: ICD-10-CM

## 2024-02-12 DIAGNOSIS — I48.0 PAF (PAROXYSMAL ATRIAL FIBRILLATION) (HCC): ICD-10-CM

## 2024-02-12 PROCEDURE — 99214 OFFICE O/P EST MOD 30 MIN: CPT | Performed by: INTERNAL MEDICINE

## 2024-02-12 PROCEDURE — 3077F SYST BP >= 140 MM HG: CPT | Performed by: INTERNAL MEDICINE

## 2024-02-12 PROCEDURE — G8484 FLU IMMUNIZE NO ADMIN: HCPCS | Performed by: INTERNAL MEDICINE

## 2024-02-12 PROCEDURE — G8427 DOCREV CUR MEDS BY ELIG CLIN: HCPCS | Performed by: INTERNAL MEDICINE

## 2024-02-12 PROCEDURE — 3078F DIAST BP <80 MM HG: CPT | Performed by: INTERNAL MEDICINE

## 2024-02-12 PROCEDURE — G8417 CALC BMI ABV UP PARAM F/U: HCPCS | Performed by: INTERNAL MEDICINE

## 2024-02-12 PROCEDURE — 1036F TOBACCO NON-USER: CPT | Performed by: INTERNAL MEDICINE

## 2024-02-12 PROCEDURE — 3017F COLORECTAL CA SCREEN DOC REV: CPT | Performed by: INTERNAL MEDICINE

## 2024-02-12 PROCEDURE — 1123F ACP DISCUSS/DSCN MKR DOCD: CPT | Performed by: INTERNAL MEDICINE

## 2024-02-12 RX ORDER — LOSARTAN POTASSIUM 50 MG/1
50 TABLET ORAL 2 TIMES DAILY
Qty: 180 TABLET | Refills: 3 | Status: SHIPPED | OUTPATIENT
Start: 2024-02-12

## 2024-02-12 NOTE — PROGRESS NOTES
[facility] CARDIOLOGY  [address] [place] [address], [facility] [address]  [place] [address]  PHONE: [phone]      24    NAME:  Benjamin Colby  : 1950  MRN: [id number]       SUBJECTIVE:   Bnejamin Colby is a 74 y.o. male seen for a follow up visit regarding the following:     Chief Complaint   Patient presents with    Coronary Artery Disease         HPI:    No cp or marcum. No orthopnea or pnd. No palpitations or syncope.      Past Medical History, Past Surgical History, Family history, Social History, and Medications were all reviewed with the patient today and updated as necessary.     Current Outpatient Medications   Medication Sig Dispense Refill    losartan (COZAAR) 50 MG tablet Take 1 tablet by mouth 2 times daily 180 tablet 3    atorvastatin (LIPITOR) 80 MG tablet TAKE 1 TABLET BY MOUTH EVERY DAY 90 tablet 2    Ubrogepant (UBRELVY) 100 MG TABS Take 1 tablet by mouth as needed (May repeat for 1 dose in 2 hours if needed. Not to exceed 200 mg/24 hours.) 16 tablet 11    metoprolol tartrate (LOPRESSOR) 50 MG tablet Take 1 tablet by mouth 2 times daily 180 tablet 3    rivaroxaban (XARELTO) 20 MG TABS tablet Take 1 tablet by mouth daily (with breakfast) 90 tablet 3    Evolocumab (REPATHA) SOSY syringe INJECT 1 ML BY SUBCUTANEOUS ROUTE EVERY FOURTEEN (14) DAYS. 2 each 11    meclizine (ANTIVERT) 25 MG tablet Take 1 tablet by mouth in the morning, at noon, and at bedtime      BiPAP Machine MISC by Does not apply route      aspirin 81 MG EC tablet Take 1 tablet by mouth daily      fluticasone (FLONASE) 50 MCG/ACT nasal spray One spray in each nostril twice daily      loratadine (CLARITIN) 10 MG tablet Take 1 tablet by mouth daily      nitroGLYCERIN (NITROSTAT) 0.4 MG SL tablet Place 1 tablet under the tongue       No current facility-administered medications for this visit.               Social History     Tobacco Use    Smoking status: Never    Smokeless tobacco: Never   Substance Use Topics    Alcohol use: Never

## 2024-02-23 ENCOUNTER — TELEPHONE (OUTPATIENT)
Age: 74
End: 2024-02-23

## 2024-02-23 DIAGNOSIS — I10 PRIMARY HYPERTENSION: ICD-10-CM

## 2024-02-23 RX ORDER — LOSARTAN POTASSIUM 50 MG/1
50 TABLET ORAL 2 TIMES DAILY
Qty: 180 TABLET | Refills: 3 | Status: SHIPPED | OUTPATIENT
Start: 2024-02-23

## 2024-02-23 NOTE — TELEPHONE ENCOUNTER
MEDICATION REFILL REQUEST      Name of Medication:  losartan   Dose:  50 mg  Frequency:  twice a day   Quantity:    Days' supply:  90      Pharmacy Name/Location:  [facility]    MEDICATION REFILL REQUEST      Name of Medication:  Xarelto  Dose:  20 mg  Frequency:  daily   Quantity:    Days' supply:  90      Pharmacy Name/Location:  [facility]

## 2024-02-23 NOTE — TELEPHONE ENCOUNTER
Requested Prescriptions     Signed Prescriptions Disp Refills    losartan (COZAAR) 50 MG tablet 180 tablet 3     Sig: Take 1 tablet by mouth 2 times daily     Authorizing Provider: JOCE SMALLS     Ordering User: MULUGETA BORRERO    rivaroxaban (XARELTO) 20 MG TABS tablet 90 tablet 3     Sig: Take 1 tablet by mouth daily (with breakfast)     Authorizing Provider: JOCE SMALLS     Ordering User: MULUGETA BORRERO       Rx verified.

## 2024-04-15 ENCOUNTER — TELEPHONE (OUTPATIENT)
Dept: NEUROLOGY | Age: 74
End: 2024-04-15

## 2024-05-21 ENCOUNTER — OFFICE VISIT (OUTPATIENT)
Age: 74
End: 2024-05-21
Payer: MEDICARE

## 2024-05-21 VITALS
HEART RATE: 84 BPM | HEIGHT: 71 IN | DIASTOLIC BLOOD PRESSURE: 58 MMHG | WEIGHT: 210 LBS | SYSTOLIC BLOOD PRESSURE: 130 MMHG | BODY MASS INDEX: 29.4 KG/M2

## 2024-05-21 DIAGNOSIS — I48.0 PAF (PAROXYSMAL ATRIAL FIBRILLATION) (HCC): ICD-10-CM

## 2024-05-21 DIAGNOSIS — E78.5 DYSLIPIDEMIA: ICD-10-CM

## 2024-05-21 DIAGNOSIS — I25.810 CORONARY ARTERY DISEASE INVOLVING CORONARY BYPASS GRAFT OF NATIVE HEART WITHOUT ANGINA PECTORIS: Primary | ICD-10-CM

## 2024-05-21 DIAGNOSIS — I25.700 CORONARY ARTERY DISEASE INVOLVING CORONARY BYPASS GRAFT OF NATIVE HEART WITH UNSTABLE ANGINA PECTORIS (HCC): ICD-10-CM

## 2024-05-21 PROCEDURE — 3075F SYST BP GE 130 - 139MM HG: CPT | Performed by: INTERNAL MEDICINE

## 2024-05-21 PROCEDURE — 3017F COLORECTAL CA SCREEN DOC REV: CPT | Performed by: INTERNAL MEDICINE

## 2024-05-21 PROCEDURE — 1036F TOBACCO NON-USER: CPT | Performed by: INTERNAL MEDICINE

## 2024-05-21 PROCEDURE — 99214 OFFICE O/P EST MOD 30 MIN: CPT | Performed by: INTERNAL MEDICINE

## 2024-05-21 PROCEDURE — G8427 DOCREV CUR MEDS BY ELIG CLIN: HCPCS | Performed by: INTERNAL MEDICINE

## 2024-05-21 PROCEDURE — G8417 CALC BMI ABV UP PARAM F/U: HCPCS | Performed by: INTERNAL MEDICINE

## 2024-05-21 PROCEDURE — 3078F DIAST BP <80 MM HG: CPT | Performed by: INTERNAL MEDICINE

## 2024-05-21 PROCEDURE — 1123F ACP DISCUSS/DSCN MKR DOCD: CPT | Performed by: INTERNAL MEDICINE

## 2024-05-21 RX ORDER — AMLODIPINE BESYLATE 10 MG/1
10 TABLET ORAL DAILY
COMMUNITY

## 2024-05-21 RX ORDER — CARVEDILOL 25 MG/1
25 TABLET ORAL 2 TIMES DAILY WITH MEALS
COMMUNITY

## 2024-05-21 RX ORDER — PHENOL 1.4 %
AEROSOL, SPRAY (ML) MUCOUS MEMBRANE
COMMUNITY

## 2024-05-21 RX ORDER — ACETAMINOPHEN 325 MG/1
650 TABLET ORAL EVERY 6 HOURS PRN
COMMUNITY

## 2024-05-21 RX ORDER — DIVALPROEX SODIUM 125 MG/1
125 TABLET, DELAYED RELEASE ORAL 3 TIMES DAILY
COMMUNITY

## 2024-05-21 NOTE — PROGRESS NOTES
[facility] CARDIOLOGY  [address] [place] [address], [address]  [place] [address]  PHONE: [phone]      24    NAME:  Benjamin Colby  : 1950  MRN: [id number]       SUBJECTIVE:   Benjamin Colby is a 74 y.o. male seen for a follow up visit regarding the following:     Chief Complaint   Patient presents with    Coronary Artery Disease    Follow-Up from Hospital         HPI:    No cp or marcum. No orthopnea or pnd. No palpitations or syncope.    He had a month-long hospitalization with intraventricular hemorrhage at [facility].  Systolic blood pressure was greater than 210 at the time of the hemorrhage and he has also been maintained on Xarelto at the time for atrial fibrillation    Past Medical History, Past Surgical History, Family history, Social History, and Medications were all reviewed with the patient today and updated as necessary.     Current Outpatient Medications   Medication Sig Dispense Refill    apixaban (ELIQUIS) 5 MG TABS tablet Take 1 tablet by mouth 2 times daily      acetaminophen (TYLENOL) 325 MG tablet Take 2 tablets by mouth every 6 hours as needed for Pain      amLODIPine (NORVASC) 10 MG tablet Take 1 tablet by mouth daily      carboxymethylcellulose 1 % ophthalmic solution 1 drop 3 times daily      carvedilol (COREG) 25 MG tablet Take 1 tablet by mouth 2 times daily (with meals)      divalproex (DEPAKOTE) 125 MG DR tablet Take 1 tablet by mouth 3 times daily      Melatonin 10 MG TABS Take by mouth      losartan (COZAAR) 50 MG tablet Take 1 tablet by mouth 2 times daily 180 tablet 3    atorvastatin (LIPITOR) 80 MG tablet TAKE 1 TABLET BY MOUTH EVERY DAY 90 tablet 2    nitroGLYCERIN (NITROSTAT) 0.4 MG SL tablet Place 1 tablet under the tongue       No current facility-administered medications for this visit.               Social History     Tobacco Use    Smoking status: Never    Smokeless tobacco: Never   Substance Use Topics    Alcohol use: Never              PHYSICAL EXAM:   BP (!)

## 2024-07-11 ENCOUNTER — TELEPHONE (OUTPATIENT)
Age: 74
End: 2024-07-11

## 2024-07-11 NOTE — TELEPHONE ENCOUNTER
Cardiac Clearance        Physician or Practice Requesting:[facility]  Urology   :  Jessica   Contact Phone Number: [phone]  Fax Number: [phone]  Date of Surgery/Procedure: 7/15/24  Type of Surgery or Procedure: cystoscope with bladder neck contracture dilation  Type of Anesthesia: general   Type of Clearance Requested: Cardiac Clearance and Medication Hold  Medication to Hold:Eliquis   Days to Hold: 3 days

## 2024-07-12 NOTE — TELEPHONE ENCOUNTER
Low to moderate risk.  Okay to hold Eliquis 2 to 3 days prior to the procedure.  Resume postoperatively as soon as possible, defer to operating physician.  Continue other cardiac meds without change or interruption if possible.

## 2024-07-16 PROBLEM — Z86.79 HISTORY OF INTRACRANIAL HEMORRHAGE: Status: ACTIVE | Noted: 2024-07-16

## 2024-07-16 NOTE — TELEPHONE ENCOUNTER
Copy of this note faxed to :[facility]  Urology   :  Jessica   Contact Phone Number: [phone]  Fax Number: [phone]

## 2024-07-17 NOTE — PROGRESS NOTES
[facility] CARDIOLOGY  [address] [place] [address], [address]  [place] [address]  PHONE: [phone]      Benjamin Colby  1950    SUBJECTIVE:   Benjamin Colby is a 74 y.o. male seen for a consultation visit regarding the following:     Chief Complaint   Patient presents with    Coronary Artery Disease            HPI:  Consultation is requested by [unfilled] for evaluation of Coronary Artery Disease   .    Patient presents for consultation requested by Dr. Ellis for consideration of left atrial appendage occlusion.  He has a history of coronary artery disease status post coronary bypass grafting, hypertension, hyperlipidemia and paroxysmal atrial fibrillation.  He presented to [facility] on April 13, 2024 with headaches, aphasia and confusion.  CT showed right lateral ventricular hemorrhage and small left lateral ventricular hemorrhage.  CTA was negative for vascular malformations.  It was felt the etiology of his intracranial hemorrhage was uncontrolled hypertension.  He was on Xarelto prior to this presentation.  This was held for 2 weeks and apparently has been placed on Eliquis which he is currently receiving.    He is having difficulty with urinary obstruction and recently had a catheter placed.  Unclear if he is going need further procedures in this regard.      Atrial Fibrillation CHADSVASC2 Score Stroke Risk:   74 y.o. 65-74 - 1   male Male - 0   CHF HX: No - 0   HTN HX: Yes - 1   Stroke/TIA/Thromboembolism Yes _2   Vascular Disease HX: Yes - 1   Diabetes Mellitus No - 0   CHADSVASC 2 Score 5      Annual Stroke Risk 7.2% - moderate-high        HAS-BLED Score     HTN Hx [x] 1 Point   Renal Disease  [] 1 Point   Liver Disease [] 1 Point   Stoke Hx [x] 1 Point   Prior Major Bleeding or Predisposition [x] 1 Point   Labile INR [] 1 Point   Age > 65 Years Current: 74 y.o.   [x] 1 Point   Rx Usage Predisposing to Bleeding [] 1 Point   Alcohol Use (> or = 8 Drinks/wk) [] 1 Point   Total: UCHASBLED: Score 4

## 2024-07-18 ENCOUNTER — OFFICE VISIT (OUTPATIENT)
Age: 74
End: 2024-07-18
Payer: MEDICARE

## 2024-07-18 VITALS
HEIGHT: 71 IN | HEART RATE: 62 BPM | BODY MASS INDEX: 28.56 KG/M2 | SYSTOLIC BLOOD PRESSURE: 126 MMHG | DIASTOLIC BLOOD PRESSURE: 75 MMHG | WEIGHT: 204 LBS

## 2024-07-18 DIAGNOSIS — I10 PRIMARY HYPERTENSION: ICD-10-CM

## 2024-07-18 DIAGNOSIS — Z86.79 HISTORY OF INTRACRANIAL HEMORRHAGE: ICD-10-CM

## 2024-07-18 DIAGNOSIS — N13.9 URINARY OBSTRUCTION: ICD-10-CM

## 2024-07-18 DIAGNOSIS — I25.810 CORONARY ARTERY DISEASE INVOLVING CORONARY BYPASS GRAFT OF NATIVE HEART WITHOUT ANGINA PECTORIS: ICD-10-CM

## 2024-07-18 DIAGNOSIS — I48.0 PAF (PAROXYSMAL ATRIAL FIBRILLATION) (HCC): Primary | ICD-10-CM

## 2024-07-18 PROCEDURE — 1036F TOBACCO NON-USER: CPT | Performed by: INTERNAL MEDICINE

## 2024-07-18 PROCEDURE — 3074F SYST BP LT 130 MM HG: CPT | Performed by: INTERNAL MEDICINE

## 2024-07-18 PROCEDURE — 1123F ACP DISCUSS/DSCN MKR DOCD: CPT | Performed by: INTERNAL MEDICINE

## 2024-07-18 PROCEDURE — G8417 CALC BMI ABV UP PARAM F/U: HCPCS | Performed by: INTERNAL MEDICINE

## 2024-07-18 PROCEDURE — 3017F COLORECTAL CA SCREEN DOC REV: CPT | Performed by: INTERNAL MEDICINE

## 2024-07-18 PROCEDURE — 3078F DIAST BP <80 MM HG: CPT | Performed by: INTERNAL MEDICINE

## 2024-07-18 PROCEDURE — G8428 CUR MEDS NOT DOCUMENT: HCPCS | Performed by: INTERNAL MEDICINE

## 2024-07-18 PROCEDURE — 99215 OFFICE O/P EST HI 40 MIN: CPT | Performed by: INTERNAL MEDICINE

## 2024-07-18 RX ORDER — LOSARTAN POTASSIUM 50 MG/1
50 TABLET ORAL 2 TIMES DAILY
Qty: 180 TABLET | Refills: 3 | Status: SHIPPED | OUTPATIENT
Start: 2024-07-18

## 2024-07-18 RX ORDER — CARVEDILOL 25 MG/1
25 TABLET ORAL 2 TIMES DAILY WITH MEALS
Qty: 180 TABLET | Refills: 3 | Status: SHIPPED | OUTPATIENT
Start: 2024-07-18

## 2024-07-18 RX ORDER — ATORVASTATIN CALCIUM 80 MG/1
80 TABLET, FILM COATED ORAL DAILY
Qty: 90 TABLET | Refills: 3 | Status: SHIPPED | OUTPATIENT
Start: 2024-07-18

## 2024-07-18 RX ORDER — AMLODIPINE BESYLATE 10 MG/1
10 TABLET ORAL DAILY
Qty: 90 TABLET | Refills: 3 | Status: SHIPPED | OUTPATIENT
Start: 2024-07-18

## 2024-07-18 RX ORDER — NITROGLYCERIN 0.4 MG/1
0.4 TABLET SUBLINGUAL EVERY 5 MIN PRN
Qty: 25 TABLET | Refills: 0 | Status: SHIPPED | OUTPATIENT
Start: 2024-07-18

## 2024-07-18 ASSESSMENT — ENCOUNTER SYMPTOMS
DIARRHEA: 0
COLOR CHANGE: 0
SHORTNESS OF BREATH: 0
LEFT EYE: 0

## 2024-07-30 ENCOUNTER — TELEPHONE (OUTPATIENT)
Age: 74
End: 2024-07-30

## 2024-07-30 NOTE — TELEPHONE ENCOUNTER
Spouse states patient sees rubens he gives him 20 mg. Dr. Recinos gave him 80mg atorvastatin (LIPITOR)   Patient spouse states he does not want to take 80 mg would like a call regarding.

## 2024-07-30 NOTE — TELEPHONE ENCOUNTER
Wife called stating that when patient was in [facility] in April for  a stroke, he was given Lipitor 20 mg and does not take Lipitor 80 mg anymore. Wife is wanting clarification as to which dose of Lipitor patient should be taking. //ty    Last office visit 5-21-24

## 2024-08-22 ENCOUNTER — TELEPHONE (OUTPATIENT)
Dept: CARDIAC CATH/INVASIVE PROCEDURES | Age: 74
End: 2024-08-22

## 2024-08-22 NOTE — TELEPHONE ENCOUNTER
Follow up call to Mr. Colby, his urological issues have resolved.  He would like to follow up with his primary cardiologist prior to pursuing [facility].  Patient has nurse navigator contact ([phone]) information.

## 2024-08-26 ENCOUNTER — OFFICE VISIT (OUTPATIENT)
Age: 74
End: 2024-08-26
Payer: MEDICARE

## 2024-08-26 VITALS
WEIGHT: 204 LBS | HEIGHT: 69 IN | BODY MASS INDEX: 30.21 KG/M2 | SYSTOLIC BLOOD PRESSURE: 128 MMHG | HEART RATE: 61 BPM | DIASTOLIC BLOOD PRESSURE: 54 MMHG

## 2024-08-26 DIAGNOSIS — E78.5 DYSLIPIDEMIA: ICD-10-CM

## 2024-08-26 DIAGNOSIS — I25.119 ATHEROSCLEROSIS OF NATIVE CORONARY ARTERY OF NATIVE HEART WITH ANGINA PECTORIS (HCC): ICD-10-CM

## 2024-08-26 DIAGNOSIS — I10 PRIMARY HYPERTENSION: ICD-10-CM

## 2024-08-26 DIAGNOSIS — I48.0 PAF (PAROXYSMAL ATRIAL FIBRILLATION) (HCC): Primary | ICD-10-CM

## 2024-08-26 DIAGNOSIS — Z95.1 S/P CABG X 4: ICD-10-CM

## 2024-08-26 PROBLEM — I20.9 ANGINA PECTORIS, UNSPECIFIED (HCC): Status: ACTIVE | Noted: 2024-08-26

## 2024-08-26 PROCEDURE — G8417 CALC BMI ABV UP PARAM F/U: HCPCS | Performed by: INTERNAL MEDICINE

## 2024-08-26 PROCEDURE — 3074F SYST BP LT 130 MM HG: CPT | Performed by: INTERNAL MEDICINE

## 2024-08-26 PROCEDURE — 3017F COLORECTAL CA SCREEN DOC REV: CPT | Performed by: INTERNAL MEDICINE

## 2024-08-26 PROCEDURE — 3078F DIAST BP <80 MM HG: CPT | Performed by: INTERNAL MEDICINE

## 2024-08-26 PROCEDURE — 93000 ELECTROCARDIOGRAM COMPLETE: CPT | Performed by: INTERNAL MEDICINE

## 2024-08-26 PROCEDURE — G8428 CUR MEDS NOT DOCUMENT: HCPCS | Performed by: INTERNAL MEDICINE

## 2024-08-26 PROCEDURE — 99214 OFFICE O/P EST MOD 30 MIN: CPT | Performed by: INTERNAL MEDICINE

## 2024-08-26 PROCEDURE — 1036F TOBACCO NON-USER: CPT | Performed by: INTERNAL MEDICINE

## 2024-08-26 PROCEDURE — 1123F ACP DISCUSS/DSCN MKR DOCD: CPT | Performed by: INTERNAL MEDICINE

## 2024-08-26 RX ORDER — CARVEDILOL 25 MG/1
25 TABLET ORAL 2 TIMES DAILY WITH MEALS
Qty: 180 TABLET | Refills: 3 | Status: SHIPPED | OUTPATIENT
Start: 2024-08-26

## 2024-08-26 RX ORDER — AMLODIPINE BESYLATE 10 MG/1
10 TABLET ORAL DAILY
Qty: 90 TABLET | Refills: 3 | Status: SHIPPED | OUTPATIENT
Start: 2024-08-26

## 2024-08-26 RX ORDER — ATORVASTATIN CALCIUM 80 MG/1
80 TABLET, FILM COATED ORAL DAILY
Qty: 90 TABLET | Refills: 3 | Status: SHIPPED | OUTPATIENT
Start: 2024-08-26

## 2024-08-26 RX ORDER — LOSARTAN POTASSIUM 50 MG/1
50 TABLET ORAL 2 TIMES DAILY
Qty: 180 TABLET | Refills: 3 | Status: SHIPPED | OUTPATIENT
Start: 2024-08-26

## 2024-08-26 NOTE — PROGRESS NOTES
[facility] CARDIOLOGY  [address] [place] [address], [facility] [address]  [place] [address]  PHONE: [phone]      24    NAME:  Benjamin Colby  : 1950  MRN: [id number]       SUBJECTIVE:   Benjamin Colby is a 74 y.o. male seen for a follow up visit regarding the following:     Chief Complaint   Patient presents with    Coronary Artery Disease         HPI:    No cp or marcum. No orthopnea or pnd. No palpitations or syncope.      Past Medical History, Past Surgical History, Family history, Social History, and Medications were all reviewed with the patient today and updated as necessary.     Current Outpatient Medications   Medication Sig Dispense Refill    atorvastatin (LIPITOR) 80 MG tablet Take 1 tablet by mouth daily 90 tablet 3    losartan (COZAAR) 50 MG tablet Take 1 tablet by mouth 2 times daily 180 tablet 3    nitroGLYCERIN (NITROSTAT) 0.4 MG SL tablet Place 1 tablet under the tongue every 5 minutes as needed for Chest pain 25 tablet 0    carvedilol (COREG) 25 MG tablet Take 1 tablet by mouth 2 times daily (with meals) 180 tablet 3    apixaban (ELIQUIS) 5 MG TABS tablet Take 1 tablet by mouth 2 times daily 180 tablet 3    amLODIPine (NORVASC) 10 MG tablet Take 1 tablet by mouth daily 90 tablet 3    acetaminophen (TYLENOL) 325 MG tablet Take 2 tablets by mouth every 6 hours as needed for Pain      carboxymethylcellulose 1 % ophthalmic solution 1 drop 3 times daily (Patient not taking: Reported on 2024)      divalproex (DEPAKOTE) 125 MG DR tablet Take 1 tablet by mouth 3 times daily (Patient not taking: Reported on 2024)      Melatonin 10 MG TABS Take by mouth (Patient not taking: Reported on 2024)       No current facility-administered medications for this visit.               Social History     Tobacco Use    Smoking status: Never    Smokeless tobacco: Never   Substance Use Topics    Alcohol use: Never              PHYSICAL EXAM:   BP (!) 128/54   Pulse 61   Ht 1.753 m (5' 9\")   Wt 92.5 kg

## 2024-09-09 ENCOUNTER — TELEPHONE (OUTPATIENT)
Dept: CARDIAC CATH/INVASIVE PROCEDURES | Age: 74
End: 2024-09-09

## 2024-10-03 NOTE — PROGRESS NOTES
Benjamin Colby has been referred to the [facility] for evaluation for Left Atrial Appendage Closure with the Watchman device for management of stroke risk resulting from non-valvular atrial fibrillation.    Based on this patient's history of Intracranial hemorrhage, it has been determined that he is a poor candidate for long-term oral anticoagulation, however may be tolerant of short term treatment needed for watchman implantation.     Atrial Fibrillation CHADSVASC2 Score Stroke Risk:   74 y.o. 65-74 - 1   male Male - 0   CHF HX: No - 0   HTN HX: Yes - 1   Stroke/TIA/Thromboembolism Yes _2   Vascular Disease HX: Yes - 1   Diabetes Mellitus No - 0   CHADSVASC 2 Score 5      Annual Stroke Risk 7.2% - moderate-high      HAS-BLED Score     HTN Hx [x] 1 Point   Renal Disease  [] 1 Point   Liver Disease [] 1 Point   Stroke Hx [x] 1 Point   Prior Major Bleeding or Predisposition [x] 1 Point   Labile INR [] 1 Point   Age > 65 Years Current: 74 y.o.   [x] 1 Point   Rx Usage Predisposing to Bleeding [] 1 Point   Alcohol Use (> or = 8 Drinks/wk) [] 1 Point   Total: UCHASBLED: Score 4 High Risk 8.9% Risk of major bleeding 8.70  Bleeds Per 100 pts years Alternatives to Anticoagulation can be considered       Dr. Jose Guadalupe Recinos and Dr. Delroy Ellis have both had a face to face conversation with this patient explaining atrial fibrillation, stroke risk with atrial fibrillation, and the different treatment plans as related to their elevated stroke risk with atrial fibrillation. Based on stroke risk, as shown with ITZ1SY1-PVDn score, and bleeding risk, as shown with HAS-BLED score, a shared decision has been made to pursue closure of the left atrial appendage as a safe and effective alternative to oral anticoagulation.

## 2024-10-09 RX ORDER — TAMSULOSIN HYDROCHLORIDE 0.4 MG/1
0.4 CAPSULE ORAL DAILY
COMMUNITY

## 2024-10-09 RX ORDER — CEFPODOXIME PROXETIL 100 MG/1
100 TABLET, FILM COATED ORAL 2 TIMES DAILY
COMMUNITY
Start: 2024-10-12 | End: 2024-10-24 | Stop reason: ALTCHOICE

## 2024-10-09 NOTE — PROGRESS NOTES
Patient pre-assessment complete for LAAO scheduled for 10/15/2024, arrival time 0700. Patient verified using . Patient instructed to bring all home medications in labeled bottles on the day of procedure.  No food after midnight 10/14, patient to drink 32 ounces of a clear non-caffeinated liquid 2 hours prior to arrival time to avoid dehydration. Patient's last dose of Eliquis will be on 10/13/2024. He will take amlodipine and carvedilol with a small sip of water, the morning of his procedure. Patient instructed to HOLD all other medications and supplements. Patient verbalizes understanding of all instructions & denies any questions at this time. Patient has watchman contact ([phone]) for  questions.

## 2024-10-10 ENCOUNTER — PREP FOR PROCEDURE (OUTPATIENT)
Age: 74
End: 2024-10-10

## 2024-10-10 DIAGNOSIS — I48.0 PAROXYSMAL ATRIAL FIBRILLATION (HCC): Primary | ICD-10-CM

## 2024-10-14 ENCOUNTER — HOSPITAL ENCOUNTER (OUTPATIENT)
Dept: LAB | Age: 74
Discharge: HOME | End: 2024-10-17
Payer: MEDICARE

## 2024-10-14 ENCOUNTER — HOSPITAL ENCOUNTER (OUTPATIENT)
Dept: LAB | Age: 74
Setting detail: SPECIMEN
Discharge: HOME | End: 2024-10-17
Payer: MEDICARE

## 2024-10-14 DIAGNOSIS — I48.0 PAROXYSMAL ATRIAL FIBRILLATION (HCC): ICD-10-CM

## 2024-10-14 LAB
ABO + RH BLD: NORMAL
BLOOD GROUP ANTIBODIES SERPL: NORMAL
INR PPP: 1.4
PROTHROMBIN TIME: 17.2 SEC (ref 11.3–14.9)
SPECIMEN EXP DATE BLD: NORMAL

## 2024-10-14 PROCEDURE — 85610 PROTHROMBIN TIME: CPT

## 2024-10-14 PROCEDURE — 86850 RBC ANTIBODY SCREEN: CPT

## 2024-10-14 PROCEDURE — 86900 BLOOD TYPING SEROLOGIC ABO: CPT

## 2024-10-14 PROCEDURE — 86901 BLOOD TYPING SEROLOGIC RH(D): CPT

## 2024-10-14 PROCEDURE — 36415 COLL VENOUS BLD VENIPUNCTURE: CPT

## 2024-10-24 ENCOUNTER — PREP FOR PROCEDURE (OUTPATIENT)
Age: 74
End: 2024-10-24

## 2024-10-24 DIAGNOSIS — I48.0 PAROXYSMAL ATRIAL FIBRILLATION (HCC): Primary | ICD-10-CM

## 2024-10-24 NOTE — PROGRESS NOTES
Patient pre-assessment complete for LAAO scheduled for 10/29/2024, arrival time 1000. Patient verified using . Patient instructed to bring all home medications in labeled bottles on the day of procedure. Nothing to eat after midnight, consume 32 ounces of a clear non-caffeinated liquid 2 hours prior to arrival time.  Patient's last dose of Eliquis will be on 10/27/2024. He/she will take amlodipine and carvedilol with a small sip of water, the morning of his procedure. Patient instructed to HOLD all other medications and supplements. Patient verbalizes understanding of all instructions & denies any questions at this time. Patient has watchman contact ([phone]) information for questions.

## 2024-10-28 ENCOUNTER — HOSPITAL ENCOUNTER (OUTPATIENT)
Dept: LAB | Age: 74
Setting detail: SPECIMEN
Discharge: HOME | DRG: 274 | End: 2024-10-31
Payer: MEDICARE

## 2024-10-28 ENCOUNTER — HOSPITAL ENCOUNTER (OUTPATIENT)
Dept: LAB | Age: 74
Discharge: HOME | DRG: 274 | End: 2024-10-31
Payer: MEDICARE

## 2024-10-28 ENCOUNTER — ANESTHESIA EVENT (OUTPATIENT)
Dept: SURGERY | Age: 74
DRG: 274 | End: 2024-10-28
Payer: MEDICARE

## 2024-10-28 DIAGNOSIS — I48.0 PAROXYSMAL ATRIAL FIBRILLATION (HCC): ICD-10-CM

## 2024-10-28 LAB
ABO + RH BLD: NORMAL
ALBUMIN SERPL-MCNC: 3.7 G/DL (ref 3.2–4.6)
ANION GAP SERPL CALC-SCNC: 12 MMOL/L (ref 9–18)
BASOPHILS # BLD: 0.1 K/UL (ref 0–0.2)
BASOPHILS NFR BLD: 1 % (ref 0–2)
BLOOD GROUP ANTIBODIES SERPL: NORMAL
BUN SERPL-MCNC: 10 MG/DL (ref 8–23)
CALCIUM SERPL-MCNC: 9.3 MG/DL (ref 8.8–10.2)
CHLORIDE SERPL-SCNC: 103 MMOL/L (ref 98–107)
CO2 SERPL-SCNC: 28 MMOL/L (ref 20–28)
CREAT SERPL-MCNC: 1.23 MG/DL (ref 0.8–1.3)
DIFFERENTIAL METHOD BLD: NORMAL
EOSINOPHIL # BLD: 0.2 K/UL (ref 0–0.8)
EOSINOPHIL NFR BLD: 4 % (ref 0.5–7.8)
ERYTHROCYTE [DISTWIDTH] IN BLOOD BY AUTOMATED COUNT: 13 % (ref 11.9–14.6)
GLUCOSE SERPL-MCNC: 108 MG/DL (ref 70–99)
HCT VFR BLD AUTO: 47.4 % (ref 41.1–50.3)
HGB BLD-MCNC: 15.5 G/DL (ref 13.6–17.2)
IMM GRANULOCYTES # BLD AUTO: 0 K/UL (ref 0–0.5)
IMM GRANULOCYTES NFR BLD AUTO: 0 % (ref 0–5)
INR PPP: 1.4
LYMPHOCYTES # BLD: 1.2 K/UL (ref 0.5–4.6)
LYMPHOCYTES NFR BLD: 20 % (ref 13–44)
MCH RBC QN AUTO: 29.5 PG (ref 26.1–32.9)
MCHC RBC AUTO-ENTMCNC: 32.7 G/DL (ref 31.4–35)
MCV RBC AUTO: 90.3 FL (ref 82–102)
MONOCYTES # BLD: 0.3 K/UL (ref 0.1–1.3)
MONOCYTES NFR BLD: 6 % (ref 4–12)
NEUTS SEG # BLD: 4.2 K/UL (ref 1.7–8.2)
NEUTS SEG NFR BLD: 69 % (ref 43–78)
NRBC # BLD: 0 K/UL (ref 0–0.2)
PLATELET # BLD AUTO: 197 K/UL (ref 150–450)
PMV BLD AUTO: 10.3 FL (ref 9.4–12.3)
POTASSIUM SERPL-SCNC: 4.8 MMOL/L (ref 3.5–5.1)
PROTHROMBIN TIME: 16.8 SEC (ref 11.3–14.9)
RBC # BLD AUTO: 5.25 M/UL (ref 4.23–5.6)
SODIUM SERPL-SCNC: 143 MMOL/L (ref 136–145)
SPECIMEN EXP DATE BLD: NORMAL
WBC # BLD AUTO: 6 K/UL (ref 4.3–11.1)

## 2024-10-28 PROCEDURE — 36415 COLL VENOUS BLD VENIPUNCTURE: CPT

## 2024-10-28 PROCEDURE — 86900 BLOOD TYPING SEROLOGIC ABO: CPT

## 2024-10-28 PROCEDURE — 85025 COMPLETE CBC W/AUTO DIFF WBC: CPT

## 2024-10-28 PROCEDURE — 80048 BASIC METABOLIC PNL TOTAL CA: CPT

## 2024-10-28 PROCEDURE — 85610 PROTHROMBIN TIME: CPT

## 2024-10-28 PROCEDURE — 86850 RBC ANTIBODY SCREEN: CPT

## 2024-10-28 PROCEDURE — 86901 BLOOD TYPING SEROLOGIC RH(D): CPT

## 2024-10-28 PROCEDURE — 82040 ASSAY OF SERUM ALBUMIN: CPT

## 2024-10-29 ENCOUNTER — APPOINTMENT (OUTPATIENT)
Dept: CARDIAC CATH/INVASIVE PROCEDURES | Age: 74
DRG: 274 | End: 2024-10-29
Attending: INTERNAL MEDICINE
Payer: MEDICARE

## 2024-10-29 ENCOUNTER — ANESTHESIA (OUTPATIENT)
Dept: SURGERY | Age: 74
DRG: 274 | End: 2024-10-29
Payer: MEDICARE

## 2024-10-29 ENCOUNTER — HOSPITAL ENCOUNTER (INPATIENT)
Age: 74
LOS: 1 days | Discharge: HOME | DRG: 274 | End: 2024-10-29
Attending: INTERNAL MEDICINE | Admitting: INTERNAL MEDICINE
Payer: MEDICARE

## 2024-10-29 VITALS
SYSTOLIC BLOOD PRESSURE: 120 MMHG | HEART RATE: 73 BPM | OXYGEN SATURATION: 90 % | WEIGHT: 206 LBS | HEIGHT: 71 IN | RESPIRATION RATE: 23 BRPM | DIASTOLIC BLOOD PRESSURE: 55 MMHG | TEMPERATURE: 98.3 F | BODY MASS INDEX: 28.84 KG/M2

## 2024-10-29 DIAGNOSIS — I48.0 PAROXYSMAL ATRIAL FIBRILLATION (HCC): ICD-10-CM

## 2024-10-29 DIAGNOSIS — I48.0 PAROXYSMAL A-FIB (HCC): Primary | ICD-10-CM

## 2024-10-29 LAB
ACT BLD: 348 SECS (ref 70–128)
ECHO BSA: 2.16 M2
EKG ATRIAL RATE: 54 BPM
EKG DIAGNOSIS: NORMAL
EKG P AXIS: 30 DEGREES
EKG P-R INTERVAL: 148 MS
EKG Q-T INTERVAL: 404 MS
EKG QRS DURATION: 88 MS
EKG QTC CALCULATION (BAZETT): 383 MS
EKG R AXIS: 54 DEGREES
EKG T AXIS: 70 DEGREES
EKG VENTRICULAR RATE: 54 BPM

## 2024-10-29 PROCEDURE — 6360000004 HC RX CONTRAST MEDICATION: Performed by: INTERNAL MEDICINE

## 2024-10-29 PROCEDURE — 33340 PERQ CLSR TCAT L ATR APNDGE: CPT | Performed by: INTERNAL MEDICINE

## 2024-10-29 PROCEDURE — 02L73DK OCCLUSION OF LEFT ATRIAL APPENDAGE WITH INTRALUMINAL DEVICE, PERCUTANEOUS APPROACH: ICD-10-PCS | Performed by: INTERNAL MEDICINE

## 2024-10-29 PROCEDURE — 85347 COAGULATION TIME ACTIVATED: CPT

## 2024-10-29 PROCEDURE — 2709999900 HC NON-CHARGEABLE SUPPLY: Performed by: INTERNAL MEDICINE

## 2024-10-29 PROCEDURE — 7100000001 HC PACU RECOVERY - ADDTL 15 MIN: Performed by: INTERNAL MEDICINE

## 2024-10-29 PROCEDURE — 93662 INTRACARDIAC ECG (ICE): CPT | Performed by: INTERNAL MEDICINE

## 2024-10-29 PROCEDURE — 3700000000 HC ANESTHESIA ATTENDED CARE: Performed by: INTERNAL MEDICINE

## 2024-10-29 PROCEDURE — B24BZZ4 ULTRASONOGRAPHY OF HEART WITH AORTA, TRANSESOPHAGEAL: ICD-10-PCS | Performed by: ANESTHESIOLOGY

## 2024-10-29 PROCEDURE — 2580000003 HC RX 258: Performed by: ANESTHESIOLOGY

## 2024-10-29 PROCEDURE — 1100000000 HC RM PRIVATE

## 2024-10-29 PROCEDURE — 93312 ECHO TRANSESOPHAGEAL: CPT

## 2024-10-29 PROCEDURE — 2580000003 HC RX 258: Performed by: NURSE ANESTHETIST, CERTIFIED REGISTERED

## 2024-10-29 PROCEDURE — 6360000002 HC RX W HCPCS: Performed by: INTERNAL MEDICINE

## 2024-10-29 PROCEDURE — 2500000003 HC RX 250 WO HCPCS: Performed by: NURSE ANESTHETIST, CERTIFIED REGISTERED

## 2024-10-29 PROCEDURE — 2580000003 HC RX 258: Performed by: INTERNAL MEDICINE

## 2024-10-29 PROCEDURE — 6360000002 HC RX W HCPCS: Performed by: NURSE ANESTHETIST, CERTIFIED REGISTERED

## 2024-10-29 PROCEDURE — 93005 ELECTROCARDIOGRAM TRACING: CPT | Performed by: INTERNAL MEDICINE

## 2024-10-29 PROCEDURE — 3700000001 HC ADD 15 MINUTES (ANESTHESIA): Performed by: INTERNAL MEDICINE

## 2024-10-29 PROCEDURE — C1769 GUIDE WIRE: HCPCS | Performed by: INTERNAL MEDICINE

## 2024-10-29 PROCEDURE — 7100000000 HC PACU RECOVERY - FIRST 15 MIN: Performed by: INTERNAL MEDICINE

## 2024-10-29 PROCEDURE — C1753 CATH, INTRAVAS ULTRASOUND: HCPCS | Performed by: INTERNAL MEDICINE

## 2024-10-29 PROCEDURE — C1894 INTRO/SHEATH, NON-LASER: HCPCS | Performed by: INTERNAL MEDICINE

## 2024-10-29 PROCEDURE — C1760 CLOSURE DEV, VASC: HCPCS | Performed by: INTERNAL MEDICINE

## 2024-10-29 PROCEDURE — C1889 IMPLANT/INSERT DEVICE, NOC: HCPCS | Performed by: INTERNAL MEDICINE

## 2024-10-29 DEVICE — LEFT ATRIAL APPENDAGE CLOSURE DEVICE WITH DELIVERY SYSTEM
Type: IMPLANTABLE DEVICE | Site: HEART | Status: FUNCTIONAL
Brand: WATCHMAN FLX™ PRO

## 2024-10-29 RX ORDER — ONDANSETRON 2 MG/ML
4 INJECTION INTRAMUSCULAR; INTRAVENOUS
Status: DISCONTINUED | OUTPATIENT
Start: 2024-10-29 | End: 2024-10-29 | Stop reason: HOSPADM

## 2024-10-29 RX ORDER — PROTAMINE SULFATE 10 MG/ML
INJECTION, SOLUTION INTRAVENOUS
Status: DISCONTINUED | OUTPATIENT
Start: 2024-10-29 | End: 2024-10-29 | Stop reason: SDUPTHER

## 2024-10-29 RX ORDER — ROCURONIUM BROMIDE 10 MG/ML
INJECTION, SOLUTION INTRAVENOUS
Status: DISCONTINUED | OUTPATIENT
Start: 2024-10-29 | End: 2024-10-29 | Stop reason: SDUPTHER

## 2024-10-29 RX ORDER — NALOXONE HYDROCHLORIDE 0.4 MG/ML
INJECTION, SOLUTION INTRAMUSCULAR; INTRAVENOUS; SUBCUTANEOUS PRN
Status: DISCONTINUED | OUTPATIENT
Start: 2024-10-29 | End: 2024-10-29 | Stop reason: HOSPADM

## 2024-10-29 RX ORDER — HYDROMORPHONE HYDROCHLORIDE 2 MG/ML
0.5 INJECTION, SOLUTION INTRAMUSCULAR; INTRAVENOUS; SUBCUTANEOUS EVERY 5 MIN PRN
Status: DISCONTINUED | OUTPATIENT
Start: 2024-10-29 | End: 2024-10-29 | Stop reason: HOSPADM

## 2024-10-29 RX ORDER — IOPAMIDOL 755 MG/ML
INJECTION, SOLUTION INTRAVASCULAR PRN
Status: DISCONTINUED | OUTPATIENT
Start: 2024-10-29 | End: 2024-10-29 | Stop reason: HOSPADM

## 2024-10-29 RX ORDER — PROPOFOL 10 MG/ML
INJECTION, EMULSION INTRAVENOUS
Status: DISCONTINUED | OUTPATIENT
Start: 2024-10-29 | End: 2024-10-29 | Stop reason: SDUPTHER

## 2024-10-29 RX ORDER — DEXAMETHASONE SODIUM PHOSPHATE 4 MG/ML
INJECTION, SOLUTION INTRA-ARTICULAR; INTRALESIONAL; INTRAMUSCULAR; INTRAVENOUS; SOFT TISSUE
Status: DISCONTINUED | OUTPATIENT
Start: 2024-10-29 | End: 2024-10-29 | Stop reason: SDUPTHER

## 2024-10-29 RX ORDER — ONDANSETRON 2 MG/ML
INJECTION INTRAMUSCULAR; INTRAVENOUS
Status: DISCONTINUED | OUTPATIENT
Start: 2024-10-29 | End: 2024-10-29 | Stop reason: SDUPTHER

## 2024-10-29 RX ORDER — SODIUM CHLORIDE 9 MG/ML
INJECTION, SOLUTION INTRAVENOUS PRN
Status: DISCONTINUED | OUTPATIENT
Start: 2024-10-29 | End: 2024-10-29 | Stop reason: HOSPADM

## 2024-10-29 RX ORDER — HEPARIN SODIUM 200 [USP'U]/100ML
INJECTION, SOLUTION INTRAVENOUS CONTINUOUS PRN
Status: DISCONTINUED | OUTPATIENT
Start: 2024-10-29 | End: 2024-10-29 | Stop reason: HOSPADM

## 2024-10-29 RX ORDER — SODIUM CHLORIDE 0.9 % (FLUSH) 0.9 %
5-40 SYRINGE (ML) INJECTION EVERY 12 HOURS SCHEDULED
Status: DISCONTINUED | OUTPATIENT
Start: 2024-10-29 | End: 2024-10-29 | Stop reason: HOSPADM

## 2024-10-29 RX ORDER — SODIUM CHLORIDE 0.9 % (FLUSH) 0.9 %
5-40 SYRINGE (ML) INJECTION PRN
Status: DISCONTINUED | OUTPATIENT
Start: 2024-10-29 | End: 2024-10-29 | Stop reason: HOSPADM

## 2024-10-29 RX ORDER — LIDOCAINE HYDROCHLORIDE 20 MG/ML
INJECTION, SOLUTION INTRAVENOUS
Status: DISCONTINUED | OUTPATIENT
Start: 2024-10-29 | End: 2024-10-29 | Stop reason: SDUPTHER

## 2024-10-29 RX ORDER — FENTANYL CITRATE 50 UG/ML
100 INJECTION, SOLUTION INTRAMUSCULAR; INTRAVENOUS
Status: DISCONTINUED | OUTPATIENT
Start: 2024-10-29 | End: 2024-10-29 | Stop reason: HOSPADM

## 2024-10-29 RX ORDER — OXYCODONE HYDROCHLORIDE 5 MG/1
5 TABLET ORAL
Status: DISCONTINUED | OUTPATIENT
Start: 2024-10-29 | End: 2024-10-29 | Stop reason: HOSPADM

## 2024-10-29 RX ORDER — ACETAMINOPHEN 500 MG
1000 TABLET ORAL ONCE
Status: DISCONTINUED | OUTPATIENT
Start: 2024-10-29 | End: 2024-10-29 | Stop reason: HOSPADM

## 2024-10-29 RX ORDER — HEPARIN SODIUM 1000 [USP'U]/ML
INJECTION, SOLUTION INTRAVENOUS; SUBCUTANEOUS
Status: DISCONTINUED | OUTPATIENT
Start: 2024-10-29 | End: 2024-10-29 | Stop reason: SDUPTHER

## 2024-10-29 RX ORDER — DIPHENHYDRAMINE HYDROCHLORIDE 50 MG/ML
12.5 INJECTION INTRAMUSCULAR; INTRAVENOUS
Status: DISCONTINUED | OUTPATIENT
Start: 2024-10-29 | End: 2024-10-29 | Stop reason: HOSPADM

## 2024-10-29 RX ORDER — MIDAZOLAM HYDROCHLORIDE 2 MG/2ML
2 INJECTION, SOLUTION INTRAMUSCULAR; INTRAVENOUS
Status: DISCONTINUED | OUTPATIENT
Start: 2024-10-29 | End: 2024-10-29 | Stop reason: HOSPADM

## 2024-10-29 RX ORDER — SODIUM CHLORIDE, SODIUM LACTATE, POTASSIUM CHLORIDE, CALCIUM CHLORIDE 600; 310; 30; 20 MG/100ML; MG/100ML; MG/100ML; MG/100ML
INJECTION, SOLUTION INTRAVENOUS CONTINUOUS
Status: DISCONTINUED | OUTPATIENT
Start: 2024-10-29 | End: 2024-10-29 | Stop reason: HOSPADM

## 2024-10-29 RX ADMIN — LIDOCAINE HYDROCHLORIDE 60 MG: 20 INJECTION, SOLUTION INTRAVENOUS at 09:50

## 2024-10-29 RX ADMIN — PROPOFOL 150 MG: 10 INJECTION, EMULSION INTRAVENOUS at 09:50

## 2024-10-29 RX ADMIN — PHENYLEPHRINE HYDROCHLORIDE 200 MCG: 10 INJECTION INTRAVENOUS at 10:02

## 2024-10-29 RX ADMIN — HEPARIN SODIUM 7000 UNITS: 1000 INJECTION INTRAVENOUS; SUBCUTANEOUS at 10:21

## 2024-10-29 RX ADMIN — HEPARIN SODIUM 5000 UNITS: 1000 INJECTION INTRAVENOUS; SUBCUTANEOUS at 10:17

## 2024-10-29 RX ADMIN — PHENYLEPHRINE HYDROCHLORIDE 200 MCG: 10 INJECTION INTRAVENOUS at 10:31

## 2024-10-29 RX ADMIN — ROCURONIUM BROMIDE 50 MG: 10 INJECTION, SOLUTION INTRAVENOUS at 09:51

## 2024-10-29 RX ADMIN — ONDANSETRON 4 MG: 2 INJECTION INTRAMUSCULAR; INTRAVENOUS at 10:00

## 2024-10-29 RX ADMIN — CEFAZOLIN 2000 MG: 2 INJECTION, POWDER, FOR SOLUTION INTRAMUSCULAR; INTRAVENOUS at 09:56

## 2024-10-29 RX ADMIN — DEXAMETHASONE SODIUM PHOSPHATE 10 MG: 4 INJECTION INTRA-ARTICULAR; INTRALESIONAL; INTRAMUSCULAR; INTRAVENOUS; SOFT TISSUE at 10:00

## 2024-10-29 RX ADMIN — SODIUM CHLORIDE, SODIUM LACTATE, POTASSIUM CHLORIDE, AND CALCIUM CHLORIDE: 600; 310; 30; 20 INJECTION, SOLUTION INTRAVENOUS at 09:41

## 2024-10-29 RX ADMIN — PROTAMINE SULFATE 40 MG: 10 INJECTION, SOLUTION INTRAVENOUS at 10:36

## 2024-10-29 RX ADMIN — PHENYLEPHRINE HYDROCHLORIDE 200 MCG: 10 INJECTION INTRAVENOUS at 10:06

## 2024-10-29 RX ADMIN — PHENYLEPHRINE HYDROCHLORIDE 100 MCG: 10 INJECTION INTRAVENOUS at 10:22

## 2024-10-29 RX ADMIN — PHENYLEPHRINE HYDROCHLORIDE 200 MCG: 10 INJECTION INTRAVENOUS at 10:26

## 2024-10-29 RX ADMIN — PHENYLEPHRINE HYDROCHLORIDE 100 MCG: 10 INJECTION INTRAVENOUS at 09:59

## 2024-10-29 RX ADMIN — PHENYLEPHRINE HYDROCHLORIDE 200 MCG: 10 INJECTION INTRAVENOUS at 10:00

## 2024-10-29 RX ADMIN — SUGAMMADEX 200 MG: 100 INJECTION, SOLUTION INTRAVENOUS at 10:35

## 2024-10-29 ASSESSMENT — ENCOUNTER SYMPTOMS: SHORTNESS OF BREATH: 1

## 2024-10-29 NOTE — PROGRESS NOTES
Ambulated pt to restroom with no issues. Pt tolerates well. R fem site remains C/D/I after ambulation. Will continue to monitor pt.

## 2024-10-29 NOTE — DISCHARGE SUMMARY
[facility] Cardiology Discharge Summary     Patient ID:  Benjamin Colby  [id number]  74 y.o.  1950    Admit date: 10/29/2024    Discharge date:  10/29/2024    Admitting Physician: Jose Guadalupe Recinos MD     Discharge Physician: Yolanda Nelson, APRN - [facility]/Dr. Recinos    Admission Diagnoses: Paroxysmal A-fib (HCC) [I48.0]    Discharge Diagnoses:   Patient Active Problem List    Diagnosis    Paroxysmal A-fib (HCC)    Angina pectoris, unspecified    Atherosclerotic heart disease of native coronary artery with unspecified angina pectoris    Urinary obstruction    Non-restorative sleep    Obesity (BMI 30.0-34.9)    Ischemic cerebral vascular accident (CVA) due to stenosis of large extracranial artery (HCC)    COVID-19 long hauler manifesting chronic dyspnea    Secondary hypercoagulable state (HCC)    Personal history of COVID-19    Primary hypertension    CAD (coronary artery disease)    PAF (paroxysmal atrial fibrillation) (HCC)    Nocturnal hypoxemia    BILL (obstructive sleep apnea)    Class 1 obesity due to excess calories with serious comorbidity and body mass index (BMI) of 33.0 to 33.9 in adult    S/P CABG x 4    Dyslipidemia       Cardiology Procedures this admission:  GRICELDA, Implantation of Watchman device, Echocardiogram  Consults: none    Hospital Course: Patient was seen at the office of [facility] Cardiology by Dr. Recinos in follow up. The patient was felt to be an appropriate candidate for Watchman device. The patient presented for procedure. GRICELDA was performed directly prior to procedure that was negative for DESTINY thrombus. The patient underwent successful implantation of a 20 mm FLX Watchman device.     The patient tolerated the procedure well and was monitored closely.   At the time of discharge, the patient was up feeling well without any complaints of chest pain or shortness of breath. Patient's labs were stable.     Patient was seen and examined by Dr. Recinos and determined

## 2024-10-29 NOTE — PROGRESS NOTES
Report received from [facility] RN. Procedural finding communicated. Intra procedural medication administration reviewed. Progression of care discussed.    Patient received into CPRU room 5, Post HAIDER w/ Dr Recinos    Access site without bleeding or swelling. None noted    Patient instructed to limit movement of R lower extremity.    Routine post procedural vital signs & site assessment initiated.

## 2024-10-29 NOTE — ANESTHESIA POSTPROCEDURE EVALUATION
Department of Anesthesiology  Postprocedure Note    Patient: Benjamin Colby  MRN: [id number]  YOB: 1950  Date of evaluation: 10/29/2024    Procedure Summary       Date: 10/29/24 Room / Location: [facility] MAIN OR  / [facility] MAIN OR    Anesthesia Start: 0941 Anesthesia Stop: 1052    Procedure: Watchman pravin closure device Diagnosis:       Paroxysmal A-fib (HCC)      (Paroxysmal atrial fibrillation)    Providers: Jose Guadalupe Recinos MD Responsible Provider: Jose Guadalupe Bertrand MD    Anesthesia Type: general ASA Status: 3            Anesthesia Type: No value filed.    Alaina Phase I: Alaina Score: 10    Alaina Phase II:      Anesthesia Post Evaluation    Patient location during evaluation: PACU  Patient participation: complete - patient participated  Level of consciousness: awake and alert  Airway patency: patent  Nausea & Vomiting: no nausea and no vomiting  Cardiovascular status: hemodynamically stable  Respiratory status: acceptable, nonlabored ventilation and spontaneous ventilation  Hydration status: euvolemic  Comments: /62   Pulse 57   Temp 98.3 °F (36.8 °C) (Temporal)   Resp 16   Ht 1.803 m (5' 11\")   Wt 93.4 kg (206 lb)   SpO2 98%   BMI 28.73 kg/m²     Multimodal analgesia pain management approach  Pain management: adequate and satisfactory to patient    There were no known notable events for this encounter.   Name: Mary Ellen Fajardo  MR #: [id number]    DOCUMENTATION CLARIFICATION      CDI : Mai Altamirano RN, CDS              Contact information: [email or web address]     This form is a permanent document in the medical record.    Query Date: September 5, 2023    By submitting this query, we are merely seeking further clarification of documentation. Please utilize your independent clinical judgment when addressing the question(s) below.    The Medical Record contains the following:   Indicators Supporting Clinical Findings Location in Medical Record   x Fracture documented Closed fracture of distal end of right tibia Orthopedic surgery 8/23   x Osteoporosis, malignancy documented  Disuse osteopenia  Very poor bone quality OP note 8/23   x Radiology Findings Acute fracture of the distal right tibia and fibula, approximately 2-3 cm above the talar dome. Mild comminution and mild displacement.   R ankle imaging 8/23   x Treatment/Medication Fixation right tibial pilon fracture with fibula fracture  EBL 20 cc    Home medication list: Folic acid-vit B6-Vit B12, thiamine OP note 8/23    Hospital Medicine H&P    Other       Provider, please clarify if there is any clinical correlation between __Osteopenia______ and __R tibial Fracture______.  [   ] Due to or associated with each other   [  x ] Unrelated to each other   [   ] Other explanation (please specify): _________   [  ] Clinically undetermined       Please document in your progress notes daily for the duration of treatment, until resolved, and include in your discharge summary.    Form No. [id number]

## 2024-10-29 NOTE — ANESTHESIA PRE PROCEDURE
Department of Anesthesiology  Preprocedure Note       Name:  Benjamin Colby   Age:  74 y.o.  :  1950                                          MRN:  [id number]         Date:  10/29/2024      Surgeon: Surgeon(s):  Jose Guadalupe Recinos MD    Procedure: Procedure(s):  Watchman pravin closure device    Medications prior to admission:   Prior to Admission medications    Medication Sig Start Date End Date Taking? Authorizing Provider   losartan (COZAAR) 50 MG tablet Take 1 tablet by mouth 2 times daily 24  Yes Delroy Ellis MD   carvedilol (COREG) 25 MG tablet Take 1 tablet by mouth 2 times daily (with meals) 24  Yes Delroy Ellis MD   atorvastatin (LIPITOR) 80 MG tablet Take 1 tablet by mouth daily 24  Yes Delroy Ellis MD   apixaban (ELIQUIS) 5 MG TABS tablet Take 1 tablet by mouth 2 times daily 24  Yes Delroy Ellis MD   amLODIPine (NORVASC) 10 MG tablet Take 1 tablet by mouth daily 24  Yes Delroy Ellis MD   acetaminophen (TYLENOL) 325 MG tablet Take 2 tablets by mouth every 6 hours as needed for Pain   Yes ProviderDaniel MD   tamsulosin (FLOMAX) 0.4 MG capsule Take 1 capsule by mouth daily  Patient not taking: Reported on 10/24/2024    ProviderDaniel MD   nitroGLYCERIN (NITROSTAT) 0.4 MG SL tablet Place 1 tablet under the tongue every 5 minutes as needed for Chest pain 24   Jose Guadalupe Recinos MD       Current medications:    Current Facility-Administered Medications   Medication Dose Route Frequency Provider Last Rate Last Admin   • acetaminophen (TYLENOL) tablet 1,000 mg  1,000 mg Oral Once Jose Guadalupe Bertarnd MD       • fentaNYL (SUBLIMAZE) injection 100 mcg  100 mcg IntraVENous Once PRN Jose Guadalupe Bertrand MD       • lactated ringers IV soln infusion SOLN   IntraVENous Continuous Jose Guadalupe Bertrand MD       • sodium chloride flush 0.9 % injection 5-40 mL  5-40 mL IntraVENous 2 times per day Jose Guadalupe Bertrand MD

## 2024-10-29 NOTE — PROGRESS NOTES
LANATALI with Dr Recinos  Successful deployment of Watchman device    16fr RVF Closed with Perclose  10fr RVF Closed with Perclose    Site covered with tegaderm and gauze.  No bleeding or hematoma noted at site. Site Soft.  Pt to go to PACU.

## 2024-10-29 NOTE — PROGRESS NOTES
Benjamin Colby  1950     SUBJECTIVE:   Benjamin Colby is a 74 y.o. male seen for a consultation visit regarding the following:          Chief Complaint   Patient presents with    Coronary Artery Disease               HPI:  Consultation is requested by [unfilled] for evaluation of Coronary Artery Disease   .     Patient presents for consultation requested by Dr. Ellis for consideration of left atrial appendage occlusion.  He has a history of coronary artery disease status post coronary bypass grafting, hypertension, hyperlipidemia and paroxysmal atrial fibrillation.  He presented to hospitals on April 13, 2024 with headaches, aphasia and confusion.  CT showed right lateral ventricular hemorrhage and small left lateral ventricular hemorrhage.  CTA was negative for vascular malformations.  It was felt the etiology of his intracranial hemorrhage was uncontrolled hypertension.  He was on Xarelto prior to this presentation.  This was held for 2 weeks and apparently has been placed on Eliquis which he is currently receiving.     He is having difficulty with urinary obstruction and recently had a catheter placed.  Unclear if he is going need further procedures in this regard.        Atrial Fibrillation CHADSVASC2 Score Stroke Risk:   74 y.o. 65-74 - 1   male Male - 0   CHF HX: No - 0   HTN HX: Yes - 1   Stroke/TIA/Thromboembolism Yes _2   Vascular Disease HX: Yes - 1   Diabetes Mellitus No - 0   CHADSVASC 2 Score 5      Annual Stroke Risk 7.2% - moderate-high          HAS-BLED Score      HTN Hx [x] 1 Point   Renal Disease  [] 1 Point   Liver Disease [] 1 Point   Stoke Hx [x] 1 Point   Prior Major Bleeding or Predisposition [x] 1 Point   Labile INR [] 1 Point   Age > 65 Years Current: 74 y.o.    [x] 1 Point   Rx Usage Predisposing to Bleeding [] 1 Point   Alcohol Use (> or = 8 Drinks/wk) [] 1 Point   Total: UCHASBLED: Score 4 High Risk 8.9% Risk of major bleeding 8.70  Bleeds Per 100 pts years

## 2024-10-29 NOTE — PERIOP NOTE
TRANSFER - OUT REPORT:    Verbal report given to JENNA Villareal on Benjamin Colby  being transferred to cath lab for routine post-op       Report consisted of patient’s Situation, Background, Assessment and   Recommendations(SBAR).     Information from the following report(s) Adult Overview, Surgery Report, Intake/Output, and MAR was reviewed with the receiving nurse.    Lines:   Peripheral IV 10/29/24 Left Forearm (Active)   Site Assessment Clean, dry & intact 10/29/24 1052   Line Care Connections checked and tightened 10/29/24 1052   Phlebitis Assessment No symptoms 10/29/24 1052   Infiltration Assessment 0 10/29/24 1052   Dressing Status Clean, dry & intact 10/29/24 1052   Dressing Type Transparent 10/29/24 1052       Peripheral IV 10/29/24 Right Antecubital (Active)   Site Assessment Clean, dry & intact 10/29/24 1052   Line Care Connections checked and tightened 10/29/24 1052   Phlebitis Assessment No symptoms 10/29/24 1052   Infiltration Assessment 0 10/29/24 1052   Dressing Status Clean, dry & intact 10/29/24 1052   Dressing Type Transparent 10/29/24 1052        Opportunity for questions and clarification was provided.      Patient transported with:   Registered Nurse    VTE prophylaxis orders have not been written for Benjamin Colby.    Patient and family given floor number and nurses name.

## 2024-10-29 NOTE — PROGRESS NOTES
Patient received to CPRU room # 9  Ambulatory from [facility]. Patient scheduled for LAAO today with Dr Recinos. Procedure reviewed & questions answered, voiced good understanding consent obtained & placed on chart. All medications and medical history reviewed. Will prep patient per orders. Patient & family updated on plan of care.      The patient has a fraility score of 3-MANAGING WELL, based on patient A&Ox3, patient able to ambulate to room without difficulty.

## 2024-10-29 NOTE — PROGRESS NOTES
Discharge instructions given per orders, voiced good understanding of post WM care, medications & follow up care. Denies any questions.    R fem site C/D/I. All discharge instructions gone over with pt and his wife. Pt has no questions.

## 2024-10-29 NOTE — PROGRESS NOTES
Pt raised HOB to approximately 30 degrees. Pt tolerated well. R fem site remains C/D/I. Will continue to monitor patient.

## 2024-12-11 NOTE — PROGRESS NOTES
Patient pre-assessment complete for Wilfrido Colby with wife Yunior scheduled for GRICELDA, arrival time 0930. Patient verified using . Patient instructed to bring a list of all home medications on the day of procedure. NPO status reinforced.  Instructed they can take all other medications excluding vitamins & supplements. Patient verbalizes understanding of all instructions & denies any questions at this time.

## 2024-12-12 ENCOUNTER — HOSPITAL ENCOUNTER (OUTPATIENT)
Dept: CARDIAC CATH/INVASIVE PROCEDURES | Age: 74
Discharge: HOME | End: 2024-12-12
Attending: INTERNAL MEDICINE | Admitting: INTERNAL MEDICINE
Payer: MEDICARE

## 2024-12-12 VITALS
BODY MASS INDEX: 30.35 KG/M2 | HEART RATE: 61 BPM | DIASTOLIC BLOOD PRESSURE: 55 MMHG | TEMPERATURE: 98 F | WEIGHT: 212 LBS | OXYGEN SATURATION: 96 % | SYSTOLIC BLOOD PRESSURE: 109 MMHG | RESPIRATION RATE: 12 BRPM | HEIGHT: 70 IN

## 2024-12-12 DIAGNOSIS — I48.0 PAROXYSMAL A-FIB (HCC): Primary | ICD-10-CM

## 2024-12-12 LAB
EKG ATRIAL RATE: 58 BPM
EKG DIAGNOSIS: NORMAL
EKG P AXIS: 20 DEGREES
EKG P-R INTERVAL: 166 MS
EKG Q-T INTERVAL: 406 MS
EKG QRS DURATION: 84 MS
EKG QTC CALCULATION (BAZETT): 398 MS
EKG R AXIS: 62 DEGREES
EKG T AXIS: 83 DEGREES
EKG VENTRICULAR RATE: 58 BPM

## 2024-12-12 PROCEDURE — 6360000002 HC RX W HCPCS: Performed by: INTERNAL MEDICINE

## 2024-12-12 PROCEDURE — 6370000000 HC RX 637 (ALT 250 FOR IP): Performed by: INTERNAL MEDICINE

## 2024-12-12 PROCEDURE — 93005 ELECTROCARDIOGRAM TRACING: CPT | Performed by: INTERNAL MEDICINE

## 2024-12-12 PROCEDURE — 99152 MOD SED SAME PHYS/QHP 5/>YRS: CPT

## 2024-12-12 PROCEDURE — 99152 MOD SED SAME PHYS/QHP 5/>YRS: CPT | Performed by: INTERNAL MEDICINE

## 2024-12-12 PROCEDURE — 93312 ECHO TRANSESOPHAGEAL: CPT

## 2024-12-12 RX ORDER — MIDAZOLAM HYDROCHLORIDE 1 MG/ML
INJECTION, SOLUTION INTRAMUSCULAR; INTRAVENOUS PRN
Status: COMPLETED | OUTPATIENT
Start: 2024-12-12 | End: 2024-12-12

## 2024-12-12 RX ORDER — LIDOCAINE HYDROCHLORIDE 20 MG/ML
SOLUTION OROPHARYNGEAL PRN
Status: COMPLETED | OUTPATIENT
Start: 2024-12-12 | End: 2024-12-12

## 2024-12-12 RX ORDER — FENTANYL CITRATE 50 UG/ML
INJECTION, SOLUTION INTRAMUSCULAR; INTRAVENOUS PRN
Status: COMPLETED | OUTPATIENT
Start: 2024-12-12 | End: 2024-12-12

## 2024-12-12 RX ORDER — ASPIRIN 81 MG/1
81 TABLET ORAL DAILY
Qty: 90 TABLET | Refills: 1 | Status: SHIPPED | OUTPATIENT
Start: 2024-12-12

## 2024-12-12 RX ORDER — CLOPIDOGREL BISULFATE 75 MG/1
75 TABLET ORAL DAILY
Qty: 90 TABLET | Refills: 1 | Status: SHIPPED | OUTPATIENT
Start: 2024-12-12

## 2024-12-12 RX ADMIN — MIDAZOLAM 2 MG: 1 INJECTION INTRAMUSCULAR; INTRAVENOUS at 11:07

## 2024-12-12 RX ADMIN — LIDOCAINE HYDROCHLORIDE 15 ML: 20 SOLUTION ORAL at 10:37

## 2024-12-12 RX ADMIN — MIDAZOLAM 2 MG: 1 INJECTION INTRAMUSCULAR; INTRAVENOUS at 11:10

## 2024-12-12 RX ADMIN — FENTANYL CITRATE 50 MCG: 50 INJECTION, SOLUTION INTRAMUSCULAR; INTRAVENOUS at 11:07

## 2024-12-12 ASSESSMENT — ENCOUNTER SYMPTOMS
ABDOMINAL PAIN: 0
VOMITING: 0
BLOATING: 0
HEMOPTYSIS: 0
NAUSEA: 0
BLURRED VISION: 0
BACK PAIN: 0
SHORTNESS OF BREATH: 0
ORTHOPNEA: 0
COUGH: 0
DOUBLE VISION: 0

## 2024-12-12 NOTE — DISCHARGE INSTRUCTIONS
AFTER YOU TRANSESOPHAGEAL ECHOCARDIOGRAM    Be sure someone else drives you home. You may feel drowsy for several hours.    Do not eat or drink for at least two hours after your procedure. Your throat will be numb and there is a risk you might have difficulty swallowing for a while. Be careful when you do eat or drink for the first time especially with hot fluids since you could easily burn your throat.    Call your doctor if:    You are bleeding from your throat or mouth.  You have trouble breathing all of a sudden.  You have chest pain or any pain that spreads to your neck, jaw, or arms.  You have questions or concerns.  You have a fever greater than 101°F.    Doctor: [facility] [phone]    Special Instructions:    No driving for 24 hours.  Do not eat or drink for 2 hours after your proceudure 1230.  Plavix stop date is 4/29/2025.

## 2024-12-12 NOTE — PROGRESS NOTES
GRICELDA completed  Patient numbed at 1037  Patient given 4mg versed, 50mcg fentanyl  Sedation start 1107  Sedation end 1117  Patient tolerated procedure well  Will continue to monitor patient until ready for discharge

## 2024-12-12 NOTE — H&P
[facility] Cardiology History & Physical      Date of  Admission: 12/12/2024  9:24 AM       CC: Post watchman GRICELDA    HPI:  Benjamin Colby is a 74 y.o. male     He has a history of coronary artery disease status post coronary bypass grafting, hypertension, hyperlipidemia and paroxysmal atrial fibrillation.  He presented to [facility] on April 13, 2024 with headaches, aphasia and confusion.  CT showed right lateral ventricular hemorrhage and small left lateral ventricular hemorrhage.  CTA was negative for vascular malformations.  It was felt the etiology of his intracranial hemorrhage was uncontrolled hypertension.  He was on Xarelto prior to this presentation.    Presents today for post watchman GRICELDA.  No complications postprocedure.     Past Medical History:   Diagnosis Date    Acute hypoxemic respiratory failure 12/04/2021    Anterior myocardial infarction (HCC) 10/2003    Anticoagulant long-term use 2021    Arrhythmia     paroxysmal a fib    Arteriosclerotic coronary artery disease 11/2005    Arthritis     fingers    Atrial fibrillation (HCC) 2001    Atrial fibrillation with RVR ([facility]) 12/04/2021    Cancer ([facility])     prostate    Cerebral artery occlusion with cerebral infarction ([facility])     Coronary atherosclerosis of native coronary vessel 10/2001    Dysarthria 01/10/2023    Elevated serum creatinine 12/15/2021    Facial droop 02/20/2022    Family history of premature CAD     Father hx cabg/CHF  Brother CABG x 2    Gout     right toe but no problem now    Hypercholesterolemia     Hypertension     Hypoxia 03/11/2016    Right sided numbness     Right sided weakness 02/20/2022    Shortness of breath 01/25/2022    Sinus bradycardia 12/14/2021    Sleep apnea 2-    CPAP at HS    Slurred speech 02/20/2022    TIA (transient ischemic attack) 02/20/2022      Past Surgical History:   Procedure Laterality Date    CARDIAC SURGERY  3-9-2016    COLONOSCOPY N/A 12/21/2016    COLONOSCOPY  BMI 34 performed by

## 2024-12-12 NOTE — NURSE NAVIGATOR
Successful post Watchman GRICELDA this am. No thrombus or significant periprosthetic device leak noted.  See GRICELDA report for additional information.  Eliquis discontinued.  Aspirin 81 mg and plavix 75 mg ordered per Dr. Sherman/Suhail protocol.  Plavix stop date is 4/29/2025.  Patient has Watchman coordinator contact ([phone]) information for questions or concerns.        THE   BARTHEL INDEX   Activity Score  FEEDING  0 = unable  5 = needs help cutting, spreading butter, etc., or requires modified diet  10 = independent   10  BATHING  0 = dependent  5 = independent (or in shower)  5  GROOMING  0 = needs to help with personal care  5 = independent face/hair/teeth/shaving (implements provided)  5  DRESSING  0 = dependent  5 = needs help but can do about half unaided  10 = independent (including buttons, zips, laces, etc.)   10  BOWELS  0 = incontinent (or needs to be given enemas)  5 = occasional accident  10 = continent  10  BLADDER  0 = incontinent, or catheterized and unable to manage alone  5 = occasional accident  10 = continent  10  TOILET USE  0 = dependent  5 = needs some help, but can do something alone  10 = independent (on and off, dressing, wiping)  10  TRANSFERS (BED TO CHAIR AND BACK)  0 = unable, no sitting balance  5 = major help (one or two people, physical), can sit  10 = minor help (verbal or physical)  15 = independent   15  MOBILITY (ON LEVEL SURFACES)  0 = immobile or < 50 yards  5 = wheelchair independent, including corners, > 50 yards  10 = walks with help of one person (verbal or physical) > 50 yards  15 = independent (but may use any aid; for example, stick) > 50 yards   15  STAIRS  0 = unable  5 = needs help (verbal, physical, carrying aid)  10 = independent  10  TOTAL (0-100): 100

## 2024-12-12 NOTE — PROGRESS NOTES
Patient received to CPRU room # 14  Ambulatory from [facility]. Patient scheduled for GRICELDA today with Dr Sherman. Procedure reviewed & questions answered, voiced good understanding consent obtained & placed on chart. All medications and medical history reviewed. Will prep patient per orders. Patient & family updated on plan of care.      The patient has a fraility score of 3-MANAGING WELL, based on ambulation.

## 2024-12-13 LAB — ECHO BSA: 2.18 M2

## 2024-12-18 PROBLEM — Z95.818 PRESENCE OF WATCHMAN LEFT ATRIAL APPENDAGE CLOSURE DEVICE: Status: ACTIVE | Noted: 2024-12-18

## 2024-12-18 ASSESSMENT — ENCOUNTER SYMPTOMS: SHORTNESS OF BREATH: 0

## 2024-12-19 ENCOUNTER — OFFICE VISIT (OUTPATIENT)
Age: 74
End: 2024-12-19
Payer: MEDICARE

## 2024-12-19 VITALS
WEIGHT: 218.6 LBS | HEART RATE: 72 BPM | BODY MASS INDEX: 31.3 KG/M2 | DIASTOLIC BLOOD PRESSURE: 50 MMHG | HEIGHT: 70 IN | SYSTOLIC BLOOD PRESSURE: 112 MMHG

## 2024-12-19 DIAGNOSIS — I10 PRIMARY HYPERTENSION: ICD-10-CM

## 2024-12-19 DIAGNOSIS — I48.0 PAF (PAROXYSMAL ATRIAL FIBRILLATION) (HCC): Primary | ICD-10-CM

## 2024-12-19 DIAGNOSIS — Z86.79 HISTORY OF INTRACRANIAL HEMORRHAGE: ICD-10-CM

## 2024-12-19 DIAGNOSIS — Z95.818 PRESENCE OF WATCHMAN LEFT ATRIAL APPENDAGE CLOSURE DEVICE: ICD-10-CM

## 2024-12-19 PROCEDURE — G8427 DOCREV CUR MEDS BY ELIG CLIN: HCPCS | Performed by: INTERNAL MEDICINE

## 2024-12-19 PROCEDURE — G8417 CALC BMI ABV UP PARAM F/U: HCPCS | Performed by: INTERNAL MEDICINE

## 2024-12-19 PROCEDURE — 3017F COLORECTAL CA SCREEN DOC REV: CPT | Performed by: INTERNAL MEDICINE

## 2024-12-19 PROCEDURE — 1036F TOBACCO NON-USER: CPT | Performed by: INTERNAL MEDICINE

## 2024-12-19 PROCEDURE — G8484 FLU IMMUNIZE NO ADMIN: HCPCS | Performed by: INTERNAL MEDICINE

## 2024-12-19 PROCEDURE — 3074F SYST BP LT 130 MM HG: CPT | Performed by: INTERNAL MEDICINE

## 2024-12-19 PROCEDURE — 3078F DIAST BP <80 MM HG: CPT | Performed by: INTERNAL MEDICINE

## 2024-12-19 PROCEDURE — 1126F AMNT PAIN NOTED NONE PRSNT: CPT | Performed by: INTERNAL MEDICINE

## 2024-12-19 PROCEDURE — 1159F MED LIST DOCD IN RCRD: CPT | Performed by: INTERNAL MEDICINE

## 2024-12-19 PROCEDURE — 99214 OFFICE O/P EST MOD 30 MIN: CPT | Performed by: INTERNAL MEDICINE

## 2024-12-19 PROCEDURE — 1123F ACP DISCUSS/DSCN MKR DOCD: CPT | Performed by: INTERNAL MEDICINE

## 2024-12-19 NOTE — PROGRESS NOTES
Priority: Low        Social History     Tobacco Use    Smoking status: Never    Smokeless tobacco: Never   Substance Use Topics    Alcohol use: Never       ROS:    Review of Systems   Constitutional: Negative for malaise/fatigue.   Cardiovascular:  Negative for chest pain.   Respiratory:  Negative for shortness of breath.    Musculoskeletal:  Positive for arthritis.   Neurological:  Negative for focal weakness.   Psychiatric/Behavioral:  Negative for depression.            PHYSICAL EXAM:  Wt Readings from Last 3 Encounters:   12/12/24 96.2 kg (212 lb)   10/29/24 93.4 kg (206 lb)   08/26/24 92.5 kg (204 lb)     BP Readings from Last 3 Encounters:   12/12/24 (!) 109/55   10/29/24 (!) 120/55   08/26/24 (!) 128/54     Pulse Readings from Last 3 Encounters:   12/12/24 61   10/29/24 73   08/26/24 61       Physical Exam  Constitutional:       General: He is not in acute distress.  Neck:      Vascular: No carotid bruit.   Cardiovascular:      Rate and Rhythm: Normal rate and regular rhythm.   Pulmonary:      Effort: No respiratory distress.      Breath sounds: Normal breath sounds.   Abdominal:      General: Bowel sounds are normal.      Palpations: Abdomen is soft.   Musculoskeletal:         General: No swelling.   Skin:     General: Skin is warm and dry.   Neurological:      General: No focal deficit present.   Psychiatric:         Mood and Affect: Mood normal.         Medical problems and test results were reviewed with the patient today.       Lab Results   Component Value Date    WBC 6.0 10/28/2024    HGB 15.5 10/28/2024    HCT 47.4 10/28/2024    MCV 90.3 10/28/2024     10/28/2024       Lab Results   Component Value Date/Time     10/28/2024 10:35 AM    K 4.8 10/28/2024 10:35 AM     10/28/2024 10:35 AM    CO2 28 10/28/2024 10:35 AM    BUN 10 10/28/2024 10:35 AM    CREATININE 1.23 10/28/2024 10:35 AM    GLUCOSE 108 10/28/2024 10:35 AM    CALCIUM 9.3 10/28/2024 10:35 AM        Lab Results

## 2025-02-18 ENCOUNTER — OFFICE VISIT (OUTPATIENT)
Dept: PRIMARY CARE CLINIC | Facility: CLINIC | Age: 75
End: 2025-02-18
Payer: MEDICARE

## 2025-02-18 VITALS
HEIGHT: 70 IN | HEART RATE: 68 BPM | TEMPERATURE: 98.1 F | WEIGHT: 213 LBS | BODY MASS INDEX: 30.49 KG/M2 | SYSTOLIC BLOOD PRESSURE: 110 MMHG | DIASTOLIC BLOOD PRESSURE: 64 MMHG | OXYGEN SATURATION: 97 %

## 2025-02-18 DIAGNOSIS — R53.83 MALAISE AND FATIGUE: ICD-10-CM

## 2025-02-18 DIAGNOSIS — Z00.00 MEDICARE ANNUAL WELLNESS VISIT, SUBSEQUENT: ICD-10-CM

## 2025-02-18 DIAGNOSIS — Z12.11 COLON CANCER SCREENING: ICD-10-CM

## 2025-02-18 DIAGNOSIS — Z13.29 SCREENING FOR ENDOCRINE, NUTRITIONAL, METABOLIC AND IMMUNITY DISORDER: ICD-10-CM

## 2025-02-18 DIAGNOSIS — R73.09 OTHER ABNORMAL GLUCOSE: ICD-10-CM

## 2025-02-18 DIAGNOSIS — R53.83 OTHER FATIGUE: ICD-10-CM

## 2025-02-18 DIAGNOSIS — Z95.818 PRESENCE OF WATCHMAN LEFT ATRIAL APPENDAGE CLOSURE DEVICE: ICD-10-CM

## 2025-02-18 DIAGNOSIS — Z13.21 ENCOUNTER FOR VITAMIN DEFICIENCY SCREENING: ICD-10-CM

## 2025-02-18 DIAGNOSIS — G43.501 PERSISTENT MIGRAINE AURA WITHOUT CEREBRAL INFARCTION AND WITH STATUS MIGRAINOSUS, NOT INTRACTABLE: ICD-10-CM

## 2025-02-18 DIAGNOSIS — Z76.89 ENCOUNTER TO ESTABLISH CARE: Primary | ICD-10-CM

## 2025-02-18 DIAGNOSIS — Z12.5 SCREENING FOR PROSTATE CANCER: ICD-10-CM

## 2025-02-18 DIAGNOSIS — R53.82 CHRONIC FATIGUE: ICD-10-CM

## 2025-02-18 DIAGNOSIS — Z13.0 SCREENING FOR ENDOCRINE, NUTRITIONAL, METABOLIC AND IMMUNITY DISORDER: ICD-10-CM

## 2025-02-18 DIAGNOSIS — R79.89 OTHER SPECIFIED ABNORMAL FINDINGS OF BLOOD CHEMISTRY: ICD-10-CM

## 2025-02-18 DIAGNOSIS — E66.811 CLASS 1 OBESITY DUE TO EXCESS CALORIES WITH SERIOUS COMORBIDITY AND BODY MASS INDEX (BMI) OF 33.0 TO 33.9 IN ADULT: Chronic | ICD-10-CM

## 2025-02-18 DIAGNOSIS — Z13.21 SCREENING FOR ENDOCRINE, NUTRITIONAL, METABOLIC AND IMMUNITY DISORDER: ICD-10-CM

## 2025-02-18 DIAGNOSIS — Z13.220 SCREENING FOR LIPID DISORDERS: ICD-10-CM

## 2025-02-18 DIAGNOSIS — R53.81 MALAISE AND FATIGUE: ICD-10-CM

## 2025-02-18 DIAGNOSIS — G47.33 OSA (OBSTRUCTIVE SLEEP APNEA): ICD-10-CM

## 2025-02-18 DIAGNOSIS — R53.81 MALAISE: ICD-10-CM

## 2025-02-18 DIAGNOSIS — N13.9 URINARY OBSTRUCTION: ICD-10-CM

## 2025-02-18 DIAGNOSIS — Z13.1 SCREENING FOR DIABETES MELLITUS: ICD-10-CM

## 2025-02-18 DIAGNOSIS — Z13.0 SCREENING FOR DEFICIENCY ANEMIA: ICD-10-CM

## 2025-02-18 DIAGNOSIS — I25.810 CORONARY ARTERY DISEASE INVOLVING CORONARY BYPASS GRAFT OF NATIVE HEART WITHOUT ANGINA PECTORIS: ICD-10-CM

## 2025-02-18 DIAGNOSIS — E55.9 VITAMIN D DEFICIENCY: ICD-10-CM

## 2025-02-18 DIAGNOSIS — Z13.228 SCREENING FOR ENDOCRINE, NUTRITIONAL, METABOLIC AND IMMUNITY DISORDER: ICD-10-CM

## 2025-02-18 DIAGNOSIS — Z79.899 ENCOUNTER FOR LONG-TERM (CURRENT) USE OF MEDICATIONS: ICD-10-CM

## 2025-02-18 DIAGNOSIS — E66.09 CLASS 1 OBESITY DUE TO EXCESS CALORIES WITH SERIOUS COMORBIDITY AND BODY MASS INDEX (BMI) OF 33.0 TO 33.9 IN ADULT: Chronic | ICD-10-CM

## 2025-02-18 DIAGNOSIS — Z13.228 SCREENING FOR METABOLIC DISORDER: ICD-10-CM

## 2025-02-18 DIAGNOSIS — I10 PRIMARY HYPERTENSION: ICD-10-CM

## 2025-02-18 DIAGNOSIS — Z13.29 SCREENING FOR THYROID DISORDER: ICD-10-CM

## 2025-02-18 DIAGNOSIS — D68.69 SECONDARY HYPERCOAGULABLE STATE: ICD-10-CM

## 2025-02-18 DIAGNOSIS — E78.5 DYSLIPIDEMIA: ICD-10-CM

## 2025-02-18 DIAGNOSIS — R79.9 ABNORMAL BLOOD CHEMISTRY: ICD-10-CM

## 2025-02-18 DIAGNOSIS — E66.811 OBESITY (BMI 30.0-34.9): ICD-10-CM

## 2025-02-18 DIAGNOSIS — Z86.79 HISTORY OF INTRACRANIAL HEMORRHAGE: ICD-10-CM

## 2025-02-18 DIAGNOSIS — I25.119 ATHEROSCLEROSIS OF NATIVE CORONARY ARTERY OF NATIVE HEART WITH ANGINA PECTORIS: ICD-10-CM

## 2025-02-18 DIAGNOSIS — Z95.1 S/P CABG X 4: ICD-10-CM

## 2025-02-18 DIAGNOSIS — I48.0 PAROXYSMAL A-FIB (HCC): ICD-10-CM

## 2025-02-18 PROBLEM — I20.9 ANGINA PECTORIS, UNSPECIFIED: Status: RESOLVED | Noted: 2024-08-26 | Resolved: 2025-02-18

## 2025-02-18 PROBLEM — U09.9 COVID-19 LONG HAULER MANIFESTING CHRONIC DYSPNEA: Status: RESOLVED | Noted: 2022-01-25 | Resolved: 2025-02-18

## 2025-02-18 PROBLEM — I63.20 ISCHEMIC CEREBRAL VASCULAR ACCIDENT (CVA) DUE TO STENOSIS OF LARGE EXTRACRANIAL ARTERY (HCC): Status: RESOLVED | Noted: 2022-05-29 | Resolved: 2025-02-18

## 2025-02-18 PROBLEM — Z86.16 PERSONAL HISTORY OF COVID-19: Chronic | Status: RESOLVED | Noted: 2021-12-04 | Resolved: 2025-02-18

## 2025-02-18 PROBLEM — R06.09 COVID-19 LONG HAULER MANIFESTING CHRONIC DYSPNEA: Status: RESOLVED | Noted: 2022-01-25 | Resolved: 2025-02-18

## 2025-02-18 PROBLEM — G47.8 NON-RESTORATIVE SLEEP: Status: RESOLVED | Noted: 2023-02-22 | Resolved: 2025-02-18

## 2025-02-18 PROBLEM — R06.83 SNORING: Status: RESOLVED | Noted: 2023-02-22 | Resolved: 2025-02-18

## 2025-02-18 LAB
25(OH)D3 SERPL-MCNC: 17.3 NG/ML (ref 30–100)
ALBUMIN SERPL-MCNC: 4 G/DL (ref 3.2–4.6)
ALBUMIN/GLOB SERPL: 1.2 (ref 1–1.9)
ALP SERPL-CCNC: 88 U/L (ref 40–129)
ALT SERPL-CCNC: 30 U/L (ref 8–55)
ANION GAP SERPL CALC-SCNC: 11 MMOL/L (ref 7–16)
AST SERPL-CCNC: 27 U/L (ref 15–37)
BASOPHILS # BLD: 0.05 K/UL (ref 0–0.2)
BASOPHILS NFR BLD: 0.8 % (ref 0–2)
BILIRUB SERPL-MCNC: 0.9 MG/DL (ref 0–1.2)
BUN SERPL-MCNC: 12 MG/DL (ref 8–23)
CALCIUM SERPL-MCNC: 9.6 MG/DL (ref 8.8–10.2)
CHLORIDE SERPL-SCNC: 101 MMOL/L (ref 98–107)
CHOLEST SERPL-MCNC: 109 MG/DL (ref 0–200)
CO2 SERPL-SCNC: 29 MMOL/L (ref 20–29)
CREAT SERPL-MCNC: 1.04 MG/DL (ref 0.8–1.3)
DIFFERENTIAL METHOD BLD: ABNORMAL
EOSINOPHIL # BLD: 0.19 K/UL (ref 0–0.8)
EOSINOPHIL NFR BLD: 3.2 % (ref 0.5–7.8)
ERYTHROCYTE [DISTWIDTH] IN BLOOD BY AUTOMATED COUNT: 13.9 % (ref 11.9–14.6)
EST. AVERAGE GLUCOSE BLD GHB EST-MCNC: 109 MG/DL
GLOBULIN SER CALC-MCNC: 3.3 G/DL (ref 2.3–3.5)
GLUCOSE SERPL-MCNC: 85 MG/DL (ref 70–99)
HBA1C MFR BLD: 5.4 % (ref 0–5.6)
HCT VFR BLD AUTO: 50.1 % (ref 41.1–50.3)
HDLC SERPL-MCNC: 43 MG/DL (ref 40–60)
HDLC SERPL: 2.6 (ref 0–5)
HGB BLD-MCNC: 15.6 G/DL (ref 13.6–17.2)
IMM GRANULOCYTES # BLD AUTO: 0.02 K/UL (ref 0–0.5)
IMM GRANULOCYTES NFR BLD AUTO: 0.3 % (ref 0–5)
LDLC SERPL CALC-MCNC: 46 MG/DL (ref 0–100)
LYMPHOCYTES # BLD: 1.4 K/UL (ref 0.5–4.6)
LYMPHOCYTES NFR BLD: 23.5 % (ref 13–44)
MCH RBC QN AUTO: 27.7 PG (ref 26.1–32.9)
MCHC RBC AUTO-ENTMCNC: 31.1 G/DL (ref 31.4–35)
MCV RBC AUTO: 89 FL (ref 82–102)
MONOCYTES # BLD: 0.51 K/UL (ref 0.1–1.3)
MONOCYTES NFR BLD: 8.5 % (ref 4–12)
NEUTS SEG # BLD: 3.8 K/UL (ref 1.7–8.2)
NEUTS SEG NFR BLD: 63.7 % (ref 43–78)
NRBC # BLD: 0 K/UL (ref 0–0.2)
PLATELET # BLD AUTO: 195 K/UL (ref 150–450)
PMV BLD AUTO: 10.9 FL (ref 9.4–12.3)
POTASSIUM SERPL-SCNC: 4.5 MMOL/L (ref 3.5–5.1)
PROT SERPL-MCNC: 7.3 G/DL (ref 6.3–8.2)
PSA SERPL-MCNC: <0.1 NG/ML (ref 0–4)
RBC # BLD AUTO: 5.63 M/UL (ref 4.23–5.6)
SODIUM SERPL-SCNC: 141 MMOL/L (ref 136–145)
TRIGL SERPL-MCNC: 102 MG/DL (ref 0–150)
TSH W FREE THYROID IF ABNORMAL: 3.08 UIU/ML (ref 0.27–4.2)
VIT B12 SERPL-MCNC: 402 PG/ML (ref 193–986)
VLDLC SERPL CALC-MCNC: 20 MG/DL (ref 6–23)
WBC # BLD AUTO: 6 K/UL (ref 4.3–11.1)

## 2025-02-18 PROCEDURE — 3078F DIAST BP <80 MM HG: CPT | Performed by: NURSE PRACTITIONER

## 2025-02-18 PROCEDURE — 1159F MED LIST DOCD IN RCRD: CPT | Performed by: NURSE PRACTITIONER

## 2025-02-18 PROCEDURE — 1160F RVW MEDS BY RX/DR IN RCRD: CPT | Performed by: NURSE PRACTITIONER

## 2025-02-18 PROCEDURE — 1036F TOBACCO NON-USER: CPT | Performed by: NURSE PRACTITIONER

## 2025-02-18 PROCEDURE — 99214 OFFICE O/P EST MOD 30 MIN: CPT | Performed by: NURSE PRACTITIONER

## 2025-02-18 PROCEDURE — 3074F SYST BP LT 130 MM HG: CPT | Performed by: NURSE PRACTITIONER

## 2025-02-18 PROCEDURE — G8417 CALC BMI ABV UP PARAM F/U: HCPCS | Performed by: NURSE PRACTITIONER

## 2025-02-18 PROCEDURE — G0439 PPPS, SUBSEQ VISIT: HCPCS | Performed by: NURSE PRACTITIONER

## 2025-02-18 PROCEDURE — 1123F ACP DISCUSS/DSCN MKR DOCD: CPT | Performed by: NURSE PRACTITIONER

## 2025-02-18 PROCEDURE — 3017F COLORECTAL CA SCREEN DOC REV: CPT | Performed by: NURSE PRACTITIONER

## 2025-02-18 PROCEDURE — G8427 DOCREV CUR MEDS BY ELIG CLIN: HCPCS | Performed by: NURSE PRACTITIONER

## 2025-02-18 SDOH — ECONOMIC STABILITY: FOOD INSECURITY: WITHIN THE PAST 12 MONTHS, YOU WORRIED THAT YOUR FOOD WOULD RUN OUT BEFORE YOU GOT MONEY TO BUY MORE.: NEVER TRUE

## 2025-02-18 SDOH — ECONOMIC STABILITY: FOOD INSECURITY: WITHIN THE PAST 12 MONTHS, THE FOOD YOU BOUGHT JUST DIDN'T LAST AND YOU DIDN'T HAVE MONEY TO GET MORE.: NEVER TRUE

## 2025-02-18 ASSESSMENT — ENCOUNTER SYMPTOMS
ABDOMINAL PAIN: 0
EYES NEGATIVE: 1
ABDOMINAL DISTENTION: 0
RESPIRATORY NEGATIVE: 1
ALLERGIC/IMMUNOLOGIC NEGATIVE: 1

## 2025-02-18 ASSESSMENT — PATIENT HEALTH QUESTIONNAIRE - PHQ9
SUM OF ALL RESPONSES TO PHQ QUESTIONS 1-9: 0
SUM OF ALL RESPONSES TO PHQ9 QUESTIONS 1 & 2: 0
SUM OF ALL RESPONSES TO PHQ QUESTIONS 1-9: 0
1. LITTLE INTEREST OR PLEASURE IN DOING THINGS: NOT AT ALL
2. FEELING DOWN, DEPRESSED OR HOPELESS: NOT AT ALL

## 2025-02-18 NOTE — ASSESSMENT & PLAN NOTE
Does see Urology  Had balloon procedure in 2024  Otherwise stable. Chronic  Continue current tx plan

## 2025-02-18 NOTE — ASSESSMENT & PLAN NOTE
Not on meds  Does see neurology  Stable.  Chronic.  Continue current treatment plan    Has been on Ubrelvy in the past prior stroke

## 2025-02-18 NOTE — PROGRESS NOTES
attendance with the patient were advised that Artificial Intelligence will be utilized during this visit to record and process the conversation to generate a clinical note. The patient (or guardian, if applicable) and other individuals in attendance at the appointment consented to the use of AI, including the recording.

## 2025-02-18 NOTE — ASSESSMENT & PLAN NOTE
On Plavix 75 mg p.o. daily  On aspirin 81 mg p.o. daily  On losartan 50 mg p.o. twice daily  On carvedilol 25 mg p.o. twice daily  On atorvastatin 80 mg daily  On amlodipine 10 mg p.o. daily  On nitro 0.4 mg tablet SLl as needed for chest pain    Does see cardiology  Stable.  Chronic.  Continue current treatment plan    S/P CABG 2016  S/P Watchman device 2024  S/P CVA 04/2024

## 2025-02-19 PROBLEM — E55.9 VITAMIN D DEFICIENCY: Status: ACTIVE | Noted: 2025-02-19

## 2025-02-19 NOTE — RESULT ENCOUNTER NOTE
Please let the patient know:    Lab Results Review (2/18/2025):    Blood Count and Anemia Panel:  · Red Blood Cell (RBC) Count: 5.63 (High)  · Mean Corpuscular Hemoglobin Concentration (MCHC): 31.1 (Low)  · All other blood count values, including hemoglobin and hematocrit, are within normal limits.  Recommendation: Your elevated RBC count is mild and likely not concerning, but we will continue monitoring. No changes needed at this time.    Kidney Function:  · Creatinine: 1.04 (Normal)  · Estimated Glomerular Filtration Rate (eGFR): 76 (Normal)  Recommendation: Kidney function remains stable. Continue staying well-hydrated. No medication changes needed.    Electrolytes and Metabolism:  · All electrolyte levels, including sodium, potassium, and chloride, are normal.  Recommendation: No concerns. Continue your current medications and diet.    Blood Sugar Control:  · Fasting Glucose: 85 (Normal)  · Hemoglobin A1C: 5.4 (Normal)  Recommendation: No signs of diabetes. Maintain a healthy diet and regular exercise.    Cholesterol and Heart Health:  · Total Cholesterol: 109 (Excellent)  · LDL (Bad Cholesterol): 46 (Optimal)  · HDL (Good Cholesterol): 43 (Normal)  · Triglycerides: 102 (Normal)  Recommendation: Your cholesterol levels are well-managed with atorvastatin. Continue your current dose.    Vitamin Levels:  · Vitamin B12: 402 (Normal)  · Vitamin D: 17.3 (Low)  Recommendation: Your vitamin D is low. Consider taking vitamin D3 1392-2136 IU daily and increasing sun exposure or dietary sources of vitamin D.    Thyroid Function:  · TSH: 3.08 (Normal)  Recommendation: No concerns.    Prostate Health:  · PSA: <0.1 (Normal)  Recommendation: No concerns. Continue regular check-ups.    Next Steps & Follow-Up:  · Increase Vitamin D intake with supplementation and/or diet.  · Continue your current heart and blood pressure medications as they are effectively managing your cholesterol and cardiovascular risk.  · No immediate

## 2025-03-05 NOTE — ED NOTES
TRANSFER - OUT REPORT:    Verbal report given to Keron RN(name) on Vanita Chávez  being transferred to [facility](unit) for routine progression of care       Report consisted of patients Situation, Background, Assessment and   Recommendations(SBAR). Information from the following report(s) SBAR was reviewed with the receiving nurse. Lines:   Peripheral IV 02/20/22 Left Antecubital (Active)   Site Assessment Clean, dry, & intact 02/20/22 1143   Phlebitis Assessment 0 02/20/22 1143   Infiltration Assessment 0 02/20/22 1143   Dressing Status Clean, dry, & intact 02/20/22 1143   Dressing Type 4 X 4 02/20/22 1143   Hub Color/Line Status Pink 02/20/22 1143        Opportunity for questions and clarification was provided.       Patient transported with:   Registered Nurse yes

## 2025-03-14 ENCOUNTER — OFFICE VISIT (OUTPATIENT)
Age: 75
End: 2025-03-14
Payer: MEDICARE

## 2025-03-14 VITALS
HEART RATE: 72 BPM | DIASTOLIC BLOOD PRESSURE: 72 MMHG | WEIGHT: 216.8 LBS | SYSTOLIC BLOOD PRESSURE: 130 MMHG | BODY MASS INDEX: 31.11 KG/M2

## 2025-03-14 DIAGNOSIS — I48.0 PAROXYSMAL A-FIB (HCC): ICD-10-CM

## 2025-03-14 DIAGNOSIS — I10 PRIMARY HYPERTENSION: ICD-10-CM

## 2025-03-14 PROCEDURE — 1126F AMNT PAIN NOTED NONE PRSNT: CPT | Performed by: INTERNAL MEDICINE

## 2025-03-14 PROCEDURE — 1036F TOBACCO NON-USER: CPT | Performed by: INTERNAL MEDICINE

## 2025-03-14 PROCEDURE — 1123F ACP DISCUSS/DSCN MKR DOCD: CPT | Performed by: INTERNAL MEDICINE

## 2025-03-14 PROCEDURE — G8417 CALC BMI ABV UP PARAM F/U: HCPCS | Performed by: INTERNAL MEDICINE

## 2025-03-14 PROCEDURE — 3017F COLORECTAL CA SCREEN DOC REV: CPT | Performed by: INTERNAL MEDICINE

## 2025-03-14 PROCEDURE — G8428 CUR MEDS NOT DOCUMENT: HCPCS | Performed by: INTERNAL MEDICINE

## 2025-03-14 PROCEDURE — 3078F DIAST BP <80 MM HG: CPT | Performed by: INTERNAL MEDICINE

## 2025-03-14 PROCEDURE — 99214 OFFICE O/P EST MOD 30 MIN: CPT | Performed by: INTERNAL MEDICINE

## 2025-03-14 PROCEDURE — 3075F SYST BP GE 130 - 139MM HG: CPT | Performed by: INTERNAL MEDICINE

## 2025-03-14 RX ORDER — CARVEDILOL 25 MG/1
25 TABLET ORAL 2 TIMES DAILY WITH MEALS
Qty: 180 TABLET | Refills: 3 | Status: SHIPPED | OUTPATIENT
Start: 2025-03-14

## 2025-03-14 RX ORDER — LOSARTAN POTASSIUM 50 MG/1
50 TABLET ORAL 2 TIMES DAILY
Qty: 180 TABLET | Refills: 3 | Status: SHIPPED | OUTPATIENT
Start: 2025-03-14

## 2025-03-14 RX ORDER — CLOPIDOGREL BISULFATE 75 MG/1
75 TABLET ORAL DAILY
Qty: 90 TABLET | Refills: 3 | Status: SHIPPED | OUTPATIENT
Start: 2025-03-14

## 2025-03-14 RX ORDER — ATORVASTATIN CALCIUM 80 MG/1
80 TABLET, FILM COATED ORAL DAILY
Qty: 90 TABLET | Refills: 3 | Status: CANCELLED | OUTPATIENT
Start: 2025-03-14

## 2025-03-14 RX ORDER — AMLODIPINE BESYLATE 10 MG/1
10 TABLET ORAL DAILY
Qty: 90 TABLET | Refills: 3 | Status: SHIPPED | OUTPATIENT
Start: 2025-03-14

## 2025-03-14 NOTE — PROGRESS NOTES
[facility] CARDIOLOGY  [address] [place] [address], [facility] [address]  [place] [address]  PHONE: [phone]      25    NAME:  Benjamin Colby  : 1950  MRN: [id number]       SUBJECTIVE:   Benjamin Colby is a 75 y.o. male seen for a follow up visit regarding the following:     Chief Complaint   Patient presents with    Coronary Artery Disease         HPI:    No cp or marcum. No orthopnea or pnd. No palpitations or syncope.      Past Medical History, Past Surgical History, Family history, Social History, and Medications were all reviewed with the patient today and updated as necessary.     Current Outpatient Medications   Medication Sig Dispense Refill    amLODIPine (NORVASC) 10 MG tablet Take 1 tablet by mouth daily 90 tablet 3    carvedilol (COREG) 25 MG tablet Take 1 tablet by mouth 2 times daily (with meals) 180 tablet 3    clopidogrel (PLAVIX) 75 MG tablet Take 1 tablet by mouth daily 90 tablet 3    losartan (COZAAR) 50 MG tablet Take 1 tablet by mouth 2 times daily 180 tablet 3    Evolocumab 140 MG/ML SOAJ Inject 140 mg into the skin every 14 days 6 Adjustable Dose Pre-filled Pen Syringe 3    aspirin 81 MG EC tablet Take 1 tablet by mouth daily 90 tablet 1    atorvastatin (LIPITOR) 80 MG tablet Take 1 tablet by mouth daily 90 tablet 3    nitroGLYCERIN (NITROSTAT) 0.4 MG SL tablet Place 1 tablet under the tongue every 5 minutes as needed for Chest pain 25 tablet 0    acetaminophen (TYLENOL) 325 MG tablet Take 2 tablets by mouth every 6 hours as needed for Pain      tamsulosin (FLOMAX) 0.4 MG capsule Take 1 capsule by mouth daily (Patient not taking: Reported on 10/24/2024)       No current facility-administered medications for this visit.               Social History     Tobacco Use    Smoking status: Never    Smokeless tobacco: Never   Substance Use Topics    Alcohol use: Never              PHYSICAL EXAM:   /72   Pulse 72   Wt 98.3 kg (216 lb 12.8 oz)   BMI 31.11 kg/m²    Constitutional:

## 2025-03-20 ENCOUNTER — TELEPHONE (OUTPATIENT)
Age: 75
End: 2025-03-20

## 2025-03-20 NOTE — TELEPHONE ENCOUNTER
[facility] received a prescription for repatha last week and a request for prior auth has been sent through [employer]. [employer] is following up to be sure the prior auth request has been received.

## 2025-03-21 NOTE — TELEPHONE ENCOUNTER
PA Approved  3/21/2025   Note from Payer: CaseId:[id number];Status:Approved;Review Type:Prior Auth;Coverage Start Date:02/19/2025;Coverage End Date:03/21/2026;

## 2025-03-27 ENCOUNTER — TELEPHONE (OUTPATIENT)
Age: 75
End: 2025-03-27

## 2025-03-27 NOTE — TELEPHONE ENCOUNTER
Called patient and informed him that his insurance has approved for Repatha. Patient stated that he had been informed of the approval and that he had picked up the medication.

## 2025-08-18 ENCOUNTER — OFFICE VISIT (OUTPATIENT)
Dept: PRIMARY CARE CLINIC | Facility: CLINIC | Age: 75
End: 2025-08-18
Payer: MEDICARE

## 2025-08-18 VITALS
BODY MASS INDEX: 30.35 KG/M2 | OXYGEN SATURATION: 97 % | TEMPERATURE: 97.7 F | WEIGHT: 212 LBS | DIASTOLIC BLOOD PRESSURE: 48 MMHG | HEIGHT: 70 IN | HEART RATE: 62 BPM | SYSTOLIC BLOOD PRESSURE: 101 MMHG

## 2025-08-18 DIAGNOSIS — Z13.21 ENCOUNTER FOR VITAMIN DEFICIENCY SCREENING: ICD-10-CM

## 2025-08-18 DIAGNOSIS — E78.5 DYSLIPIDEMIA: ICD-10-CM

## 2025-08-18 DIAGNOSIS — Z13.0 SCREENING FOR ENDOCRINE, NUTRITIONAL, METABOLIC AND IMMUNITY DISORDER: ICD-10-CM

## 2025-08-18 DIAGNOSIS — E66.811 OBESITY (BMI 30.0-34.9): ICD-10-CM

## 2025-08-18 DIAGNOSIS — R53.83 OTHER FATIGUE: ICD-10-CM

## 2025-08-18 DIAGNOSIS — D68.69 SECONDARY HYPERCOAGULABLE STATE: ICD-10-CM

## 2025-08-18 DIAGNOSIS — R53.81 MALAISE: ICD-10-CM

## 2025-08-18 DIAGNOSIS — G47.33 OSA (OBSTRUCTIVE SLEEP APNEA): ICD-10-CM

## 2025-08-18 DIAGNOSIS — Z95.818 PRESENCE OF WATCHMAN LEFT ATRIAL APPENDAGE CLOSURE DEVICE: ICD-10-CM

## 2025-08-18 DIAGNOSIS — M25.50 PAIN IN JOINT, MULTIPLE SITES: ICD-10-CM

## 2025-08-18 DIAGNOSIS — Z79.899 ENCOUNTER FOR LONG-TERM (CURRENT) USE OF MEDICATIONS: ICD-10-CM

## 2025-08-18 DIAGNOSIS — I25.119 ATHEROSCLEROSIS OF NATIVE CORONARY ARTERY OF NATIVE HEART WITH ANGINA PECTORIS: ICD-10-CM

## 2025-08-18 DIAGNOSIS — Z12.5 SCREENING FOR PROSTATE CANCER: ICD-10-CM

## 2025-08-18 DIAGNOSIS — Z13.228 SCREENING FOR METABOLIC DISORDER: ICD-10-CM

## 2025-08-18 DIAGNOSIS — Z95.1 S/P CABG X 4: ICD-10-CM

## 2025-08-18 DIAGNOSIS — R79.89 OTHER SPECIFIED ABNORMAL FINDINGS OF BLOOD CHEMISTRY: ICD-10-CM

## 2025-08-18 DIAGNOSIS — R53.83 MALAISE AND FATIGUE: ICD-10-CM

## 2025-08-18 DIAGNOSIS — I10 PRIMARY HYPERTENSION: ICD-10-CM

## 2025-08-18 DIAGNOSIS — I48.0 PAROXYSMAL A-FIB (HCC): ICD-10-CM

## 2025-08-18 DIAGNOSIS — R73.09 OTHER ABNORMAL GLUCOSE: ICD-10-CM

## 2025-08-18 DIAGNOSIS — Z13.220 SCREENING FOR LIPID DISORDERS: ICD-10-CM

## 2025-08-18 DIAGNOSIS — R53.82 CHRONIC FATIGUE: ICD-10-CM

## 2025-08-18 DIAGNOSIS — Z13.1 SCREENING FOR DIABETES MELLITUS: ICD-10-CM

## 2025-08-18 DIAGNOSIS — E55.9 VITAMIN D DEFICIENCY: ICD-10-CM

## 2025-08-18 DIAGNOSIS — G43.501 PERSISTENT MIGRAINE AURA WITHOUT CEREBRAL INFARCTION AND WITH STATUS MIGRAINOSUS, NOT INTRACTABLE: ICD-10-CM

## 2025-08-18 DIAGNOSIS — Z13.0 SCREENING FOR IRON DEFICIENCY ANEMIA: ICD-10-CM

## 2025-08-18 DIAGNOSIS — N13.9 URINARY OBSTRUCTION: ICD-10-CM

## 2025-08-18 DIAGNOSIS — Z13.0 SCREENING FOR DEFICIENCY ANEMIA: ICD-10-CM

## 2025-08-18 DIAGNOSIS — Z13.21 SCREENING FOR ENDOCRINE, NUTRITIONAL, METABOLIC AND IMMUNITY DISORDER: ICD-10-CM

## 2025-08-18 DIAGNOSIS — I25.810 CORONARY ARTERY DISEASE INVOLVING CORONARY BYPASS GRAFT OF NATIVE HEART WITHOUT ANGINA PECTORIS: ICD-10-CM

## 2025-08-18 DIAGNOSIS — Z13.29 SCREENING FOR ENDOCRINE, NUTRITIONAL, METABOLIC AND IMMUNITY DISORDER: ICD-10-CM

## 2025-08-18 DIAGNOSIS — R79.9 ABNORMAL BLOOD CHEMISTRY: ICD-10-CM

## 2025-08-18 DIAGNOSIS — Z13.29 SCREENING FOR THYROID DISORDER: ICD-10-CM

## 2025-08-18 DIAGNOSIS — Z13.228 SCREENING FOR ENDOCRINE, NUTRITIONAL, METABOLIC AND IMMUNITY DISORDER: ICD-10-CM

## 2025-08-18 DIAGNOSIS — R79.0 LOW MAGNESIUM LEVEL: ICD-10-CM

## 2025-08-18 DIAGNOSIS — R30.0 DYSURIA: Primary | ICD-10-CM

## 2025-08-18 DIAGNOSIS — R53.81 MALAISE AND FATIGUE: ICD-10-CM

## 2025-08-18 DIAGNOSIS — R30.0 DYSURIA: ICD-10-CM

## 2025-08-18 DIAGNOSIS — Z86.79 HISTORY OF INTRACRANIAL HEMORRHAGE: ICD-10-CM

## 2025-08-18 DIAGNOSIS — Z71.89 ACP (ADVANCE CARE PLANNING): ICD-10-CM

## 2025-08-18 LAB
25(OH)D3 SERPL-MCNC: 28.8 NG/ML (ref 30–100)
ALBUMIN SERPL-MCNC: 3.8 G/DL (ref 3.2–4.6)
ALBUMIN/GLOB SERPL: 1.2 (ref 1–1.9)
ALP SERPL-CCNC: 64 U/L (ref 40–129)
ALT SERPL-CCNC: 16 U/L (ref 8–55)
ANION GAP SERPL CALC-SCNC: 12 MMOL/L (ref 7–16)
AST SERPL-CCNC: 21 U/L (ref 15–37)
BASOPHILS # BLD: 0.05 K/UL (ref 0–0.2)
BASOPHILS NFR BLD: 0.8 % (ref 0–2)
BILIRUB SERPL-MCNC: 0.6 MG/DL (ref 0–1.2)
BILIRUBIN, URINE, POC: NEGATIVE
BLOOD URINE, POC: NEGATIVE
BUN SERPL-MCNC: 18 MG/DL (ref 8–23)
CALCIUM SERPL-MCNC: 9.1 MG/DL (ref 8.8–10.2)
CHLORIDE SERPL-SCNC: 104 MMOL/L (ref 98–107)
CHOLEST SERPL-MCNC: 103 MG/DL (ref 0–200)
CO2 SERPL-SCNC: 26 MMOL/L (ref 20–29)
CREAT SERPL-MCNC: 1.11 MG/DL (ref 0.8–1.3)
CRP SERPL HS-MCNC: 1.7 MG/L (ref 0–3)
DIFFERENTIAL METHOD BLD: ABNORMAL
EOSINOPHIL # BLD: 0.23 K/UL (ref 0–0.8)
EOSINOPHIL NFR BLD: 3.8 % (ref 0.5–7.8)
ERYTHROCYTE [DISTWIDTH] IN BLOOD BY AUTOMATED COUNT: 13.2 % (ref 11.9–14.6)
ERYTHROCYTE [SEDIMENTATION RATE] IN BLOOD: 6 MM/HR
EST. AVERAGE GLUCOSE BLD GHB EST-MCNC: 103 MG/DL
FERRITIN SERPL-MCNC: 67 NG/ML (ref 8–388)
FOLATE SERPL-MCNC: 10.6 NG/ML (ref 3.1–17.5)
GLOBULIN SER CALC-MCNC: 3.1 G/DL (ref 2.3–3.5)
GLUCOSE SERPL-MCNC: 87 MG/DL (ref 70–99)
GLUCOSE URINE, POC: NEGATIVE
HBA1C MFR BLD: 5.2 % (ref 0–5.6)
HCT VFR BLD AUTO: 50.7 % (ref 41.1–50.3)
HDLC SERPL-MCNC: 44 MG/DL (ref 40–60)
HDLC SERPL: 2.4 (ref 0–5)
HGB BLD-MCNC: 16.9 G/DL (ref 13.6–17.2)
IMM GRANULOCYTES # BLD AUTO: 0.03 K/UL (ref 0–0.5)
IMM GRANULOCYTES NFR BLD AUTO: 0.5 % (ref 0–5)
IRON SATN MFR SERPL: 20 % (ref 20–50)
IRON SERPL-MCNC: 55 UG/DL (ref 35–100)
KETONES, URINE, POC: NEGATIVE
LDLC SERPL CALC-MCNC: 42 MG/DL (ref 0–100)
LEUKOCYTE ESTERASE, URINE, POC: NEGATIVE
LYMPHOCYTES # BLD: 1.34 K/UL (ref 0.5–4.6)
LYMPHOCYTES NFR BLD: 22.3 % (ref 13–44)
MAGNESIUM SERPL-MCNC: 2.1 MG/DL (ref 1.8–2.4)
MCH RBC QN AUTO: 30.6 PG (ref 26.1–32.9)
MCHC RBC AUTO-ENTMCNC: 33.3 G/DL (ref 31.4–35)
MCV RBC AUTO: 91.8 FL (ref 82–102)
MONOCYTES # BLD: 0.46 K/UL (ref 0.1–1.3)
MONOCYTES NFR BLD: 7.6 % (ref 4–12)
NEUTS SEG # BLD: 3.91 K/UL (ref 1.7–8.2)
NEUTS SEG NFR BLD: 65 % (ref 43–78)
NITRITE, URINE, POC: NEGATIVE
NRBC # BLD: 0 K/UL (ref 0–0.2)
PH, URINE, POC: 5 (ref 4.6–8)
PLATELET # BLD AUTO: 209 K/UL (ref 150–450)
PMV BLD AUTO: 10.6 FL (ref 9.4–12.3)
POTASSIUM SERPL-SCNC: 4.4 MMOL/L (ref 3.5–5.1)
PROT SERPL-MCNC: 6.9 G/DL (ref 6.3–8.2)
PROTEIN,URINE, POC: NORMAL
PSA SERPL-MCNC: <0.1 NG/ML (ref 0–4)
RBC # BLD AUTO: 5.52 M/UL (ref 4.23–5.6)
SODIUM SERPL-SCNC: 142 MMOL/L (ref 136–145)
SPECIFIC GRAVITY, URINE, POC: 1.02 (ref 1–1.03)
TIBC SERPL-MCNC: 274 UG/DL (ref 240–450)
TRIGL SERPL-MCNC: 86 MG/DL (ref 0–150)
TSH W FREE THYROID IF ABNORMAL: 2.62 UIU/ML (ref 0.27–4.2)
UIBC SERPL-MCNC: 219 UG/DL (ref 112–347)
URINALYSIS CLARITY, POC: CLEAR
URINALYSIS COLOR, POC: YELLOW
UROBILINOGEN, POC: NORMAL
VIT B12 SERPL-MCNC: 399 PG/ML (ref 193–986)
VLDLC SERPL CALC-MCNC: 17 MG/DL (ref 6–23)
WBC # BLD AUTO: 6 K/UL (ref 4.3–11.1)

## 2025-08-18 PROCEDURE — 81003 URINALYSIS AUTO W/O SCOPE: CPT | Performed by: NURSE PRACTITIONER

## 2025-08-18 PROCEDURE — 99497 ADVNCD CARE PLAN 30 MIN: CPT | Performed by: NURSE PRACTITIONER

## 2025-08-18 PROCEDURE — 3074F SYST BP LT 130 MM HG: CPT | Performed by: NURSE PRACTITIONER

## 2025-08-18 PROCEDURE — 1036F TOBACCO NON-USER: CPT | Performed by: NURSE PRACTITIONER

## 2025-08-18 PROCEDURE — 99214 OFFICE O/P EST MOD 30 MIN: CPT | Performed by: NURSE PRACTITIONER

## 2025-08-18 PROCEDURE — 1160F RVW MEDS BY RX/DR IN RCRD: CPT | Performed by: NURSE PRACTITIONER

## 2025-08-18 PROCEDURE — 1159F MED LIST DOCD IN RCRD: CPT | Performed by: NURSE PRACTITIONER

## 2025-08-18 PROCEDURE — 3078F DIAST BP <80 MM HG: CPT | Performed by: NURSE PRACTITIONER

## 2025-08-18 PROCEDURE — 1123F ACP DISCUSS/DSCN MKR DOCD: CPT | Performed by: NURSE PRACTITIONER

## 2025-08-18 PROCEDURE — G8417 CALC BMI ABV UP PARAM F/U: HCPCS | Performed by: NURSE PRACTITIONER

## 2025-08-18 PROCEDURE — G8427 DOCREV CUR MEDS BY ELIG CLIN: HCPCS | Performed by: NURSE PRACTITIONER

## 2025-08-18 PROCEDURE — 3017F COLORECTAL CA SCREEN DOC REV: CPT | Performed by: NURSE PRACTITIONER

## 2025-08-18 RX ORDER — CIPROFLOXACIN 500 MG/1
500 TABLET, FILM COATED ORAL 2 TIMES DAILY
Qty: 14 TABLET | Refills: 0 | Status: SHIPPED | OUTPATIENT
Start: 2025-08-18 | End: 2025-08-25

## 2025-08-18 ASSESSMENT — ENCOUNTER SYMPTOMS
ABDOMINAL PAIN: 1
RESPIRATORY NEGATIVE: 1
EYES NEGATIVE: 1
ALLERGIC/IMMUNOLOGIC NEGATIVE: 1
ABDOMINAL DISTENTION: 0

## 2025-08-20 LAB
BACTERIA SPEC CULT: NORMAL
SERVICE CMNT-IMP: NORMAL

## (undated) DEVICE — PERCLOSE™ PROSTYLE™ SUTURE-MEDIATED CLOSURE AND REPAIR SYSTEM: Brand: PERCLOSE™ PROSTYLE™

## (undated) DEVICE — ACCESS SHEATH WITH DILATOR: Brand: WATCHMAN FXD CURVE™ ACCESS SYSTEM

## (undated) DEVICE — CHECK-FLO PERFORMER INTRODUCER: Brand: PERFORMER

## (undated) DEVICE — CATHETER DIAG 6FR L110CM PIG CRV SZ 6 SIDE H DBL BRAID WIRE

## (undated) DEVICE — CATHETER REPROC ULTRASOUND 10 FRX90 CM ACUSON ACUNAV

## (undated) DEVICE — 1 X VERSACROSS CONNECT TRANSSEPTAL DILATOR (INCLUDING 1 X J-TIP MECHANICAL GUIDEWIRE); 1 X VERSACROSS RF WIRE (INCLUDING 1 X CONNECTOR CABLE (SINGLE USE)); 1 X DISPERSIVE ELECTRODE: Brand: VERSACROSS CONNECT LAAC ACCESS SOLUTION

## (undated) DEVICE — 18G NG KIT WITH 96IN PROBE COVER (10 PK): Brand: SITE-RITE

## (undated) DEVICE — PINNACLE INTRODUCER SHEATH: Brand: PINNACLE